# Patient Record
Sex: FEMALE | Race: WHITE | NOT HISPANIC OR LATINO | Employment: OTHER | URBAN - METROPOLITAN AREA
[De-identification: names, ages, dates, MRNs, and addresses within clinical notes are randomized per-mention and may not be internally consistent; named-entity substitution may affect disease eponyms.]

---

## 2017-01-16 ENCOUNTER — ALLSCRIPTS OFFICE VISIT (OUTPATIENT)
Dept: OTHER | Facility: OTHER | Age: 62
End: 2017-01-16

## 2017-01-17 ENCOUNTER — GENERIC CONVERSION - ENCOUNTER (OUTPATIENT)
Dept: OTHER | Facility: OTHER | Age: 62
End: 2017-01-17

## 2017-01-23 ENCOUNTER — TRANSCRIBE ORDERS (OUTPATIENT)
Dept: ADMINISTRATIVE | Facility: HOSPITAL | Age: 62
End: 2017-01-23

## 2017-01-23 ENCOUNTER — HOSPITAL ENCOUNTER (OUTPATIENT)
Dept: RADIOLOGY | Facility: HOSPITAL | Age: 62
Discharge: HOME | End: 2017-01-23
Payer: MEDICARE

## 2017-01-23 DIAGNOSIS — R07.81 PLEURODYNIA: ICD-10-CM

## 2017-01-23 PROCEDURE — 71101 X-RAY EXAM UNILAT RIBS/CHEST: CPT

## 2017-01-24 ENCOUNTER — GENERIC CONVERSION - ENCOUNTER (OUTPATIENT)
Dept: OTHER | Facility: OTHER | Age: 62
End: 2017-01-24

## 2017-04-25 ENCOUNTER — ALLSCRIPTS OFFICE VISIT (OUTPATIENT)
Dept: OTHER | Facility: OTHER | Age: 62
End: 2017-04-25

## 2017-04-25 DIAGNOSIS — M79.7 FIBROMYALGIA: ICD-10-CM

## 2017-04-25 DIAGNOSIS — F30.9 MANIC EPISODE (HCC): ICD-10-CM

## 2017-04-25 DIAGNOSIS — Z13.6 ENCOUNTER FOR SCREENING FOR CARDIOVASCULAR DISORDERS: ICD-10-CM

## 2017-04-25 DIAGNOSIS — R69 ILLNESS: ICD-10-CM

## 2017-07-04 ENCOUNTER — GENERIC CONVERSION - ENCOUNTER (OUTPATIENT)
Dept: OTHER | Facility: OTHER | Age: 62
End: 2017-07-04

## 2017-07-31 ENCOUNTER — GENERIC CONVERSION - ENCOUNTER (OUTPATIENT)
Dept: OTHER | Facility: OTHER | Age: 62
End: 2017-07-31

## 2017-08-28 ENCOUNTER — GENERIC CONVERSION - ENCOUNTER (OUTPATIENT)
Dept: OTHER | Facility: OTHER | Age: 62
End: 2017-08-28

## 2017-11-13 ENCOUNTER — TRANSCRIBE ORDERS (OUTPATIENT)
Dept: MAMMOGRAPHY | Facility: CLINIC | Age: 62
End: 2017-11-13

## 2017-11-13 ENCOUNTER — LAB REQUISITION (OUTPATIENT)
Dept: LAB | Facility: HOSPITAL | Age: 62
End: 2017-11-13
Payer: MEDICARE

## 2017-11-13 ENCOUNTER — ALLSCRIPTS OFFICE VISIT (OUTPATIENT)
Dept: OTHER | Facility: OTHER | Age: 62
End: 2017-11-13

## 2017-11-13 DIAGNOSIS — Z12.31 ENCOUNTER FOR SCREENING MAMMOGRAM FOR MALIGNANT NEOPLASM OF BREAST: ICD-10-CM

## 2017-11-13 DIAGNOSIS — Z01.419 ENCOUNTER FOR GYNECOLOGICAL EXAMINATION WITHOUT ABNORMAL FINDING: ICD-10-CM

## 2017-11-13 PROCEDURE — 87624 HPV HI-RISK TYP POOLED RSLT: CPT | Performed by: PHYSICIAN ASSISTANT

## 2017-11-13 PROCEDURE — G0145 SCR C/V CYTO,THINLAYER,RESCR: HCPCS | Performed by: PHYSICIAN ASSISTANT

## 2017-11-15 ENCOUNTER — HOSPITAL ENCOUNTER (OUTPATIENT)
Dept: MAMMOGRAPHY | Facility: CLINIC | Age: 62
Discharge: HOME | End: 2017-11-15
Payer: MEDICARE

## 2017-11-15 DIAGNOSIS — Z12.31 ENCOUNTER FOR SCREENING MAMMOGRAM FOR MALIGNANT NEOPLASM OF BREAST: ICD-10-CM

## 2017-11-15 PROCEDURE — G0202 SCR MAMMO BI INCL CAD: HCPCS

## 2017-11-16 LAB — HPV RRNA GENITAL QL NAA+PROBE: NORMAL

## 2017-11-17 LAB
LAB AP GYN PRIMARY INTERPRETATION: NORMAL
Lab: NORMAL

## 2017-12-11 ENCOUNTER — GENERIC CONVERSION - ENCOUNTER (OUTPATIENT)
Dept: FAMILY MEDICINE CLINIC | Facility: CLINIC | Age: 62
End: 2017-12-11

## 2018-01-09 NOTE — MISCELLANEOUS
Message  Return to work or school:   Lukasz Hinojosa is under my professional care  She was seen in my office on 1/16/2017   She is able to return to work on  1/30/2017       Ivania Cordova ,PAC        Signatures   Electronically signed by : Justo Huber, ; Jan 23 2017  4:24PM EST                       (Author)

## 2018-01-14 VITALS
WEIGHT: 195.25 LBS | RESPIRATION RATE: 16 BRPM | HEART RATE: 80 BPM | DIASTOLIC BLOOD PRESSURE: 82 MMHG | BODY MASS INDEX: 32.53 KG/M2 | SYSTOLIC BLOOD PRESSURE: 110 MMHG | TEMPERATURE: 98.4 F | HEIGHT: 65 IN

## 2018-01-14 NOTE — PROGRESS NOTES
Assessment   1  Never a smoker  2  Acute sinusitis (461 9) (J01 90)    Plan  Acute sinusitis    · Start: Azithromycin 250 MG Oral Tablet (Zithromax Z-Connor); TAKE 2 TABLETS ON DAY 1  THEN TAKE 1 TABLET A DAY FOR 4 DAYS  PMH: Cough, Shortness of breath    · PEAK FLOW- POC; Status:Complete;   Done: 84THQ7418 08:12AM    Discussion/Summary    Sinus infection - start Z-pack as directed with food, increase Symbicort 2 puffs twice daily for 2 weeks until breathing improves then resume 1 puff twice a day, use Proair as needed  f/u as needed  f/u as scheduled  Possible side effects of new medications were reviewed with the patient/guardian today  The treatment plan was reviewed with the patient/guardian  The patient/guardian understands and agrees with the treatment plan      Chief Complaint  Pt presents to the office with c/o a cough and difficulty breathing  colon due 10/23/23  mammo due 07/14/16  pap due 01/23/2017      History of Present Illness  HPI: [place] and was babysitting grandson from the 20-26  Grandson had a fever of 102 F  Patient started to feel sick around January 23, then started with cough around Jan 26  Nasal congestion, cough, trouble breathing  Ears congested and popping, laying flat coughing worse  Tried [facility] cough medicine like Delsym with no relief  Denies fever, chills, n/v/d  Active Problems   1  Acute URI (465 9) (J06 9)  2  Allergic rhinitis (477 9) (J30 9)  3  Asymptomatic menopausal state (V49 81) (Z78 0)  4  Basal cell carcinoma of skin (173 91) (C44 91)  5  Bipolar I disorder, single manic episode (296 00) (F30 9)  6  Cervical radiculopathy (723 4) (M54 12)  7  Cervical spinal stenosis (723 0) (M48 02)  8  Chronic pain syndrome (338 4) (G89 4)  9  Colonoscopy (Fiberoptic) Screening  10  Degeneration of cervical intervertebral disc (722 4) (M50 90)  11  Disc degeneration, lumbar (722 52) (M51 36)  12  Encounter for routine gynecological examination (V72 31) (Z01 419)  13  Encounter for screening for malignant neoplasm of colon (V76 51) (Z12 11)  14  Encounter for screening mammogram for malignant neoplasm of breast (V76 12)    (Z12 31)  15  Esophageal reflux (530 81) (K21 9)  16  Essential hypertriglyceridemia (272 1) (E78 1)  17  Fatigue (780 79) (R53 83)  18  Fibromyalgia (729 1) (M79 7)  19  Herpes simplex infection (054 9) (B00 9)  20  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 1) (G56 10)  21  Need for hepatitis C screening test (V73 89) (Z11 59)  22  Need for prophylactic vaccination and inoculation against influenza (V04 81) (Z23)  23  Need for Tdap vaccination (V06 1) (Z23)  24  Screening for osteoporosis (V82 81) (Z13 820)  25  Vaccine for VZV (varicella-zoster virus) (V05 4) (Z23)    Past Medical History   1  History of Abnormal blood chemistry (790 6) (R79 9)  2  Acute bronchitis (466 0) (J20 9)  3  Acute bronchitis (466 0) (J20 9)  4  History of Anxiety (300 00) (F41 9)  5  History of Cellulitis of extremity (L03 119)  6  History of Cellulitis of right lower leg (682 6) (L03 115)  7  History of Chest pain (786 50) (R07 9)  8  History of Chronic constipation (564 00) (K59 00)  9  History of Chronic hyperglycemia (790 29) (R73 9)  10  Cough (786 2) (R05)  11  History of Cough (786 2) (R05)  12  History of Depression (311) (F32 9)  13  History of Elevated serum creatinine (790 99) (R74 8)  14  History of Fibromyalgia (729 1) (M79 7)  15  History of Flushing (782 62) (R23 2)  16  History of Folliculitis (244 7) (Z80 8)  17  History of Headache (784 0) (R51)  18  History of acute sinusitis (V12 69) (Z87 09)  19  History of bipolar disorder (V11 1) (Z86 59)  20  History of chronic fatigue syndrome (V13 89) (Z87 898)  21  History of chronic fatigue syndrome (V13 89) (Z87 898)  22  History of edema (V13 89) (Z87 898)  23  History of gastroesophageal reflux (GERD) (V12 79) (Z87 19)  24  History of herpes simplex infection (V12 09) (Z86 19)  25  History of hyperglycemia (V12 29) (Z86 39)  26   History of hypokalemia (V12 29) (Z86 39)  27  History of insomnia (V13 89) (Z87 898)  28  History of low back pain (V13 59) (Z87 39)  29  History of upper respiratory infection (V12 09) (Z87 09)  30  History of upper respiratory infection (V12 09) (Z87 09)  31  History of Leg swelling (729 81) (M79 89)  32  History of Nasal congestion (478 19) (R09 81)  33  History of Neck pain (723 1) (M54 2)  34  Personal history of arthritis (V13 4) (Z87 39)  35  History of Pneumonia (V12 61)  36  History of Sacroiliitis (720 2) (M46 1)  37  Visit for pre-operative examination (V72 84) (K72 547)    Family History   1  Family history of Hypertension (V17 49)  2  Family history of Osteoporosis (V17 81)   3  Family history of Acute Myocardial Infarction (V17 3)  4  Family history of   5  Family history of Malignant Melanoma Of The Skin (V16 8)   6  Family history of Diabetes Mellitus (V18 0)   7  Family history of Coronary Artery Disease (V17 49)   8  Family history of Acute Myocardial Infarction (V17 3)  9  Family history of Arthritis (V17 7)  10  Family history of Diabetes Mellitus (V18 0)  11  Family history of hypertension (V17 49) (Z82 49)  12  Family history of Heart Disease (V17 49)  13  Family history of Osteoporosis (V17 81)  Family History Reviewed: The family history was reviewed and updated today  Social History    · Always uses seat belt   · Daily caffeine consumption, 1 serving a day   · Employed in sales position   · Lives independently   · Marital History -  (V61 03)   · Never a smoker   · No alcohol use   · No drug use   · Unemployed (V62 0)  The social history was reviewed and updated today  The social history was reviewed and is unchanged  Surgical History   1  History of Breast Surgery Reduction Procedure  2  History of Cervical Vertebral Fusion  3  History of Complete Colonoscopy  4  History of Dilation And Curettage  5  History of Hallux Valgus (Bunion) Correction  6   History of Hemorrhoidectomy  7  History of Neuroplasty Decompression Median Nerve At Carpal Tunnel  8  History of Oral Surgery Tooth Extraction  9  History of Tonsillectomy  10  History of Tubal Ligation  Surgical History Reviewed: The surgical history was reviewed and updated today  Current Meds  1  Abilify 20 MG Oral Tablet; 1 tab PO q day; Therapy: 84Aam1721 to Recorded  2  Bisacodyl EC 5 MG Oral Tablet Delayed Release; Therapy: (Recorded:07Oct2015) to Recorded  3  Centrum Silver TABS; TAKE 1 TABLET DAILY; Therapy: (Recorded:43Wda3145) to Recorded  4  Cymbalta 60 MG Oral Capsule Delayed Release Particles; TAKE 1 CAPSULE DAILY; Therapy: 39Wgs1913 to (Evaluate:27Oct2012) Recorded  5  Loratadine 10 MG Oral Tablet; Take 1 tablet daily; Therapy: (Recorded:87Gzi8904) to Recorded  6  Montelukast Sodium 10 MG Oral Tablet; TAKE 1 TABLET AT BEDTIME; Therapy: 20OIB7214 to (Evaluate:17Nov2016)  Requested for: 72ANK4805; Last   Rx:23Nov2015 Ordered  7  ProAir  (90 Base) MCG/ACT Inhalation Aerosol Solution; 2 PUFF(S) EVERY 4   HOURS AS NEEDED; Therapy: 02CLO8179 to (Ila Merrill)  Requested for: 42PWA7368; Last   Rx:16Oct2015 Ordered  8  Symbicort 160-4 5 MCG/ACT Inhalation Aerosol; INHALE 1 PUFFS Twice daily; Therapy: 46QAW5386 to (Last Rx:28Ght8551) Ordered    The medication list was reviewed and updated today  Allergies   1  Keflex TABS  2  Latex Exam Gloves Miscellaneous  3  RisperDAL TABS  4  Zoloft TABS   5  Soy  6  No Known Environmental Allergies    Vitals   Recorded: 44JLL4580 08:11AM   Temperature 98 F, Oral   Heart Rate 68, R Radial   Respiration 20   Respiration Quality Difficult   Systolic 613, RUE, Sitting   Diastolic 64, RUE, Sitting   Height 5 ft 6 in   Weight 227 lb    BMI Calculated 36 64   BSA Calculated 2 12     Physical Exam    Constitutional   General appearance: No acute distress, well appearing and well nourished      Eyes   Conjunctiva and lids: No swelling, erythema or discharge  Pupils and irises: Equal, round and reactive to light  Ears, Nose, Mouth, and Throat   External inspection of ears and nose: Normal     Otoscopic examination: Tympanic membranes translucent with normal light reflex  Canals patent without erythema  Nasal mucosa, septum, and turbinates: Abnormal   turbinates inflamed, congested  Oropharynx: Normal with no erythema, edema, exudate or lesions  Pulmonary   Respiratory effort: No increased work of breathing or signs of respiratory distress  Auscultation of lungs: Clear to auscultation  Cardiovascular   Palpation of heart: Normal PMI, no thrills  Auscultation of heart: Normal rate and rhythm, normal S1 and S2, without murmurs  Lymphatic   Palpation of lymph nodes in neck: No lymphadenopathy      Psychiatric   Mood and affect: Normal          Results/Data  PEAK FLOW- POC 19VMU1887 08:12AM Unk Sirisha     Test Name Result Flag Reference   Peak Flow 157,718,189         Signatures   Electronically signed by : Hamilton Preciado, [facility]; Feb 1 2016  2:04PM EST                       (Author)    Electronically signed by : JILL Francisco ; Feb 1 2016  3:01PM EST                       (Author)

## 2018-01-15 NOTE — MISCELLANEOUS
Message   Recorded as Task   Date: 01/24/2017 11:08 AM, Created By: Dajuan Martinez   Task Name: Call Back   Assigned To: Glenroy Chen   Regarding Patient: Bekah Watkins, Status: Active   CommentJuleen Scot - 24 Jan 2017 11:08 AM     TASK CREATED  Caller: Self; [phone] (Home); [phone] (Work)  pt is asking for her x ray results  pt # [phone]   Glenroy Chen - 24 Jan 2017 12:08 PM     TASK REPLIED TO: Previously Assigned To Edwina Holbrook  XR shows only an old healed fracture of rib 7 on L side  It is going to take time for the pain to resolve  I would continue to rest and try not to aggravate rib  Limit heavy  lifting, turning, ect     Dajuan Martinez - 24 Jan 2017 12:42 PM     TASK EDITED  pt informed        Signatures   Electronically signed by : Nel Flores, [facility]; Jan 24 2017  1:01PM EST                       (Author)

## 2018-01-17 NOTE — PROGRESS NOTES
Assessment    1  Encounter for routine gynecological examination (V72 31) (Z01 419)    Plan  Encounter for routine gynecological examination    · (1) THIN PREP PAP WITH IMAGING; Status: In Progress - Specimen/Data Collected;    Done: 00UZI3152  Maturation index required? : No  : Postmenopausal  HPV? : Regardless of Interpretation  Encounter for screening mammogram for malignant neoplasm of breast    · * MAMMO SCREENING BILATERAL W CAD; Status:Hold For - Scheduling; Requested  for:13Nov2017;   Need for prophylactic vaccination and inoculation against influenza    · Fluzone Quadrivalent Intramuscular Suspension    Discussion/Summary  health maintenance visit healthy adult female Currently, she eats an adequate diet and has an inadequate exercise regimen  the risks and benefits of cervical cancer screening were discussed Pap test with reflex HPV testing was done today Breast cancer screening: the risks and benefits of breast cancer screening were discussed, self breast exam technique was taught, monthly self breast exam was advised and mammogram has been ordered  Colorectal cancer screening: the risks and benefits of colorectal cancer screening were discussed and colorectal cancer screening is current  The risks and benefits of immunizations were discussed and immunizations are up to date  Advice and education were given regarding nutrition, aerobic exercise, weight bearing exercise, weight loss, calcium supplements, vitamin D supplements, reproductive health, self skin examination and seat belt use  Patient discussion: discussed with the patient  Hepatitis C Screening: the patient was counseled on Hepatitis C screening  The patient declines Hepatitis C screening  Gyn exam - conducted, mammo to be done today, labs reviewed from August normal repeat in 1 year    Colon due 10/23/23  PAP due 11/23/16  mammo DUE 10/10/2017  Possible side effects of new medications were reviewed with the patient/guardian today   The treatment plan was reviewed with the patient/guardian  The patient/guardian understands and agrees with the treatment plan      Chief Complaint  Pt presents to the office for her Annual GYN  Colon due 10/23/23  PAP due 11/23/16  mammo DUE 10/10/2017      History of Present Illness  HM, Adult Female: The patient is being seen for a gynecology evaluation  General Health: The patient's health since the last visit is described as good  She has regular dental visits  She denies vision problems  Lifestyle:  She consumes a diverse and healthy diet  She exercises regularly  She does not use tobacco  She denies alcohol use  She denies drug use  Reproductive health:  she reports normal menses  she uses contraception  For contraception, she has had a tubal occlusion  Screening:   HPI: Patient here for PAP  Working at [employer], 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"  Review of Systems    Constitutional: No fever, no chills, feels well, no tiredness, no recent weight gain or weight loss  Eyes: No complaints of eye pain, no red eyes, no eyesight problems, no discharge, no dry eyes, no itching of eyes  ENT: no complaints of earache, no loss of hearing, no nose bleeds, no nasal discharge, no sore throat, no hoarseness  Cardiovascular: No complaints of slow heart rate, no fast heart rate, no chest pain, no palpitations, no leg claudication, no lower extremity edema  Respiratory: No complaints of shortness of breath, no wheezing, no cough, no SOB on exertion, no orthopnea, no PND  Gastrointestinal: No complaints of abdominal pain, no constipation, no nausea or vomiting, no diarrhea, no bloody stools  Genitourinary: No complaints of dysuria, no incontinence, no pelvic pain, no dysmenorrhea, no vaginal discharge or bleeding  Musculoskeletal: No complaints of arthralgias, no myalgias, no joint swelling or stiffness, no limb pain or swelling     Integumentary: No complaints of skin rash or lesions, no itching, no skin wounds, no breast pain or lump  Neurological: No complaints of headache, no confusion, no convulsions, no numbness, no dizziness or fainting, no tingling, no limb weakness, no difficulty walking  Psychiatric: Not suicidal, no sleep disturbance, no anxiety or depression, no change in personality, no emotional problems  Endocrine: No complaints of proptosis, no hot flashes, no muscle weakness, no deepening of the voice, no feelings of weakness  Hematologic/Lymphatic: No complaints of swollen glands, no swollen glands in the neck, does not bleed easily, does not bruise easily  Active Problems    1  Allergic rhinitis (477 9) (J30 9)   2  Basal cell carcinoma of skin (173 91) (C44 91)   3  Bipolar I disorder, single manic episode (296 00) (F30 9)   4  Cervical radiculopathy (723 4) (M54 12)   5  Degeneration of cervical intervertebral disc (722 4) (M50 90)   6  Disc degeneration, lumbar (722 52) (M51 36)   7  Fibromyalgia (729 1) (M79 7)   8  Screening for cardiovascular condition (V81 2) (Z13 6)   9   Taking medication for chronic disease (799 9) (R69)    Past Medical History    · History of Abnormal blood chemistry (790 6) (R79 9)   · Acute bronchitis (466 0) (J20 9)   · Acute bronchitis (466 0) (J20 9)   · Acute sinusitis (461 9) (J01 90)   · History of Acute viral pharyngitis (462) (J02 8,B97 89)   · History of Anxiety (300 00) (F41 9)   · History of Asymptomatic menopausal state (V49 81) (Z78 0)   · History of Cellulitis of extremity (L03 119)   · History of Cellulitis of right lower leg (682 6) (L03 115)   · History of Cervical spinal stenosis (723 0) (M48 02)   · History of Chest pain (786 50) (R07 9)   · History of Chest wall muscle strain (848 8) (S29 011A)   · History of Chronic constipation (564 00) (K59 09)   · History of Chronic hyperglycemia (790 29) (R73 9)   · History of Chronic pain syndrome (338 4) (G89 4)   · Cough (786 2) (R05)   · History of Cough (786 2) (R05)   · History of Cough (786 2) (R05)   · History of Depression (311) (F32 9)   · History of Elevated serum creatinine (790 99) (R79 89)   · History of Essential hypertriglyceridemia (272 1) (E78 1)   · History of Fibromyalgia (729 1) (M79 7)   · History of Flushing (782 62) (R23 2)   · History of Folliculitis (527 9) (D70 7)   · History of Headache (784 0) (R51)   · History of acute sinusitis (V12 69) (Z87 09)   · History of bipolar disorder (V11 1) (Z86 59)   · History of chronic fatigue syndrome (V13 89) (E96 978)   · History of chronic fatigue syndrome (V13 89) (X86 443)   · History of constipation (V12 79) (Z87 19)   · History of edema (V13 89) (J09 357)   · History of esophageal reflux (V12 79) (Z87 19)   · History of fatigue (V13 89) (M65 543)   · History of gastroesophageal reflux (GERD) (V12 79) (Z87 19)   · History of herpes simplex infection (V12 09) (Z86 19)   · History of herpes simplex infection (V12 09) (Z86 19)   · History of hyperglycemia (V12 29) (Z86 39)   · History of hypokalemia (V12 29) (Z86 39)   · History of insomnia (V13 89) (Z87 898)   · History of low back pain (V13 59) (Z87 39)   · History of shortness of breath (V13 89) (W21 715)   · History of shortness of breath (V13 89) (I15 792)   · History of upper respiratory infection (V12 09) (Z87 09)   · History of upper respiratory infection (V12 09) (Z87 09)   · History of Leg swelling (729 81) (M79 89)   · History of 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 1) (G56 10)   · History of Nasal congestion (478 19) (R09 81)   · History of Neck pain (723 1) (M54 2)   · Personal history of arthritis (V13 4) (Z87 39)   · History of Pneumonia (V12 61)   · History of Sacroiliitis (720 2) (M46 1)   · Visit for pre-operative examination (V72 84) (K15 693)    Surgical History    · History of Breast Surgery Reduction Procedure   · History of Cervical Vertebral Fusion   · History of Complete Colonoscopy   · History of Dilation And Curettage   · History of Hallux Valgus (Bunion) Correction   · History of Hemorrhoidectomy   · History of Neuroplasty Decompression Median Nerve At Carpal Tunnel   · History of Oral Surgery Tooth Extraction   · History of Tonsillectomy   · History of Tubal Ligation    Family History  Mother    · Denied: Family history of substance abuse   · Family history of Hypertension (V17 49)   · Denied: Family history of Mental health problem   · Family history of Osteoporosis (V17 81)  Father    · Family history of Acute Myocardial Infarction (V17 3)   · Family history of    · Denied: Family history of substance abuse   · Family history of Malignant Melanoma Of The Skin (V16 8)   · Denied: Family history of Mental health problem  Paternal Grandmother    · Family history of Diabetes Mellitus (V18 0)  Paternal Grandfather    · Family history of Coronary Artery Disease (V17 49)  Family History    · Family history of Acute Myocardial Infarction (V17 3)   · Family history of Arthritis (V17 7)   · Family history of Diabetes Mellitus (V18 0)   · Family history of hypertension (V17 49) (Z82 49)   · Family history of Heart Disease (V17 49)   · Family history of Osteoporosis (V17 81)    Social History    · Always uses seat belt   · Daily caffeine consumption, 1 serving a day   · Employed in sales position   · Has smoke detectors   · Lives independently   · Marital History -  (V61 03)   · Never a smoker   · No alcohol use   · No drug use   · Unemployed (V62 0)    Current Meds   1  Abilify 10 MG Oral Tablet; TAKE 1 TABLET DAILY; Therapy: 2012 to Recorded   2  Bisacodyl EC 5 MG Oral Tablet Delayed Release; TAKE 1 TABLET DAILY AS NEEDED; Therapy: (Recorded:2017) to Recorded   3  [employer] Vitamin D CAPS; Therapy: (Recorded:2016) to Recorded   4  Cymbalta 60 MG Oral Capsule Delayed Release Particles; TAKE 1 CAPSULE DAILY; Therapy: 71Wmq0756 to (Evaluate:2012) Recorded   5  Turmeric 500 MG Oral Capsule;    Therapy: (Recorded:2016) to Recorded    Allergies    1  Keflex TABS   2  Latex Exam Gloves MISC   3  RisperDAL TABS   4  Zoloft TABS    5  Dust   6  Mold   7  Other   8  Soy   9  Trees    Vitals   Recorded: 95EEB1760 01:31PM   Heart Rate 76, L Radial   Pulse Quality Regular, L Radial   Respiration Quality Normal   Respiration 16   Systolic 114, LUE, Sitting   Diastolic 68, LUE, Sitting   Height 5 ft 6 in   Weight 207 lb 14 4 oz   BMI Calculated 33 56   BSA Calculated 2 03     Physical Exam    Constitutional   General appearance: No acute distress, well appearing and well nourished  Neck   Neck: Supple, symmetric, trachea midline, no masses  Thyroid: Normal, no thyromegaly  Pulmonary   Respiratory effort: No increased work of breathing or signs of respiratory distress  Palpation of chest: Normal     Auscultation of lungs: Clear to auscultation  Cardiovascular   Palpation of heart: Normal PMI, no thrills  Auscultation of heart: Normal rate and rhythm, normal S1 and S2, no murmurs  Pedal pulses: 2+ bilaterally  Examination of extremities for edema and/or varicosities: Normal     Chest   Breasts: Normal, no dimpling or skin changes appreciated  Palpation of breasts and axillae: Normal, no masses palpated  Chest: Normal     Abdomen   Abdomen: Non-tender, no masses  Liver and spleen: No hepatomegaly or splenomegaly  Genitourinary   External genitalia and vagina: Normal, no lesions appreciated  Cervix: Normal, no lesions  Uterus: Normal size, no tenderness, no masses  Adnexa/Parametria: Normal, no masses or tenderness  Lymphatic   Palpation of lymph nodes in neck: No lymphadenopathy  Palpation of lymph nodes in axillae: No lymphadenopathy  Musculoskeletal   Gait and station: Normal     Skin   Skin and subcutaneous tissue: Normal without rashes or lesions  Palpation of skin and subcutaneous tissue: Normal turgor  Neurologic   Cranial nerves: Cranial nerves II-XII intact      Reflexes: 2+ and symmetric  Psychiatric   Judgment and insight: Normal     Mood and affect: Normal        Health Management  History of Encounter for routine gynecological examination   (1) THIN PREP PAP WITH IMAGING; every 2 years; Last 01GBO5826; Next Due:  92YCJ1905; Near Due  History of Encounter for screening for malignant neoplasm of colon   COLONOSCOPY; every 10 years; Last 23Oct2013; Next Due: 44XXG9885; Active  History of Encounter for screening mammogram for malignant neoplasm of breast   Digital Bilateral Screening Mammogram With CAD; every 1 year; Last 26NYN0210; Next  Due: 99UVK8318;  Overdue    Signatures   Electronically signed by : Rob Hernandez, [facility]; Nov 13 2017  2:28PM EST                       (Author)    Electronically signed by : JILL Rodriguez ; Nov 13 2017  5:23PM EST                       (Author)

## 2018-01-17 NOTE — PROGRESS NOTES
Assessment    1  Taking medication for chronic disease (799 9) (R69)   2  Screening for cardiovascular condition (V81 2) (Z13 6)   3  Encounter for preventive health examination (V70 0) (Z00 00)    Plan  Bipolar I disorder, single manic episode, Fibromyalgia, Taking medication for chronic  disease    · (1) TSH WITH FT4 REFLEX; Status:Active; Requested for:[id number];   Fibromyalgia, Taking medication for chronic disease    · (1) CBC/PLT/DIFF; Status:Active; Requested for:[id number];   Screening for cardiovascular condition, Taking medication for chronic disease    · (1) LIPID PANEL FASTING W DIRECT LDL REFLEX; Status:Active; Requested  for:[id number];   Taking medication for chronic disease    · (1) COMPREHENSIVE METABOLIC PANEL; Status:Active; Requested for:[id number];    · (1) URINALYSIS (will reflex a microscopy if leukocytes, occult blood, protein or nitrites  are not within normal limits); Status:Active; Requested for:25Apr2017;     Discussion/Summary    Medicare wellness - conducted  Impression: Subsequent Annual Wellness Visit, with preventive exam as well as age and risk appropriate counseling completed  Cardiovascular screening and counseling: the risks and benefits of screening were discussed, counseling was given on maintaining a healthy diet, counseling was given on maintaining a healthy weight and due for a lipid panel  Diabetes screening and counseling: the risks and benefits of screening were discussed, counseling was given on maintaining a healthy diet, counseling was given on maintaining a healthy weight, counseling was given on ways to improve physical activity and due for blood glucose  Colorectal cancer screening and counseling: the risks and benefits of screening were discussed, screening is current and due 2023  Breast cancer screening and counseling: the risks and benefits of screening were discussed, screening is current and due 10/2017     Cervical cancer screening and counseling: the risks and benefits of screening were discussed, screening is current and due 11/2017  Osteoporosis screening and counseling: the risks and benefits of screening were discussed and screening is current  Abdominal aortic aneurysm screening and counseling: the risks and benefits of screening were discussed and screening not indicated  Glaucoma screening and counseling: the risks and benefits of screening were discussed, screening is current and glaucoma screening due every 1 year(s)  HIV screening and counseling: the risks and benefits of screening were discussed and screening not indicated  Immunizations: the risks and benefits of influenza vaccination were discussed with the patient, influenza vaccine is up to date this year, the risks and benefits of pneumococcal vaccination were discussed with the patient, no age appropriate, hepatitis B vaccination series is not indicated at this time due to the patient's low risk of daniel the disease, the risks and benefits of the Zostavax vaccine were discussed with the patient, Zostavax vaccination up to date, the risks and benefits of the Td vaccine were discussed with the patient, Td vaccination up to date, the risks and benefits of the Tdap vaccine were discussed with the patient and Tdap vaccination up to date  Advance Directive Planning: has at home will bring here  Advice and education were given regarding fall risk reduction, increasing physical activity, nutrition (non-diabetic), seat belt use, skin self-exam, sunscreen use and weight loss  Patient Discussion: plan discussed with the patient, follow-up visit needed in one year, follow-up as needed  The treatment plan was reviewed with the patient/guardian   The patient/guardian understands and agrees with the treatment plan      Chief Complaint  Pt presents to the office for her initial annual wellness  Colon due 10/23/23  mammo due 10/10/17  PAP due 11/23/17       History of Present Illness  HPI: Patient here for wellness  Has an appt with Dr Breanna Adame, dermatology, for July  Specialist for skin cancer  Not eating cured meat, gluten, dairy  Lost 40 lbs  Psych Dr Heena Martin, [facility]   Welcome to [employer] and Wellness Visits: The patient is being seen for the subsequent annual wellness visit  Medicare Screening and Risk Factors   Hospitalizations: no previous hospitalizations  Medicare Screening Tests Risk Questions   Abdominal aortic aneurysm risk assessment: none indicated  Osteoporosis risk assessment: none indicated  HIV risk assessment: none indicated  Drug and Alcohol Use: The patient has never smoked cigarettes  The patient reports never drinking alcohol  She has never used illicit drugs  Diet and Physical Activity: Current diet includes well balanced meals  She exercises daily  The patient does not exercise  Exercise: walking 3hours hours per week  Mood Disorder and Cognitive Impairment Screening: She denies feeling down, depressed, or hopeless over the past two weeks  She denies feeling little interest or pleasure in doing things over the past two weeks  Cognitive impairment screening: denies difficulty learning/retaining new information, denies difficulty handling complex tasks, denies difficulty with reasoning, denies difficulty with spatial ability and orientation, denies difficulty with language and denies difficulty with behavior  Functional Ability/Level of Safety: Hearing is normal bilaterally and a hearing aid is not used  The patient is currently able to do activities of daily living without limitations, able to do instrumental activities of daily living without limitations, able to participate in social activities without limitations and able to drive without limitations   Activities of daily living details: does not need help using the phone, no transportation help needed, does not need help shopping, no meal preparation help needed, does not need help doing housework, does not need help doing laundry, does not need help managing medications and does not need help managing money  Fall risk factors:  polypharmacy, but The patient fell 1- fell of latter 10/2016 times in the past 12 months , no alcohol use, no mobility impairment, no antidepressant use, no deconditioning, no postural hypotension, no sedative use, no visual impairment, no urinary incontinence, no antihypertensive use, no cognitive impairment, up and go test was normal and no previous fall  Home safety risk factors:  unfamiliar surroundings, loose rugs, household clutter, no grab bars in the bathroom and moved to a new place, working on getting settled, feels safe, but no poor household lighting, no uneven floors and handrails on the stairs  Advance Directives: Advance directives: living will, durable power of  for health care directives, advance directives and Pt will bring copy  end of life decisions were reviewed with the patient and I agree with the patient's decisions  Co-Managers and Medical Equipment/Suppliers: See Patient Care Team      Patient Care Team    Care Team Member Role Specialty Office Number   [facility] DO  Pain Management [phone]   [place] [address] [phone]   [facility]  Family Medicine [phone]     Review of Systems    Constitutional: negative  Eyes: negative  ENT: negative  Cardiovascular: negative  Respiratory: negative  Gastrointestinal: negative  Genitourinary: negative  Musculoskeletal: negative  Integumentary and Breasts: negative  Neurological: negative  Psychiatric: negative  Endocrine: negative  Hematologic and Lymphatic: negative  Active Problems    1  Allergic rhinitis (477 9) (J30 9)   2  Basal cell carcinoma of skin (173 91) (C44 91)   3  Bipolar I disorder, single manic episode (296 00) (F30 9)   4  Cervical radiculopathy (723 4) (M54 12)   5   Degeneration of cervical intervertebral disc (722 4) (M50 90)   6  Disc degeneration, lumbar (722 52) (M51 36)   7   Fibromyalgia (729 1) (M79 7)    Past Medical History    · History of Abnormal blood chemistry (790 6) (R79 9)   · Acute bronchitis (466 0) (J20 9)   · Acute bronchitis (466 0) (J20 9)   · Acute sinusitis (461 9) (J01 90)   · History of Acute viral pharyngitis (462) (J02 8,B97 89)   · History of Anxiety (300 00) (F41 9)   · History of Asymptomatic menopausal state (V49 81) (Z78 0)   · History of Cellulitis of extremity (L03 119)   · History of Cellulitis of right lower leg (682 6) (L03 115)   · History of Cervical spinal stenosis (723 0) (M48 02)   · History of Chest pain (786 50) (R07 9)   · History of Chest wall muscle strain (848 8) (S29 011A)   · History of Chronic constipation (564 00) (K59 09)   · History of Chronic hyperglycemia (790 29) (R73 9)   · History of Chronic pain syndrome (338 4) (G89 4)   · Cough (786 2) (R05)   · History of Cough (786 2) (R05)   · History of Cough (786 2) (R05)   · History of Depression (311) (F32 9)   · History of Elevated serum creatinine (790 99) (R79 89)   · History of Essential hypertriglyceridemia (272 1) (E78 1)   · History of Fibromyalgia (729 1) (M79 7)   · History of Flushing (782 62) (R23 2)   · History of Folliculitis (331 1) (A09 5)   · History of Headache (784 0) (R51)   · History of acute sinusitis (V12 69) (Z87 09)   · History of bipolar disorder (V11 1) (Z86 59)   · History of chronic fatigue syndrome (V13 89) (A41 249)   · History of chronic fatigue syndrome (V13 89) (P78 053)   · History of constipation (V12 79) (Z87 19)   · History of edema (V13 89) (C97 554)   · History of esophageal reflux (V12 79) (Z87 19)   · History of fatigue (V13 89) (X62 087)   · History of gastroesophageal reflux (GERD) (V12 79) (Z87 19)   · History of herpes simplex infection (V12 09) (Z86 19)   · History of herpes simplex infection (V12 09) (Z86 19)   · History of hyperglycemia (V12 29) (Z86 39)   · History of hypokalemia (V12 29) (Z86 39)   · History of insomnia (V13 89) (W56 918)   · History of low back pain (V13 59) (Z87 39)   · History of shortness of breath (V13 89) (Y44 320)   · History of shortness of breath (V13 89) (F71 114)   · History of upper respiratory infection (V12 09) (Z87 09)   · History of upper respiratory infection (V12 09) (Z87 09)   · History of Leg swelling (729 81) (M79 89)   · History of 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 1) (G56 10)   · History of Nasal congestion (478 19) (R09 81)   · History of Neck pain (723 1) (M54 2)   · Personal history of arthritis (V13 4) (Z87 39)   · History of Pneumonia (V12 61)   · History of Sacroiliitis (720 2) (M46 1)   · Visit for pre-operative examination (V72 84) (X30 160)    The active problems and past medical history were reviewed and updated today  Surgical History    · History of Breast Surgery Reduction Procedure   · History of Cervical Vertebral Fusion   · History of Complete Colonoscopy   · History of Dilation And Curettage   · History of Hallux Valgus (Bunion) Correction   · History of Hemorrhoidectomy   · History of Neuroplasty Decompression Median Nerve At Carpal Tunnel   · History of Oral Surgery Tooth Extraction   · History of Tonsillectomy   · History of Tubal Ligation    The surgical history was reviewed and updated today         Family History  Mother    · Family history of Hypertension (V17 49)   · Family history of Osteoporosis (V17 81)  Father    · Family history of Acute Myocardial Infarction (V17 3)   · Family history of    · Family history of Malignant Melanoma Of The Skin (V16 8)  Paternal Grandmother    · Family history of Diabetes Mellitus (V18 0)  Paternal Grandfather    · Family history of Coronary Artery Disease (V17 49)  Family History    · Family history of Acute Myocardial Infarction (V17 3)   · Family history of Arthritis (V17 7)   · Family history of Diabetes Mellitus (V18 0)   · Family history of hypertension (V17 49) (Z82 49) · Family history of Heart Disease (V17 49)   · Family history of Osteoporosis (V17 81)    The family history was reviewed and updated today  Social History    · Always uses seat belt   · Daily caffeine consumption, 1 serving a day   · Employed in sales position   · Has smoke detectors   · Lives independently   · Marital History -  (V61 03)   · Never a smoker   · No alcohol use   · No drug use   · Unemployed (V62 0)  The social history was reviewed and updated today  The social history was reviewed and is unchanged  Current Meds   1  Abilify 10 MG Oral Tablet; Therapy: 28Aug2012 to Recorded   2  Bisacodyl EC 5 MG Oral Tablet Delayed Release; Therapy: (Recorded:07Oct2015) to Recorded   3  [employer] Vitamin D CAPS; Therapy: (Recorded:10Oct2016) to Recorded   4  Cymbalta 60 MG Oral Capsule Delayed Release Particles; TAKE 1 CAPSULE DAILY; Therapy: 28Aug2012 to (Evaluate:27Oct2012) Recorded   5  Turmeric 500 MG Oral Capsule; Therapy: (Recorded:10Oct2016) to Recorded    The medication list was reviewed and updated today  Allergies    1  Keflex TABS   2  Latex Exam Gloves MISC   3  RisperDAL TABS   4  Zoloft TABS    5  Dust   6  Mold   7  Other   8  Soy   9  Trees    Immunizations   1 2 3 4    Influenza  12-Nov-2013 13-Oct-2014 14-Sep-2015 10-Oct-2016    Tdap  23-Nov-2015       Zoster  07-Oct-2015        Vitals  Signs    Heart Rate: 72, R Radial  Pulse Quality: Normal, R Radial  Respiration Quality: Normal  Respiration: 16  Systolic: 815, RUE, Sitting  Diastolic: 68, RUE, Sitting  Height: 5 ft 5 in  Weight: 195 lb 4 8 oz  BMI Calculated: 32 5  BSA Calculated: 1 96    Physical Exam    Constitutional   General appearance: No acute distress, well appearing and well nourished  Neck   Neck: Supple, symmetric, trachea midline, no masses  Thyroid: Normal, no thyromegaly  Pulmonary   Respiratory effort: No increased work of breathing or signs of respiratory distress      Palpation of chest: Normal     Auscultation of lungs: Clear to auscultation  Cardiovascular   Palpation of heart: Normal PMI, no thrills  Auscultation of heart: Normal rate and rhythm, normal S1 and S2, no murmurs  Pedal pulses: 2+ bilaterally  Examination of extremities for edema and/or varicosities: Normal     Abdomen   Abdomen: Non-tender, no masses  Lymphatic   Palpation of lymph nodes in neck: No lymphadenopathy  Musculoskeletal   Gait and station: Normal     Psychiatric   Judgment and insight: Normal     Mood and affect: Normal        Health Management  History of Encounter for routine gynecological examination   (1) THIN PREP PAP WITH IMAGING; every 2 years; Last 35ZLF5543; Next Due:  53YBN0810; Active  History of Encounter for screening for malignant neoplasm of colon   COLONOSCOPY; every 10 years; Last 23Oct2013; Next Due: 49BMD0401; Active  History of Encounter for screening mammogram for malignant neoplasm of breast   Digital Bilateral Screening Mammogram With CAD; every 1 year; Last 04YTX0044; Next  Due: 63PRO9406; Overdue    Future Appointments    Date/Time Provider Specialty Site   11/13/2017 01:30 PM Shalom Fuchs [facility] [address] [place] [address] Drive     Signatures   Electronically signed by : Hamilton Preciado, [facility];  Apr 25 2017  4:06PM EST                       (Author)    Electronically signed by : JILL Francisco ; Apr 25 2017  4:14PM EST                       (Author)

## 2018-01-22 VITALS
WEIGHT: 195.3 LBS | HEART RATE: 72 BPM | SYSTOLIC BLOOD PRESSURE: 124 MMHG | HEIGHT: 65 IN | RESPIRATION RATE: 16 BRPM | BODY MASS INDEX: 32.54 KG/M2 | DIASTOLIC BLOOD PRESSURE: 68 MMHG

## 2018-01-22 VITALS
SYSTOLIC BLOOD PRESSURE: 118 MMHG | BODY MASS INDEX: 33.41 KG/M2 | HEART RATE: 76 BPM | WEIGHT: 207.9 LBS | DIASTOLIC BLOOD PRESSURE: 68 MMHG | HEIGHT: 66 IN | RESPIRATION RATE: 16 BRPM

## 2018-03-15 ENCOUNTER — OFFICE VISIT (OUTPATIENT)
Dept: FAMILY MEDICINE CLINIC | Facility: CLINIC | Age: 63
End: 2018-03-15
Payer: MEDICARE

## 2018-03-15 VITALS
WEIGHT: 215 LBS | HEIGHT: 66 IN | DIASTOLIC BLOOD PRESSURE: 72 MMHG | RESPIRATION RATE: 16 BRPM | SYSTOLIC BLOOD PRESSURE: 126 MMHG | HEART RATE: 76 BPM | TEMPERATURE: 97.7 F | BODY MASS INDEX: 34.55 KG/M2

## 2018-03-15 DIAGNOSIS — J30.0 ACUTE VASOMOTOR RHINITIS: ICD-10-CM

## 2018-03-15 DIAGNOSIS — R05.9 COUGH: Primary | ICD-10-CM

## 2018-03-15 DIAGNOSIS — J30.9 CHRONIC ALLERGIC RHINITIS, UNSPECIFIED SEASONALITY, UNSPECIFIED TRIGGER: ICD-10-CM

## 2018-03-15 PROCEDURE — 99213 OFFICE O/P EST LOW 20 MIN: CPT | Performed by: PHYSICIAN ASSISTANT

## 2018-03-15 RX ORDER — DULOXETIN HYDROCHLORIDE 60 MG/1
1 CAPSULE, DELAYED RELEASE ORAL DAILY
COMMUNITY
Start: 2012-08-28 | End: 2021-09-16 | Stop reason: DRUGHIGH

## 2018-03-15 RX ORDER — TRIAMCINOLONE ACETONIDE 55 UG/1
SPRAY, METERED NASAL
Qty: 1 INHALER | Refills: 3 | Status: SHIPPED | OUTPATIENT
Start: 2018-03-15 | End: 2018-10-23 | Stop reason: ALTCHOICE

## 2018-03-15 RX ORDER — LEVALBUTEROL TARTRATE 45 UG/1
1-2 AEROSOL, METERED ORAL EVERY 4 HOURS PRN
Qty: 1 INHALER | Refills: 0 | Status: SHIPPED | OUTPATIENT
Start: 2018-03-15 | End: 2018-10-23 | Stop reason: SDUPTHER

## 2018-03-15 RX ORDER — LORATADINE 10 MG/1
10 TABLET ORAL
COMMUNITY

## 2018-03-15 RX ORDER — ARIPIPRAZOLE 10 MG/1
1 TABLET ORAL DAILY
COMMUNITY
Start: 2012-08-28 | End: 2020-12-30

## 2018-03-15 RX ORDER — OXYCODONE HYDROCHLORIDE AND ACETAMINOPHEN 5; 325 MG/1; MG/1
1 TABLET ORAL EVERY 6 HOURS
COMMUNITY
End: 2018-10-23 | Stop reason: ALTCHOICE

## 2018-03-15 NOTE — LETTER
March 15, 2018     Patient: Graciela Junior   YOB: 1955   Date of Visit: 3/15/2018       To Whom it May Concern:    Cookie Thurman is under my professional care  She was seen in my office on 3/15/2018  She was found to have non allergic rhinitis most likely triggered by second hand smoke and other airborne irritants  She would benefit from not being exposed to such things  If you have any questions or concerns, please don't hesitate to call           Sincerely,          Yulia Siu PA-C        CC: No Recipients

## 2018-03-15 NOTE — PROGRESS NOTES
Assessment/Plan:    1  Acute vasomotor rhinitis    - avoid  Smoking and other airborne irritants  - start Nasacort AQ 2 sprays each nostril once daily, Xopenex inhaler 2 puffs every 4-6 hours as needed    2  Cough    - Peak flow  - Triamcinolone Acetonide 55 MCG/ACT AERO; 2 sprayse ach nostril once a day  Dispense: 1 Inhaler; Refill: 3  - levalbuterol (XOPENEX HFA) 45 mcg/act inhaler; Inhale 1-2 puffs every 4 (four) hours as needed for wheezing  Dispense: 1 Inhaler; Refill: 0    F/u as needed  F/u as scheduled            Subjective:   Chief Complaint   Patient presents with    Cough     productive    Headache      Patient ID: Natali Walters is a 60 y o  female  Patient started last night with nasal congestion, blowing pinkish mucus from nose, coughing up mucus  Denies fever, chills  Gives patient a headache  New neighbor moved in and is a smoker, had a huge problem with neighbor like this before, caused a lot of coughing so moved into new place and now the same is happening  Saw an allergist and they wrote a note stating patient could not live around smokers but did not make a difference  The following portions of the patient's history were reviewed and updated as appropriate: allergies, current medications, past family history, past medical history, past social history, past surgical history and problem list     No past medical history on file  No past surgical history on file  No family history on file  Social History     Social History    Marital status:      Spouse name: N/A    Number of children: N/A    Years of education: N/A     Occupational History    Not on file       Social History Main Topics    Smoking status: Never Smoker    Smokeless tobacco: Never Used    Alcohol use No    Drug use: No    Sexual activity: Not on file     Other Topics Concern    Not on file     Social History Narrative    No narrative on file       Current Outpatient Prescriptions:     ARIPiprazole (ABILIFY) 10 mg tablet, Take 1 tablet by mouth daily, Disp: , Rfl:     DULoxetine (CYMBALTA) 60 mg delayed release capsule, Take 1 capsule by mouth daily, Disp: , Rfl:     Turmeric Curcumin 500 MG CAPS, Take by mouth, Disp: , Rfl:     bisacodyl (DULCOLAX) 5 mg EC tablet, Take 1 tablet by mouth daily as needed, Disp: , Rfl:     Cholecalciferol ([employer]S VITAMIN D) 2000 units CAPS, Take by mouth, Disp: , Rfl:     loratadine (CLARITIN) 10 mg tablet, Take 10 mg by mouth, Disp: , Rfl:     oxyCODONE-acetaminophen (PERCOCET) 5-325 mg per tablet, Take 1 tablet by mouth every 6 (six) hours, Disp: , Rfl:     Review of Systems          Objective:    Vitals:    03/15/18 1237   BP: 126/72   BP Location: Right arm   Patient Position: Sitting   Cuff Size: Adult   Pulse: 76   Resp: 16   Temp: 97 7 °F (36 5 °C)   TempSrc: Oral   Weight: 97 5 kg (215 lb)   Height: 5' 6" (1 676 m)        Physical Exam   Constitutional: She is oriented to person, place, and time  She appears well-developed and well-nourished  HENT:   Head: Normocephalic and atraumatic  Right Ear: External ear normal    Left Ear: External ear normal    Mouth/Throat: Oropharynx is clear and moist    Turbinates inflamed, congested, dry   Eyes: Conjunctivae are normal    Neck: Neck supple  Cardiovascular: Normal rate, regular rhythm and normal heart sounds  Pulmonary/Chest: Effort normal and breath sounds normal    Lymphadenopathy:     She has no cervical adenopathy  Neurological: She is alert and oriented to person, place, and time  Skin: Skin is warm  Psychiatric: She has a normal mood and affect

## 2018-10-23 ENCOUNTER — OFFICE VISIT (OUTPATIENT)
Dept: FAMILY MEDICINE CLINIC | Facility: CLINIC | Age: 63
End: 2018-10-23
Payer: MEDICARE

## 2018-10-23 VITALS
WEIGHT: 215.3 LBS | TEMPERATURE: 98 F | DIASTOLIC BLOOD PRESSURE: 74 MMHG | BODY MASS INDEX: 34.6 KG/M2 | RESPIRATION RATE: 16 BRPM | HEART RATE: 75 BPM | SYSTOLIC BLOOD PRESSURE: 120 MMHG | HEIGHT: 66 IN

## 2018-10-23 DIAGNOSIS — J06.9 VIRAL URI WITH COUGH: Primary | ICD-10-CM

## 2018-10-23 DIAGNOSIS — R05.9 COUGH: ICD-10-CM

## 2018-10-23 PROCEDURE — 99213 OFFICE O/P EST LOW 20 MIN: CPT | Performed by: PHYSICIAN ASSISTANT

## 2018-10-23 PROCEDURE — 94150 VITAL CAPACITY TEST: CPT | Performed by: PHYSICIAN ASSISTANT

## 2018-10-23 RX ORDER — PREDNISONE 10 MG/1
TABLET ORAL
Qty: 20 TABLET | Refills: 0 | Status: SHIPPED | OUTPATIENT
Start: 2018-10-23 | End: 2018-11-27 | Stop reason: ALTCHOICE

## 2018-10-23 RX ORDER — LEVALBUTEROL TARTRATE 45 UG/1
1-2 AEROSOL, METERED ORAL EVERY 4 HOURS PRN
Qty: 1 INHALER | Refills: 0 | Status: SHIPPED | OUTPATIENT
Start: 2018-10-23 | End: 2021-04-28

## 2018-10-23 NOTE — PROGRESS NOTES
Assessment/Plan:    1  Viral URI with cough    - start prednisone 10 mg 4-4-3-3-2-2-1-1 take each daily dose with food, Xoponex as needed, fluids rest  - POCT peak flow  - levalbuterol (XOPENEX HFA) 45 mcg/act inhaler; Inhale 1-2 puffs every 4 (four) hours as needed for wheezing  Dispense: 1 Inhaler; Refill: 0  - predniSONE 10 mg tablet; Take 4 tabs on day 1,2 with food, 3 tabs on day 3,4 with food, 2 tabs on day 5,6 with food, 1 tab on day 7,8 with food  Dispense: 20 tablet; Refill: 0    F/u as needed  F/u for physical in november      Subjective:   Chief Complaint   Patient presents with    Cough    Nasal Congestion      Patient ID: Johnny Sharpe is a 58 y o  female  Over the weekend woke up with a sore throat, coughing and sneezing, then went to training and had to leave because was coughing so hard  Felt feverish  Left work slept all day, when patient came home she felt a little better  Less of a sore throat  Tried no OTC  Historically has had trouble with smoking also and now a new neighbor moved in that smokes  Patient upset  The following portions of the patient's history were reviewed and updated as appropriate: allergies, current medications, past family history, past medical history, past social history, past surgical history and problem list     History reviewed  No pertinent past medical history  History reviewed  No pertinent surgical history  History reviewed  No pertinent family history  Social History     Social History    Marital status:      Spouse name: N/A    Number of children: N/A    Years of education: N/A     Occupational History    Not on file       Social History Main Topics    Smoking status: Never Smoker    Smokeless tobacco: Never Used    Alcohol use No    Drug use: No    Sexual activity: Not on file     Other Topics Concern    Not on file     Social History Narrative    No narrative on file       Current Outpatient Prescriptions:     ARIPiprazole (ABILIFY) 10 mg tablet, Take 1 tablet by mouth daily, Disp: , Rfl:     bisacodyl (DULCOLAX) 5 mg EC tablet, Take 1 tablet by mouth daily as needed, Disp: , Rfl:     Cholecalciferol ([employer]S VITAMIN D) 2000 units CAPS, Take by mouth, Disp: , Rfl:     DULoxetine (CYMBALTA) 60 mg delayed release capsule, Take 1 capsule by mouth daily, Disp: , Rfl:     levalbuterol (XOPENEX HFA) 45 mcg/act inhaler, Inhale 1-2 puffs every 4 (four) hours as needed for wheezing, Disp: 1 Inhaler, Rfl: 0    loratadine (CLARITIN) 10 mg tablet, Take 10 mg by mouth, Disp: , Rfl:     Turmeric Curcumin 500 MG CAPS, Take by mouth, Disp: , Rfl:     Review of Systems          Objective:    Vitals:    10/23/18 1046   BP: 120/74   BP Location: Right arm   Patient Position: Sitting   Cuff Size: Standard   Pulse: 75   Resp: 16   Temp: 98 °F (36 7 °C)   TempSrc: Oral   Weight: 97 7 kg (215 lb 4 8 oz)   Height: 5' 5 75" (1 67 m)        Physical Exam   Constitutional: She is oriented to person, place, and time  She appears well-developed and well-nourished  HENT:   Head: Normocephalic and atraumatic  Right Ear: Tympanic membrane, external ear and ear canal normal    Left Ear: Tympanic membrane, external ear and ear canal normal    Nose: Mucosal edema and rhinorrhea present  Mouth/Throat: Oropharynx is clear and moist  No oropharyngeal exudate or posterior oropharyngeal erythema  Cardiovascular: Normal rate, regular rhythm and normal heart sounds  Pulmonary/Chest: Effort normal and breath sounds normal    Lymphadenopathy:     She has no cervical adenopathy  Neurological: She is alert and oriented to person, place, and time  Skin: Skin is warm  Psychiatric: She has a normal mood and affect   Judgment normal

## 2018-10-24 ENCOUNTER — TELEPHONE (OUTPATIENT)
Dept: FAMILY MEDICINE CLINIC | Facility: CLINIC | Age: 63
End: 2018-10-24

## 2018-10-31 ENCOUNTER — TELEPHONE (OUTPATIENT)
Dept: FAMILY MEDICINE CLINIC | Facility: CLINIC | Age: 63
End: 2018-10-31

## 2018-10-31 NOTE — TELEPHONE ENCOUNTER
We have tried an inhaler in the past I believe with patient when she was around smoke  A twice daily inhaler may help with this  What does patient think?

## 2018-10-31 NOTE — TELEPHONE ENCOUNTER
Genia Phalen called  Said she is finished with the Prednisone and is still having trouble breathing  Hasn't used the Xopenex inhaler at all because she says it makes her anxious  Is coughing up clear mucous     Wants to know what she should do?

## 2018-11-27 ENCOUNTER — OFFICE VISIT (OUTPATIENT)
Dept: FAMILY MEDICINE CLINIC | Facility: CLINIC | Age: 63
End: 2018-11-27
Payer: MEDICARE

## 2018-11-27 VITALS
DIASTOLIC BLOOD PRESSURE: 76 MMHG | BODY MASS INDEX: 34.54 KG/M2 | SYSTOLIC BLOOD PRESSURE: 122 MMHG | HEIGHT: 66 IN | HEART RATE: 70 BPM | WEIGHT: 214.9 LBS | RESPIRATION RATE: 16 BRPM

## 2018-11-27 DIAGNOSIS — Z13.1 SCREENING FOR DIABETES MELLITUS: ICD-10-CM

## 2018-11-27 DIAGNOSIS — R69 TAKING MEDICATION FOR CHRONIC DISEASE: ICD-10-CM

## 2018-11-27 DIAGNOSIS — Z23 NEED FOR PNEUMOCOCCAL VACCINATION: ICD-10-CM

## 2018-11-27 DIAGNOSIS — Z00.00 MEDICARE ANNUAL WELLNESS VISIT, SUBSEQUENT: Primary | ICD-10-CM

## 2018-11-27 DIAGNOSIS — Z13.220 SCREENING, LIPID: ICD-10-CM

## 2018-11-27 DIAGNOSIS — Z12.31 SCREENING MAMMOGRAM, ENCOUNTER FOR: ICD-10-CM

## 2018-11-27 DIAGNOSIS — Z23 NEED FOR PROPHYLACTIC VACCINATION AND INOCULATION AGAINST INFLUENZA: ICD-10-CM

## 2018-11-27 PROCEDURE — 90682 RIV4 VACC RECOMBINANT DNA IM: CPT

## 2018-11-27 PROCEDURE — G0439 PPPS, SUBSEQ VISIT: HCPCS | Performed by: PHYSICIAN ASSISTANT

## 2018-11-27 PROCEDURE — G0009 ADMIN PNEUMOCOCCAL VACCINE: HCPCS

## 2018-11-27 PROCEDURE — 90670 PCV13 VACCINE IM: CPT

## 2018-11-27 PROCEDURE — G0008 ADMIN INFLUENZA VIRUS VAC: HCPCS

## 2018-11-27 NOTE — PROGRESS NOTES
Assessment and Plan:    1  Medicare annual wellness visit, subsequent    - conducted  - prevnar and flu given  - lab slip given  - mammo ordered  - colon due next year  - PAPs no longer indicated    2  Bipolar    - continue with psych, Abilify, cymbalta    F/u 1 year     Health Maintenance Due   Topic Date Due    Hepatitis C Screening  1955    Medicare Annual Wellness Visit (AWV)  1955    Pneumococcal PPSV23 Highest Risk Adult (2 of 3 - PCV13) 11/16/2016    INFLUENZA VACCINE  07/01/2018    MAMMOGRAM  11/15/2018         HPI:  Anuj Patton is a 58 y o  female here for her Subsequent Wellness Visit      Patient Active Problem List   Diagnosis    Allergic rhinitis    Basal cell carcinoma of skin    Bipolar I disorder, single manic episode ([address] )    Cervical radiculopathy    DDD (degenerative disc disease), cervical    Disc degeneration, lumbar    Fibromyalgia    Acute vasomotor rhinitis     Past Medical History:   Diagnosis Date    Anxiety     Arthritis     Bipolar disorder ([address] )     Cervical spinal stenosis     last assessed 5/13/14, resolved 10/10/16    Chronic constipation     last assessed 8/28/12, resovled 9/14/15    Chronic fatigue syndrome     last assessed 8/28/12, resolved 9/14/15    Chronic hyperglycemia     resolved 9/14/15    Chronic pain syndrome     last assessed 11/12/13, resolved 10/10/16    Depression     Essential hypertriglyceridemia     resolved 10/10/16    Fibromyalgia     GERD (gastroesophageal reflux disease)     Herpes simplex infection     last assessed 12/12/13, resolved 10/10/16    Hypokalemia     last assessed 9/14/15, resolved 11/23/15    Insomnia     last assessed 3/30/15, resolved 11/23/15    Median neuropathy     last assessed 7/14/15, resolved 10/10/16    Pneumonia     last assessed 7/16/13, resolved 5/10/13    Sacroiliitis ([address] )     last assessed 10/22/13, resolved 10/27/14     Past Surgical History:   Procedure Laterality Date    CERVICAL FUSION      COLONOSCOPY  10/23/2013    10yrs     DILATION AND CURETTAGE OF UTERUS      HALLUX VALGUS CORRECTION      HEMORROIDECTOMY      NEUROPLASTY / TRANSPOSITION MEDIAN NERVE AT CARPAL TUNNEL      REDUCTION MAMMAPLASTY      TONSILLECTOMY      TOOTH EXTRACTION      TUBAL LIGATION       Family History   Problem Relation Age of Onset    Hypertension Mother     Osteoporosis Mother     Heart attack Father     Melanoma Father     Diabetes Paternal Grandmother     Coronary artery disease Paternal Grandfather     Heart attack Family     Arthritis Family     Diabetes Family     Hypertension Family     Heart disease Family     Osteoporosis Family      History   Smoking Status    Never Smoker   Smokeless Tobacco    Never Used     History   Alcohol Use No      History   Drug Use No       Current Outpatient Prescriptions   Medication Sig Dispense Refill    ARIPiprazole (ABILIFY) 10 mg tablet Take 1 tablet by mouth daily      bisacodyl (DULCOLAX) 5 mg EC tablet Take 1 tablet by mouth daily as needed      Cholecalciferol ([employer] VITAMIN D) 2000 units CAPS Take by mouth      DULoxetine (CYMBALTA) 60 mg delayed release capsule Take 1 capsule by mouth daily      levalbuterol (XOPENEX HFA) 45 mcg/act inhaler Inhale 1-2 puffs every 4 (four) hours as needed for wheezing 1 Inhaler 0    loratadine (CLARITIN) 10 mg tablet Take 10 mg by mouth      predniSONE 10 mg tablet Take 4 tabs on day 1,2 with food, 3 tabs on day 3,4 with food, 2 tabs on day 5,6 with food, 1 tab on day 7,8 with food 20 tablet 0    Turmeric Curcumin 500 MG CAPS Take by mouth       No current facility-administered medications for this visit        Allergies   Allergen Reactions    Cephalexin Rash    Isoflavones     Latex Rash     Reaction Date: 21Mar2012;     Molds & Smuts Allergic Rhinitis    Other Allergic Rhinitis and Cough    Risperidone Palpitations     Reaction Date: 21Mar2012;     Sertraline Itching     Reaction Date: [id number];      Immunization History   Administered Date(s) Administered    Influenza Quadrivalent, 6-35 Months IM 10/13/2014, 09/14/2015, 10/10/2016, 11/13/2017    Influenza TIV (IM) 11/12/2013    Pneumococcal Polysaccharide PPV23 11/16/2015    Tdap 11/23/2015    Zoster 10/07/2015, 11/12/2015       Patient Care Team:  Toby Jarquin PA-C as PCP - General (Family Medicine)  VICKY Alvarado, DO    Medicare Screening Tests and Risk Assessments:  Last Medicare Wellness visit information reviewed, patient interviewed, no change since last AWV  Health Risk Assessment:  Patient rates overall health as good  Patient feels that their physical health rating is Slightly better  Eyesight was rated as Slightly worse  Hearing was rated as Same  Patient feels that their emotional and mental health rating is Slightly better  Pain experienced by patient in the last 7 days has been None  Patient states that she has experienced no weight loss or gain in last 6 months  Emotional/Mental Health:  Patient has been feeling nervous/anxious  PHQ-9 Depression Screening:    Frequency of the following problems over the past two weeks:      1  Little interest or pleasure in doing things: 0 - not at all      2  Feeling down, depressed, or hopeless: 0 - not at all  PHQ-2 Score: 0          Broken Bones/Falls: Fall Risk Assessment:    In the past year, patient has experienced: No history of falling in past year          Bladder/Bowel:  Patient has leaked urine accidently in the last six months  Patient reports no loss of bowel control  Immunizations:  Patient has had a flu vaccination within the last year  Patient has received a pneumonia shot  Patient has received a shingles shot  Patient has not received tetanus/diphtheria shot  Home Safety:  Patient does not have trouble with stairs inside or outside of their home     Patient currently reports that there are no safety hazards present in home, working smoke alarms, no working carbon monoxide detectors  Preventative Screenings:   Breast cancer screening performed, colon cancer screen completed, cholesterol screen completed, glaucoma eye exam completed,     Nutrition:  Current diet: Regular and Frequent junk food with servings of the following:    Medications:  Patient is not currently taking any over-the-counter supplements  Patient is able to manage medications  Lifestyle Choices:  Patient reports no tobacco use  Patient has smoked or used tobacco in the past   Patient has stopped her tobacco use  Tobacco use quit date: 40 yrs ago   Patient reports no alcohol use  Patient drives a vehicle  Patient wears seat belt  Current level of exercise of physical activity described by patient as: None   Activities of Daily Living:  Can get out of bed by his or her self, able to dress self, able to make own meals, able to do own shopping, able to bathe self, can do own laundry/housekeeping, can manage own money, pay bills and track expenses    Previous Hospitalizations:  No hospitalization or ED visit in past 12 months        Advanced Directives:  Patient has decided on a power of   Patient has spoken to designated power of   Patient has completed advanced directive          Preventative Screening/Counseling:      Cardiovascular:      General: Risks and Benefits Discussed and Screening Current      Counseling: Healthy Diet and Healthy Weight          Diabetes:      General: Risks and Benefits Discussed and Screening Current      Counseling: Healthy Diet, Healthy Weight and Improve Physical Activity          Colorectal Cancer:      General: Risks and Benefits Discussed and Screening Current      Counseling: high fiber diet      Comments: Due 2023        Breast Cancer:      General: Risks and Benefits Discussed      Comments: Due 11/2018        Cervical Cancer:      General: Risks and Benefits Discussed, Screening Current and Screening Not Indicated      Comments: Complete no longer needs pap        Osteoporosis:      General: Risks and Benefits Discussed and Screening Not Indicated      Counseling: Calcium and Vitamin D Intake and Regular Weightbearing Exercise          AAA:      General: Screening Not Indicated          Glaucoma:      General: Risks and Benefits Discussed and Screening Current      Comments: Goes yearly, decrease in vision, start of cataracts, 11/26/2018        HIV:      General: Screening Not Indicated          Hepatitis C:      General: Screening Not Indicated        Advanced Directives:   Patient has living will for healthcare, does not have durable POA for healthcare, patient has an advanced directive  Information on ACP and/or AD provided  End of life assessment reviewed with patient  Provider agrees with end of life decisions   Additional Comments: Daughter is his executor but does not think she is the POA will look into this    Immunizations:  Patient reviewed and up to date      Influenza: Risks & Benefits Discussed and Influenza Due Today      Pneumococcal: Risks & Benefits Discussed and Lifetime Vaccine Completed      Shingrix: Risks & Benefits Discussed      Hepatitis B (Low risk patients): Prevention Counseling and Series Not Indicated      Zostavax: Risks & Benefits Discussed and Zostavax Vaccine UTD      TD: Risks & Benefits Discussed and Td Vaccine UTD      TDAP: Risks & Benefits Discussed and Tdap Vaccine UTD      Other Preventative Counseling (Non-Medicare): Increase physical activity, Nutrition Counseling, Skin self-exam, Sunscreen use and Weight reduction discussed      Referrals:   Additional Comments: Not indicated at this time

## 2018-11-30 ENCOUNTER — APPOINTMENT (OUTPATIENT)
Dept: LAB | Facility: CLINIC | Age: 63
End: 2018-11-30
Payer: MEDICARE

## 2018-11-30 ENCOUNTER — HOSPITAL ENCOUNTER (OUTPATIENT)
Dept: MAMMOGRAPHY | Facility: CLINIC | Age: 63
Discharge: HOME | End: 2018-11-30
Payer: MEDICARE

## 2018-11-30 VITALS — WEIGHT: 214 LBS | HEIGHT: 66 IN | BODY MASS INDEX: 34.39 KG/M2

## 2018-11-30 DIAGNOSIS — Z12.31 SCREENING MAMMOGRAM, ENCOUNTER FOR: ICD-10-CM

## 2018-11-30 DIAGNOSIS — Z13.220 SCREENING, LIPID: ICD-10-CM

## 2018-11-30 DIAGNOSIS — R69 TAKING MEDICATION FOR CHRONIC DISEASE: ICD-10-CM

## 2018-11-30 DIAGNOSIS — Z13.1 SCREENING FOR DIABETES MELLITUS: ICD-10-CM

## 2018-11-30 LAB
BASOPHILS # BLD AUTO: 0.03 THOUSANDS/ΜL (ref 0–0.1)
BASOPHILS NFR BLD AUTO: 0 % (ref 0–1)
BILIRUB UR QL STRIP: NEGATIVE
CLARITY UR: CLEAR
COLOR UR: YELLOW
EOSINOPHIL # BLD AUTO: 0.15 THOUSAND/ΜL (ref 0–0.61)
EOSINOPHIL NFR BLD AUTO: 2 % (ref 0–6)
ERYTHROCYTE [DISTWIDTH] IN BLOOD BY AUTOMATED COUNT: 14.2 % (ref 11.6–15.1)
GLUCOSE UR STRIP-MCNC: NEGATIVE MG/DL
HCT VFR BLD AUTO: 41.1 % (ref 34.8–46.1)
HGB BLD-MCNC: 13.6 G/DL (ref 11.5–15.4)
HGB UR QL STRIP.AUTO: NEGATIVE
IMM GRANULOCYTES # BLD AUTO: 0.02 THOUSAND/UL (ref 0–0.2)
IMM GRANULOCYTES NFR BLD AUTO: 0 % (ref 0–2)
KETONES UR STRIP-MCNC: NEGATIVE MG/DL
LEUKOCYTE ESTERASE UR QL STRIP: NEGATIVE
LYMPHOCYTES # BLD AUTO: 2.07 THOUSANDS/ΜL (ref 0.6–4.47)
LYMPHOCYTES NFR BLD AUTO: 28 % (ref 14–44)
MCH RBC QN AUTO: 32.8 PG (ref 26.8–34.3)
MCHC RBC AUTO-ENTMCNC: 33.1 G/DL (ref 31.4–37.4)
MCV RBC AUTO: 99 FL (ref 82–98)
MONOCYTES # BLD AUTO: 0.83 THOUSAND/ΜL (ref 0.17–1.22)
MONOCYTES NFR BLD AUTO: 11 % (ref 4–12)
NEUTROPHILS # BLD AUTO: 4.2 THOUSANDS/ΜL (ref 1.85–7.62)
NEUTS SEG NFR BLD AUTO: 59 % (ref 43–75)
NITRITE UR QL STRIP: NEGATIVE
NRBC BLD AUTO-RTO: 0 /100 WBCS
PH UR STRIP.AUTO: 6 [PH] (ref 4.5–8)
PLATELET # BLD AUTO: 264 THOUSANDS/UL (ref 149–390)
PMV BLD AUTO: 11.5 FL (ref 8.9–12.7)
PROT UR STRIP-MCNC: NEGATIVE MG/DL
RBC # BLD AUTO: 4.15 MILLION/UL (ref 3.81–5.12)
SP GR UR STRIP.AUTO: 1.01 (ref 1–1.03)
TSH SERPL DL<=0.05 MIU/L-ACNC: 3.56 UIU/ML (ref 0.36–3.74)
UROBILINOGEN UR QL STRIP.AUTO: 0.2 E.U./DL
WBC # BLD AUTO: 7.3 THOUSAND/UL (ref 4.31–10.16)

## 2018-11-30 PROCEDURE — 85025 COMPLETE CBC W/AUTO DIFF WBC: CPT

## 2018-11-30 PROCEDURE — 84443 ASSAY THYROID STIM HORMONE: CPT

## 2018-11-30 PROCEDURE — 77067 SCR MAMMO BI INCL CAD: CPT

## 2018-11-30 PROCEDURE — 81003 URINALYSIS AUTO W/O SCOPE: CPT

## 2018-11-30 PROCEDURE — 36415 COLL VENOUS BLD VENIPUNCTURE: CPT

## 2018-12-04 ENCOUNTER — TRANSCRIBE ORDERS (OUTPATIENT)
Dept: ADMINISTRATIVE | Facility: HOSPITAL | Age: 63
End: 2018-12-04

## 2018-12-04 ENCOUNTER — APPOINTMENT (OUTPATIENT)
Dept: LAB | Facility: CLINIC | Age: 63
End: 2018-12-04
Payer: MEDICARE

## 2018-12-04 DIAGNOSIS — Z79.899 NEED FOR PROPHYLACTIC CHEMOTHERAPY: Primary | ICD-10-CM

## 2018-12-04 DIAGNOSIS — Z79.899 NEED FOR PROPHYLACTIC CHEMOTHERAPY: ICD-10-CM

## 2018-12-04 LAB
ALBUMIN SERPL BCP-MCNC: 3.8 G/DL (ref 3.5–5)
ALP SERPL-CCNC: 95 U/L (ref 46–116)
ALT SERPL W P-5'-P-CCNC: 35 U/L (ref 12–78)
ANION GAP SERPL CALCULATED.3IONS-SCNC: 8 MMOL/L (ref 4–13)
AST SERPL W P-5'-P-CCNC: 26 U/L (ref 5–45)
BILIRUB SERPL-MCNC: 0.57 MG/DL (ref 0.2–1)
BUN SERPL-MCNC: 6 MG/DL (ref 5–25)
CALCIUM SERPL-MCNC: 8.9 MG/DL (ref 8.3–10.1)
CHLORIDE SERPL-SCNC: 108 MMOL/L (ref 100–108)
CHOLEST SERPL-MCNC: 134 MG/DL (ref 50–200)
CO2 SERPL-SCNC: 27 MMOL/L (ref 21–32)
CREAT SERPL-MCNC: 0.88 MG/DL (ref 0.6–1.3)
GFR SERPL CREATININE-BSD FRML MDRD: 70 ML/MIN/1.73SQ M
GLUCOSE P FAST SERPL-MCNC: 87 MG/DL (ref 65–99)
HDLC SERPL-MCNC: 39 MG/DL (ref 40–60)
LDLC SERPL CALC-MCNC: 66 MG/DL (ref 0–100)
POTASSIUM SERPL-SCNC: 3.9 MMOL/L (ref 3.5–5.3)
PROT SERPL-MCNC: 7.2 G/DL (ref 6.4–8.2)
SODIUM SERPL-SCNC: 143 MMOL/L (ref 136–145)
TRIGL SERPL-MCNC: 144 MG/DL

## 2018-12-04 PROCEDURE — 36415 COLL VENOUS BLD VENIPUNCTURE: CPT

## 2018-12-04 PROCEDURE — 80061 LIPID PANEL: CPT

## 2018-12-04 PROCEDURE — 80053 COMPREHEN METABOLIC PANEL: CPT

## 2019-01-08 ENCOUNTER — OFFICE VISIT (OUTPATIENT)
Dept: FAMILY MEDICINE CLINIC | Facility: CLINIC | Age: 64
End: 2019-01-08
Payer: MEDICARE

## 2019-01-08 VITALS
OXYGEN SATURATION: 98 % | HEART RATE: 80 BPM | RESPIRATION RATE: 16 BRPM | WEIGHT: 214.8 LBS | SYSTOLIC BLOOD PRESSURE: 122 MMHG | BODY MASS INDEX: 34.52 KG/M2 | DIASTOLIC BLOOD PRESSURE: 76 MMHG | TEMPERATURE: 97.8 F | HEIGHT: 66 IN

## 2019-01-08 DIAGNOSIS — J02.9 SORE THROAT: ICD-10-CM

## 2019-01-08 DIAGNOSIS — J06.9 UPPER RESPIRATORY TRACT INFECTION, UNSPECIFIED TYPE: Primary | ICD-10-CM

## 2019-01-08 LAB — S PYO AG THROAT QL: NEGATIVE

## 2019-01-08 PROCEDURE — 99213 OFFICE O/P EST LOW 20 MIN: CPT | Performed by: FAMILY MEDICINE

## 2019-01-08 PROCEDURE — 87880 STREP A ASSAY W/OPTIC: CPT | Performed by: FAMILY MEDICINE

## 2019-01-08 PROCEDURE — 87070 CULTURE OTHR SPECIMN AEROBIC: CPT | Performed by: FAMILY MEDICINE

## 2019-01-08 RX ORDER — GUAIFENESIN 600 MG
600 TABLET, EXTENDED RELEASE 12 HR ORAL EVERY 12 HOURS SCHEDULED
Qty: 20 TABLET | Refills: 0 | COMMUNITY
Start: 2019-01-08 | End: 2019-01-28 | Stop reason: ALTCHOICE

## 2019-01-08 RX ORDER — PREDNISONE 20 MG/1
20 TABLET ORAL 2 TIMES DAILY WITH MEALS
Qty: 10 TABLET | Refills: 0 | Status: SHIPPED | OUTPATIENT
Start: 2019-01-08 | End: 2019-01-28 | Stop reason: ALTCHOICE

## 2019-01-08 NOTE — PROGRESS NOTES
Assessment/Plan:  1  Upper respiratory tract infection with cough  - discussed rest, plenty of fluids, gargle with salt water, start Prednisone 20 mg twice a day for 5 days and take Mucinex twice a day as needed for cough/ congestion  - predniSONE 20 mg tablet; Take 1 tablet (20 mg total) by mouth 2 (two) times a day with meals  Dispense: 10 tablet; Refill: 0  - guaiFENesin (MUCINEX) 600 mg 12 hr tablet; Take 1 tablet (600 mg total) by mouth every 12 (twelve) hours  Dispense: 20 tablet; Refill: 0  - POCT rapid strep A negative  - Throat culture obtained and we will call with results  - follow up if symptoms persist or worsen  Possible side effects of new medications were reviewed with the patient today  The treatment plan was reviewed with the patient  The patient understands and agrees with the treatment plan      Subjective:   Chief Complaint   Patient presents with    URI      Patient ID: Sal Robertson is a 58 y o  female who presents with c/o nasal congestion, sore throat and runny nose for 1 1/2 weeks, she was visiting her daughter in [place] who was sick with a cold  Patient denies fever/chills, purulent mucus production, chest pain or SOB  She has been taking OTC cough medications         The following portions of the patient's history were reviewed and updated as appropriate: allergies, current medications, past family history, past medical history, past social history, past surgical history and problem list     Past Medical History:   Diagnosis Date    Anxiety     Arthritis     B[facility] disorder ([facility] [address] )     Cervical spinal stenosis     last assessed 5/13/14, resolved 10/10/16    Chronic constipation     last assessed 8/28/12, resovled 9/14/15    Chronic fatigue syndrome     last assessed 8/28/12, resolved 9/14/15    Chronic hyperglycemia     resolved 9/14/15    Chronic pain syndrome     last assessed 11/12/13, resolved 10/10/16    Depression     Essential hypertriglyceridemia     resolved 10/10/16    Fibromyalgia     GERD (gastroesophageal reflux disease)     Herpes simplex infection     last assessed 12/12/13, resolved 10/10/16    Hypokalemia     last assessed 9/14/15, resolved 11/23/15    Insomnia     last assessed 3/30/15, resolved 11/23/15    Median neuropathy     last assessed 7/14/15, resolved 10/10/16    Pneumonia     last assessed 7/16/13, resolved 5/10/13    Sacroiliitis ([address] )     last assessed 10/22/13, resolved 10/27/14     Past Surgical History:   Procedure Laterality Date    CERVICAL FUSION      COLONOSCOPY  10/23/2013    10yrs     DILATION AND CURETTAGE OF UTERUS      HALLUX VALGUS CORRECTION      HEMORROIDECTOMY      NEUROPLASTY / [address] AT CARPAL TUNNEL      REDUCTION MAMMAPLASTY      TONSILLECTOMY      TOOTH EXTRACTION      TUBAL LIGATION       Family History   Problem Relation Age of Onset    Hypertension Mother     Osteoporosis Mother     Heart attack Father     Melanoma Father     Diabetes Paternal Grandmother     Coronary artery disease Paternal Grandfather     Heart attack Family     Arthritis Family     Diabetes Family     Hypertension Family     Heart disease Family     Osteoporosis Family      Social History     Social History    Marital status:      Spouse name: N/A    Number of children: N/A    Years of education: N/A     Occupational History    Not on file       Social History Main Topics    Smoking status: Never Smoker    Smokeless tobacco: Never Used    Alcohol use No    Drug use: No    Sexual activity: Not on file     Other Topics Concern    Not on file     Social History Narrative    Always uses seat belt    Daily caffeine consumption, 1 serv/day    Has smoke detectors    Lives independently        Current Outpatient Prescriptions:     ARIPiprazole (ABILIFY) 10 mg tablet, Take 1 tablet by mouth daily, Disp: , Rfl:     bisacodyl (DULCOLAX) 5 mg EC tablet, Take 1 tablet by mouth daily as needed, Disp: , Rfl:     Cholecalciferol (CVS VITAMIN D) 2000 units CAPS, Take by mouth, Disp: , Rfl:     DULoxetine (CYMBALTA) 60 mg delayed release capsule, Take 1 capsule by mouth daily, Disp: , Rfl:     levalbuterol (XOPENEX HFA) 45 mcg/act inhaler, Inhale 1-2 puffs every 4 (four) hours as needed for wheezing, Disp: 1 Inhaler, Rfl: 0    loratadine (CLARITIN) 10 mg tablet, Take 10 mg by mouth, Disp: , Rfl:     Turmeric Curcumin 500 MG CAPS, Take by mouth, Disp: , Rfl:     guaiFENesin (MUCINEX) 600 mg 12 hr tablet, Take 1 tablet (600 mg total) by mouth every 12 (twelve) hours, Disp: 20 tablet, Rfl: 0    predniSONE 20 mg tablet, Take 1 tablet (20 mg total) by mouth 2 (two) times a day with meals, Disp: 10 tablet, Rfl: 0    Review of Systems   Constitutional: Positive for fatigue  Negative for chills and fever  HENT: Positive for congestion, postnasal drip, rhinorrhea and sore throat  Negative for ear pain, trouble swallowing and voice change  Respiratory: Positive for cough  Negative for shortness of breath and wheezing  Cardiovascular: Negative for chest pain and palpitations  Gastrointestinal: Negative for abdominal pain, diarrhea, nausea and vomiting  Neurological: Negative for dizziness and headaches  Hematological: Negative for adenopathy  Objective:    Vitals:    01/08/19 1821   BP: 122/76   BP Location: Left arm   Patient Position: Sitting   Cuff Size: Standard   Pulse: 80   Resp: 16   Temp: 97 8 °F (36 6 °C)   SpO2: 98%   Weight: 97 4 kg (214 lb 12 8 oz)   Height: 5' 5 75" (1 67 m)        Physical Exam   Constitutional: She is oriented to person, place, and time  She appears well-developed and well-nourished  No distress  HENT:   Head: Normocephalic and atraumatic  Right Ear: Tympanic membrane and ear canal normal    Left Ear: Tympanic membrane and ear canal normal    Nose: Mucosal edema present  Mouth/Throat: Posterior oropharyngeal erythema present   No oropharyngeal exudate  Neck: Neck supple  Cardiovascular: Normal rate, regular rhythm and normal heart sounds  No murmur heard  Pulmonary/Chest: Effort normal and breath sounds normal  No respiratory distress  She has no wheezes  She has no rhonchi  She has no rales  Lymphadenopathy:     She has no cervical adenopathy  Neurological: She is alert and oriented to person, place, and time         Office Visit on 01/08/2019   Component Date Value Ref Range Status     RAPID STREP A 01/08/2019 Negative  Negative Final

## 2019-01-10 LAB — BACTERIA THROAT CULT: NORMAL

## 2019-01-28 ENCOUNTER — APPOINTMENT (OUTPATIENT)
Dept: RADIOLOGY | Facility: CLINIC | Age: 64
End: 2019-01-28
Payer: MEDICARE

## 2019-01-28 ENCOUNTER — OFFICE VISIT (OUTPATIENT)
Dept: FAMILY MEDICINE CLINIC | Facility: CLINIC | Age: 64
End: 2019-01-28
Payer: MEDICARE

## 2019-01-28 VITALS
WEIGHT: 209.8 LBS | BODY MASS INDEX: 31.8 KG/M2 | DIASTOLIC BLOOD PRESSURE: 68 MMHG | RESPIRATION RATE: 18 BRPM | HEART RATE: 78 BPM | HEIGHT: 68 IN | SYSTOLIC BLOOD PRESSURE: 118 MMHG

## 2019-01-28 DIAGNOSIS — S80.219A ABRASION OF KNEE, UNSPECIFIED LATERALITY, INITIAL ENCOUNTER: ICD-10-CM

## 2019-01-28 DIAGNOSIS — M79.604 PAIN OF RIGHT LOWER EXTREMITY: ICD-10-CM

## 2019-01-28 DIAGNOSIS — M79.604 PAIN OF RIGHT LOWER EXTREMITY: Primary | ICD-10-CM

## 2019-01-28 PROCEDURE — 73590 X-RAY EXAM OF LOWER LEG: CPT

## 2019-01-28 PROCEDURE — 99214 OFFICE O/P EST MOD 30 MIN: CPT | Performed by: PHYSICIAN ASSISTANT

## 2019-01-28 NOTE — PROGRESS NOTES
Assessment/Plan:    1  Pain of right lower extremity    - XR right lower leg r/o fracture, most likely hematoma, apply heat, can take advil as needed will most likely take 6-8 weeks to resolved  - XR tibia fibula 2 vw right; Future    2  Abrasions b/l knees    - wound care as discussed and demonstrated on patients wounds today, keep covered until healed  - apply vaseline for moisture  - Td 2015    F/u as needed  F/u as scheduled    Subjective:   Chief Complaint   Patient presents with   Nestor Jacobsen outside house      Patient ID: Anuj Patton is a 58 y o  female  Patient was going outside at 7 pm, didn't step high caught foot and landed on both knees and hit both shins  Right lower leg had a bruise the size of grapefruit  Put ice on legs, then this morning took a hot shower  Throbbing at rest  Move to slow due to pain  Took no OTC           The following portions of the patient's history were reviewed and updated as appropriate: allergies, current medications, past family history, past medical history, past social history, past surgical history and problem list     Past Medical History:   Diagnosis Date    Anxiety     Arthritis     B[facility] disorder ([facility] [address] )     Cervical spinal stenosis     last assessed 5/13/14, resolved 10/10/16    Chronic constipation     last assessed 8/28/12, resovled 9/14/15    Chronic fatigue syndrome     last assessed 8/28/12, resolved 9/14/15    Chronic hyperglycemia     resolved 9/14/15    Chronic pain syndrome     last assessed 11/12/13, resolved 10/10/16    Depression     Essential hypertriglyceridemia     resolved 10/10/16    Fibromyalgia     GERD (gastroesophageal reflux disease)     Herpes simplex infection     last assessed 12/12/13, resolved 10/10/16    Hypokalemia     last assessed 9/14/15, resolved 11/23/15    Insomnia     last assessed 3/30/15, resolved 11/23/15    Median neuropathy     last assessed 7/14/15, resolved 10/10/16    Pneumonia     last assessed 7/16/13, resolved 5/10/13    [address]roili[address] ([facility] [address] )     last assessed 10/22/13, resolved 10/27/14     Past Surgical History:   Procedure Laterality Date    CERVICAL FUSION      COLONOSCOPY  10/23/2013    10yrs     DILATION AND CURETTAGE OF UTERUS      HALLUX VALGUS CORRECTION      HEMORROIDECTOMY      NEUROPLASTY / TRANSPOSITION MEDIAN NERVE AT CARPAL TUNNEL      REDUCTION MAMMAPLASTY      TONSILLECTOMY      TOOTH EXTRACTION      TUBAL LIGATION       Family History   Problem Relation Age of Onset    Hypertension Mother     Osteoporosis Mother     Heart attack Father     Melanoma Father     Diabetes Paternal Grandmother     Coronary artery disease Paternal Grandfather     Heart attack Family     Arthritis Family     Diabetes Family     Hypertension Family     Heart disease Family     Osteoporosis Family      Social History     Social History    Marital status:      Spouse name: N/A    Number of children: N/A    Years of education: N/A     Occupational History    Not on file       Social History Main Topics    Smoking status: Never Smoker    Smokeless tobacco: Never Used    Alcohol use No    Drug use: No    Sexual activity: Not on file     Other Topics Concern    Not on file     Social History Narrative    Always uses seat belt    Daily caffeine consumption, 1 serv/day    Has smoke detectors    Lives independently        Current Outpatient Prescriptions:     ARIPiprazole (ABILIFY) 10 mg tablet, Take 1 tablet by mouth daily, Disp: , Rfl:     bisacodyl (DULCOLAX) 5 mg EC tablet, Take 1 tablet by mouth daily as needed, Disp: , Rfl:     Cholecalciferol (CVS VITAMIN D) 2000 units CAPS, Take by mouth, Disp: , Rfl:     DULoxetine (CYMBALTA) 60 mg delayed release capsule, Take 1 capsule by mouth daily, Disp: , Rfl:     levalbuterol (XOPENEX HFA) 45 mcg/act inhaler, Inhale 1-2 puffs every 4 (four) hours as needed for wheezing, Disp: 1 Inhaler, Rfl: 0    loratadine (CLARITIN) 10 mg tablet, Take 10 mg by mouth, Disp: , Rfl:     Turmeric Curcumin 500 MG CAPS, Take by mouth, Disp: , Rfl:     Review of Systems          Objective:    Vitals:    01/28/19 0939   BP: 118/68   BP Location: Left arm   Patient Position: Sitting   Cuff Size: Standard   Pulse: 78   Resp: 18   Weight: 95 2 kg (209 lb 12 8 oz)   Height: 5' 7 75" (1 721 m)        Physical Exam   Constitutional: She is oriented to person, place, and time  She appears well-developed and well-nourished  Cardiovascular: Normal rate, regular rhythm and normal heart sounds  Pulmonary/Chest: Effort normal and breath sounds normal    Musculoskeletal:   B/l knees with superficial small abrasions clean, no signs infection    Right lower tibial plateau with area about 8 cm x 4 cm with swelling and tenderness   Neurological: She is alert and oriented to person, place, and time  Skin: Skin is warm  Psychiatric: She has a normal mood and affect

## 2019-01-29 ENCOUNTER — TELEPHONE (OUTPATIENT)
Dept: FAMILY MEDICINE CLINIC | Facility: CLINIC | Age: 64
End: 2019-01-29

## 2019-01-29 NOTE — TELEPHONE ENCOUNTER
Patient called for results of Xray on her tibia  Results are not in chart  I called over to the Reading [facility] to have them put a rush on it      Best number for Genia Phalen:  [phone]

## 2019-01-30 NOTE — TELEPHONE ENCOUNTER
Pt called again asking for results  I informed her that when we get the results we will give her a call back

## 2019-01-31 ENCOUNTER — TELEPHONE (OUTPATIENT)
Dept: FAMILY MEDICINE CLINIC | Facility: CLINIC | Age: 64
End: 2019-01-31

## 2019-01-31 NOTE — TELEPHONE ENCOUNTER
Called [facility] again  They don't understand why no one read it when I put a rush on it the other day  He said they will have someone read it right away

## 2019-01-31 NOTE — TELEPHONE ENCOUNTER
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     Best number for Shruthi Palacio:  [phone]

## 2019-01-31 NOTE — TELEPHONE ENCOUNTER
When do you think she can drive?  This is the fax # to send the note to [phone] please send it to the attn of : Nader Jacobson

## 2019-02-01 NOTE — TELEPHONE ENCOUNTER
[place] Hun, as long as she is feels ok to drive she may resume driving  Does she feel she can? If so we can write a note to clear her  With nothing being fractured there is no reason she cannot drive

## 2019-02-01 NOTE — TELEPHONE ENCOUNTER
David Vu states that when she spoke to someone about the XR results here, she asked if she can drive and she was advised that she probably shouldn't until we get the results

## 2019-02-01 NOTE — TELEPHONE ENCOUNTER
I never told her she couldn't drive  She has a large bruise on her leg nothing that would stop her from driving  Sorry but I am confused

## 2019-02-01 NOTE — TELEPHONE ENCOUNTER
Nguyen Diaz called again this AM   She needs to know when she can drive because she needs "things at the store"  Also, she needs a note to her employer stating that she is cleared to drive

## 2019-03-15 ENCOUNTER — OFFICE VISIT (OUTPATIENT)
Dept: FAMILY MEDICINE CLINIC | Facility: CLINIC | Age: 64
End: 2019-03-15
Payer: MEDICARE

## 2019-03-15 VITALS
DIASTOLIC BLOOD PRESSURE: 70 MMHG | RESPIRATION RATE: 17 BRPM | BODY MASS INDEX: 33.86 KG/M2 | WEIGHT: 210.7 LBS | HEIGHT: 66 IN | HEART RATE: 69 BPM | SYSTOLIC BLOOD PRESSURE: 120 MMHG

## 2019-03-15 DIAGNOSIS — F30.9 BIPOLAR I DISORDER, SINGLE MANIC EPISODE (HCC): ICD-10-CM

## 2019-03-15 DIAGNOSIS — Z13.820 SCREENING FOR OSTEOPOROSIS: ICD-10-CM

## 2019-03-15 DIAGNOSIS — M25.561 ACUTE PAIN OF RIGHT KNEE: ICD-10-CM

## 2019-03-15 DIAGNOSIS — R29.6 FALLS FREQUENTLY: Primary | ICD-10-CM

## 2019-03-15 PROCEDURE — 99213 OFFICE O/P EST LOW 20 MIN: CPT | Performed by: PHYSICIAN ASSISTANT

## 2019-03-15 NOTE — PROGRESS NOTES
Assessment/Plan:    1  Falls frequently    - encouraged to see eye doctor regarding new glasses   - see PT for strengthening    2  Acute pain of right knee    - c/w RICE, see PT  - Ambulatory referral to Physical Therapy; Future    3  Bipolar I disorder, single manic episode ([facility] [address] )    - c/w psych    4  Screening for osteoporosis    - DXA bone density spine hip and pelvis; Future    F/u as needed  F/u as scheduled    Subjective:   Chief Complaint   Patient presents with    Fall      Patient ID: Elise Smith is a 58 y o  female  Patient fell again  First seen 1/28/2019 falling going up the steps with taking dog out  Had xrays was normal  Friday a week ago fell going up steps  Doesn't recall fall and went "boom" on both knees  Was feeling normal, was not dizzy, did not have a headache  Did not hit head  Both knees are skinned  Not exercising  Walks dog to go to the bathroom and works at [employer]  Going to see eye doctor tomorrow because notes she has had more headaches recently  Notes ever since she got new glasses she has had more headaches and dizzy spells that come and go  Could not wear her contacts anymore because her eyes were too dry  [facility]        The following portions of the patient's history were reviewed and updated as appropriate: allergies, current medications, past family history, past medical history, past social history, past surgical history and problem list     Past Medical History:   Diagnosis Date    Anxiety     Arthritis     Bipolar disorder ([facility] [address] )     Cervical spinal stenosis     last assessed 5/13/14, resolved 10/10/16    Chronic constipation     last assessed 8/28/12, resovled 9/14/15    Chronic fatigue syndrome     last assessed 8/28/12, resolved 9/14/15    Chronic hyperglycemia     resolved 9/14/15    Chronic pain syndrome     last assessed 11/12/13, resolved 10/10/16    Depression     Essential hypertriglyceridemia     resolved 10/10/16    Fibromyalgia     GERD (gastroesophageal reflux disease)     Herpes simplex infection     last assessed 12/12/13, resolved 10/10/16    Hypokalemia     last assessed 9/14/15, resolved 11/23/15    Insomnia     last assessed 3/30/15, resolved 11/23/15    Median neuropathy     last assessed 7/14/15, resolved 10/10/16    Pneumonia     last assessed 7/16/13, resolved 5/10/13    Sacroiliitis ([address] )     last assessed 10/22/13, resolved 10/27/14     Past Surgical History:   Procedure Laterality Date    CERVICAL FUSION      COLONOSCOPY  10/23/2013    10yrs     DILATION AND CURETTAGE OF UTERUS      HALLUX VALGUS CORRECTION      HEMORROIDECTOMY      NEUROPLASTY / [address] AT CARPAL TUNNEL      REDUCTION MAMMAPLASTY      TONSILLECTOMY      TOOTH EXTRACTION      TUBAL LIGATION       Family History   Problem Relation Age of Onset    Hypertension Mother     Osteoporosis Mother     Heart attack Father     Melanoma Father     Diabetes Paternal Grandmother     Coronary artery disease Paternal Grandfather     Heart attack Family     Arthritis Family     Diabetes Family     Hypertension Family     Heart disease Family     Osteoporosis Family      Social History     Socioeconomic History    Marital status:      Spouse name: Not on file    Number of children: Not on file    Years of education: Not on file    Highest education level: Not on file   Occupational History    Not on file   Social Needs    Financial resource strain: Not on file    Food insecurity:     Worry: Not on file     Inability: Not on file    Transportation needs:     Medical: Not on file     Non-medical: Not on file   Tobacco Use    Smoking status: Never Smoker    Smokeless tobacco: Never Used   Substance and Sexual Activity    Alcohol use: No    Drug use: No    Sexual activity: Not on file   Lifestyle    Physical activity:     Days per week: Not on file     Minutes per session: Not on file    Stress: Not on file Relationships    Social connections:     Talks on phone: Not on file     Gets together: Not on file     Attends Yazdanism service: Not on file     Active member of club or organization: Not on file     Attends meetings of clubs or organizations: Not on file     Relationship status: Not on file    Intimate partner violence:     Fear of current or ex partner: Not on file     Emotionally abused: Not on file     Physically abused: Not on file     Forced sexual activity: Not on file   Other Topics Concern    Not on file   Social History Narrative    Always uses seat belt    Daily caffeine consumption, 1 serv/day    Has smoke detectors    Lives independently        Current Outpatient Medications:     ARIPiprazole (ABILIFY) 10 mg tablet, Take 1 tablet by mouth daily, Disp: , Rfl:     bisacodyl (DULCOLAX) 5 mg EC tablet, Take 1 tablet by mouth daily as needed, Disp: , Rfl:     Cholecalciferol (CVS VITAMIN D) 2000 units CAPS, Take by mouth, Disp: , Rfl:     DULoxetine (CYMBALTA) 60 mg delayed release capsule, Take 1 capsule by mouth daily, Disp: , Rfl:     levalbuterol (XOPENEX HFA) 45 mcg/act inhaler, Inhale 1-2 puffs every 4 (four) hours as needed for wheezing, Disp: 1 Inhaler, Rfl: 0    loratadine (CLARITIN) 10 mg tablet, Take 10 mg by mouth, Disp: , Rfl:     Turmeric Curcumin 500 MG CAPS, Take by mouth, Disp: , Rfl:     Review of Systems          Objective:    Vitals:    03/15/19 1029   BP: 120/70   BP Location: Right arm   Patient Position: Sitting   Cuff Size: Standard   Pulse: 69   Resp: 17   Weight: 95 6 kg (210 lb 11 2 oz)   Height: 5' 5 75" (1 67 m)        Physical Exam   Constitutional: She is oriented to person, place, and time  She appears well-developed and well-nourished  Cardiovascular: Normal rate, regular rhythm and normal heart sounds  Pulmonary/Chest: Effort normal and breath sounds normal    Musculoskeletal: Normal range of motion  She exhibits no edema, tenderness or deformity     Right knee with tenderness to palpation, full rom, ligaments intact, negative mcmurrays     Neurological: She is alert and oriented to person, place, and time  Skin: Skin is warm  Psychiatric: She has a normal mood and affect

## 2019-03-20 ENCOUNTER — TELEPHONE (OUTPATIENT)
Dept: FAMILY MEDICINE CLINIC | Facility: CLINIC | Age: 64
End: 2019-03-20

## 2019-03-20 NOTE — TELEPHONE ENCOUNTER
We have never ordered an A1C because her FBS have always been normal  We only order A1C for prediaebtics, patients with elevated fasting blood sugars and diabetics  She can still see a nutritionist if she would like though   We can put a referal in

## 2019-03-20 NOTE — TELEPHONE ENCOUNTER
Pt  Wants to know what her recent HA1C results are; I did not see anything in her chart unless I missed it  If she does not have on one may you please order it for her  She wants to see a dietician and wanted to know if her insurance would cover it  I told her you may not know the answer to that and she would have to call her insurance to find out

## 2019-08-05 ENCOUNTER — OFFICE VISIT (OUTPATIENT)
Dept: FAMILY MEDICINE CLINIC | Facility: CLINIC | Age: 64
End: 2019-08-05
Payer: MEDICARE

## 2019-08-05 ENCOUNTER — APPOINTMENT (OUTPATIENT)
Dept: RADIOLOGY | Facility: CLINIC | Age: 64
End: 2019-08-05
Payer: MEDICARE

## 2019-08-05 VITALS
DIASTOLIC BLOOD PRESSURE: 82 MMHG | HEART RATE: 72 BPM | RESPIRATION RATE: 18 BRPM | HEIGHT: 66 IN | SYSTOLIC BLOOD PRESSURE: 124 MMHG | BODY MASS INDEX: 33.73 KG/M2 | WEIGHT: 209.9 LBS

## 2019-08-05 DIAGNOSIS — R07.89 RIGHT-SIDED CHEST WALL PAIN: Primary | ICD-10-CM

## 2019-08-05 DIAGNOSIS — E66.9 OBESITY (BMI 30.0-34.9): ICD-10-CM

## 2019-08-05 DIAGNOSIS — R07.89 RIGHT-SIDED CHEST WALL PAIN: ICD-10-CM

## 2019-08-05 PROCEDURE — 99213 OFFICE O/P EST LOW 20 MIN: CPT | Performed by: PHYSICIAN ASSISTANT

## 2019-08-05 PROCEDURE — 71100 X-RAY EXAM RIBS UNI 2 VIEWS: CPT

## 2019-08-05 RX ORDER — NAPROXEN 500 MG/1
500 TABLET ORAL 2 TIMES DAILY WITH MEALS
Qty: 40 TABLET | Refills: 0 | Status: SHIPPED | OUTPATIENT
Start: 2019-08-05 | End: 2021-03-24 | Stop reason: ALTCHOICE

## 2019-08-05 NOTE — PROGRESS NOTES
Assessment/Plan:    1  Right-sided chest wall pain    - bruise vs fracture, rest, try icy hot to area, can take naprosyn twice daily with food for as short a period of time as possible  Will call with XR results  - XR ribs 2 vw right; Future  - naproxen (NAPROSYN) 500 mg tablet; Take 1 tablet (500 mg total) by mouth 2 (two) times a day with meals for 20 days  Dispense: 40 tablet; Refill: 0    2  Obesity (BMI 30 0-34 9)    - encouraged patient to work on [employer], exercise  and adequate sleep for weight loss  Follow up if more help is needed    F/u as needed  F/u as scheduled    Subjective:   Chief Complaint   Patient presents with    Poss Broken Rib     right side      Patient ID: Robbi Gibson is a 71 y o  female  Patient was cleaning her apartment on Saturday  Was doing toilettes and hit right ribs on the seat and felt a pop in right ribs  Kept cleaning and then noted with waking Sunday morning right ribs hurt  Only thing patient did all day was sit around due to pain  Pain at rest, worse with moving, pain described as sharp 8:10 pain  Pain with breathing        The following portions of the patient's history were reviewed and updated as appropriate: allergies, current medications, past family history, past medical history, past social history, past surgical history and problem list     Past Medical History:   Diagnosis Date    Anxiety     Arthritis     Bipolar disorder ([facility] [address] )     Cervical spinal stenosis     last assessed 5/13/14, resolved 10/10/16    Chronic constipation     last assessed 8/28/12, resovled 9/14/15    Chronic fatigue syndrome     last assessed 8/28/12, resolved 9/14/15    Chronic hyperglycemia     resolved 9/14/15    Chronic pain syndrome     last assessed 11/12/13, resolved 10/10/16    Depression     Essential hypertriglyceridemia     resolved 10/10/16    Fibromyalgia     GERD (gastroesophageal reflux disease)     Herpes simplex infection     last assessed 12/12/13, resolved 10/10/16    Hypokalemia     last assessed 9/14/15, resolved 11/23/15    Insomnia     last assessed 3/30/15, resolved 11/23/15    Median neuropathy     last assessed 7/14/15, resolved 10/10/16    Pneumonia     last assessed 7/16/13, resolved 5/10/13    Sacroiliitis ([facility] [address] )     last assessed 10/22/13, resolved 10/27/14     Past Surgical History:   Procedure Laterality Date    CERVICAL FUSION      COLONOSCOPY  10/23/2013    10yrs     DILATION AND CURETTAGE OF UTERUS      HALLUX VALGUS CORRECTION      HEMORROIDECTOMY      NEUROPLASTY / [address] CARPAL TUNNEL      REDUCTION MAMMAPLASTY      TONSILLECTOMY      TOOTH EXTRACTION      TUBAL LIGATION       Family History   Problem Relation Age of Onset    Hypertension Mother     Osteoporosis Mother     Heart attack Father     Melanoma Father     Diabetes Paternal Grandmother     Coronary artery disease Paternal Grandfather     Heart attack Family     Arthritis Family     Diabetes Family     Hypertension Family     Heart disease Family     Osteoporosis Family      Social History     Socioeconomic History    Marital status:      Spouse name: Not on file    Number of children: Not on file    Years of education: Not on file    Highest education level: Not on file   Occupational History    Not on file   Social Needs    Financial resource strain: Not on file    Food insecurity:     Worry: Not on file     Inability: Not on file    Transportation needs:     Medical: Not on file     Non-medical: Not on file   Tobacco Use    Smoking status: Never Smoker    Smokeless tobacco: Never Used   Substance and Sexual Activity    Alcohol use: No    Drug use: No    Sexual activity: Not on file   Lifestyle    Physical activity:     Days per week: Not on file     Minutes per session: Not on file    Stress: Not on file   Relationships    Social connections:     Talks on phone: Not on file     Gets together: Not on file     Attends Adventism service: Not on file     Active member of club or organization: Not on file     Attends meetings of clubs or organizations: Not on file     Relationship status: Not on file    Intimate partner violence:     Fear of current or ex partner: Not on file     Emotionally abused: Not on file     Physically abused: Not on file     Forced sexual activity: Not on file   Other Topics Concern    Not on file   Social History Narrative    Always uses seat belt    Daily caffeine consumption, 1 serv/day    Has smoke detectors    Lives independently        Current Outpatient Medications:     ARIPiprazole (ABILIFY) 10 mg tablet, Take 1 tablet by mouth daily, Disp: , Rfl:     bisacodyl (DULCOLAX) 5 mg EC tablet, Take 1 tablet by mouth daily as needed, Disp: , Rfl:     Cholecalciferol (CVS VITAMIN D) 2000 units CAPS, Take by mouth, Disp: , Rfl:     DULoxetine (CYMBALTA) 60 mg delayed release capsule, Take 1 capsule by mouth daily, Disp: , Rfl:     levalbuterol (XOPENEX HFA) 45 mcg/act inhaler, Inhale 1-2 puffs every 4 (four) hours as needed for wheezing, Disp: 1 Inhaler, Rfl: 0    loratadine (CLARITIN) 10 mg tablet, Take 10 mg by mouth, Disp: , Rfl:     Turmeric Curcumin 500 MG CAPS, Take by mouth, Disp: , Rfl:     Review of Systems          Objective:    Vitals:    08/05/19 1448   BP: 124/82   BP Location: Left arm   Patient Position: Sitting   Cuff Size: Standard   Pulse: 72   Resp: 18   Weight: 95 2 kg (209 lb 14 4 oz)   Height: 5' 5 5" (1 664 m)        Physical Exam   Constitutional: She appears well-developed and well-nourished  Cardiovascular: Normal rate, regular rhythm and normal heart sounds  Pulmonary/Chest: Effort normal and breath sounds normal    Right chest wall tender over anterior rib 4-5-6, no swelling, no bruising         BMI Counseling: Body mass index is 34 4 kg/m²  Discussed the patient's BMI with her  The BMI is above average  BMI counseling and education was provided to the patient  Nutrition recommendations include reducing portion sizes, decreasing overall calorie intake, 3-5 servings of fruits/vegetables daily and reducing fast food intake  Exercise recommendations include moderate aerobic physical activity for 150 minutes/week

## 2019-08-07 ENCOUNTER — TELEPHONE (OUTPATIENT)
Dept: FAMILY MEDICINE CLINIC | Facility: CLINIC | Age: 64
End: 2019-08-07

## 2019-08-07 NOTE — TELEPHONE ENCOUNTER
----- Message from Silvia Guillory PA-C sent at 8/6/2019  5:37 PM EDT -----  Please let patient know no fracture of ribs, most likely just a bruise  Rest, ice and anaprox as needed as discussed at visit    Left message to call back

## 2019-08-30 ENCOUNTER — TELEPHONE (OUTPATIENT)
Dept: FAMILY MEDICINE CLINIC | Facility: CLINIC | Age: 64
End: 2019-08-30

## 2019-08-30 NOTE — TELEPHONE ENCOUNTER
Patient stopped in today asking for a letter from you to her boss stating that she can not work more then 4 days per week, and no more then 6 hours per day  She said she will be making more then she gets in [employer], and she can not do that    She said she has chronic fatigue and fibromyalgia      Call when ready for       [phone]

## 2019-09-03 NOTE — TELEPHONE ENCOUNTER
Patient called back  I gave her the info about the psychiatrist writing the letter  She says that his is not a mental health issue, it's a physical issue  Also, she works with the psychiatrist and so it would put the psychiatrist in a "double bind" situation  She has a meeting with her supervisor tomorrow and she is hoping to  the letter larry Cortés number for Yvonne Lagunas:  [phone]

## 2019-09-03 NOTE — TELEPHONE ENCOUNTER
LMOM informing patient that she must have the provider that treats her for fibro and chronic fatigue write the letter

## 2019-09-03 NOTE — TELEPHONE ENCOUNTER
I am sorry, I do not treat her for her fibro or her chronic fatigue  Who ever treats her for this must write the note

## 2020-01-14 ENCOUNTER — OFFICE VISIT (OUTPATIENT)
Dept: FAMILY MEDICINE CLINIC | Facility: CLINIC | Age: 65
End: 2020-01-14
Payer: MEDICARE

## 2020-01-14 VITALS
BODY MASS INDEX: 34.55 KG/M2 | WEIGHT: 215 LBS | RESPIRATION RATE: 18 BRPM | SYSTOLIC BLOOD PRESSURE: 100 MMHG | HEART RATE: 86 BPM | HEIGHT: 66 IN | DIASTOLIC BLOOD PRESSURE: 68 MMHG | TEMPERATURE: 98.2 F

## 2020-01-14 DIAGNOSIS — F30.9 BIPOLAR I DISORDER, SINGLE MANIC EPISODE (HCC): ICD-10-CM

## 2020-01-14 DIAGNOSIS — Z00.00 MEDICARE ANNUAL WELLNESS VISIT, SUBSEQUENT: Primary | ICD-10-CM

## 2020-01-14 DIAGNOSIS — B34.9 ACUTE VIRAL SYNDROME: ICD-10-CM

## 2020-01-14 DIAGNOSIS — Z12.39 SCREENING FOR MALIGNANT NEOPLASM OF BREAST: ICD-10-CM

## 2020-01-14 PROCEDURE — 1123F ACP DISCUSS/DSCN MKR DOCD: CPT | Performed by: PHYSICIAN ASSISTANT

## 2020-01-14 PROCEDURE — 99213 OFFICE O/P EST LOW 20 MIN: CPT | Performed by: PHYSICIAN ASSISTANT

## 2020-01-14 PROCEDURE — G0439 PPPS, SUBSEQ VISIT: HCPCS | Performed by: PHYSICIAN ASSISTANT

## 2020-01-14 NOTE — PROGRESS NOTES
Assessment and Plan:     Problem List Items Addressed This Visit     None      Visit Diagnoses     Screening for malignant neoplasm of breast    -  Primary    Relevant Orders    Mammo screening bilateral w cad           Preventive health issues were discussed with patient, and age appropriate screening tests were ordered as noted in patient's After Visit Summary  Personalized health advice and appropriate referrals for health education or preventive services given if needed, as noted in patient's After Visit Summary       History of Present Illness:     Patient presents for Medicare Annual Wellness visit    Patient Care Team:  Kenya Arnold PA-C as PCP - General (Family Medicine)  VICKY Hoskins DO     Problem List:     Patient Active Problem List   Diagnosis    Allergic rhinitis    Basal cell carcinoma of skin    Bipolar I disorder, single manic episode ([facility] [address] )    Cervical radiculopathy    DDD (degenerative disc disease), cervical    Disc degeneration, lumbar    Fibromyalgia    Acute vasomotor rhinitis      Past Medical and Surgical History:     Past Medical History:   Diagnosis Date    Anxiety     Arthritis     Bipolar disorder ([facility] [address] )     Cervical spinal stenosis     last assessed 5/13/14, resolved 10/10/16    Chronic constipation     last assessed 8/28/12, resovled 9/14/15    Chronic fatigue syndrome     last assessed 8/28/12, resolved 9/14/15    Chronic hyperglycemia     resolved 9/14/15    Chronic pain syndrome     last assessed 11/12/13, resolved 10/10/16    Depression     Essential hypertriglyceridemia     resolved 10/10/16    Fibromyalgia     GERD (gastroesophageal reflux disease)     Herpes simplex infection     last assessed 12/12/13, resolved 10/10/16    Hypokalemia     last assessed 9/14/15, resolved 11/23/15    Insomnia     last assessed 3/30/15, resolved 11/23/15    Median neuropathy     last assessed 7/14/15, resolved 10/10/16    Pneumonia     last assessed 7/16/13, resolved 5/10/13    Sacroiliitis ([address] )     last assessed 10/22/13, resolved 10/27/14     Past Surgical History:   Procedure Laterality Date    CERVICAL FUSION      COLONOSCOPY  10/23/2013    10yrs     DILATION AND CURETTAGE OF UTERUS      HALLUX VALGUS CORRECTION      HEMORROIDECTOMY      NEUROPLASTY / TRANSPOSITION MEDIAN NERVE AT CARPAL TUNNEL      REDUCTION MAMMAPLASTY      TONSILLECTOMY      TOOTH EXTRACTION      TUBAL LIGATION        Family History:     Family History   Problem Relation Age of Onset    Hypertension Mother     Osteoporosis Mother     Heart attack Father     Melanoma Father     Diabetes Paternal Grandmother     Coronary artery disease Paternal Grandfather     Heart attack Family     Arthritis Family     Diabetes Family     Hypertension Family     Heart disease Family     Osteoporosis Family       Social History:     Social History     Socioeconomic History    Marital status:      Spouse name: None    Number of children: None    Years of education: None    Highest education level: None   Occupational History    None   Social Needs    Financial resource strain: None    Food insecurity:     Worry: None     Inability: None    Transportation needs:     Medical: None     Non-medical: None   Tobacco Use    Smoking status: Never Smoker    Smokeless tobacco: Never Used   Substance and Sexual Activity    Alcohol use: No    Drug use: No    Sexual activity: None   Lifestyle    Physical activity:     Days per week: None     Minutes per session: None    Stress: None   Relationships    Social connections:     Talks on phone: None     Gets together: None     Attends [employer] service: None     Active member of club or organization: None     Attends meetings of clubs or organizations: None     Relationship status: None    Intimate partner violence:     Fear of current or ex partner: None     Emotionally abused: None     Physically abused: None     Forced sexual activity: None   Other Topics Concern    None   Social History Narrative    Always uses seat belt    Daily caffeine consumption, 1 serv/day    Has smoke detectors    Lives independently        Medications and Allergies:     Current Outpatient Medications   Medication Sig Dispense Refill    ARIPiprazole (ABILIFY) 10 mg tablet Take 1 tablet by mouth daily      bisacodyl (DULCOLAX) 5 mg EC tablet Take 1 tablet by mouth daily as needed      Cholecalciferol (CVS VITAMIN D) 2000 units CAPS Take by mouth      DULoxetine (CYMBALTA) 60 mg delayed release capsule Take 1 capsule by mouth daily      loratadine (CLARITIN) 10 mg tablet Take 10 mg by mouth      Turmeric Curcumin 500 MG CAPS Take by mouth      levalbuterol (XOPENEX HFA) 45 mcg/act inhaler Inhale 1-2 puffs every 4 (four) hours as needed for wheezing (Patient not taking: Reported on 1/14/2020) 1 Inhaler 0    naproxen (NAPROSYN) 500 mg tablet Take 1 tablet (500 mg total) by mouth 2 (two) times a day with meals for 20 days 40 tablet 0     No current facility-administered medications for this visit        Allergies   Allergen Reactions    Cephalexin Rash    Isoflavones     Latex Rash     Reaction Date: 21Mar2012;     Molds & Smuts Allergic Rhinitis    Other Allergic Rhinitis and Cough    Risperidone Palpitations     Reaction Date: 21Mar2012;     Sertraline Itching     Reaction Date: 21Mar2012;       Immunizations:     Immunization History   Administered Date(s) Administered    INFLUENZA 10/12/2015, 10/31/2016    Influenza Quadrivalent, 6-35 Months IM 10/13/2014, 09/14/2015, 10/10/2016, 11/13/2017    Influenza TIV (IM) 11/12/2013    Influenza, recombinant, quadrivalent,injectable, preservative free 11/27/2018    Pneumococcal Conjugate 13-Valent 11/27/2018    Pneumococcal Polysaccharide PPV23 11/16/2015    Tdap 11/23/2015    Zoster 10/07/2015, 11/12/2015      Health Maintenance:         Topic Date Due    Hepatitis C Screening  1955    DXA SCAN  08/12/2016    MAMMOGRAM  11/30/2019    CRC Screening: Colonoscopy  10/23/2023         Topic Date Due    Influenza Vaccine  07/01/2019      Medicare Health Risk Assessment:     Resp 18   Ht 5' 5 5" (1 664 m)   Wt 97 5 kg (215 lb)   BMI 35 23 kg/m²      Letitia Bay is here for her Subsequent Wellness visit  Health Risk Assessment:   Patient rates overall health as fair  Patient feels that their physical health rating is slightly worse  Eyesight was rated as much worse  Hearing was rated as same  Patient feels that their emotional and mental health rating is same  Pain experienced in the last 7 days has been none  Patient states that she has experienced no weight loss or gain in last 6 months  Patient considering her health slightly worse because today she is here for feeling "like she has the flu"    Depression Screening:   PHQ-2 Score: 0      Fall Risk Screening: In the past year, patient has experienced: history of falling in past year    Number of falls: 2 or more  Injured during fall?: Yes    Feels unsteady when standing or walking?: No    Worried about falling?: No      Urinary Incontinence Screening:   Patient has leaked urine accidently in the last six months  Wears a pad as needed    Home Safety:  Patient does not have trouble with stairs inside or outside of their home  Patient has working smoke alarms and has working carbon monoxide detector  Home safety hazards include: none  Nutrition:   Current diet is Regular  Medications:   Patient is currently taking over-the-counter supplements  OTC medications include: see medication list  Patient is able to manage medications  Activities of Daily Living (ADLs)/Instrumental Activities of Daily Living (IADLs):   Walk and transfer into and out of bed and chair?: Yes  Dress and groom yourself?: Yes    Bathe or shower yourself?: Yes    Feed yourself?  Yes  Do your laundry/housekeeping?: Yes  Manage your money, pay your bills and track your expenses?: Yes  Make your own meals?: Yes    Do your own shopping?: Yes    Previous Hospitalizations:   Any hospitalizations or ED visits within the last 12 months?: No      Advance Care Planning:   Living will: Yes    Durable POA for healthcare:  Yes    Advanced directive: Yes    End of Life Decisions reviewed with patient: Yes    Provider agrees with end of life decisions: Yes      Cognitive Screening:   Provider or family/friend/caregiver concerned regarding cognition?: No    PREVENTIVE SCREENINGS      Cardiovascular Screening:    General: Screening Current      Diabetes Screening:     General: Screening Current      Colorectal Cancer Screening:     General: Screening Current      Breast Cancer Screening:     General: Screening Current      Cervical Cancer Screening:    General: Screening Not Indicated      Osteoporosis Screening:    General: Risks and Benefits Discussed    Due for: DXA Axial      Abdominal Aortic Aneurysm (AAA) Screening:        General: Screening Not Indicated      Lung Cancer Screening:     General: Screening Not Indicated      Hepatitis C Screening:    General: Patient Declines      Linda Mcintosh PA-C

## 2020-01-14 NOTE — PATIENT INSTRUCTIONS
Medicare Preventive Visit Patient Instructions  Thank you for completing your Welcome to Medicare Visit or Medicare Annual Wellness Visit today  Your next wellness visit will be due in one year (1/14/2021)  The screening/preventive services that you may require over the next 5-10 years are detailed below  Some tests may not apply to you based off risk factors and/or age  Screening tests ordered at today's visit but not completed yet may show as past due  Also, please note that scanned in results may not display below  Preventive Screenings:  Service Recommendations Previous Testing/Comments   Colorectal Cancer Screening  * Colonoscopy    * Fecal Occult Blood Test (FOBT)/Fecal Immunochemical Test (FIT)  * Fecal DNA/Cologuard Test  * Flexible Sigmoidoscopy Age: 54-65 years old   Colonoscopy: every 10 years (may be performed more frequently if at higher risk)  OR  FOBT/FIT: every 1 year  OR  Cologuard: every 3 years  OR  Sigmoidoscopy: every 5 years  Screening may be recommended earlier than age 48 if at higher risk for colorectal cancer  Also, an individualized decision between you and your healthcare provider will decide whether screening between the ages of 74-80 would be appropriate  Colonoscopy: 10/23/2013  FOBT/FIT: Not on file  Cologuard: Not on file  Sigmoidoscopy: Not on file    Screening Current     Breast Cancer Screening Age: 36 years old  Frequency: every 1-2 years  Not required if history of left and right mastectomy Mammogram: 11/30/2018    Screening Current   Cervical Cancer Screening Between the ages of 21-29, pap smear recommended once every 3 years  Between the ages of 33-67, can perform pap smear with HPV co-testing every 5 years     Recommendations may differ for women with a history of total hysterectomy, cervical cancer, or abnormal pap smears in past  Pap Smear: 11/13/2017    Screening Not Indicated   Hepatitis C Screening Once for adults born between 1945 and 1965  More frequently in patients at high risk for Hepatitis C Hep C Antibody: Not on file       Diabetes Screening 1-2 times per year if you're at risk for diabetes or have pre-diabetes Fasting glucose: 87 mg/dL   A1C: No results in last 5 years       Cholesterol Screening Once every 5 years if you don't have a lipid disorder  May order more often based on risk factors  Lipid panel: 12/04/2018    Screening Current     Other Preventive Screenings Covered by Medicare:  1  Abdominal Aortic Aneurysm (AAA) Screening: covered once if your at risk  You're considered to be at risk if you have a family history of AAA  2  Lung Cancer Screening: covers low dose CT scan once per year if you meet all of the following conditions: (1) Age 50-69; (2) No signs or symptoms of lung cancer; (3) Current smoker or have quit smoking within the last 15 years; (4) You have a tobacco smoking history of at least 30 pack years (packs per day multiplied by number of years you smoked); (5) You get a written order from a healthcare provider  3  Glaucoma Screening: covered annually if you're considered high risk: (1) You have diabetes OR (2) Family history of glaucoma OR (3)  aged 48 and older OR (3)  American aged 72 and older  3  Osteoporosis Screening: covered every 2 years if you meet one of the following conditions: (1) You're estrogen deficient and at risk for osteoporosis based off medical history and other findings; (2) Have a vertebral abnormality; (3) On glucocorticoid therapy for more than 3 months; (4) Have primary hyperparathyroidism; (5) On osteoporosis medications and need to assess response to drug therapy  · Last bone density test (DXA Scan): 08/12/2013  5  HIV Screening: covered annually if you're between the age of 12-76  Also covered annually if you are younger than 13 and older than 72 with risk factors for HIV infection  For pregnant patients, it is covered up to 3 times per pregnancy      Immunizations:  Immunization Recommendations   Influenza Vaccine Annual influenza vaccination during flu season is recommended for all persons aged >= 6 months who do not have contraindications   Pneumococcal Vaccine (Prevnar and Pneumovax)  * Prevnar = PCV13  * Pneumovax = PPSV23   Adults 25-60 years old: 1-3 doses may be recommended based on certain risk factors  Adults 72 years old: Prevnar (PCV13) vaccine recommended followed by Pneumovax (PPSV23) vaccine  If already received PPSV23 since turning 65, then PCV13 recommended at least one year after PPSV23 dose  Hepatitis B Vaccine 3 dose series if at intermediate or high risk (ex: diabetes, end stage renal disease, liver disease)   Tetanus (Td) Vaccine - COST NOT COVERED BY MEDICARE PART B Following completion of primary series, a booster dose should be given every 10 years to maintain immunity against tetanus  Td may also be given as tetanus wound prophylaxis  Tdap Vaccine - COST NOT COVERED BY MEDICARE PART B Recommended at least once for all adults  For pregnant patients, recommended with each pregnancy  Shingles Vaccine (Shingrix) - COST NOT COVERED BY MEDICARE PART B  2 shot series recommended in those aged 48 and above     Health Maintenance Due:      Topic Date Due    Hepatitis C Screening  1955    DXA SCAN  08/12/2016    MAMMOGRAM  11/30/2019    CRC Screening: Colonoscopy  10/23/2023     Immunizations Due:      Topic Date Due    Influenza Vaccine  07/01/2019     Advance Directives   What are advance directives? Advance directives are legal documents that state your wishes and plans for medical care  These plans are made ahead of time in case you lose your ability to make decisions for yourself  Advance directives can apply to any medical decision, such as the treatments you want, and if you want to donate organs  What are the types of advance directives? There are many types of advance directives, and each state has rules about how to use them   You may choose a combination of any of the following:  · Living will: This is a written record of the treatment you want  You can also choose which treatments you do not want, which to limit, and which to stop at a certain time  This includes surgery, medicine, IV fluid, and tube feedings  · Durable power of  for healthcare [facility]): This is a written record that states who you want to make healthcare choices for you when you are unable to make them for yourself  This person, called a proxy, is usually a family member or a friend  You may choose more than 1 proxy  · Do not resuscitate (DNR) order:  A DNR order is used in case your heart stops beating or you stop breathing  It is a request not to have certain forms of treatment, such as CPR  A DNR order may be included in other types of advance directives  · Medical directive: This covers the care that you want if you are in a coma, near death, or unable to make decisions for yourself  You can list the treatments you want for each condition  Treatment may include pain medicine, surgery, blood transfusions, dialysis, IV or tube feedings, and a ventilator (breathing machine)  · Values history: This document has questions about your views, beliefs, and how you feel and think about life  This information can help others choose the care that you would choose  Why are advance directives important? An advance directive helps you control your care  Although spoken wishes may be used, it is better to have your wishes written down  Spoken wishes can be misunderstood, or not followed  Treatments may be given even if you do not want them  An advance directive may make it easier for your family to make difficult choices about your care  Fall Prevention    Fall prevention  includes ways to make your home and other areas safer  It also includes ways you can move more carefully to prevent a fall   Health conditions that cause changes in your blood pressure, vision, or muscle strength and coordination may increase your risk for falls  Medicines may also increase your risk for falls if they make you dizzy, weak, or sleepy  Fall prevention tips:   · Stand or sit up slowly  · Use assistive devices as directed  · Wear shoes that fit well and have soles that   · Wear a personal alarm  · Stay active  · Manage your medical conditions  Home Safety Tips:  · Add items to prevent falls in the bathroom  · Keep paths clear  · Install bright lights in your home  · Keep items you use often on shelves within reach  · Paint or place reflective tape on the edges of your stairs  Urinary Incontinence   Urinary incontinence (UI)  is when you lose control of your bladder  UI develops because your bladder cannot store or empty urine properly  The 3 most common types of UI are stress incontinence, urge incontinence, or both  Medicines:   · May be given to help strengthen your bladder control  Report any side effects of medication to your healthcare provider  Do pelvic muscle exercises often:  Your pelvic muscles help you stop urinating  Squeeze these muscles tight for 5 seconds, then relax for 5 seconds  Gradually work up to squeezing for 10 seconds  Do 3 sets of 15 repetitions a day, or as directed  This will help strengthen your pelvic muscles and improve bladder control  Train your bladder:  Go to the bathroom at set times, such as every 2 hours, even if you do not feel the urge to go  You can also try to hold your urine when you feel the urge to go  For example, hold your urine for 5 minutes when you feel the urge to go  As that becomes easier, hold your urine for 10 minutes  Self-care:   · Keep a UI record  Write down how often you leak urine and how much you leak  Make a note of what you were doing when you leaked urine  · Drink liquids as directed  You may need to limit the amount of liquid you drink to help control your urine leakage   Do not drink any liquid right before you go to bed  Limit or do not have drinks that contain caffeine or alcohol  · Prevent constipation  Eat a variety of high-fiber foods  Good examples are high-fiber cereals, beans, vegetables, and whole-grain breads  Walking is the best way to trigger your intestines to have a bowel movement  · Exercise regularly and maintain a healthy weight  Weight loss and exercise will decrease pressure on your bladder and help you control your leakage  · Use a catheter as directed  to help empty your bladder  A catheter is a tiny, plastic tube that is put into your bladder to drain your urine  · Go to behavior therapy as directed  Behavior therapy may be used to help you learn to control your urge to urinate  Weight Management   Why it is important to manage your weight:  Being overweight increases your risk of health conditions such as heart disease, high blood pressure, type 2 diabetes, and certain types of cancer  It can also increase your risk for osteoarthritis, sleep apnea, and other respiratory problems  Aim for a slow, steady weight loss  Even a small amount of weight loss can lower your risk of health problems  How to lose weight safely:  A safe and healthy way to lose weight is to eat fewer calories and get regular exercise  You can lose up about 1 pound a week by decreasing the number of calories you eat by 500 calories each day  Healthy meal plan for weight management:  A healthy meal plan includes a variety of foods, contains fewer calories, and helps you stay healthy  A healthy meal plan includes the following:  · Eat whole-grain foods more often  A healthy meal plan should contain fiber  Fiber is the part of grains, fruits, and vegetables that is not broken down by your body  Whole-grain foods are healthy and provide extra fiber in your diet  Some examples of whole-grain foods are whole-wheat breads and pastas, oatmeal, brown rice, and bulgur    · Eat a variety of vegetables every day   Include dark, leafy greens such as spinach, kale, amanda greens, and mustard greens  Eat yellow and orange vegetables such as carrots, sweet potatoes, and winter squash  · Eat a variety of fruits every day  Choose fresh or canned fruit (canned in its own juice or light syrup) instead of juice  Fruit juice has very little or no fiber  · Eat low-fat dairy foods  Drink fat-free (skim) milk or 1% milk  Eat fat-free yogurt and low-fat cottage cheese  Try low-fat cheeses such as mozzarella and other reduced-fat cheeses  · Choose meat and other protein foods that are low in fat  Choose beans or other legumes such as split peas or lentils  Choose fish, skinless poultry (chicken or turkey), or lean cuts of red meat (beef or pork)  Before you cook meat or poultry, cut off any visible fat  · Use less fat and oil  Try baking foods instead of frying them  Add less fat, such as margarine, sour cream, regular salad dressing and mayonnaise to foods  Eat fewer high-fat foods  Some examples of high-fat foods include french fries, doughnuts, ice cream, and cakes  · Eat fewer sweets  Limit foods and drinks that are high in sugar  This includes candy, cookies, regular soda, and sweetened drinks  Exercise:  Exercise at least 30 minutes per day on most days of the week  Some examples of exercise include walking, biking, dancing, and swimming  You can also fit in more physical activity by taking the stairs instead of the elevator or parking farther away from stores  Ask your healthcare provider about the best exercise plan for you  © Copyright [employer] 2018 Information is for End User's use only and may not be sold, redistributed or otherwise used for commercial purposes   All illustrations and images included in CareNotes® are the copyrighted property of [employer]  or [address] [place] [address]

## 2020-01-14 NOTE — PROGRESS NOTES
Assessment/Plan:    1  Acute viral syndrome    - fluids, rest, reassurance appears to be improving  2  Bipolar I disorder, single manic episode ([facility] [address] )    - continue with psych and medications as prescribed    3  Screening for malignant neoplasm of breast    - Mammo screening bilateral w cad; Future    F/u as needed  F/u for yearly physical    Subjective:   Chief Complaint   Patient presents with    Headache    Sinusitis      Patient ID: Lester Avalos is a 71 y o  female  Wednesday was around a lot of sick people, Thursday started "off", Friday really started to feel "sick"  Head congestion  Sunday skipped Sikh so she started Mucinex  Helping to improve congestion, clear mucus  Coughing initially but that has gotten better  Feverish feeling  Chills         The following portions of the patient's history were reviewed and updated as appropriate: allergies, current medications, past family history, past medical history, past social history, past surgical history and problem list     Past Medical History:   Diagnosis Date    Anxiety     Arthritis     Bipolar disorder ([facility] [address] )     Cervical spinal stenosis     last assessed 5/13/14, resolved 10/10/16    Chronic constipation     last assessed 8/28/12, resovled 9/14/15    Chronic fatigue syndrome     last assessed 8/28/12, resolved 9/14/15    Chronic hyperglycemia     resolved 9/14/15    Chronic pain syndrome     last assessed 11/12/13, resolved 10/10/16    Depression     Essential hypertriglyceridemia     resolved 10/10/16    Fibromyalgia     GERD (gastroesophageal reflux disease)     Herpes simplex infection     last assessed 12/12/13, resolved 10/10/16    Hypokalemia     last assessed 9/14/15, resolved 11/23/15    Insomnia     last assessed 3/30/15, resolved 11/23/15    Median neuropathy     last assessed 7/14/15, resolved 10/10/16    Pneumonia     last assessed 7/16/13, resolved 5/10/13    Sacroiliitis ([facility] [address] )     last assessed 10/22/13, resolved 10/27/14     Past Surgical History:   Procedure Laterality Date    CERVICAL FUSION      COLONOSCOPY  10/23/2013    10yrs     DILATION AND CURETTAGE OF UTERUS      HALLUX VALGUS CORRECTION      HEMORROIDECTOMY      NEUROPLASTY / TRANSPOSITION MEDIAN NERVE AT CARPAL TUNNEL      REDUCTION MAMMAPLASTY      TONSILLECTOMY      TOOTH EXTRACTION      TUBAL LIGATION       Family History   Problem Relation Age of Onset    Hypertension Mother     Osteoporosis Mother     Heart attack Father     Melanoma Father     Diabetes Paternal Grandmother     Coronary artery disease Paternal Grandfather     Heart attack Family     Arthritis Family     Diabetes Family     Hypertension Family     Heart disease Family     Osteoporosis Family      Social History     Socioeconomic History    Marital status:      Spouse name: Not on file    Number of children: Not on file    Years of education: Not on file    Highest education level: Not on file   Occupational History    Not on file   Social Needs    Financial resource strain: Not on file    Food insecurity:     Worry: Not on file     Inability: Not on file    Transportation needs:     Medical: Not on file     Non-medical: Not on file   Tobacco Use    Smoking status: Never Smoker    Smokeless tobacco: Never Used   Substance and Sexual Activity    Alcohol use: No    Drug use: No    Sexual activity: Not on file   Lifestyle    Physical activity:     Days per week: Not on file     Minutes per session: Not on file    Stress: Not on file   Relationships    Social connections:     Talks on phone: Not on file     Gets together: Not on file     Attends Christianity service: Not on file     Active member of club or organization: Not on file     Attends meetings of clubs or organizations: Not on file     Relationship status: Not on file    Intimate partner violence:     Fear of current or ex partner: Not on file     Emotionally abused: Not on file     Physically abused: Not on file     Forced sexual activity: Not on file   Other Topics Concern    Not on file   Social History Narrative    Always uses seat belt    Daily caffeine consumption, 1 serv/day    Has smoke detectors    Lives independently        Current Outpatient Medications:     ARIPiprazole (ABILIFY) 10 mg tablet, Take 1 tablet by mouth daily, Disp: , Rfl:     bisacodyl (DULCOLAX) 5 mg EC tablet, Take 1 tablet by mouth daily as needed, Disp: , Rfl:     Cholecalciferol ([employer] VITAMIN D) 2000 units CAPS, Take by mouth, Disp: , Rfl:     DULoxetine (CYMBALTA) 60 mg delayed release capsule, Take 1 capsule by mouth daily, Disp: , Rfl:     loratadine (CLARITIN) 10 mg tablet, Take 10 mg by mouth, Disp: , Rfl:     Turmeric Curcumin 500 MG CAPS, Take by mouth, Disp: , Rfl:     levalbuterol (XOPENEX HFA) 45 mcg/act inhaler, Inhale 1-2 puffs every 4 (four) hours as needed for wheezing (Patient not taking: Reported on 1/14/2020), Disp: 1 Inhaler, Rfl: 0    naproxen (NAPROSYN) 500 mg tablet, Take 1 tablet (500 mg total) by mouth 2 (two) times a day with meals for 20 days, Disp: 40 tablet, Rfl: 0    Review of Systems          Objective:    Vitals:    01/14/20 1056   BP: 100/68   Pulse: 86   Resp: 18   Temp: 98 2 °F (36 8 °C)   TempSrc: Oral   Weight: 97 5 kg (215 lb)   Height: 5' 5 5" (1 664 m)        Physical Exam   Constitutional: She is oriented to person, place, and time  She appears well-developed and well-nourished  HENT:   Head: Normocephalic and atraumatic  Right Ear: Tympanic membrane, external ear and ear canal normal    Left Ear: Tympanic membrane, external ear and ear canal normal    Nose: Mucosal edema and rhinorrhea present  Mouth/Throat: Oropharynx is clear and moist  No oropharyngeal exudate or posterior oropharyngeal erythema  Cardiovascular: Normal rate, regular rhythm and normal heart sounds     Pulmonary/Chest: Effort normal and breath sounds normal    Lymphadenopathy:     She has no cervical adenopathy  Neurological: She is alert and oriented to person, place, and time  Skin: Skin is warm  Psychiatric: She has a normal mood and affect  Judgment normal        BMI Counseling: Body mass index is 35 23 kg/m²  The BMI is above normal  Nutrition recommendations include reducing portion sizes and decreasing overall calorie intake  Exercise recommendations include moderate aerobic physical activity for 150 minutes/week

## 2020-02-14 ENCOUNTER — HOSPITAL ENCOUNTER (OUTPATIENT)
Dept: BONE DENSITY | Facility: IMAGING CENTER | Age: 65
Discharge: HOME | End: 2020-02-14
Payer: MEDICARE

## 2020-02-14 VITALS — WEIGHT: 214 LBS | BODY MASS INDEX: 34.39 KG/M2 | HEIGHT: 66 IN

## 2020-02-14 DIAGNOSIS — Z12.39 SCREENING FOR MALIGNANT NEOPLASM OF BREAST: ICD-10-CM

## 2020-02-14 PROCEDURE — 77067 SCR MAMMO BI INCL CAD: CPT

## 2020-02-14 PROCEDURE — 77063 BREAST TOMOSYNTHESIS BI: CPT

## 2020-02-26 ENCOUNTER — HOSPITAL ENCOUNTER (OUTPATIENT)
Dept: BONE DENSITY | Facility: CLINIC | Age: 65
Discharge: HOME | End: 2020-02-26
Payer: MEDICARE

## 2020-02-26 DIAGNOSIS — Z13.820 SCREENING FOR OSTEOPOROSIS: ICD-10-CM

## 2020-02-26 PROCEDURE — 77080 DXA BONE DENSITY AXIAL: CPT

## 2020-03-21 ENCOUNTER — TELEMEDICINE (OUTPATIENT)
Dept: FAMILY MEDICINE CLINIC | Facility: CLINIC | Age: 65
End: 2020-03-21
Payer: MEDICARE

## 2020-03-21 VITALS — TEMPERATURE: 97.8 F

## 2020-03-21 DIAGNOSIS — Z20.828 EXPOSURE TO SARS-ASSOCIATED CORONAVIRUS: ICD-10-CM

## 2020-03-21 DIAGNOSIS — Z20.828 EXPOSURE TO SARS-ASSOCIATED CORONAVIRUS: Primary | ICD-10-CM

## 2020-03-21 PROCEDURE — 99442 PR PHYS/QHP TELEPHONE EVALUATION 11-20 MIN: CPT | Performed by: FAMILY MEDICINE

## 2020-03-21 PROCEDURE — 87635 SARS-COV-2 COVID-19 AMP PRB: CPT

## 2020-03-21 RX ORDER — ASCORBIC ACID 500 MG
500 TABLET ORAL DAILY
COMMUNITY

## 2020-03-21 NOTE — PROGRESS NOTES
COVID-19 Virtual Visit     This virtual check-in was done via telephone  Encounter provider Valeria De La Garza MD    Provider located at [address] [place] [address]  [phone]    Recent Visits  No visits were found meeting these conditions  Showing recent visits within past 7 days and meeting all other requirements     Future Appointments  No visits were found meeting these conditions  Showing future appointments within next 150 days and meeting all other requirements        Patient agrees to participate in a virtual check in via telephone or video visit instead of presenting to the office to address urgent/immediate medical needs  Patient is aware this is a billable service  After connecting through telephone, the patient was identified by name and date of birth  Jose Alvarado was informed that this was a telemedicine visit and that the exam was being conducted confidentially over secure lines  My office door was closed  No one else was in the room  Jose Alvarado acknowledged consent and understanding of privacy and security of the telemedicine visit  I informed the patient that I have reviewed her record in Epic and presented the opportunity for her to ask any questions regarding the visit today  The patient agreed to participate  Jose Alvarado is a 71 y o  female who is concerned about COVID-19  She reports fever, cough and shortness of breath  She has not traveled outside the [place]  within the last 14 days    She has not had contact with a person who is under investigation for or who is positive for COVID-19 within the last 14 days  She has not been hospitalized recently for fever and/or lower respiratory symptoms      Past Medical History:   Diagnosis Date    Anxiety     Arthritis     Bipolar disorder ([facility] [address] )     Cervical spinal stenosis     last assessed 5/13/14, resolved 10/10/16    Chronic constipation     last assessed 8/28/12, resovled 9/14/15    Chronic fatigue syndrome     last assessed 8/28/12, resolved 9/14/15    Chronic hyperglycemia     resolved 9/14/15    Chronic pain syndrome     last assessed 11/12/13, resolved 10/10/16    Depression     Essential hypertriglyceridemia     resolved 10/10/16    Fibromyalgia     GERD (gastroesophageal reflux disease)     Herpes simplex infection     last assessed 12/12/13, resolved 10/10/16    Hypokalemia     last assessed 9/14/15, resolved 11/23/15    Insomnia     last assessed 3/30/15, resolved 11/23/15    Median neuropathy     last assessed 7/14/15, resolved 10/10/16    Pneumonia     last assessed 7/16/13, resolved 5/10/13    Sacroiliitis ([address] )     last assessed 10/22/13, resolved 10/27/14       Past Surgical History:   Procedure Laterality Date    CERVICAL FUSION      COLONOSCOPY  10/23/2013    10yrs     DILATION AND CURETTAGE OF UTERUS      HALLUX VALGUS CORRECTION      HEMORROIDECTOMY      NEUROPLASTY / TRANSPOSITION MEDIAN NERVE AT CARPAL TUNNEL      REDUCTION MAMMAPLASTY      TONSILLECTOMY      TOOTH EXTRACTION      TUBAL LIGATION         Current Outpatient Medications   Medication Sig Dispense Refill    ARIPiprazole (ABILIFY) 10 mg tablet Take 1 tablet by mouth daily      ascorbic acid (VITAMIN C) 500 mg tablet Take 500 mg by mouth daily      bisacodyl (DULCOLAX) 5 mg EC tablet Take 1 tablet by mouth daily as needed      Calcium 250 MG CAPS Take 500 mg by mouth      Cholecalciferol ([employer] VITAMIN D) 2000 units CAPS Take by mouth      DULoxetine (CYMBALTA) 60 mg delayed release capsule Take 1 capsule by mouth daily      loratadine (CLARITIN) 10 mg tablet Take 10 mg by mouth      Turmeric Curcumin 500 MG CAPS Take by mouth      levalbuterol (XOPENEX HFA) 45 mcg/act inhaler Inhale 1-2 puffs every 4 (four) hours as needed for wheezing (Patient not taking: Reported on 1/14/2020) 1 Inhaler 0    naproxen (NAPROSYN) 500 mg tablet Take 1 tablet (500 mg total) by mouth 2 (two) times a day with meals for 20 days 40 tablet 0     No current facility-administered medications for this visit  Allergies   Allergen Reactions    Cephalexin Rash    Latex Rash     Reaction Date: 21Mar2012;     Molds & Smuts Allergic Rhinitis    Other Allergic Rhinitis and Cough     Cigarette     Risperidone Palpitations     Reaction Date: 21Mar2012;     Sertraline Itching     Reaction Date: 21Mar2012;     Soy Isoflavones        Video Exam    Francisco Botello appears no distress  Disposition:      I referred Francisco Botello to one of our centralized sites for a COVID-19 swab  I spent 15 minutes with the patient during this virtual check-in visit  Virtual Brief Visit    Reason for visit is fever, body aches, cough, sore throat  Encounter provider Nadeem Ray MD    Provider located at [address] [place] [address]  [phone]      Recent Visits  No visits were found meeting these conditions  Showing recent visits within past 7 days and meeting all other requirements     Future Appointments  No visits were found meeting these conditions  Showing future appointments within next 150 days and meeting all other requirements        Patient agrees to participate in a virtual check in via telephone or video visit instead of presenting to the office to address urgent/immediate medical needs  Patient is aware this is a billable service  After connecting through telephone, the patient was identified by name and date of birth  Jo Carrington was informed that this was a telemedicine visit and that the visit is being conducted through telephone which may not be secure and therefore might not be HIPAA-compliant  My office door was closed  No one else was in the room    She acknowledged consent and understanding of privacy and security of the virtual check-in visit  I informed the patient that I have reviewed her record in Epic and presented the opportunity for her to ask any questions regarding the visit today  The patient initiated communication and agreed to participate  Subjective  Levy Dougherty is a 71 y o  female who is c/o fever up to 99 7, body aches, hot and cold chills, headache, sore throat onset on Tuesday night 03/17/2020  She works at [employer] and has been exposed to a lot of people  She is now having occasional wheezing when lying down at night, no purulent mucus production, no difficulty breathing or pleuritic chest pain         Past Medical History:   Diagnosis Date    Anxiety     Arthritis     Bipolar disorder ([facility] [address] )     Cervical spinal stenosis     last assessed 5/13/14, resolved 10/10/16    Chronic constipation     last assessed 8/28/12, resovled 9/14/15    Chronic fatigue syndrome     last assessed 8/28/12, resolved 9/14/15    Chronic hyperglycemia     resolved 9/14/15    Chronic pain syndrome     last assessed 11/12/13, resolved 10/10/16    Depression     Essential hypertriglyceridemia     resolved 10/10/16    Fibromyalgia     GERD (gastroesophageal reflux disease)     Herpes simplex infection     last assessed 12/12/13, resolved 10/10/16    Hypokalemia     last assessed 9/14/15, resolved 11/23/15    Insomnia     last assessed 3/30/15, resolved 11/23/15    Median neuropathy     last assessed 7/14/15, resolved 10/10/16    Pneumonia     last assessed 7/16/13, resolved 5/10/13    Sacroiliitis ([facility] [address] )     last assessed 10/22/13, resolved 10/27/14       Past Surgical History:   Procedure Laterality Date    CERVICAL FUSION      COLONOSCOPY  10/23/2013    10yrs     DILATION AND CURETTAGE OF UTERUS      HALLUX VALGUS CORRECTION      HEMORROIDECTOMY      NEUROPLASTY / TRANSPOSITION MEDIAN NERVE AT CARPAL TUNNEL      REDUCTION MAMMAPLASTY      TONSILLECTOMY      TOOTH EXTRACTION      TUBAL LIGATION         Current Outpatient Medications   Medication Sig Dispense Refill    ARIPiprazole (ABILIFY) 10 mg tablet Take 1 tablet by mouth daily      ascorbic acid (VITAMIN C) 500 mg tablet Take 500 mg by mouth daily      bisacodyl (DULCOLAX) 5 mg EC tablet Take 1 tablet by mouth daily as needed      Calcium 250 MG CAPS Take 500 mg by mouth      Cholecalciferol ([employer]S VITAMIN D) 2000 units CAPS Take by mouth      DULoxetine (CYMBALTA) 60 mg delayed release capsule Take 1 capsule by mouth daily      loratadine (CLARITIN) 10 mg tablet Take 10 mg by mouth      Turmeric Curcumin 500 MG CAPS Take by mouth      levalbuterol (XOPENEX HFA) 45 mcg/act inhaler Inhale 1-2 puffs every 4 (four) hours as needed for wheezing (Patient not taking: Reported on 1/14/2020) 1 Inhaler 0    naproxen (NAPROSYN) 500 mg tablet Take 1 tablet (500 mg total) by mouth 2 (two) times a day with meals for 20 days 40 tablet 0     No current facility-administered medications for this visit  Allergies   Allergen Reactions    Cephalexin Rash    Latex Rash     Reaction Date: 21Mar2012;     Molds & Smuts Allergic Rhinitis    Other Allergic Rhinitis and Cough     Cigarette     Risperidone Palpitations     Reaction Date: 21Mar2012;     Sertraline Itching     Reaction Date: 21Mar2012;     Soy Isoflavones        [place] telephone assessment is patient appeares in no acute distress       Assessment/plan:    1  Viral illness  - will refer patient for COVID-19 testing   - advised patient to rest, have plenty of fluids, take Tylenol 500 mg 2 tablets every 4-6 hours as needed for fever/ sore throat/ body aches, Mucinex as needed for cough/ congestion and use Xopenex inhaler 2 puffs every 6-8 hours as needed for wheezing  - advised patient to self quarantine, no work until we get results back, and call the office if symptoms not better or go to ER if she develops any difficulty breathing        I spent 15 minutes with the patient during this virtual check-in visit

## 2020-03-26 ENCOUNTER — TELEPHONE (OUTPATIENT)
Dept: FAMILY MEDICINE CLINIC | Facility: CLINIC | Age: 65
End: 2020-03-26

## 2020-03-26 NOTE — TELEPHONE ENCOUNTER
Spencer Vizcarra called to see if you received her testing results  I advised her they are still processing  She wanted to let you know that she is having some belly pain but she has had no fever and is not feeling worse except for her stomach

## 2020-03-26 NOTE — TELEPHONE ENCOUNTER
Patient called looking for the result of her COVID-19 test that was done on 03/21/20  She also is c/o shortness of breath  Spoke to Dr Sophy Mejia and she requested verification that the patient was using her xopenex inhaler  Per patient, she had only used it twice since Saturday  Per Dr Sophy Mejia she is to use the inhaler and if that does not help or hold her for four hours she is to go to the ED  Patient was advised of this information and understood  Also advised the patient that we will not have her test results for at least a week

## 2020-03-26 NOTE — TELEPHONE ENCOUNTER
No results yet, but we will call patient as soon as we get them  If she has Pepcid or Tums at home, she may take it 1-2 times a day as needed, she should stay on bland diet and have small frequent meals, she should avoid spicy, fried or acidic food and reduce caffeine and alcohol intake  If her stomach pain persist, she should call the office

## 2020-03-27 LAB — SARS-COV-2 RNA SPEC QL NAA+PROBE: NOT DETECTED

## 2020-03-28 ENCOUNTER — OFFICE VISIT (OUTPATIENT)
Dept: URGENT CARE | Facility: CLINIC | Age: 65
End: 2020-03-28
Payer: MEDICARE

## 2020-03-28 ENCOUNTER — APPOINTMENT (OUTPATIENT)
Dept: RADIOLOGY | Facility: CLINIC | Age: 65
End: 2020-03-28
Payer: MEDICARE

## 2020-03-28 VITALS
RESPIRATION RATE: 16 BRPM | HEIGHT: 66 IN | TEMPERATURE: 98 F | BODY MASS INDEX: 34.39 KG/M2 | HEART RATE: 88 BPM | SYSTOLIC BLOOD PRESSURE: 153 MMHG | DIASTOLIC BLOOD PRESSURE: 84 MMHG | OXYGEN SATURATION: 99 % | WEIGHT: 214 LBS

## 2020-03-28 DIAGNOSIS — R05.9 COUGH: Primary | ICD-10-CM

## 2020-03-28 DIAGNOSIS — J06.9 BACTERIAL URI: ICD-10-CM

## 2020-03-28 DIAGNOSIS — R05.9 COUGH: ICD-10-CM

## 2020-03-28 DIAGNOSIS — B96.89 BACTERIAL URI: ICD-10-CM

## 2020-03-28 DIAGNOSIS — R07.89 TIGHTNESS IN CHEST: ICD-10-CM

## 2020-03-28 PROCEDURE — G0463 HOSPITAL OUTPT CLINIC VISIT: HCPCS | Performed by: NURSE PRACTITIONER

## 2020-03-28 PROCEDURE — 71046 X-RAY EXAM CHEST 2 VIEWS: CPT

## 2020-03-28 PROCEDURE — 99213 OFFICE O/P EST LOW 20 MIN: CPT | Performed by: NURSE PRACTITIONER

## 2020-03-28 RX ORDER — BENZONATATE 100 MG/1
100 CAPSULE ORAL 3 TIMES DAILY PRN
Qty: 20 CAPSULE | Refills: 0 | Status: SHIPPED | OUTPATIENT
Start: 2020-03-28 | End: 2020-04-09 | Stop reason: ALTCHOICE

## 2020-03-28 RX ORDER — AZITHROMYCIN 250 MG/1
TABLET, FILM COATED ORAL
Qty: 6 TABLET | Refills: 0 | Status: SHIPPED | OUTPATIENT
Start: 2020-03-28 | End: 2020-04-01

## 2020-03-28 NOTE — PATIENT INSTRUCTIONS
Follow up with pcp   Take meds as directed   Increase fluids     Take zyrtec or allegra daily, claritin not as potent    Use flonase as directed for nasal symptoms  If worse go to the ER   Rest         Acute Bronchitis   WHAT YOU NEED TO KNOW:   Acute bronchitis is swelling and irritation in the air passages of your lungs  This irritation may cause you to cough or have other breathing problems  Acute bronchitis often starts because of another illness, such as a cold or the flu  The illness spreads from your nose and throat to your windpipe and airways  Bronchitis is often called a chest cold  Acute bronchitis lasts about 3 to 6 weeks and is usually not a serious illness  Your cough can last for several weeks  DISCHARGE INSTRUCTIONS:   Return to the emergency department if:   · You cough up blood  · Your lips or fingernails turn blue  · You feel like you are not getting enough air when you breathe  Contact your healthcare provider if:   · You have a fever  · Your breathing problems do not go away or get worse  · Your cough does not get better within 4 weeks  · You have questions or concerns about your condition or care  Self-care:   · Get more rest   Rest helps your body to heal  Slowly start to do more each day  Rest when you feel it is needed  · Avoid irritants in the air  Avoid chemicals, fumes, and dust  Wear a face mask if you must work around dust or fumes  Stay inside on days when air pollution levels are high  If you have allergies, stay inside when pollen counts are high  Do not use aerosol products, such as spray-on deodorant, bug spray, and hair spray  · Do not smoke or be around others who smoke  Nicotine and other chemicals in cigarettes and cigars damages the cilia that move mucus out of your lungs  Ask your healthcare provider for information if you currently smoke and need help to quit  E-cigarettes or smokeless tobacco still contain nicotine   Talk to your healthcare provider before you use these products  · Drink liquids as directed  Liquids help keep your air passages moist and help you cough up mucus  You may need to drink more liquids when you have acute bronchitis  Ask how much liquid to drink each day and which liquids are best for you  · Use a humidifier or vaporizer  Use a cool mist humidifier or a vaporizer to increase air moisture in your home  This may make it easier for you to breathe and help decrease your cough  Decrease risk for acute bronchitis:   · Get the vaccinations you need  Ask your healthcare provider if you should get vaccinated against the flu or pneumonia  · Prevent the spread of germs  You can decrease your risk of acute bronchitis and other illnesses by doing the following:     [facility] your hands often with soap and water  Carry germ-killing hand lotion or gel with you  You can use the lotion or gel to clean your hands when soap and water are not available  ¨ Do not touch your eyes, nose, or mouth unless you have washed your hands first     ¨ Always cover your mouth when you cough to prevent the spread of germs  It is best to cough into a tissue or your shirt sleeve instead of into your hand  Ask those around you cover their mouths when they cough  ¨ Try to avoid people who have a cold or the flu  If you are sick, stay away from others as much as possible  Medicines: Your healthcare provider may  give you any of the following:  · Ibuprofen or acetaminophen  are medicines that help lower your fever  They are available without a doctor's order  Ask your healthcare provider which medicine is right for you  Ask how much to take and how often to take it  Follow directions  These medicines can cause stomach bleeding if not taken correctly  Ibuprofen can cause kidney damage  Do not take ibuprofen if you have kidney disease, an ulcer, or allergies to aspirin  Acetaminophen can cause liver damage   Do not take more than 4,000 milligrams in 24 hours  · Decongestants  help loosen mucus in your lungs and make it easier to cough up  This can help you breathe easier  · Cough suppressants  decrease your urge to cough  If your cough produces mucus, do not take a cough suppressant unless your healthcare provider tells you to  Your healthcare provider may suggest that you take a cough suppressant at night so you can rest     · Inhalers  may be given  Your healthcare provider may give you one or more inhalers to help you breathe easier and cough less  An inhaler gives your medicine to open your airways  Ask your healthcare provider to show you how to use your inhaler correctly  · Take your medicine as directed  Contact your healthcare provider if you think your medicine is not helping or if you have side effects  Tell him of her if you are allergic to any medicine  Keep a list of the medicines, vitamins, and herbs you take  Include the amounts, and when and why you take them  Bring the list or the pill bottles to follow-up visits  Carry your medicine list with you in case of an emergency  Follow up with your healthcare provider as directed:  Write down questions you have so you will remember to ask them during your follow-up visits  © 2017 [facility] Information is for End User's use only and may not be sold, redistributed or otherwise used for commercial purposes  All illustrations and images included in CareNotes® are the copyrighted property of A D A M [employer]  or [employer]  The above information is an  only  It is not intended as medical advice for individual conditions or treatments  Talk to your doctor, nurse or pharmacist before following any medical regimen to see if it is safe and effective for you

## 2020-03-28 NOTE — PROGRESS NOTES
NAME: Sheila Robins is a 71 y o  female  : 1955    MRN: [id number]    /84   Pulse 88   Temp 98 °F (36 7 °C)   Resp 16   Ht 5' 6" (1 676 m)   Wt 97 1 kg (214 lb)   SpO2 99%   BMI 34 54 kg/m²     Assessment and Plan   Cough [R05]  1  Cough  XR chest pa & lateral    azithromycin (ZITHROMAX) 250 mg tablet    benzonatate (TESSALON PERLES) 100 mg capsule   2  Tightness in chest  azithromycin (ZITHROMAX) 250 mg tablet    benzonatate (TESSALON PERLES) 100 mg capsule   3  Bacterial URI  azithromycin (ZITHROMAX) 250 mg tablet       Carole Arias was seen today for cough  Diagnoses and all orders for this visit:    Cough  -     XR chest pa & lateral; Future  -     azithromycin (ZITHROMAX) 250 mg tablet; Take 2 tablets today and only 1 pill daily until finished  -     benzonatate (TESSALON PERLES) 100 mg capsule; Take 1 capsule (100 mg total) by mouth 3 (three) times a day as needed for cough    Tightness in chest  -     azithromycin (ZITHROMAX) 250 mg tablet; Take 2 tablets today and only 1 pill daily until finished  -     benzonatate (TESSALON PERLES) 100 mg capsule; Take 1 capsule (100 mg total) by mouth 3 (three) times a day as needed for cough    Bacterial URI  -     azithromycin (ZITHROMAX) 250 mg tablet; Take 2 tablets today and only 1 pill daily until finished  Patient Instructions   Patient Instructions     Follow up with pcp   Take meds as directed   Increase fluids     Take zyrtec or allegra daily, claritin not as potent    Use flonase as directed for nasal symptoms  If worse go to the ER   Rest         Acute Bronchitis   WHAT YOU NEED TO KNOW:   Acute bronchitis is swelling and irritation in the air passages of your lungs  This irritation may cause you to cough or have other breathing problems  Acute bronchitis often starts because of another illness, such as a cold or the flu  The illness spreads from your nose and throat to your windpipe and airways   Bronchitis is often called a chest cold  Acute bronchitis lasts about 3 to 6 weeks and is usually not a serious illness  Your cough can last for several weeks  DISCHARGE INSTRUCTIONS:   Return to the emergency department if:   · You cough up blood  · Your lips or fingernails turn blue  · You feel like you are not getting enough air when you breathe  Contact your healthcare provider if:   · You have a fever  · Your breathing problems do not go away or get worse  · Your cough does not get better within 4 weeks  · You have questions or concerns about your condition or care  Self-care:   · Get more rest   Rest helps your body to heal  Slowly start to do more each day  Rest when you feel it is needed  · Avoid irritants in the air  Avoid chemicals, fumes, and dust  Wear a face mask if you must work around dust or fumes  Stay inside on days when air pollution levels are high  If you have allergies, stay inside when pollen counts are high  Do not use aerosol products, such as spray-on deodorant, bug spray, and hair spray  · Do not smoke or be around others who smoke  Nicotine and other chemicals in cigarettes and cigars damages the cilia that move mucus out of your lungs  Ask your healthcare provider for information if you currently smoke and need help to quit  E-cigarettes or smokeless tobacco still contain nicotine  Talk to your healthcare provider before you use these products  · Drink liquids as directed  Liquids help keep your air passages moist and help you cough up mucus  You may need to drink more liquids when you have acute bronchitis  Ask how much liquid to drink each day and which liquids are best for you  · Use a humidifier or vaporizer  Use a cool mist humidifier or a vaporizer to increase air moisture in your home  This may make it easier for you to breathe and help decrease your cough  Decrease risk for acute bronchitis:   · Get the vaccinations you need    Ask your healthcare provider if you should get vaccinated against the flu or pneumonia  · Prevent the spread of germs  You can decrease your risk of acute bronchitis and other illnesses by doing the following:     [facility] your hands often with soap and water  Carry germ-killing hand lotion or gel with you  You can use the lotion or gel to clean your hands when soap and water are not available  ¨ Do not touch your eyes, nose, or mouth unless you have washed your hands first     ¨ Always cover your mouth when you cough to prevent the spread of germs  It is best to cough into a tissue or your shirt sleeve instead of into your hand  Ask those around you cover their mouths when they cough  ¨ Try to avoid people who have a cold or the flu  If you are sick, stay away from others as much as possible  Medicines: Your healthcare provider may  give you any of the following:  · Ibuprofen or acetaminophen  are medicines that help lower your fever  They are available without a doctor's order  Ask your healthcare provider which medicine is right for you  Ask how much to take and how often to take it  Follow directions  These medicines can cause stomach bleeding if not taken correctly  Ibuprofen can cause kidney damage  Do not take ibuprofen if you have kidney disease, an ulcer, or allergies to aspirin  Acetaminophen can cause liver damage  Do not take more than 4,000 milligrams in 24 hours  · Decongestants  help loosen mucus in your lungs and make it easier to cough up  This can help you breathe easier  · Cough suppressants  decrease your urge to cough  If your cough produces mucus, do not take a cough suppressant unless your healthcare provider tells you to  Your healthcare provider may suggest that you take a cough suppressant at night so you can rest     · Inhalers  may be given  Your healthcare provider may give you one or more inhalers to help you breathe easier and cough less  An inhaler gives your medicine to open your airways   Ask your healthcare provider to show you how to use your inhaler correctly  · Take your medicine as directed  Contact your healthcare provider if you think your medicine is not helping or if you have side effects  Tell him of her if you are allergic to any medicine  Keep a list of the medicines, vitamins, and herbs you take  Include the amounts, and when and why you take them  Bring the list or the pill bottles to follow-up visits  Carry your medicine list with you in case of an emergency  Follow up with your healthcare provider as directed:  Write down questions you have so you will remember to ask them during your follow-up visits  © 2017 [address] [employer] Information is for End User's use only and may not be sold, redistributed or otherwise used for commercial purposes  All illustrations and images included in CareNotes® are the copyrighted property of A D A M [employer] Inc  or [employer]  The above information is an  only  It is not intended as medical advice for individual conditions or treatments  Talk to your doctor, nurse or pharmacist before following any medical regimen to see if it is safe and effective for you  Proceed to ER if symptoms worsen  Chief Complaint     Chief Complaint   Patient presents with    Cough     fever present for the past 10 days, cough that comes and goes  Pt has productive cough and worried she may have pneumonia, COVID -19 testing was negative this past week  History of Present Illness     Pt was also notified that her COVID -19 test was negative  It is documented in the result part of her labs  Pt comes in with c/o of fatigue, cough, fevers and present for the past 10 days  She was tested cfor COVID-19 and was negative  She was taking OTC meds, mucinex, with little relief, has taken no tylenol or motrin for the symptoms  No one else sick at home  She has no n/v/d   She has been resting at home, worried about having COVID and happy that it was negative  She still has complaints of coughing and having a productive cough and worried that she may have pneumonia or something worse  She is able to speak in full sentences without being SOB and has no active cough when I saw pt today  Review of Systems   Review of Systems   Constitutional: Positive for fatigue and fever  Negative for chills  HENT: Positive for congestion  Negative for postnasal drip, sinus pressure, sinus pain, sneezing and sore throat  Eyes: Negative  Respiratory: Positive for cough, chest tightness (when coughing) and shortness of breath  Negative for wheezing  Cardiovascular: Negative  Gastrointestinal: Positive for nausea (intermittent)  Genitourinary: Negative  Musculoskeletal: Negative  Neurological: Negative            Current Medications       Current Outpatient Medications:     ARIPiprazole (ABILIFY) 10 mg tablet, Take 1 tablet by mouth daily, Disp: , Rfl:     ascorbic acid (VITAMIN C) 500 mg tablet, Take 500 mg by mouth daily, Disp: , Rfl:     bisacodyl (DULCOLAX) 5 mg EC tablet, Take 1 tablet by mouth daily as needed, Disp: , Rfl:     Calcium 250 MG CAPS, Take 500 mg by mouth, Disp: , Rfl:     Cholecalciferol ([employer] VITAMIN D) 2000 units CAPS, Take by mouth, Disp: , Rfl:     DULoxetine (CYMBALTA) 60 mg delayed release capsule, Take 1 capsule by mouth daily, Disp: , Rfl:     levalbuterol (XOPENEX HFA) 45 mcg/act inhaler, Inhale 1-2 puffs every 4 (four) hours as needed for wheezing, Disp: 1 Inhaler, Rfl: 0    loratadine (CLARITIN) 10 mg tablet, Take 10 mg by mouth, Disp: , Rfl:     Turmeric Curcumin 500 MG CAPS, Take by mouth, Disp: , Rfl:     azithromycin (ZITHROMAX) 250 mg tablet, Take 2 tablets today and only 1 pill daily until finished , Disp: 6 tablet, Rfl: 0    benzonatate (TESSALON PERLES) 100 mg capsule, Take 1 capsule (100 mg total) by mouth 3 (three) times a day as needed for cough, Disp: 20 capsule, Rfl: 0    naproxen (NAPROSYN) 500 mg tablet, Take 1 tablet (500 mg total) by mouth 2 (two) times a day with meals for 20 days, Disp: 40 tablet, Rfl: 0    Current Allergies     Allergies as of 03/28/2020 - Reviewed 03/28/2020   Allergen Reaction Noted    Cephalexin Rash 09/14/2015    Latex Rash 04/21/2012    Molds & smuts Allergic Rhinitis 03/11/2016    Other Allergic Rhinitis and Cough 04/30/2015    Risperidone Palpitations 04/21/2012    Sertraline Itching 04/21/2012    Soy isoflavones  07/30/2015              Past Medical History:   Diagnosis Date    Anxiety     Arthritis     [facility]     Cervical spinal stenosis     last assessed 5/13/14, resolved 10/10/16    Chronic constipation     last assessed 8/28/12, resovled 9/14/15    Chronic fatigue syndrome     last assessed 8/28/12, resolved 9/14/15    Chronic hyperglycemia     resolved 9/14/15    Chronic pain syndrome     last assessed 11/12/13, resolved 10/10/16    Depression     Essential hypertriglyceridemia     resolved 10/10/16    Fibromyalgia     GERD (gastroesophageal reflux disease)     Herpes simplex infection     last assessed 12/12/13, resolved 10/10/16    Hypokalemia     last assessed 9/14/15, resolved 11/23/15    Insomnia     last assessed 3/30/15, resolved 11/23/15    Median neuropathy     last assessed 7/14/15, resolved 10/10/16    Pneumonia     last assessed 7/16/13, resolved 5/10/13    Sacroiliitis ([facility] )     last assessed 10/22/13, resolved 10/27/14       Past Surgical History:   Procedure Laterality Date    CERVICAL FUSION      COLONOSCOPY  10/23/2013    10yrs     DILATION AND CURETTAGE OF UTERUS      HALLUX VALGUS CORRECTION      HEMORROIDECTOMY      NEUROPLASTY / TRANSPOSITION MEDIAN NERVE AT CARPAL TUNNEL      REDUCTION MAMMAPLASTY      TONSILLECTOMY      TOOTH EXTRACTION      TUBAL LIGATION         Family History   Problem Relation Age of Onset    Hypertension Mother     Osteoporosis Mother     Heart attack Father  Melanoma Father     Diabetes Paternal Grandmother     Coronary artery disease Paternal Grandfather     Heart attack Family     Arthritis Family     Diabetes Family     Hypertension Family     Heart disease Family     Osteoporosis Family     No Known Problems Daughter     No Known Problems Maternal Grandmother     No Known Problems Maternal Grandfather          Medications have been verified  The following portions of the patient's history were reviewed and updated as appropriate: allergies, current medications, past family history, past medical history, past social history, past surgical history and problem list     Objective   /84   Pulse 88   Temp 98 °F (36 7 °C)   Resp 16   Ht 5' 6" (1 676 m)   Wt 97 1 kg (214 lb)   SpO2 99%   BMI 34 54 kg/m²      Physical Exam     Physical Exam   Constitutional: She appears well-developed and well-nourished  No distress  HENT:   Head: Normocephalic  Nose: Nose normal  Right sinus exhibits no maxillary sinus tenderness  Left sinus exhibits no maxillary sinus tenderness  Cardiovascular: Normal rate, regular rhythm, normal heart sounds and normal pulses  Pulmonary/Chest: Effort normal and breath sounds normal  No stridor  She has no decreased breath sounds  She has no wheezes  She has no rhonchi  Abdominal: Soft  Normal appearance  There is no tenderness  Musculoskeletal: Normal range of motion  Skin: Skin is warm  No rash noted  She is not diaphoretic  Psychiatric: She has a normal mood and affect   Her speech is normal and behavior is normal  Thought content normal        Ramesh KARIN Renee

## 2020-03-30 ENCOUNTER — TELEPHONE (OUTPATIENT)
Dept: FAMILY MEDICINE CLINIC | Facility: CLINIC | Age: 65
End: 2020-03-30

## 2020-03-30 NOTE — TELEPHONE ENCOUNTER
Patient called she got a call her covid test came back negative she went to urgent care Saturday has a upper respiratory infection  She is asking for a note to return to work on 4-2-20  Is it ok to send the note?     David Berry  Fax number [phone]

## 2020-04-09 ENCOUNTER — TELEMEDICINE (OUTPATIENT)
Dept: FAMILY MEDICINE CLINIC | Facility: CLINIC | Age: 65
End: 2020-04-09
Payer: MEDICARE

## 2020-04-09 VITALS — BODY MASS INDEX: 34.22 KG/M2 | WEIGHT: 212 LBS | TEMPERATURE: 96.8 F

## 2020-04-09 DIAGNOSIS — H10.32 ACUTE BACTERIAL CONJUNCTIVITIS OF LEFT EYE: Primary | ICD-10-CM

## 2020-04-09 PROCEDURE — 99213 OFFICE O/P EST LOW 20 MIN: CPT | Performed by: NURSE PRACTITIONER

## 2020-04-09 RX ORDER — POLYMYXIN B SULFATE AND TRIMETHOPRIM 1; 10000 MG/ML; [USP'U]/ML
1 SOLUTION OPHTHALMIC EVERY 6 HOURS
Qty: 10 ML | Refills: 0 | Status: SHIPPED | OUTPATIENT
Start: 2020-04-09 | End: 2021-01-18 | Stop reason: ALTCHOICE

## 2020-12-01 ENCOUNTER — HOSPITAL ENCOUNTER (EMERGENCY)
Facility: HOSPITAL | Age: 65
Discharge: HOME | End: 2020-12-01
Attending: EMERGENCY MEDICINE
Payer: MEDICARE

## 2020-12-01 ENCOUNTER — APPOINTMENT (EMERGENCY)
Dept: RADIOLOGY | Facility: HOSPITAL | Age: 65
End: 2020-12-01
Payer: MEDICARE

## 2020-12-01 VITALS
HEART RATE: 71 BPM | HEIGHT: 66 IN | BODY MASS INDEX: 33.75 KG/M2 | TEMPERATURE: 97.3 F | WEIGHT: 210 LBS | OXYGEN SATURATION: 99 % | DIASTOLIC BLOOD PRESSURE: 75 MMHG | SYSTOLIC BLOOD PRESSURE: 142 MMHG | RESPIRATION RATE: 20 BRPM

## 2020-12-01 DIAGNOSIS — R07.89 ATYPICAL CHEST PAIN: ICD-10-CM

## 2020-12-01 DIAGNOSIS — R07.9 CHEST PAIN: Primary | ICD-10-CM

## 2020-12-01 LAB
ALBUMIN SERPL BCP-MCNC: 3.9 G/DL (ref 3.5–5)
ALP SERPL-CCNC: 83 U/L (ref 46–116)
ALT SERPL W P-5'-P-CCNC: 32 U/L (ref 12–78)
ANION GAP SERPL CALCULATED.3IONS-SCNC: 10 MMOL/L (ref 4–13)
AST SERPL W P-5'-P-CCNC: 26 U/L (ref 5–45)
BASOPHILS # BLD AUTO: 0.03 THOUSANDS/ΜL (ref 0–0.1)
BASOPHILS NFR BLD AUTO: 0 % (ref 0–1)
BILIRUB SERPL-MCNC: 0.5 MG/DL (ref 0.2–1)
BUN SERPL-MCNC: 17 MG/DL (ref 5–25)
CALCIUM SERPL-MCNC: 9.2 MG/DL (ref 8.3–10.1)
CHLORIDE SERPL-SCNC: 103 MMOL/L (ref 100–108)
CO2 SERPL-SCNC: 27 MMOL/L (ref 21–32)
CREAT SERPL-MCNC: 0.96 MG/DL (ref 0.6–1.3)
EOSINOPHIL # BLD AUTO: 0.15 THOUSAND/ΜL (ref 0–0.61)
EOSINOPHIL NFR BLD AUTO: 2 % (ref 0–6)
ERYTHROCYTE [DISTWIDTH] IN BLOOD BY AUTOMATED COUNT: 14 % (ref 11.6–15.1)
GFR SERPL CREATININE-BSD FRML MDRD: 62 ML/MIN/1.73SQ M
GLUCOSE SERPL-MCNC: 86 MG/DL (ref 65–140)
HCT VFR BLD AUTO: 40.9 % (ref 34.8–46.1)
HGB BLD-MCNC: 13.8 G/DL (ref 11.5–15.4)
IMM GRANULOCYTES # BLD AUTO: 0.02 THOUSAND/UL (ref 0–0.2)
IMM GRANULOCYTES NFR BLD AUTO: 0 % (ref 0–2)
LYMPHOCYTES # BLD AUTO: 1.99 THOUSANDS/ΜL (ref 0.6–4.47)
LYMPHOCYTES NFR BLD AUTO: 30 % (ref 14–44)
MCH RBC QN AUTO: 33.7 PG (ref 26.8–34.3)
MCHC RBC AUTO-ENTMCNC: 33.7 G/DL (ref 31.4–37.4)
MCV RBC AUTO: 100 FL (ref 82–98)
MONOCYTES # BLD AUTO: 0.52 THOUSAND/ΜL (ref 0.17–1.22)
MONOCYTES NFR BLD AUTO: 8 % (ref 4–12)
NEUTROPHILS # BLD AUTO: 3.97 THOUSANDS/ΜL (ref 1.85–7.62)
NEUTS SEG NFR BLD AUTO: 60 % (ref 43–75)
NRBC BLD AUTO-RTO: 0 /100 WBCS
PLATELET # BLD AUTO: 266 THOUSANDS/UL (ref 149–390)
PMV BLD AUTO: 10.4 FL (ref 8.9–12.7)
POTASSIUM SERPL-SCNC: 3.3 MMOL/L (ref 3.5–5.3)
PROT SERPL-MCNC: 7.6 G/DL (ref 6.4–8.2)
RBC # BLD AUTO: 4.09 MILLION/UL (ref 3.81–5.12)
SODIUM SERPL-SCNC: 140 MMOL/L (ref 136–145)
TROPONIN I SERPL-MCNC: <0.02 NG/ML
TROPONIN I SERPL-MCNC: <0.02 NG/ML
WBC # BLD AUTO: 6.68 THOUSAND/UL (ref 4.31–10.16)

## 2020-12-01 PROCEDURE — 84484 ASSAY OF TROPONIN QUANT: CPT | Performed by: EMERGENCY MEDICINE

## 2020-12-01 PROCEDURE — 93005 ELECTROCARDIOGRAM TRACING: CPT

## 2020-12-01 PROCEDURE — 85025 COMPLETE CBC W/AUTO DIFF WBC: CPT | Performed by: EMERGENCY MEDICINE

## 2020-12-01 PROCEDURE — 99285 EMERGENCY DEPT VISIT HI MDM: CPT

## 2020-12-01 PROCEDURE — 71046 X-RAY EXAM CHEST 2 VIEWS: CPT

## 2020-12-01 PROCEDURE — 80053 COMPREHEN METABOLIC PANEL: CPT | Performed by: EMERGENCY MEDICINE

## 2020-12-01 PROCEDURE — 36415 COLL VENOUS BLD VENIPUNCTURE: CPT | Performed by: EMERGENCY MEDICINE

## 2020-12-01 PROCEDURE — 99284 EMERGENCY DEPT VISIT MOD MDM: CPT | Performed by: EMERGENCY MEDICINE

## 2020-12-01 RX ORDER — ASPIRIN 81 MG/1
324 TABLET, CHEWABLE ORAL ONCE
Status: COMPLETED | OUTPATIENT
Start: 2020-12-01 | End: 2020-12-01

## 2020-12-01 RX ORDER — POTASSIUM CHLORIDE 20 MEQ/1
40 TABLET, EXTENDED RELEASE ORAL ONCE
Status: COMPLETED | OUTPATIENT
Start: 2020-12-01 | End: 2020-12-01

## 2020-12-01 RX ADMIN — ASPIRIN 81 MG CHEWABLE TABLET 324 MG: 81 TABLET CHEWABLE at 14:28

## 2020-12-01 RX ADMIN — POTASSIUM CHLORIDE 40 MEQ: 1500 TABLET, EXTENDED RELEASE ORAL at 15:05

## 2020-12-03 LAB
ATRIAL RATE: 89 BPM
P AXIS: 68 DEGREES
PR INTERVAL: 150 MS
QRS AXIS: 33 DEGREES
QRSD INTERVAL: 72 MS
QT INTERVAL: 382 MS
QTC INTERVAL: 464 MS
T WAVE AXIS: 54 DEGREES
VENTRICULAR RATE: 89 BPM

## 2020-12-03 PROCEDURE — 93010 ELECTROCARDIOGRAM REPORT: CPT | Performed by: INTERNAL MEDICINE

## 2020-12-15 ENCOUNTER — OFFICE VISIT (OUTPATIENT)
Dept: FAMILY MEDICINE CLINIC | Facility: CLINIC | Age: 65
End: 2020-12-15
Payer: MEDICARE

## 2020-12-15 VITALS
RESPIRATION RATE: 18 BRPM | TEMPERATURE: 98.3 F | HEART RATE: 84 BPM | WEIGHT: 215.4 LBS | SYSTOLIC BLOOD PRESSURE: 134 MMHG | BODY MASS INDEX: 34.62 KG/M2 | HEIGHT: 66 IN | DIASTOLIC BLOOD PRESSURE: 74 MMHG

## 2020-12-15 DIAGNOSIS — M25.562 ACUTE PAIN OF LEFT KNEE: ICD-10-CM

## 2020-12-15 PROCEDURE — 99213 OFFICE O/P EST LOW 20 MIN: CPT | Performed by: PHYSICIAN ASSISTANT

## 2020-12-18 ENCOUNTER — TELEPHONE (OUTPATIENT)
Dept: FAMILY MEDICINE CLINIC | Facility: CLINIC | Age: 65
End: 2020-12-18

## 2020-12-21 ENCOUNTER — TELEPHONE (OUTPATIENT)
Dept: FAMILY MEDICINE CLINIC | Facility: CLINIC | Age: 65
End: 2020-12-21

## 2020-12-30 ENCOUNTER — OFFICE VISIT (OUTPATIENT)
Dept: CARDIOLOGY CLINIC | Facility: CLINIC | Age: 65
End: 2020-12-30
Payer: MEDICARE

## 2020-12-30 VITALS
WEIGHT: 222 LBS | DIASTOLIC BLOOD PRESSURE: 70 MMHG | HEART RATE: 88 BPM | BODY MASS INDEX: 34.84 KG/M2 | HEIGHT: 67 IN | SYSTOLIC BLOOD PRESSURE: 130 MMHG

## 2020-12-30 DIAGNOSIS — R07.9 CHEST PAIN SYNDROME: Primary | ICD-10-CM

## 2020-12-30 PROCEDURE — 99203 OFFICE O/P NEW LOW 30 MIN: CPT | Performed by: INTERNAL MEDICINE

## 2020-12-30 RX ORDER — ARIPIPRAZOLE 15 MG/1
15 TABLET ORAL
COMMUNITY
Start: 2020-12-12 | End: 2021-09-16 | Stop reason: SDUPTHER

## 2021-01-18 ENCOUNTER — OFFICE VISIT (OUTPATIENT)
Dept: FAMILY MEDICINE CLINIC | Facility: CLINIC | Age: 66
End: 2021-01-18
Payer: MEDICARE

## 2021-01-18 VITALS
TEMPERATURE: 98.3 F | BODY MASS INDEX: 34.86 KG/M2 | DIASTOLIC BLOOD PRESSURE: 70 MMHG | WEIGHT: 216.9 LBS | HEART RATE: 84 BPM | HEIGHT: 66 IN | SYSTOLIC BLOOD PRESSURE: 120 MMHG | RESPIRATION RATE: 16 BRPM

## 2021-01-18 DIAGNOSIS — E87.6 HYPOKALEMIA: ICD-10-CM

## 2021-01-18 DIAGNOSIS — M25.511 ACUTE PAIN OF RIGHT SHOULDER: Primary | ICD-10-CM

## 2021-01-18 DIAGNOSIS — Z79.899 ENCOUNTER FOR LONG-TERM CURRENT USE OF MEDICATION: ICD-10-CM

## 2021-01-18 DIAGNOSIS — Z12.31 ENCOUNTER FOR SCREENING MAMMOGRAM FOR MALIGNANT NEOPLASM OF BREAST: ICD-10-CM

## 2021-01-18 DIAGNOSIS — Z23 FLU VACCINE NEED: ICD-10-CM

## 2021-01-18 PROCEDURE — 99213 OFFICE O/P EST LOW 20 MIN: CPT | Performed by: NURSE PRACTITIONER

## 2021-01-18 PROCEDURE — G0008 ADMIN INFLUENZA VIRUS VAC: HCPCS

## 2021-01-18 PROCEDURE — 90662 IIV NO PRSV INCREASED AG IM: CPT

## 2021-01-18 NOTE — LETTER
January 18, 2021     Patient: Marixa Cole   YOB: 1955   Date of Visit: 1/18/2021       To Whom it May Concern:    Jarred Romero is under my professional care  She was seen in my office on 1/18/2021  Please excuse her on 01/17 and 01/18  She may return to work on 1/19/2021  If you have any questions or concerns, please don't hesitate to call           Sincerely,          KARIN Gutierrez        CC: No Recipients

## 2021-01-18 NOTE — PROGRESS NOTES
Assessment/Plan:       Diagnoses and all orders for this visit:    Acute pain of right shoulder    Limited ROM noted  Limited right shoulder extension to 45 degrees with pain & right shoulder flexion 110 degrees with pain  Patient able to abduct without difficulty  R shoulder adduction to 20 degrees limited with pain  Patient encouraged to continue to rest right shoulder and limit overuse  PT consult placed  Patient instructed to utilize ice PRN for shoulder pain  Patient is not interested in pharmacological mgmt at this time  Pt to f/u in 4 weeks for recheck and AWV  Encounter for screening mammogram    Order placed  Last mammo noted on 02/14  Flu Vaccine Need    Flu shot given    Hypokalemia    Noted at 3 3 during ER visit on 12/01  We will recheck this along with other baseline labs prior to next AWV in 4 weeks  Subjective:      Patient ID: Jayda Talley is a 76 y o  female  Patient presents for right shoulder pain and limited ROM patient states since her mechanical fall back in December 2020  She was seen by Maxwell Pascual PA-C on 12/15/2020 for left knee pain  Left knee xrays noted no fracture to left knee  No right shoulder pain reported during initial visit with Maxwell Pascual on 12/15/2020  Patient states that her right shoulder pain has worsened over the last 3 weeks or so  Patient right shoulder pain relieved with rest & aggravated while working at [employer] as a   She is changing jobs from [employer] to [employer] and is hoping that her new position will be less physically demanding  The following portions of the patient's history were reviewed and updated as appropriate: allergies, current medications, past family history, past medical history, past social history, past surgical history and problem list     Review of Systems   Constitutional: Negative  Negative for chills and fever  HENT: Negative  Negative for ear pain and sore throat  Eyes: Negative    Negative for pain and visual disturbance  Respiratory: Negative  Negative for cough and shortness of breath  Cardiovascular: Negative  Negative for chest pain and palpitations  Gastrointestinal: Negative  Negative for abdominal pain and vomiting  Musculoskeletal: Positive for arthralgias (right shoulder, aggravated w/ ROM)  Negative for back pain, joint swelling, myalgias, neck pain and neck stiffness  Skin: Negative  Negative for color change and rash  Neurological: Negative  Negative for numbness  Psychiatric/Behavioral: Negative  All other systems reviewed and are negative  Objective:      /70   Pulse 84   Temp 98 3 °F (36 8 °C)   Resp 16   Ht 5' 6" (1 676 m)   Wt 98 4 kg (216 lb 14 4 oz)   BMI 35 01 kg/m²          Physical Exam  Vitals signs reviewed  Constitutional:       General: She is not in acute distress  Appearance: Normal appearance  She is obese  HENT:      Head: Normocephalic  Neck:      Musculoskeletal: Normal range of motion and neck supple  No muscular tenderness  Cardiovascular:      Rate and Rhythm: Normal rate and regular rhythm  Pulses: Normal pulses  Heart sounds: Normal heart sounds  Pulmonary:      Effort: Pulmonary effort is normal       Breath sounds: Normal breath sounds  Abdominal:      General: Bowel sounds are normal       Palpations: Abdomen is soft  Musculoskeletal:         General: Tenderness (right shoulder) present  No swelling  Right shoulder: She exhibits decreased range of motion, tenderness and decreased strength  She exhibits no swelling, no effusion, no crepitus and normal pulse  Right upper arm: She exhibits tenderness  She exhibits no bony tenderness, no swelling, no edema and no deformity  Right lower leg: No edema  Left lower leg: No edema  Skin:     General: Skin is warm and dry  Coloration: Skin is not pale  Findings: No erythema     Neurological:      Mental Status: She is alert and oriented to person, place, and time  Mental status is at baseline     Psychiatric:         Mood and Affect: Mood normal          Behavior: Behavior normal

## 2021-01-20 ENCOUNTER — HOSPITAL ENCOUNTER (OUTPATIENT)
Dept: NON INVASIVE DIAGNOSTICS | Age: 66
Discharge: HOME | End: 2021-01-20
Payer: MEDICARE

## 2021-01-20 DIAGNOSIS — R07.9 CHEST PAIN SYNDROME: ICD-10-CM

## 2021-01-20 PROCEDURE — 93016 CV STRESS TEST SUPVJ ONLY: CPT | Performed by: INTERNAL MEDICINE

## 2021-01-20 PROCEDURE — 93017 CV STRESS TEST TRACING ONLY: CPT

## 2021-01-20 PROCEDURE — 93306 TTE W/DOPPLER COMPLETE: CPT

## 2021-01-20 PROCEDURE — 93018 CV STRESS TEST I&R ONLY: CPT | Performed by: INTERNAL MEDICINE

## 2021-01-20 PROCEDURE — 93306 TTE W/DOPPLER COMPLETE: CPT | Performed by: INTERNAL MEDICINE

## 2021-01-21 LAB
CHEST PAIN STATEMENT: NORMAL
MAX DIASTOLIC BP: 86 MMHG
MAX HEART RATE: 133 BPM
MAX PREDICTED HEART RATE: 154 BPM
MAX. SYSTOLIC BP: 160 MMHG
PROTOCOL NAME: NORMAL
REASON FOR TERMINATION: NORMAL
TARGET HR FORMULA: NORMAL
TEST INDICATION: NORMAL
TIME IN EXERCISE PHASE: NORMAL

## 2021-02-03 ENCOUNTER — EVALUATION (OUTPATIENT)
Dept: PHYSICAL THERAPY | Facility: CLINIC | Age: 66
End: 2021-02-03
Payer: MEDICARE

## 2021-02-03 DIAGNOSIS — M25.511 ACUTE PAIN OF RIGHT SHOULDER: Primary | ICD-10-CM

## 2021-02-03 PROCEDURE — 97162 PT EVAL MOD COMPLEX 30 MIN: CPT | Performed by: PHYSICAL THERAPIST

## 2021-02-03 NOTE — PROGRESS NOTES
PT Evaluation     Today's date: 2/3/2021  Patient name: Latonya Bashir  : 1955  MRN: [id number]  Referring provider: KARIN Reyez  Dx:   Encounter Diagnosis     ICD-10-CM    1  Acute pain of right shoulder  M25 511 Ambulatory referral to Physical Therapy                  Assessment  Assessment details: Pt is a 76y o  year old female coming to outpatient PT with a diagnosis of R shoulder pain s/p fall   Pt presents with increased pain and TTP, decreased R shoulder ROM and strength, and overall decreased functional mobility  Pt would benefit from skilled PT services in order to address these deficits and reach maximum level of function  Thank you kindly for the referral!  Impairments: abnormal or restricted ROM, activity intolerance, impaired physical strength, lacks appropriate home exercise program and pain with function  Understanding of Dx/Px/POC: good   Prognosis: fair    Goals  STG's ( 3-4 weeks)  1  Pt will be independent in HEP  2  Decrease pain to 5/10 at best  LTG's ( 6- 8 weeks)  1  Improve FOTO score by 8-10 points  2  Improve R shoulder ROM to WFL's  3  Improve R shoulder strength by 1/2 grade  4  Pt will be able to complete overhead activities with greater ease  5   Pt will have improved tolerance for reaching behind her back    Plan  Patient would benefit from: PT eval and skilled physical therapy  Planned modality interventions: cryotherapy, thermotherapy: hydrocollator packs and TENS  Planned therapy interventions: joint mobilization, manual therapy, neuromuscular re-education, strengthening, stretching, therapeutic activities, therapeutic exercise, flexibility, functional ROM exercises and home exercise program  Frequency: 2x week  Plan of Care beginning date: 2/3/2021  Plan of Care expiration date: 3/17/2021  Treatment plan discussed with: patient        Subjective Evaluation    History of Present Illness  Mechanism of injury: Pt reports she fell on 20, which caused R shoulder pain  Pt notes the floor was uneven and she can't see well and needs cataract surgery  Pt has a history of cervical spine fusion  Pt reports her pain is gradually worsening in her shoulder and has worsened with shoveling snow  Pt has increased pain and difficulty putting her R arm behind her back, lifting and reaching overhead  Pt is able to drive  Pt is not able to hook her bra  Pt has not noticed pain with self care activities  Pt was a  at [employer] and felt increased soreness with opening bags and scanning items      Work: works at [employer]; calligraphy  Gait: slow speed  Pain  At best pain ratin  At worst pain rating: 10  Location: R shoulder  Quality: sharp  Aggravating factors: lifting and overhead activity    Social Support    Employment status: working    Diagnostic Tests  No diagnostic tests performed  Treatments  No previous or current treatments  Patient Goals  Patient goal: to have better ROM in my arm        Objective     Neurological Testing     Sensation     Shoulder   Left Shoulder   Intact: light touch    Right Shoulder   Intact: light touch    Reflexes   Left   Biceps (C5/C6): normal (2+)  Brachioradialis (C6): normal (2+)  Triceps (C7): normal (2+)    Right   Biceps (C5/C6): normal (2+)  Brachioradialis (C6): normal (2+)  Triceps (C7): normal (2+)    Active Range of Motion   Cervical/Thoracic Spine       Cervical    Flexion: 30 degrees   Extension: 50 degrees      Left lateral flexion: 20 degrees      Right lateral flexion: 15 degrees      Left rotation:  Restriction level: moderate  Right rotation:  Restriction level: moderate  Left Shoulder   Flexion: 120 degrees   Abduction: 110 degrees     Right Shoulder   Flexion: 75 degrees   Abduction: 75 degrees   External rotation 45°: 20 degrees   Internal rotation 45°: 60 degrees     Additional Active Range of Motion Details  Posture: pt sits with rounded shoulders/ forward head position  Hypersensitivity to light touch in R upper trap, rhomboid, scapular region   Pt is very tender to light touch  (+) TTP R posterior shoulder, lateral shoulder  Pt moves with guarded positioning    Passive Range of Motion     Right Shoulder   Flexion: 85 degrees   Abduction: 87 degrees   External rotation 45°: 21 degrees   Internal rotation 45°: 60 degrees     Strength/Myotome Testing     Right Shoulder     Planes of Motion   Right shoulder forward flexion strength: NT    Right shoulder abduction strength: NT    External rotation at 0°: 4+ (pain)   Internal rotation at 0°: 4+ (pain)             Dx: R shoulder pain s/p fall  EPOC:3/17/21  CO-MORBIDITIES: depression, fibromyalgia, chronic fatigue syndrome; s/p cervical fusion, LBP  PERSONAL FACTORS:  Precautions: high pain levels; very tender to light touch      Manuals             R shoulder PROM             R shld gentle MFR                                       Neuro Re-Ed                                                                                                        Ther Ex             pendulums             Table slides: flex, scap, ER             Seated cane IR/ER             Supine cane flexion             Pulleys: flex             Pulleys: scap                                       Ther Activity                                       Gait Training                                       Modalities             TENS w/ MH post tx

## 2021-02-09 ENCOUNTER — OFFICE VISIT (OUTPATIENT)
Dept: PHYSICAL THERAPY | Facility: CLINIC | Age: 66
End: 2021-02-09
Payer: MEDICARE

## 2021-02-09 DIAGNOSIS — M25.511 ACUTE PAIN OF RIGHT SHOULDER: Primary | ICD-10-CM

## 2021-02-09 PROCEDURE — 97112 NEUROMUSCULAR REEDUCATION: CPT

## 2021-02-09 PROCEDURE — 97140 MANUAL THERAPY 1/> REGIONS: CPT

## 2021-02-09 PROCEDURE — 97110 THERAPEUTIC EXERCISES: CPT

## 2021-02-09 NOTE — PROGRESS NOTES
Daily Note     Today's date: 2021  Patient name: Apolinar Brooke  : 1955  MRN: [id number]  Referring provider: KARIN Saucedo  Dx:   Encounter Diagnosis     ICD-10-CM    1  Acute pain of right shoulder  M25 [id number]                   Subjective: Pt reports practicing HEP and having more mobility but still experiencing pain  Pt states having difficulty lifting heavy paper at work  Objective: See treatment diary below  Pt treated by RICKI Cummings w/ supervision of FABRICIO SINCLAIR  Assessment: Tolerated treatment well  Limitations felt at end range of R shldr PROM in flex and scaption due to pain and tightness  Patient demonstrated fatigue post treatment and would benefit from continued PT to increase ROM, overall function, and decrease pain and difficulty lifting at work  Pt reported having TENS in the past and did not like the tingling  Plan: Continue per plan of care        Dx: R shoulder pain s/p fall  EPOC:3/17/21  CO-MORBIDITIES: depression, fibromyalgia, chronic fatigue syndrome; s/p cervical fusion, LBP  PERSONAL FACTORS:  Precautions: high pain levels; very tender to light touch      Manuals 2/8            R shoulder PROM NK/JK            R shld gentle MFR NK/JK                          15'            Neuro Re-Ed                                                                                                        Ther Ex             pendulums 1' CW/  CCW            Table slides: flex, scap, ER 10 ea            Seated cane IR/ER 10ea            Supine cane flexion 10x            Pulleys: flex 3'            Pulleys: scap 1 5'                                      Ther Activity                                       Gait Training                                       Modalities             TENS w/ MH post tx MH 10'

## 2021-02-11 ENCOUNTER — OFFICE VISIT (OUTPATIENT)
Dept: FAMILY MEDICINE CLINIC | Facility: CLINIC | Age: 66
End: 2021-02-11
Payer: MEDICARE

## 2021-02-11 VITALS
HEART RATE: 97 BPM | BODY MASS INDEX: 35.17 KG/M2 | SYSTOLIC BLOOD PRESSURE: 120 MMHG | HEIGHT: 66 IN | WEIGHT: 218.8 LBS | DIASTOLIC BLOOD PRESSURE: 80 MMHG | RESPIRATION RATE: 16 BRPM

## 2021-02-11 DIAGNOSIS — M54.50 ACUTE BILATERAL LOW BACK PAIN WITHOUT SCIATICA: ICD-10-CM

## 2021-02-11 DIAGNOSIS — M25.511 ACUTE PAIN OF RIGHT SHOULDER: ICD-10-CM

## 2021-02-11 DIAGNOSIS — R26.89 IMBALANCE: ICD-10-CM

## 2021-02-11 DIAGNOSIS — Z00.00 ENCOUNTER FOR SUBSEQUENT ANNUAL WELLNESS VISIT (AWV) IN MEDICARE PATIENT: Primary | ICD-10-CM

## 2021-02-11 PROCEDURE — G0439 PPPS, SUBSEQ VISIT: HCPCS | Performed by: NURSE PRACTITIONER

## 2021-02-11 PROCEDURE — 99212 OFFICE O/P EST SF 10 MIN: CPT | Performed by: NURSE PRACTITIONER

## 2021-02-11 PROCEDURE — 1123F ACP DISCUSS/DSCN MKR DOCD: CPT | Performed by: NURSE PRACTITIONER

## 2021-02-11 NOTE — PROGRESS NOTES
Assessment and Plan: 1  Obtain Mammogram  2  F/u in 1 year for AWV or sooner PRN       Problem List Items Addressed This Visit     None      Visit Diagnoses     Acute pain of right shoulder    -  Primary    Relevant Orders    Ambulatory referral to Orthopedic Surgery    Encounter for subsequent annual wellness visit (AWV) in Medicare patient        Acute bilateral low back pain without sciatica        Imbalance        Relevant Orders    Ambulatory referral to Physical Therapy    Medicare annual wellness visit, subsequent            BMI Counseling: Body mass index is 35 32 kg/m²  The BMI is above normal  Nutrition recommendations include decreasing portion sizes, encouraging healthy choices of fruits and vegetables, decreasing fast food intake, consuming healthier snacks, moderation in carbohydrate intake, increasing intake of lean protein, reducing intake of saturated and trans fat and reducing intake of cholesterol  Exercise recommendations include moderate physical activity 150 minutes/week  No pharmacotherapy was ordered  Preventive health issues were discussed with patient, and age appropriate screening tests were ordered as noted in patient's After Visit Summary  Personalized health advice and appropriate referrals for health education or preventive services given if needed, as noted in patient's After Visit Summary       History of Present Illness:     Patient presents for Medicare Annual Wellness visit    Patient Care Team:  Gabriel Cordero PA-C as PCP - General (Family Medicine)  VICKY Bacon DO     Problem List:     Patient Active Problem List   Diagnosis    Allergic rhinitis    Basal cell carcinoma of skin    Bipolar I disorder, single manic episode ([facility] [address] )    Cervical radiculopathy    DDD (degenerative disc disease), cervical    Disc degeneration, lumbar    Fibromyalgia    Acute vasomotor rhinitis    Chest pain syndrome      Past Medical and Surgical History:     Past Medical History:   Diagnosis Date    Anxiety     Arthritis     Bipolar disorder ([facility] [address] )     Cervical spinal stenosis     last assessed 5/13/14, resolved 10/10/16    Chronic constipation     last assessed 8/28/12, resovled 9/14/15    Chronic fatigue syndrome     last assessed 8/28/12, resolved 9/14/15    Chronic hyperglycemia     resolved 9/14/15    Chronic pain syndrome     last assessed 11/12/13, resolved 10/10/16    Depression     Essential hypertriglyceridemia     resolved 10/10/16    Fibromyalgia     GERD (gastroesophageal reflux disease)     Herpes simplex infection     last assessed 12/12/13, resolved 10/10/16    Hypokalemia     last assessed 9/14/15, resolved 11/23/15    Insomnia     last assessed 3/30/15, resolved 11/23/15    Median neuropathy     last assessed 7/14/15, resolved 10/10/16    Pneumonia     last assessed 7/16/13, resolved 5/10/13    Sacroiliitis ([facility] [address] )     last assessed 10/22/13, resolved 10/27/14     Past Surgical History:   Procedure Laterality Date    CERVICAL FUSION      COLONOSCOPY  10/23/2013    10yrs     DILATION AND CURETTAGE OF UTERUS      HALLUX VALGUS CORRECTION      HEMORROIDECTOMY      NEUROPLASTY / TRANSPOSITION MEDIAN NERVE AT CARPAL TUNNEL      REDUCTION MAMMAPLASTY      TONSILLECTOMY      TOOTH EXTRACTION      TUBAL LIGATION        Family History:     Family History   Problem Relation Age of Onset    Hypertension Mother     Osteoporosis Mother     Arthritis Mother     Sudden death Mother         brain aneurysm    Heart attack Father     Melanoma Father     Coronary artery disease Father     Hyperlipidemia Father     Diabetes Paternal Grandmother     Coronary artery disease Paternal Grandfather     Heart attack Family     Arthritis Family     Diabetes Family     Hypertension Family     Heart disease Family     Osteoporosis Family     No Known Problems Daughter     No Known Problems Maternal Grandmother     No Known Problems Maternal Grandfather       Social History:     E-Cigarette/Vaping    E-Cigarette Use Never User      E-Cigarette/Vaping Substances    Nicotine No     THC No     CBD No     Flavoring No     Other No     Unknown No      Social History     Socioeconomic History    Marital status:      Spouse name: None    Number of children: None    Years of education: None    Highest education level: None   Occupational History    None   Social Needs    Financial resource strain: None    Food insecurity     Worry: None     Inability: None    Transportation needs     Medical: None     Non-medical: None   Tobacco Use    Smoking status: Never Smoker    Smokeless tobacco: Never Used   Substance and Sexual Activity    Alcohol use: No    Drug use: No    Sexual activity: None   Lifestyle    Physical activity     Days per week: None     Minutes per session: None    Stress: None   Relationships    Social connections     Talks on phone: None     Gets together: None     Attends [employer] service: None     Active member of club or organization: None     Attends meetings of clubs or organizations: None     Relationship status: None    Intimate partner violence     Fear of current or ex partner: None     Emotionally abused: None     Physically abused: None     Forced sexual activity: None   Other Topics Concern    None   Social History Narrative    Always uses seat belt    Daily caffeine consumption, 1 serv/day    Has smoke detectors    Lives independently       Medications and Allergies:     Current Outpatient Medications   Medication Sig Dispense Refill    ARIPiprazole (ABILIFY) 15 mg tablet Take 15 mg by mouth daily at bedtime      ascorbic acid (VITAMIN C) 500 mg tablet Take 500 mg by mouth daily      bisacodyl (DULCOLAX) 5 mg EC tablet Take 1 tablet by mouth daily as needed      Calcium 250 MG CAPS Take 500 mg by mouth      Cholecalciferol ([employer] VITAMIN D) 2000 units CAPS Take by mouth      DULoxetine (CYMBALTA) 60 mg delayed release capsule Take 1 capsule by mouth daily      levalbuterol (XOPENEX HFA) 45 mcg/act inhaler Inhale 1-2 puffs every 4 (four) hours as needed for wheezing 1 Inhaler 0    loratadine (CLARITIN) 10 mg tablet Take 10 mg by mouth      Turmeric Curcumin 500 MG CAPS Take by mouth      naproxen (NAPROSYN) 500 mg tablet Take 1 tablet (500 mg total) by mouth 2 (two) times a day with meals for 20 days 40 tablet 0     No current facility-administered medications for this visit  Allergies   Allergen Reactions    Cephalexin Rash    Latex Rash     Reaction Date: 21Mar2012;     Molds & Smuts Allergic Rhinitis    Other Allergic Rhinitis and Cough     Cigarette     Risperidone Palpitations     Reaction Date: 21Mar2012;     Sertraline Itching     Reaction Date: 21Mar2012;     Soy Isoflavones       Immunizations:     Immunization History   Administered Date(s) Administered    INFLUENZA 10/12/2015, 10/31/2016    Influenza Quadrivalent, 6-35 Months IM 10/13/2014, 09/14/2015, 10/10/2016, 11/13/2017    Influenza, high dose seasonal 0 7 mL 01/18/2021    Influenza, recombinant, quadrivalent,injectable, preservative free 11/27/2018    Influenza, seasonal, injectable 11/12/2013    Pneumococcal Conjugate 13-Valent 11/27/2018    Pneumococcal Polysaccharide PPV23 11/16/2015    Tdap 11/23/2015    Zoster 10/07/2015, 11/12/2015      Health Maintenance:         Topic Date Due    MAMMOGRAM  02/14/2021    Hepatitis C Screening  02/18/2021 (Originally 1955)    DXA SCAN  02/26/2023    Colorectal Cancer Screening  10/23/2023         Topic Date Due    Pneumococcal Vaccine: 65+ Years (2 of 2 - PPSV23) 11/16/2020      Medicare Health Risk Assessment:     /80   Pulse 97   Resp 16   Ht 5' 6" (1 676 m)   Wt 99 2 kg (218 lb 12 8 oz)   BMI 35 32 kg/m²      Deandra Dahl is here for her Subsequent Wellness visit  Last Medicare Wellness visit information reviewed, patient interviewed, no change since last AWV  Health Risk Assessment:   Patient rates overall health as fair  Patient feels that their physical health rating is slightly worse  Eyesight was rated as same  Hearing was rated as same  Patient feels that their emotional and mental health rating is slightly worse  Pain experienced in the last 7 days has been a lot  Patient's pain rating has been 6/10  Patient states that she has experienced no weight loss or gain in last 6 months  Pt feeling slightly worse since fall in December  She is currently working with PT to help w/ balance and strengthening  She is also being seen by psychiatry for her mental health  Fall Risk Screening: In the past year, patient has experienced: history of falling in past year    Number of falls: 2 or more  Injured during fall?: Yes    Feels unsteady when standing or walking?: No    Worried about falling?: No      Urinary Incontinence Screening:   Patient has leaked urine accidently in the last six months  Wears pads as needed    Home Safety:  Patient has trouble with stairs inside or outside of their home  Patient has working smoke alarms and has no working carbon monoxide detector  Home safety hazards include: none  Pt is working with PT to help w/ balance and strengthening with her stairs at home  Nutrition:   Current diet is Unhealthy and Frequent junk food  We reviewed healthy diet and exercise lifestyle modifications  Medications:   Patient is currently taking over-the-counter supplements  OTC medications include: see medication list  Patient is able to manage medications  Activities of Daily Living (ADLs)/Instrumental Activities of Daily Living (IADLs):   Walk and transfer into and out of bed and chair?: Yes  Dress and groom yourself?: Yes    Bathe or shower yourself?: Yes    Feed yourself?  Yes  Do your laundry/housekeeping?: Yes  Manage your money, pay your bills and track your expenses?: Yes  Make your own meals?: Yes    Do your own shopping?: Yes    Previous Hospitalizations:   Any hospitalizations or ED visits within the last 12 months?: No      Advance Care Planning:   Living will: Yes    Durable POA for healthcare: No    Advanced directive: Yes    Advanced directive counseling given: Yes    Patient declined ACP directive: No    End of Life Decisions reviewed with patient: Yes    Provider agrees with end of life decisions: Yes      Cognitive Screening:   Provider or family/friend/caregiver concerned regarding cognition?: No    PREVENTIVE SCREENINGS      Cardiovascular Screening:    General: Screening Current    Due for: Lipid Panel      Diabetes Screening:     General: Screening Current      Colorectal Cancer Screening:     General: Screening Current      Breast Cancer Screening:     General: Screening Current      Cervical Cancer Screening:    General: Screening Not Indicated      Osteoporosis Screening:    General: Screening Current      Abdominal Aortic Aneurysm (AAA) Screening:        General: Screening Not Indicated      Lung Cancer Screening:     General: Screening Not Indicated      Hepatitis C Screening:    General: Screening Current    Other Counseling Topics:   Alcohol use counseling, car/seat belt/driving safety, skin self-exam, sunscreen and calcium and vitamin D intake and regular weightbearing exercise         Herb Monk

## 2021-02-11 NOTE — PATIENT INSTRUCTIONS
Medicare Preventive Visit Patient Instructions  Thank you for completing your Welcome to Medicare Visit or Medicare Annual Wellness Visit today  Your next wellness visit will be due in one year (2/11/2022)  The screening/preventive services that you may require over the next 5-10 years are detailed below  Some tests may not apply to you based off risk factors and/or age  Screening tests ordered at today's visit but not completed yet may show as past due  Also, please note that scanned in results may not display below  Preventive Screenings:  Service Recommendations Previous Testing/Comments   Colorectal Cancer Screening  * Colonoscopy    * Fecal Occult Blood Test (FOBT)/Fecal Immunochemical Test (FIT)  * Fecal DNA/Cologuard Test  * Flexible Sigmoidoscopy Age: 54-65 years old   Colonoscopy: every 10 years (may be performed more frequently if at higher risk)  OR  FOBT/FIT: every 1 year  OR  Cologuard: every 3 years  OR  Sigmoidoscopy: every 5 years  Screening may be recommended earlier than age 48 if at higher risk for colorectal cancer  Also, an individualized decision between you and your healthcare provider will decide whether screening between the ages of 74-80 would be appropriate  Colonoscopy: 10/23/2013  FOBT/FIT: Not on file  Cologuard: Not on file  Sigmoidoscopy: Not on file         Breast Cancer Screening Age: 36 years old  Frequency: every 1-2 years  Not required if history of left and right mastectomy Mammogram: 02/14/2020       Cervical Cancer Screening Between the ages of 21-29, pap smear recommended once every 3 years  Between the ages of 33-67, can perform pap smear with HPV co-testing every 5 years     Recommendations may differ for women with a history of total hysterectomy, cervical cancer, or abnormal pap smears in past  Pap Smear: 11/13/2017       Hepatitis C Screening Once for adults born between 1945 and 1965  More frequently in patients at high risk for Hepatitis C Hep C Antibody: Not on file       Diabetes Screening 1-2 times per year if you're at risk for diabetes or have pre-diabetes Fasting glucose: 87 mg/dL   A1C: No results in last 5 years       Cholesterol Screening Once every 5 years if you don't have a lipid disorder  May order more often based on risk factors  Lipid panel: 12/04/2018         Other Preventive Screenings Covered by Medicare:  1  Abdominal Aortic Aneurysm (AAA) Screening: covered once if your at risk  You're considered to be at risk if you have a family history of AAA  2  Lung Cancer Screening: covers low dose CT scan once per year if you meet all of the following conditions: (1) Age 50-69; (2) No signs or symptoms of lung cancer; (3) Current smoker or have quit smoking within the last 15 years; (4) You have a tobacco smoking history of at least 30 pack years (packs per day multiplied by number of years you smoked); (5) You get a written order from a healthcare provider  3  Glaucoma Screening: covered annually if you're considered high risk: (1) You have diabetes OR (2) Family history of glaucoma OR (3)  aged 48 and older OR (3)  American aged 72 and older  3  Osteoporosis Screening: covered every 2 years if you meet one of the following conditions: (1) You're estrogen deficient and at risk for osteoporosis based off medical history and other findings; (2) Have a vertebral abnormality; (3) On glucocorticoid therapy for more than 3 months; (4) Have primary hyperparathyroidism; (5) On osteoporosis medications and need to assess response to drug therapy  · Last bone density test (DXA Scan): 02/26/2020   5  HIV Screening: covered annually if you're between the age of 15-65  Also covered annually if you are younger than 13 and older than 72 with risk factors for HIV infection  For pregnant patients, it is covered up to 3 times per pregnancy      Immunizations:  Immunization Recommendations   Influenza Vaccine Annual influenza vaccination during flu season is recommended for all persons aged >= 6 months who do not have contraindications   Pneumococcal Vaccine (Prevnar and Pneumovax)  * Prevnar = PCV13  * Pneumovax = PPSV23   Adults 25-60 years old: 1-3 doses may be recommended based on certain risk factors  Adults 72 years old: Prevnar (PCV13) vaccine recommended followed by Pneumovax (PPSV23) vaccine  If already received PPSV23 since turning 65, then PCV13 recommended at least one year after PPSV23 dose  Hepatitis B Vaccine 3 dose series if at intermediate or high risk (ex: diabetes, end stage renal disease, liver disease)   Tetanus (Td) Vaccine - COST NOT COVERED BY MEDICARE PART B Following completion of primary series, a booster dose should be given every 10 years to maintain immunity against tetanus  Td may also be given as tetanus wound prophylaxis  Tdap Vaccine - COST NOT COVERED BY MEDICARE PART B Recommended at least once for all adults  For pregnant patients, recommended with each pregnancy  Shingles Vaccine (Shingrix) - COST NOT COVERED BY MEDICARE PART B  2 shot series recommended in those aged 48 and above     Health Maintenance Due:      Topic Date Due    MAMMOGRAM  02/14/2021    Hepatitis C Screening  02/18/2021 (Originally 1955)    DXA SCAN  02/26/2023    Colorectal Cancer Screening  10/23/2023     Immunizations Due:      Topic Date Due    Pneumococcal Vaccine: 65+ Years (2 of 2 - PPSV23) 11/16/2020     Advance Directives   What are advance directives? Advance directives are legal documents that state your wishes and plans for medical care  These plans are made ahead of time in case you lose your ability to make decisions for yourself  Advance directives can apply to any medical decision, such as the treatments you want, and if you want to donate organs  What are the types of advance directives? There are many types of advance directives, and each state has rules about how to use them   You may choose a combination of any of the following:  · Living will: This is a written record of the treatment you want  You can also choose which treatments you do not want, which to limit, and which to stop at a certain time  This includes surgery, medicine, IV fluid, and tube feedings  · Durable power of  for healthcare [facility]): This is a written record that states who you want to make healthcare choices for you when you are unable to make them for yourself  This person, called a proxy, is usually a family member or a friend  You may choose more than 1 proxy  · Do not resuscitate (DNR) order:  A DNR order is used in case your heart stops beating or you stop breathing  It is a request not to have certain forms of treatment, such as CPR  A DNR order may be included in other types of advance directives  · Medical directive: This covers the care that you want if you are in a coma, near death, or unable to make decisions for yourself  You can list the treatments you want for each condition  Treatment may include pain medicine, surgery, blood transfusions, dialysis, IV or tube feedings, and a ventilator (breathing machine)  · Values history: This document has questions about your views, beliefs, and how you feel and think about life  This information can help others choose the care that you would choose  Why are advance directives important? An advance directive helps you control your care  Although spoken wishes may be used, it is better to have your wishes written down  Spoken wishes can be misunderstood, or not followed  Treatments may be given even if you do not want them  An advance directive may make it easier for your family to make difficult choices about your care  Weight Management   Why it is important to manage your weight:  Being overweight increases your risk of health conditions such as heart disease, high blood pressure, type 2 diabetes, and certain types of cancer   It can also increase your risk for osteoarthritis, sleep apnea, and other respiratory problems  Aim for a slow, steady weight loss  Even a small amount of weight loss can lower your risk of health problems  How to lose weight safely:  A safe and healthy way to lose weight is to eat fewer calories and get regular exercise  You can lose up about 1 pound a week by decreasing the number of calories you eat by 500 calories each day  Healthy meal plan for weight management:  A healthy meal plan includes a variety of foods, contains fewer calories, and helps you stay healthy  A healthy meal plan includes the following:  · Eat whole-grain foods more often  A healthy meal plan should contain fiber  Fiber is the part of grains, fruits, and vegetables that is not broken down by your body  Whole-grain foods are healthy and provide extra fiber in your diet  Some examples of whole-grain foods are whole-wheat breads and pastas, oatmeal, brown rice, and bulgur  · Eat a variety of vegetables every day  Include dark, leafy greens such as spinach, kale, amanda greens, and mustard greens  Eat yellow and orange vegetables such as carrots, sweet potatoes, and winter squash  · Eat a variety of fruits every day  Choose fresh or canned fruit (canned in its own juice or light syrup) instead of juice  Fruit juice has very little or no fiber  · Eat low-fat dairy foods  Drink fat-free (skim) milk or 1% milk  Eat fat-free yogurt and low-fat cottage cheese  Try low-fat cheeses such as mozzarella and other reduced-fat cheeses  · Choose meat and other protein foods that are low in fat  Choose beans or other legumes such as split peas or lentils  Choose fish, skinless poultry (chicken or turkey), or lean cuts of red meat (beef or pork)  Before you cook meat or poultry, cut off any visible fat  · Use less fat and oil  Try baking foods instead of frying them  Add less fat, such as margarine, sour cream, regular salad dressing and mayonnaise to foods  Eat fewer high-fat foods   Some examples of high-fat foods include french fries, doughnuts, ice cream, and cakes  · Eat fewer sweets  Limit foods and drinks that are high in sugar  This includes candy, cookies, regular soda, and sweetened drinks  Exercise:  Exercise at least 30 minutes per day on most days of the week  Some examples of exercise include walking, biking, dancing, and swimming  You can also fit in more physical activity by taking the stairs instead of the elevator or parking farther away from stores  Ask your healthcare provider about the best exercise plan for you  © Copyright [employer] 2018 Information is for End User's use only and may not be sold, redistributed or otherwise used for commercial purposes  All illustrations and images included in CareNotes® are the copyrighted property of [employer]  or [facility] Preventive Visit Patient Instructions  Thank you for completing your Welcome to Medicare Visit or Medicare Annual Wellness Visit today  Your next wellness visit will be due in one year (2/11/2022)  The screening/preventive services that you may require over the next 5-10 years are detailed below  Some tests may not apply to you based off risk factors and/or age  Screening tests ordered at today's visit but not completed yet may show as past due  Also, please note that scanned in results may not display below  Preventive Screenings:  Service Recommendations Previous Testing/Comments   Colorectal Cancer Screening  * Colonoscopy    * Fecal Occult Blood Test (FOBT)/Fecal Immunochemical Test (FIT)  * Fecal DNA/Cologuard Test  * Flexible Sigmoidoscopy Age: 54-65 years old   Colonoscopy: every 10 years (may be performed more frequently if at higher risk)  OR  FOBT/FIT: every 1 year  OR  Cologuard: every 3 years  OR  Sigmoidoscopy: every 5 years  Screening may be recommended earlier than age 48 if at higher risk for colorectal cancer   Also, an individualized decision between you and your healthcare provider will decide whether screening between the ages of 74-80 would be appropriate  Colonoscopy: 10/23/2013  FOBT/FIT: Not on file  Cologuard: Not on file  Sigmoidoscopy: Not on file         Breast Cancer Screening Age: 36 years old  Frequency: every 1-2 years  Not required if history of left and right mastectomy Mammogram: 02/14/2020       Cervical Cancer Screening Between the ages of 21-29, pap smear recommended once every 3 years  Between the ages of 33-67, can perform pap smear with HPV co-testing every 5 years  Recommendations may differ for women with a history of total hysterectomy, cervical cancer, or abnormal pap smears in past  Pap Smear: 11/13/2017       Hepatitis C Screening Once for adults born between 1945 and 1965  More frequently in patients at high risk for Hepatitis C Hep C Antibody: Not on file       Diabetes Screening 1-2 times per year if you're at risk for diabetes or have pre-diabetes Fasting glucose: 87 mg/dL   A1C: No results in last 5 years       Cholesterol Screening Once every 5 years if you don't have a lipid disorder  May order more often based on risk factors  Lipid panel: 12/04/2018         Other Preventive Screenings Covered by Medicare:  6  Abdominal Aortic Aneurysm (AAA) Screening: covered once if your at risk  You're considered to be at risk if you have a family history of AAA  7  Lung Cancer Screening: covers low dose CT scan once per year if you meet all of the following conditions: (1) Age 50-69; (2) No signs or symptoms of lung cancer; (3) Current smoker or have quit smoking within the last 15 years; (4) You have a tobacco smoking history of at least 30 pack years (packs per day multiplied by number of years you smoked); (5) You get a written order from a healthcare provider    8  Glaucoma Screening: covered annually if you're considered high risk: (1) You have diabetes OR (2) Family history of glaucoma OR (3)  aged 48 and older OR (4)  American aged 72 and older  5  Osteoporosis Screening: covered every 2 years if you meet one of the following conditions: (1) You're estrogen deficient and at risk for osteoporosis based off medical history and other findings; (2) Have a vertebral abnormality; (3) On glucocorticoid therapy for more than 3 months; (4) Have primary hyperparathyroidism; (5) On osteoporosis medications and need to assess response to drug therapy  · Last bone density test (DXA Scan): 02/26/2020  10  HIV Screening: covered annually if you're between the age of 12-76  Also covered annually if you are younger than 13 and older than 72 with risk factors for HIV infection  For pregnant patients, it is covered up to 3 times per pregnancy  Immunizations:  Immunization Recommendations   Influenza Vaccine Annual influenza vaccination during flu season is recommended for all persons aged >= 6 months who do not have contraindications   Pneumococcal Vaccine (Prevnar and Pneumovax)  * Prevnar = PCV13  * Pneumovax = PPSV23   Adults 25-60 years old: 1-3 doses may be recommended based on certain risk factors  Adults 72 years old: Prevnar (PCV13) vaccine recommended followed by Pneumovax (PPSV23) vaccine  If already received PPSV23 since turning 65, then PCV13 recommended at least one year after PPSV23 dose  Hepatitis B Vaccine 3 dose series if at intermediate or high risk (ex: diabetes, end stage renal disease, liver disease)   Tetanus (Td) Vaccine - COST NOT COVERED BY MEDICARE PART B Following completion of primary series, a booster dose should be given every 10 years to maintain immunity against tetanus  Td may also be given as tetanus wound prophylaxis  Tdap Vaccine - COST NOT COVERED BY MEDICARE PART B Recommended at least once for all adults  For pregnant patients, recommended with each pregnancy     Shingles Vaccine (Shingrix) - COST NOT COVERED BY MEDICARE PART B  2 shot series recommended in those aged 48 and above     Health Maintenance Due:      Topic Date Due    MAMMOGRAM  02/14/2021    Hepatitis C Screening  02/18/2021 (Originally 1955)    DXA SCAN  02/26/2023    Colorectal Cancer Screening  10/23/2023     Immunizations Due:      Topic Date Due    Pneumococcal Vaccine: 65+ Years (2 of 2 - PPSV23) 11/16/2020     Advance Directives   What are advance directives? Advance directives are legal documents that state your wishes and plans for medical care  These plans are made ahead of time in case you lose your ability to make decisions for yourself  Advance directives can apply to any medical decision, such as the treatments you want, and if you want to donate organs  What are the types of advance directives? There are many types of advance directives, and each state has rules about how to use them  You may choose a combination of any of the following:  · Living will: This is a written record of the treatment you want  You can also choose which treatments you do not want, which to limit, and which to stop at a certain time  This includes surgery, medicine, IV fluid, and tube feedings  · Durable power of  for healthcare [facility]): This is a written record that states who you want to make healthcare choices for you when you are unable to make them for yourself  This person, called a proxy, is usually a family member or a friend  You may choose more than 1 proxy  · Do not resuscitate (DNR) order:  A DNR order is used in case your heart stops beating or you stop breathing  It is a request not to have certain forms of treatment, such as CPR  A DNR order may be included in other types of advance directives  · Medical directive: This covers the care that you want if you are in a coma, near death, or unable to make decisions for yourself  You can list the treatments you want for each condition   Treatment may include pain medicine, surgery, blood transfusions, dialysis, IV or tube feedings, and a ventilator (breathing machine)  · Values history: This document has questions about your views, beliefs, and how you feel and think about life  This information can help others choose the care that you would choose  Why are advance directives important? An advance directive helps you control your care  Although spoken wishes may be used, it is better to have your wishes written down  Spoken wishes can be misunderstood, or not followed  Treatments may be given even if you do not want them  An advance directive may make it easier for your family to make difficult choices about your care  Weight Management   Why it is important to manage your weight:  Being overweight increases your risk of health conditions such as heart disease, high blood pressure, type 2 diabetes, and certain types of cancer  It can also increase your risk for osteoarthritis, sleep apnea, and other respiratory problems  Aim for a slow, steady weight loss  Even a small amount of weight loss can lower your risk of health problems  How to lose weight safely:  A safe and healthy way to lose weight is to eat fewer calories and get regular exercise  You can lose up about 1 pound a week by decreasing the number of calories you eat by 500 calories each day  Healthy meal plan for weight management:  A healthy meal plan includes a variety of foods, contains fewer calories, and helps you stay healthy  A healthy meal plan includes the following:  · Eat whole-grain foods more often  A healthy meal plan should contain fiber  Fiber is the part of grains, fruits, and vegetables that is not broken down by your body  Whole-grain foods are healthy and provide extra fiber in your diet  Some examples of whole-grain foods are whole-wheat breads and pastas, oatmeal, brown rice, and bulgur  · Eat a variety of vegetables every day  Include dark, leafy greens such as spinach, kale, amanda greens, and mustard greens   Eat yellow and orange vegetables such as carrots, sweet potatoes, and winter squash  · Eat a variety of fruits every day  Choose fresh or canned fruit (canned in its own juice or light syrup) instead of juice  Fruit juice has very little or no fiber  · Eat low-fat dairy foods  Drink fat-free (skim) milk or 1% milk  Eat fat-free yogurt and low-fat cottage cheese  Try low-fat cheeses such as mozzarella and other reduced-fat cheeses  · Choose meat and other protein foods that are low in fat  Choose beans or other legumes such as split peas or lentils  Choose fish, skinless poultry (chicken or turkey), or lean cuts of red meat (beef or pork)  Before you cook meat or poultry, cut off any visible fat  · Use less fat and oil  Try baking foods instead of frying them  Add less fat, such as margarine, sour cream, regular salad dressing and mayonnaise to foods  Eat fewer high-fat foods  Some examples of high-fat foods include french fries, doughnuts, ice cream, and cakes  · Eat fewer sweets  Limit foods and drinks that are high in sugar  This includes candy, cookies, regular soda, and sweetened drinks  Exercise:  Exercise at least 30 minutes per day on most days of the week  Some examples of exercise include walking, biking, dancing, and swimming  You can also fit in more physical activity by taking the stairs instead of the elevator or parking farther away from stores  Ask your healthcare provider about the best exercise plan for you  © Copyright [employer] 2018 Information is for End User's use only and may not be sold, redistributed or otherwise used for commercial purposes  All illustrations and images included in CareNotes® are the copyrighted property of [employer]  or [facility] Preventive Visit Patient Instructions  Thank you for completing your Welcome to Medicare Visit or Medicare Annual Wellness Visit today  Your next wellness visit will be due in one year (2/11/2022)    The screening/preventive services that you may require over the next 5-10 years are detailed below  Some tests may not apply to you based off risk factors and/or age  Screening tests ordered at today's visit but not completed yet may show as past due  Also, please note that scanned in results may not display below  Preventive Screenings:  Service Recommendations Previous Testing/Comments   Colorectal Cancer Screening  * Colonoscopy    * Fecal Occult Blood Test (FOBT)/Fecal Immunochemical Test (FIT)  * Fecal DNA/Cologuard Test  * Flexible Sigmoidoscopy Age: 54-65 years old   Colonoscopy: every 10 years (may be performed more frequently if at higher risk)  OR  FOBT/FIT: every 1 year  OR  Cologuard: every 3 years  OR  Sigmoidoscopy: every 5 years  Screening may be recommended earlier than age 48 if at higher risk for colorectal cancer  Also, an individualized decision between you and your healthcare provider will decide whether screening between the ages of 74-80 would be appropriate  Colonoscopy: 10/23/2013  FOBT/FIT: Not on file  Cologuard: Not on file  Sigmoidoscopy: Not on file         Breast Cancer Screening Age: 36 years old  Frequency: every 1-2 years  Not required if history of left and right mastectomy Mammogram: 02/14/2020       Cervical Cancer Screening Between the ages of 21-29, pap smear recommended once every 3 years  Between the ages of 33-67, can perform pap smear with HPV co-testing every 5 years  Recommendations may differ for women with a history of total hysterectomy, cervical cancer, or abnormal pap smears in past  Pap Smear: 11/13/2017       Hepatitis C Screening Once for adults born between 1945 and 1965  More frequently in patients at high risk for Hepatitis C Hep C Antibody: Not on file       Diabetes Screening 1-2 times per year if you're at risk for diabetes or have pre-diabetes Fasting glucose: 87 mg/dL   A1C: No results in last 5 years       Cholesterol Screening Once every 5 years if you don't have a lipid disorder   May order more often based on risk factors  Lipid panel: 12/04/2018         Other Preventive Screenings Covered by Medicare:  11  Abdominal Aortic Aneurysm (AAA) Screening: covered once if your at risk  You're considered to be at risk if you have a family history of AAA  12  Lung Cancer Screening: covers low dose CT scan once per year if you meet all of the following conditions: (1) Age 50-69; (2) No signs or symptoms of lung cancer; (3) Current smoker or have quit smoking within the last 15 years; (4) You have a tobacco smoking history of at least 30 pack years (packs per day multiplied by number of years you smoked); (5) You get a written order from a healthcare provider  15  Glaucoma Screening: covered annually if you're considered high risk: (1) You have diabetes OR (2) Family history of glaucoma OR (3)  aged 48 and older OR (3)  American aged 72 and older  15  Osteoporosis Screening: covered every 2 years if you meet one of the following conditions: (1) You're estrogen deficient and at risk for osteoporosis based off medical history and other findings; (2) Have a vertebral abnormality; (3) On glucocorticoid therapy for more than 3 months; (4) Have primary hyperparathyroidism; (5) On osteoporosis medications and need to assess response to drug therapy  · Last bone density test (DXA Scan): 02/26/2020  14  HIV Screening: covered annually if you're between the age of 12-76  Also covered annually if you are younger than 13 and older than 72 with risk factors for HIV infection  For pregnant patients, it is covered up to 3 times per pregnancy      Immunizations:  Immunization Recommendations   Influenza Vaccine Annual influenza vaccination during flu season is recommended for all persons aged >= 6 months who do not have contraindications   Pneumococcal Vaccine (Prevnar and Pneumovax)  * Prevnar = PCV13  * Pneumovax = PPSV23   Adults 25-60 years old: 1-3 doses may be recommended based on certain risk factors  Adults 65+ years old: Prevnar (PCV13) vaccine recommended followed by Pneumovax (PPSV23) vaccine  If already received PPSV23 since turning 65, then PCV13 recommended at least one year after PPSV23 dose  Hepatitis B Vaccine 3 dose series if at intermediate or high risk (ex: diabetes, end stage renal disease, liver disease)   Tetanus (Td) Vaccine - COST NOT COVERED BY MEDICARE PART B Following completion of primary series, a booster dose should be given every 10 years to maintain immunity against tetanus  Td may also be given as tetanus wound prophylaxis  Tdap Vaccine - COST NOT COVERED BY MEDICARE PART B Recommended at least once for all adults  For pregnant patients, recommended with each pregnancy  Shingles Vaccine (Shingrix) - COST NOT COVERED BY MEDICARE PART B  2 shot series recommended in those aged 48 and above     Health Maintenance Due:      Topic Date Due    MAMMOGRAM  02/14/2021    Hepatitis C Screening  02/18/2021 (Originally 1955)    DXA SCAN  02/26/2023    Colorectal Cancer Screening  10/23/2023     Immunizations Due:      Topic Date Due    Pneumococcal Vaccine: 65+ Years (2 of 2 - PPSV23) 11/16/2020     Advance Directives   What are advance directives? Advance directives are legal documents that state your wishes and plans for medical care  These plans are made ahead of time in case you lose your ability to make decisions for yourself  Advance directives can apply to any medical decision, such as the treatments you want, and if you want to donate organs  What are the types of advance directives? There are many types of advance directives, and each state has rules about how to use them  You may choose a combination of any of the following:  · Living will: This is a written record of the treatment you want  You can also choose which treatments you do not want, which to limit, and which to stop at a certain time  This includes surgery, medicine, IV fluid, and tube feedings     · Durable power of  for [facility]): This is a written record that states who you want to make healthcare choices for you when you are unable to make them for yourself  This person, called a proxy, is usually a family member or a friend  You may choose more than 1 proxy  · Do not resuscitate (DNR) order:  A DNR order is used in case your heart stops beating or you stop breathing  It is a request not to have certain forms of treatment, such as CPR  A DNR order may be included in other types of advance directives  · Medical directive: This covers the care that you want if you are in a coma, near death, or unable to make decisions for yourself  You can list the treatments you want for each condition  Treatment may include pain medicine, surgery, blood transfusions, dialysis, IV or tube feedings, and a ventilator (breathing machine)  · Values history: This document has questions about your views, beliefs, and how you feel and think about life  This information can help others choose the care that you would choose  Why are advance directives important? An advance directive helps you control your care  Although spoken wishes may be used, it is better to have your wishes written down  Spoken wishes can be misunderstood, or not followed  Treatments may be given even if you do not want them  An advance directive may make it easier for your family to make difficult choices about your care  Weight Management   Why it is important to manage your weight:  Being overweight increases your risk of health conditions such as heart disease, high blood pressure, type 2 diabetes, and certain types of cancer  It can also increase your risk for osteoarthritis, sleep apnea, and other respiratory problems  Aim for a slow, steady weight loss  Even a small amount of weight loss can lower your risk of health problems  How to lose weight safely:  A safe and healthy way to lose weight is to eat fewer calories and get regular exercise   You can lose up about 1 pound a week by decreasing the number of calories you eat by 500 calories each day  Healthy meal plan for weight management:  A healthy meal plan includes a variety of foods, contains fewer calories, and helps you stay healthy  A healthy meal plan includes the following:  · Eat whole-grain foods more often  A healthy meal plan should contain fiber  Fiber is the part of grains, fruits, and vegetables that is not broken down by your body  Whole-grain foods are healthy and provide extra fiber in your diet  Some examples of whole-grain foods are whole-wheat breads and pastas, oatmeal, brown rice, and bulgur  · Eat a variety of vegetables every day  Include dark, leafy greens such as spinach, kale, amanda greens, and mustard greens  Eat yellow and orange vegetables such as carrots, sweet potatoes, and winter squash  · Eat a variety of fruits every day  Choose fresh or canned fruit (canned in its own juice or light syrup) instead of juice  Fruit juice has very little or no fiber  · Eat low-fat dairy foods  Drink fat-free (skim) milk or 1% milk  Eat fat-free yogurt and low-fat cottage cheese  Try low-fat cheeses such as mozzarella and other reduced-fat cheeses  · Choose meat and other protein foods that are low in fat  Choose beans or other legumes such as split peas or lentils  Choose fish, skinless poultry (chicken or turkey), or lean cuts of red meat (beef or pork)  Before you cook meat or poultry, cut off any visible fat  · Use less fat and oil  Try baking foods instead of frying them  Add less fat, such as margarine, sour cream, regular salad dressing and mayonnaise to foods  Eat fewer high-fat foods  Some examples of high-fat foods include french fries, doughnuts, ice cream, and cakes  · Eat fewer sweets  Limit foods and drinks that are high in sugar  This includes candy, cookies, regular soda, and sweetened drinks    Exercise:  Exercise at least 30 minutes per day on most days of the week  Some examples of exercise include walking, biking, dancing, and swimming  You can also fit in more physical activity by taking the stairs instead of the elevator or parking farther away from stores  Ask your healthcare provider about the best exercise plan for you  © Copyright [employer] 2018 Information is for End User's use only and may not be sold, redistributed or otherwise used for commercial purposes   All illustrations and images included in CareNotes® are the copyrighted property of A D A M , Inc  or [address] [place] [address] [email or web address]

## 2021-02-11 NOTE — PROGRESS NOTES
Assessment/Plan:       Diagnoses and all orders for this visit:      Acute pain of right shoulder  -     Ambulatory referral to Orthopedic Surgery; Future    This is f/u from previous visit with myself on 01/18/21  Pt has had 1 session with PT, noting improvement in R shoulder pain & ROM  Continue PT  Pt not wanting to take any medications for pain & has been trying to rest when not at work  Pt requesting to have ortho consult as she has chronic low back pain, liz knee pain & left wrist pain  Ortho consult placed  Acute bilateral low back pain without sciatica    Pt states this is chronic & is requesting ortho consult  Ortho consult placed  Imbalance  -     Ambulatory referral to Physical Therapy; Future      Pt states she is working with PT to help improve this  No vertigo or dizziness per pt  Subjective:      Patient ID: Desmond Pacheco is a 76 y o  female  Pt presents today for her AWV as well as a f/u for her right shoulder pain  Pt has had 1 visit with PT and has already noticed some improvement in ROM & pain  She has other orthopedic concerns including bilateral low back pain, liz knee pain & left side wrist pain  Pt requesting to get established with an orthopedic group  The following portions of the patient's history were reviewed and updated as appropriate: allergies, current medications, past family history, past medical history, past social history, past surgical history and problem list     Review of Systems   Constitutional: Negative  Negative for activity change, appetite change, chills and fever  HENT: Negative  Negative for ear pain and sore throat  Eyes: Negative  Negative for pain and visual disturbance  Respiratory: Negative  Negative for cough and shortness of breath  Cardiovascular: Negative  Negative for chest pain and palpitations  Gastrointestinal: Negative  Negative for abdominal pain, constipation, diarrhea, nausea and vomiting     Genitourinary: Negative for dysuria, frequency, hematuria and urgency  Musculoskeletal: Positive for arthralgias  Negative for back pain, gait problem, myalgias, neck pain and neck stiffness  Skin: Negative for color change and rash  Neurological: Negative  Negative for dizziness, seizures, syncope and headaches  Psychiatric/Behavioral: Negative  All other systems reviewed and are negative  Objective:      /80   Pulse 97   Resp 16   Ht 5' 6" (1 676 m)   Wt 99 2 kg (218 lb 12 8 oz)   BMI 35 32 kg/m²          Physical Exam  Vitals signs reviewed  Constitutional:       General: She is not in acute distress  Appearance: Normal appearance  She is obese  She is not ill-appearing  HENT:      Head: Normocephalic  Neck:      Musculoskeletal: Normal range of motion  Vascular: No carotid bruit  Cardiovascular:      Rate and Rhythm: Normal rate and regular rhythm  Pulses: Normal pulses  Heart sounds: Normal heart sounds  No murmur  Pulmonary:      Effort: Pulmonary effort is normal       Breath sounds: Normal breath sounds  No wheezing  Abdominal:      General: Bowel sounds are normal  There is no distension  Palpations: Abdomen is soft  There is no mass  Tenderness: There is no abdominal tenderness  There is no guarding or rebound  Hernia: No hernia is present  Musculoskeletal: Normal range of motion  General: Tenderness (right shoulder pain, chronic liz low back pain) present  No swelling, deformity or signs of injury  Right lower leg: No edema  Left lower leg: No edema  Lymphadenopathy:      Cervical: No cervical adenopathy  Skin:     General: Skin is warm and dry  Coloration: Skin is not pale  Neurological:      Mental Status: She is alert and oriented to person, place, and time  Mental status is at baseline  Cranial Nerves: No cranial nerve deficit  Sensory: No sensory deficit  Motor: No weakness        Coordination: Coordination normal       Gait: Gait normal    Psychiatric:         Mood and Affect: Mood normal          Behavior: Behavior normal          Thought Content:  Thought content normal          Judgment: Judgment normal

## 2021-02-12 ENCOUNTER — OFFICE VISIT (OUTPATIENT)
Dept: PHYSICAL THERAPY | Facility: CLINIC | Age: 66
End: 2021-02-12
Payer: MEDICARE

## 2021-02-12 DIAGNOSIS — M25.511 ACUTE PAIN OF RIGHT SHOULDER: Primary | ICD-10-CM

## 2021-02-12 PROCEDURE — 97140 MANUAL THERAPY 1/> REGIONS: CPT | Performed by: PHYSICAL THERAPIST

## 2021-02-12 PROCEDURE — 97110 THERAPEUTIC EXERCISES: CPT | Performed by: PHYSICAL THERAPIST

## 2021-02-12 NOTE — PROGRESS NOTES
Daily Note     Today's date: 2021  Patient name: Jackson Mehta  : 1955  MRN: [id number]  Referring provider: KARIN Erazo  Dx:   Encounter Diagnosis     ICD-10-CM    1  Acute pain of right shoulder  M25 511                   Subjective: "My shoulder feels great", but everything else hurts  Objective: See treatment diary below      Assessment: Tolerated treatment well  Patient had improved PROM today  Improved ROM with GHJ mobs      Plan: Continue per plan of care        Dx: R shoulder pain s/p fall  EPOC:3/17/21  CO-MORBIDITIES: depression, fibromyalgia, chronic fatigue syndrome; s/p cervical fusion, LBP  PERSONAL FACTORS:  Precautions: high pain levels; very tender to light touch      Manuals            R shoulder PROM NK/JK th           R shld gentle MFR NK/JK th           Cervical distraton/ MFR  th            13' 15'           Neuro Re-Ed                                                                                                        Ther Ex             Pendulums: CW, CCW 1' CW/  CCW 15 ea           Table slides: flex, scap, ER 10 ea 10 ea           Seated cane IR/ER 10ea 10 ea           Supine cane flexion 10x 10           Pulleys: flex 3' 2'           Pulleys: scap 1 5' 2'           Doorway stetch  10"x3           Standing row  10           Standing shld ext  10                                     Gait Training                                       Modalities             TENS w/ MH post tx MH 10'

## 2021-02-15 ENCOUNTER — OFFICE VISIT (OUTPATIENT)
Dept: PHYSICAL THERAPY | Facility: CLINIC | Age: 66
End: 2021-02-15
Payer: MEDICARE

## 2021-02-15 DIAGNOSIS — M25.511 ACUTE PAIN OF RIGHT SHOULDER: Primary | ICD-10-CM

## 2021-02-15 PROCEDURE — 97140 MANUAL THERAPY 1/> REGIONS: CPT

## 2021-02-15 PROCEDURE — 97110 THERAPEUTIC EXERCISES: CPT

## 2021-02-15 NOTE — PROGRESS NOTES
Daily Note     Today's date: 2/15/2021  Patient name: Lester Avalos   : 1955  MRN: [id number]  Referring provider: KARIN Guido  Dx:   Encounter Diagnosis     ICD-10-CM    1  Acute pain of right shoulder  M25 511                   Subjective: Pt reports feeling good after therapy sessions and feeling the shldr getting better  Pt notes not stretching or practicing exercises at home and just focusing on resting  Pt states reaching for something high up on a shelf last week and feeling pain in R shldr during the reach  Objective: See treatment diary below      Assessment: Tolerated treatment well  Pt demonstrates PROM restrictions in R shldr in all directions except ER due to pain  Severe limitations felt in IR  Pt Patient would benefit from continued PT to increase strength and ROM to return to ADLs  Plan: Continue per plan of care        Dx: R shoulder pain s/p fall  EPOC:3/17/21  CO-MORBIDITIES: depression, fibromyalgia, chronic fatigue syndrome; s/p cervical fusion, LBP  PERSONAL FACTORS:  Precautions: high pain levels; very tender to light touch      Manuals 2/8 2/12 2/15          R shoulder PROM NK/JK th nk          R shld gentle MFR NK/JK th nk          Cervical distraction/ MFR  th            13' 15' 15'          Neuro Re-Ed                                                                                                        Ther Ex             Pendulums: CW, CCW 1' CW/  CCW 15 ea 15 ea          Table slides: flex, scap, ER 10 ea 10 ea 10 ea          Seated cane IR/ER 10ea 10 ea 10 ea          Supine cane flexion 10x 10 10x          Pulleys: flex 3' 2' 3          Pulleys: scap 1 5' 2' 2'          Doorway stretch  10"x3 10"x3          Standing row  10 10          Standing shld ext  10 10                                    Gait Training                                       Modalities             TENS w/ MH post tx MH 10'  defer                          3

## 2021-02-16 ENCOUNTER — TELEPHONE (OUTPATIENT)
Dept: FAMILY MEDICINE CLINIC | Facility: CLINIC | Age: 66
End: 2021-02-16

## 2021-02-16 ENCOUNTER — HOSPITAL ENCOUNTER (OUTPATIENT)
Dept: MAMMOGRAPHY | Facility: CLINIC | Age: 66
Discharge: HOME | End: 2021-02-16
Payer: MEDICARE

## 2021-02-16 VITALS — WEIGHT: 218 LBS | BODY MASS INDEX: 35.03 KG/M2 | HEIGHT: 66 IN

## 2021-02-16 DIAGNOSIS — Z12.31 ENCOUNTER FOR SCREENING MAMMOGRAM FOR MALIGNANT NEOPLASM OF BREAST: Primary | ICD-10-CM

## 2021-02-16 DIAGNOSIS — Z12.31 ENCOUNTER FOR SCREENING MAMMOGRAM FOR MALIGNANT NEOPLASM OF BREAST: ICD-10-CM

## 2021-02-16 PROCEDURE — 77067 SCR MAMMO BI INCL CAD: CPT

## 2021-02-16 PROCEDURE — 77063 BREAST TOMOSYNTHESIS BI: CPT

## 2021-02-16 NOTE — TELEPHONE ENCOUNTER
----- Message from Khadar Mills, [address] sent at 2/16/2021 12:04 PM EST -----  Can you please let pt know her mammogram is normal & it is recommended she have routine screening in 1 year, thanks!

## 2021-02-19 ENCOUNTER — APPOINTMENT (OUTPATIENT)
Dept: PHYSICAL THERAPY | Facility: CLINIC | Age: 66
End: 2021-02-19
Payer: MEDICARE

## 2021-02-23 ENCOUNTER — OFFICE VISIT (OUTPATIENT)
Dept: PHYSICAL THERAPY | Facility: CLINIC | Age: 66
End: 2021-02-23
Payer: MEDICARE

## 2021-02-23 DIAGNOSIS — M25.511 ACUTE PAIN OF RIGHT SHOULDER: Primary | ICD-10-CM

## 2021-02-23 PROCEDURE — 97140 MANUAL THERAPY 1/> REGIONS: CPT

## 2021-02-23 PROCEDURE — 97110 THERAPEUTIC EXERCISES: CPT

## 2021-02-26 ENCOUNTER — APPOINTMENT (OUTPATIENT)
Dept: RADIOLOGY | Facility: CLINIC | Age: 66
End: 2021-02-26
Payer: MEDICARE

## 2021-02-26 ENCOUNTER — APPOINTMENT (OUTPATIENT)
Dept: PHYSICAL THERAPY | Facility: CLINIC | Age: 66
End: 2021-02-26
Payer: MEDICARE

## 2021-02-26 ENCOUNTER — OFFICE VISIT (OUTPATIENT)
Dept: PHYSICAL THERAPY | Facility: CLINIC | Age: 66
End: 2021-02-26
Payer: MEDICARE

## 2021-02-26 ENCOUNTER — OFFICE VISIT (OUTPATIENT)
Dept: OBGYN CLINIC | Facility: CLINIC | Age: 66
End: 2021-02-26
Payer: MEDICARE

## 2021-02-26 VITALS
HEIGHT: 66 IN | WEIGHT: 218 LBS | TEMPERATURE: 97.3 F | BODY MASS INDEX: 35.03 KG/M2 | SYSTOLIC BLOOD PRESSURE: 120 MMHG | DIASTOLIC BLOOD PRESSURE: 78 MMHG

## 2021-02-26 DIAGNOSIS — M25.511 RIGHT SHOULDER PAIN, UNSPECIFIED CHRONICITY: ICD-10-CM

## 2021-02-26 DIAGNOSIS — M25.511 ACUTE PAIN OF RIGHT SHOULDER: Primary | ICD-10-CM

## 2021-02-26 DIAGNOSIS — M25.562 LEFT KNEE PAIN, UNSPECIFIED CHRONICITY: ICD-10-CM

## 2021-02-26 DIAGNOSIS — S82.002A CLOSED NONDISPLACED FRACTURE OF LEFT PATELLA, UNSPECIFIED FRACTURE MORPHOLOGY, INITIAL ENCOUNTER: Primary | ICD-10-CM

## 2021-02-26 DIAGNOSIS — M77.8 RIGHT SHOULDER TENDONITIS: ICD-10-CM

## 2021-02-26 PROCEDURE — 73564 X-RAY EXAM KNEE 4 OR MORE: CPT

## 2021-02-26 PROCEDURE — 73562 X-RAY EXAM OF KNEE 3: CPT

## 2021-02-26 PROCEDURE — 73030 X-RAY EXAM OF SHOULDER: CPT

## 2021-02-26 PROCEDURE — 97140 MANUAL THERAPY 1/> REGIONS: CPT

## 2021-02-26 PROCEDURE — 97110 THERAPEUTIC EXERCISES: CPT

## 2021-02-26 PROCEDURE — 99204 OFFICE O/P NEW MOD 45 MIN: CPT | Performed by: ORTHOPAEDIC SURGERY

## 2021-02-26 RX ORDER — CHLORAL HYDRATE 500 MG
1000 CAPSULE ORAL DAILY
COMMUNITY
End: 2021-08-30 | Stop reason: HOSPADM

## 2021-02-26 NOTE — LETTER
February 26, 2021     Gabriel Cordero PA-C  [facility] [address]    Patient: Greg Orantes   YOB: 1955   Date of Visit: 2/26/2021       Dear Dr Violetta Woody: Thank you for referring Ryne Estrada to me for evaluation  Below are my notes for this consultation  If you have questions, please do not hesitate to call me  I look forward to following your patient along with you           Sincerely,        Reina Neves DO        CC: No Recipients

## 2021-02-26 NOTE — PROGRESS NOTES
Ortho Sports Medicine Shoulder Visit     Assesment:   right shoulder tendonitis, trigger points mid trapezius, left knee distal pole of patella fracture nondisplaced 12/9/21    Plan:    Conservative treatment:    Ice to shoulder 1-2 times daily, for 20 minutes at a time  PT for ROM and strengthening to shoulder, rotator cuff, scapular stabilizers  Home exercise program for shoulder, including ROM and strenthening  Instructions given to patient of what exercises to perform  Let pain guide return to activities  Patient will continue physical therapy for her right shoulder her exam was unremarkable, good cuff strength and motion, pain posterior scapular region, left knee will have start physical therapy and give hinged knee brace for support  Fracture is 2 months old  She does have medial joint line tenderness but no locking currently  If symptoms persist may get MRI to evaluate for meniscal tear  Therapy will also address left wrist as well, she has history carpal tunnel release, she may need to see hand specialist in future  She states she also has fibromyalgia may need rheumatology consult in future  See back in 6 weeks  Imaging: All imaging from today was reviewed by myself and explained to the patient  Injection:    No Injection planned at this time  Surgery:     No surgery is recommended at this point, continue with conservative treatment plan as noted  History of Present Illness: The patient is a 76 y o , right hand dominant female whose occupation is  at [employer] referred to me by their primary care physician, seen in clinic for consultation of right shoulder pain and left knee pain  The patient denies a history of diabetes  The patient denies a history of thyroid disorder  Pain is located posterior shoulder and anteromedial left knee  The patient rates the pain as a 5/10  The pain has been present for 8 weeks        The patient sustained an injury on 12/9/21 The mechanism of injury was fall injuring left knee, month later due to repetitive use of right shoulder while at work  The pain is characterized as sharp  The pain is present at all times  Pain is improved by rest, ice, NSAIDS and physical therapy  Pain is aggravated by left knee standing, walking, right shoulder with overhead activities but this is improving with physical therapy  Symptoms include clicking and swelling  The patient denies weakness  The patient denies numbness and tingling  The patient has tried rest, ice, NSAIDS and physical therapy            Shoulder Surgical History:  None    Past Medical, Social and Family History:  Past Medical History:   Diagnosis Date    Anxiety     Arthritis     Bipolar disorder ([facility] [address] )     Cervical spinal stenosis     last assessed 5/13/14, resolved 10/10/16    Chronic constipation     last assessed 8/28/12, resovled 9/14/15    Chronic fatigue syndrome     last assessed 8/28/12, resolved 9/14/15    Chronic hyperglycemia     resolved 9/14/15    Chronic pain syndrome     last assessed 11/12/13, resolved 10/10/16    Depression     Essential hypertriglyceridemia     resolved 10/10/16    Fibromyalgia     GERD (gastroesophageal reflux disease)     Herpes simplex infection     last assessed 12/12/13, resolved 10/10/16    Hypokalemia     last assessed 9/14/15, resolved 11/23/15    Insomnia     last assessed 3/30/15, resolved 11/23/15    Median neuropathy     last assessed 7/14/15, resolved 10/10/16    Pneumonia     last assessed 7/16/13, resolved 5/10/13    Sacroiliitis ([facility] [address] )     last assessed 10/22/13, resolved 10/27/14     Past Surgical History:   Procedure Laterality Date    CERVICAL FUSION      COLONOSCOPY  10/23/2013    10yrs     DILATION AND CURETTAGE OF UTERUS      HALLUX VALGUS CORRECTION      HEMORROIDECTOMY      NEUROPLASTY / TRANSPOSITION MEDIAN NERVE AT CARPAL TUNNEL      REDUCTION MAMMAPLASTY      TONSILLECTOMY  TOOTH EXTRACTION      TUBAL LIGATION       Allergies   Allergen Reactions    Cephalexin Rash    Latex Rash     Reaction Date: 21Mar2012;     Molds & Smuts Allergic Rhinitis    Other Allergic Rhinitis and Cough     Cigarette     Risperidone Palpitations     Reaction Date: 21Mar2012;     Sertraline Itching     Reaction Date: 21Mar2012;     Soy Isoflavones      Current Outpatient Medications on File Prior to Visit   Medication Sig Dispense Refill    ARIPiprazole (ABILIFY) 15 mg tablet Take 15 mg by mouth daily at bedtime      ascorbic acid (VITAMIN C) 500 mg tablet Take 500 mg by mouth daily      bisacodyl (DULCOLAX) 5 mg EC tablet Take 1 tablet by mouth daily as needed      Calcium 250 MG CAPS Take 500 mg by mouth      Cholecalciferol ([employer] VITAMIN D) 2000 units CAPS Take by mouth      DULoxetine (CYMBALTA) 60 mg delayed release capsule Take 1 capsule by mouth daily      levalbuterol (XOPENEX HFA) 45 mcg/act inhaler Inhale 1-2 puffs every 4 (four) hours as needed for wheezing 1 Inhaler 0    loratadine (CLARITIN) 10 mg tablet Take 10 mg by mouth      Omega-3 Fatty Acids (fish oil) 1,000 mg Take 1,000 mg by mouth daily      Turmeric Curcumin 500 MG CAPS Take by mouth      naproxen (NAPROSYN) 500 mg tablet Take 1 tablet (500 mg total) by mouth 2 (two) times a day with meals for 20 days 40 tablet 0     No current facility-administered medications on file prior to visit        Social History     Socioeconomic History    Marital status:      Spouse name: Not on file    Number of children: Not on file    Years of education: Not on file    Highest education level: Not on file   Occupational History    Not on file   Social Needs    Financial resource strain: Not on file    Food insecurity     Worry: Not on file     Inability: Not on file    Transportation needs     Medical: Not on file     Non-medical: Not on file   Tobacco Use    Smoking status: Never Smoker    Smokeless tobacco: Never Used   Substance and Sexual Activity    Alcohol use: No    Drug use: No    Sexual activity: Not on file   Lifestyle    Physical activity     Days per week: Not on file     Minutes per session: Not on file    Stress: Not on file   Relationships    Social connections     Talks on phone: Not on file     Gets together: Not on file     Attends Scientologist service: Not on file     Active member of club or organization: Not on file     Attends meetings of clubs or organizations: Not on file     Relationship status: Not on file    Intimate partner violence     Fear of current or ex partner: Not on file     Emotionally abused: Not on file     Physically abused: Not on file     Forced sexual activity: Not on file   Other Topics Concern    Not on file   Social History Narrative    Always uses seat belt    Daily caffeine consumption, 1 serv/day    Has smoke detectors    Lives independently          I have reviewed the past medical, surgical, social and family history, medications and allergies as documented in the EMR  Review of systems: ROS is negative other than that noted in the HPI  Constitutional: Negative for fatigue and fever  HENT: Negative for sore throat  Respiratory: Negative for shortness of breath  Cardiovascular: Negative for chest pain  Gastrointestinal: Negative for abdominal pain  Endocrine: Negative for cold intolerance and heat intolerance  Genitourinary: Negative for flank pain  Musculoskeletal: Negative for back pain  Skin: Negative for rash  Allergic/Immunologic: Negative for immunocompromised state  Neurological: Negative for dizziness  Psychiatric/Behavioral: Negative for agitation  Physical Exam:    Blood pressure 120/78, temperature (!) 97 3 °F (36 3 °C), height 5' 6" (1 676 m), weight 98 9 kg (218 lb), not currently breastfeeding      General/Constitutional: NAD, well developed, well nourished  HENT: Normocephalic, atraumatic  CV: Intact distal pulses, regular rate  Resp: No respiratory distress or labored breathing  Lymphatic: No lymphadenopathy palpated  Neuro: Alert and Oriented x 3, no focal deficits  Psych: Normal mood, normal affect, normal judgement, normal behavior  Skin: Warm, dry, no rashes, no erythema     Shoulder Exam (focused): Shoulder focused exam:       RIGHT LEFT    Scapula Atrophy Negative Negative     Winging Negative Negative     Protraction Negative Negative    Rotator cuff SS 5/5 5/5     IS 5/5 5/5     SubS 5/5 5/5    ROM  170     ER0 60 60     ER90 90 90     IR90 40 40     IRb T6 T6    TTP: AC Negative Negative     Biceps Negative Negative     Coracoid Negative Negative    Special Tests: O'Briens Negative Negative     Mathews-shear Negative Negative     Cross body Adduction Negative Negative     Speeds  Negative Negative     Soledad's Negative Negative     Whipple Negative Negative       Neer Negative Negative     Granados Negative Negative    Instability: Apprehension & relocation not tested not tested     Load & shift not tested not tested    Other: Crank Negative Negative             Left knee:  No erythema, mild swelling distal pole of patella with bony deformity evident distal pole of patella  TTP distal pole of patella and medial joint line  Extensor mechanism intact of left knee with good strength against resistance  0 extension, 130 flexion  Positive medial Severino  Negative anterior drawer, lachman  Calf is supple           UE NV Exam: +2 Radial pulses bilaterally  Sensation intact to light touch C5-T1 bilaterally, Radial/median/ulnar nerve motor intact      Bilateral elbow, wrist, and and forearm ROM full, painless with passive ROM, no ttp or crepitance throughout extremities below shoulder joint    Cervical ROM is full without pain, numbness or tingling      Shoulder Imaging    X-rays of the right shoulder were reviewed, which demonstrate mild degenerative changes of right shoulder, no fracture, no osseus abnormality   XR left knee demonstrates distal pole of patella fracture nondisplaced  I have reviewed the radiology report and do not currently have a radiology reading from [address] [place] [address], but will check the result once the reading is performed        Scribe Attestation    I,:  Elena Aldridge am acting as a scribe while in the presence of the attending physician :       I,:  Corry Hinkle, DO personally performed the services described in this documentation    as scribed in my presence :

## 2021-02-26 NOTE — PROGRESS NOTES
Daily Note     Today's date: 2021  Patient name: Shahla Gottlieb  : 1955  MRN: [id number]  Referring provider: KARIN Love  Dx:   Encounter Diagnosis     ICD-10-CM    1  Acute pain of right shoulder  M25 511                   Subjective: Pt arrives 15 min late from ortho visit  Pt reports R shldr feeling much stronger  Pt states results from ortho return for a fx of the L patella  L wrist and L knee is painful  Pt notes feeling relief in shld post Tx  Objective: See treatment diary below  Updated exercises to HEP  Assessment: Tolerated treatment well  R shldr PROM has improved in all directions  Reviewed HEP and pt demonstrated understanding  Patient exhibited good technique with therapeutic exercises and would benefit from continued PT to increase ROM and overall strength  Plan: RA NV for possible discharge of shldr dx and work towards new script for L knee        Dx: R shoulder pain s/p fall  EPOC:3/17/21  CO-MORBIDITIES: depression, fibromyalgia, chronic fatigue syndrome; s/p cervical fusion, LBP  PERSONAL FACTORS:  Precautions: high pain levels; very tender to light touch      Manuals 2/8 2/12 2/15 2/23 2/26        R shoulder PROM NK/JK th nk JK nk        R shld gentle MFR NK/JK th nk JK nk        Cervical distraction/ MFR  th  scap PROM  JK scap PROM JK         15' 15' 15' 15' 20'        Neuro Re-Ed                                                                                                        Ther Ex             Pendulums: CW, CCW 1' CW/  CCW 15 ea 15 ea np 15 ea        Table slides: flex, scap, ER 10 ea 10 ea 10 ea 10 10 ea        Seated cane IR/ER 10ea 10 ea 10 ea 10 10 ea        Supine cane flexion 10x 10 10x 103' 10'        Pulleys: flex 3' 2' 3 3' 3'        Pulleys: scap 1 5' 2' 2' 3' 3'        Doorway stretch  10"x3 10"x3 15"x3 15"x3        Standing row  10 10 15 10 #1        Standing shld ext  10 10 15 10 #1                                  Gait Training Modalities             TENS w/ MH post tx  10'  defer

## 2021-03-02 ENCOUNTER — EVALUATION (OUTPATIENT)
Dept: PHYSICAL THERAPY | Facility: CLINIC | Age: 66
End: 2021-03-02
Payer: MEDICARE

## 2021-03-02 DIAGNOSIS — M25.562 ACUTE PAIN OF LEFT KNEE: ICD-10-CM

## 2021-03-02 DIAGNOSIS — S82.002D CLOSED NONDISPLACED FRACTURE OF LEFT PATELLA WITH ROUTINE HEALING, UNSPECIFIED FRACTURE MORPHOLOGY, SUBSEQUENT ENCOUNTER: ICD-10-CM

## 2021-03-02 DIAGNOSIS — M25.511 ACUTE PAIN OF RIGHT SHOULDER: Primary | ICD-10-CM

## 2021-03-02 DIAGNOSIS — R26.89 IMBALANCE: ICD-10-CM

## 2021-03-02 DIAGNOSIS — M25.561 ACUTE PAIN OF RIGHT KNEE: ICD-10-CM

## 2021-03-02 PROCEDURE — 97164 PT RE-EVAL EST PLAN CARE: CPT | Performed by: PHYSICAL THERAPIST

## 2021-03-02 NOTE — PROGRESS NOTES
PT Re-Evaluation     Today's date: 3/2/2021  Patient name: Dsemond Pacheco  : 1955  MRN: [id number]  Referring provider: KARIN Johnston  Dx:   Encounter Diagnosis     ICD-10-CM    1  Acute pain of right shoulder  M25 511                   Assessment  Assessment details: Since starting skilled PT, R shoulder pain is decreasing, R shoulder ROM and strength has improved nicely with good functional progress  Recommend pt discontinue skilled PT for her R shoulder and initiate skilled PT for B knee pain and balance deficits  Pt should follow up with physician regarding B wrist pain s/p fall  A referral to occupational therapy and certified hand therapist would be appropriate  Impairments: abnormal or restricted ROM, activity intolerance, impaired physical strength and pain with function  Understanding of Dx/Px/POC: good   Prognosis: fair    Goals  STG's ( 3-4 weeks)  1  Pt will be independent in HEP--met  2  Decrease pain to 5/10 at best-met  LTG's ( 6- 8 weeks)  1  Improve FOTO score by 8-10 points-met  2  Improve R shoulder ROM to WFL's-met  3  Improve R shoulder strength by 1/2 grade-met  4  Pt will be able to complete overhead activities with greater ease-met  5  Pt will have improved tolerance for reaching behind her back-partial met  6  Pt will be able to go up and down the steps with less pain to get into her home  7  Improve L knee flexion ROM to WFL's  8   Improve L LE strength by 1/2 grade    Plan  Patient would benefit from: skilled physical therapy and PT eval  Planned modality interventions: cryotherapy, thermotherapy: hydrocollator packs and TENS  Planned therapy interventions: joint mobilization, manual therapy, neuromuscular re-education, strengthening, stretching, therapeutic activities, therapeutic exercise, flexibility, functional ROM exercises and home exercise program  Frequency: 2x week  Plan of Care beginning date: 3/2/2021  Plan of Care expiration date: 2021  Treatment plan discussed with: patient        Subjective Evaluation    History of Present Illness  Mechanism of injury: I E: Pt reports she fell on 20, which caused R shoulder pain  Pt notes the floor was uneven and she can't see well and needs cataract surgery  Pt has a history of cervical spine fusion  Pt reports her pain is gradually worsening in her shoulder and has worsened with shoveling snow  Pt has increased pain and difficulty putting her R arm behind her back, lifting and reaching overhead  Pt is able to drive  Pt is not able to hook her bra  Pt has not noticed pain with self care activities  Pt was a  at [employer] and felt increased soreness with opening bags and scanning items  3/2/21: Pt reports she can do activities easier, but it still hurts  Pt is able to reach overhead with greater ease  Pt continues to have difficulty hooking her bra behind her back  Pt is able to lift a grocery bag and laundry basket  Pt is now working at [employer], and it is difficult because it is a fast pace job  Pt reports L knee pain s/p fall  X-testing did not show a fracture at first  MD took more X-rays and it showed L knee nondisplaced patellar fx,distal pole  Pt was give a brace for her L knee  When she wears the brace on her L knee, it causes her R knee to hurt  Pt has increased pain and difficulty putting on shoes, when crossing her ankle over her opposite knee  Pt has increased knee pain when standing at work  Pt has increased pain when going up and down the steps  Pt needs to carry laundry and grocery bags up an down the steps and pt says " it's killing me"  Pt has increased pain when walking for long time periods      Work: works at [employer]; calligraphy  Gait: slow speed  Pain  At best pain rating: 3  At worst pain ratin  Location: R shoulder; L knee best: 5/10  worst: 10/10; R knee: best: 3/10  worst: 10  Quality: sharp  Aggravating factors: lifting and overhead activity    Social Support    Employment status: working    Diagnostic Tests  No diagnostic tests performed  Treatments  No previous or current treatments  Patient Goals  Patient goals for therapy: decreased pain  Patient goal: to have better ROM in my arm; less pain when when going up and down the steps  Objective     Neurological Testing     Sensation     Shoulder   Left Shoulder   Intact: light touch    Right Shoulder   Intact: light touch    Knee   Left Knee   Intact: light touch    Right Knee   Intact: light touch     Reflexes   Left   Biceps (C5/C6): normal (2+)  Brachioradialis (C6): normal (2+)  Triceps (C7): normal (2+)  Achilles (S1): normal (2+)    Right   Biceps (C5/C6): normal (2+)  Brachioradialis (C6): normal (2+)  Triceps (C7): normal (2+)  Achilles (S1): normal (2+)    Active Range of Motion   Cervical/Thoracic Spine       Cervical    Flexion: 30 degrees   Extension: 50 degrees      Left lateral flexion: 20 degrees      Right lateral flexion: 15 degrees      Left rotation:  Restriction level: moderate  Right rotation:  Restriction level: moderate  Left Shoulder   Flexion: 120 degrees   Abduction: 110 degrees     Right Shoulder   Flexion: 140 degrees   Abduction: 130 degrees   External rotation 45°: 85 degrees   Internal rotation 45°: 90 degrees   Left Knee   Flexion: 90 degrees with pain  Extension: 0 degrees     Right Knee   Flexion: 120 degrees   Extension: 0 degrees     Additional Active Range of Motion Details  Posture: pt sits with rounded shoulders/ forward head position  Hypersensitivity to light touch in R upper trap, rhomboid, scapular region   Pt is very tender to light touch  (+) TTP R posterior shoulder, lateral shoulder  Pt moves with guarded positioning  Pt is R hand dominant    3/2/21:   (+) TTP R medial knee/ patellar tendon  (+) TTP L medial and lateral joint line, patellar tendon    Passive Range of Motion     Right Shoulder   Flexion: 140 degrees with pain  Abduction: 141 degrees with pain  External rotation 45°: 86 degrees   Internal rotation 45°: 90 degrees     Strength/Myotome Testing     Right Shoulder     Planes of Motion   Flexion: 4+ (NT)   Abduction: 4+ (NT)   External rotation at 0°: 5   Internal rotation at 0°: 5     Left Hip   Planes of Motion   Flexion: 4 (pain)  Abduction: 4+  External rotation: 4+ (pain)  Internal rotation: 4+ (pain)    Right Hip   Planes of Motion   Flexion: 4 (pain)  Abduction: 4+  External rotation: 4+  Internal rotation: 4+ (pain)    Left Knee   Flexion: 4 (pain)  Extension: 4+ (pain)    Right Knee   Flexion: 5  Extension: 5          Dx: R shoulder pain s/p fall; L knee non displaced patellar fx, distal pole; imbalance  EPOC:3/17/21  CO-MORBIDITIES: depression, fibromyalgia, chronic fatigue syndrome; s/p cervical fusion, LBP  PERSONAL FACTORS:  Precautions: high pain levels; very tender to light touch      Manuals 2/8 2/12 2/15 2/23 2/26 3/2       RE- Rico Coto NK/JK th nk JK nk th       R shld gentle MFR NK/JK th nk JK nk        Cervical distraction/ MFR  th  scap PROM  JK scap PROM JK         15' 15' 15' 15' 20' 45'       Neuro Re-Ed             sidestepping             Rhomberg FT EO on foam             Rhomberg FT EC on level surf             Fw walking over hurdles                                                    Ther Ex             LAQ 1' CW/  CCW 15 ea 15 ea np 15 ea        Quad sets 10 ea 10 ea 10 ea 10 10 ea        Heel slides 10ea 10 ea 10 ea 10 10 ea        Supine SLR 10x 10 10x 103' 10'        S/l hip abduction 3' 2' 3 3' 3'        Seated HS stretch 1 5' 2' 2' 3' 3'        Seated ball squeeze  10"x3 10"x3 15"x3 15"x3        Standing row  10 10 15 10 #1        Standing shld ext  10 10 15 10 #1                                  Gait Training                                       Modalities             TENS w/ MH post tx MH 10'  defer

## 2021-03-03 ENCOUNTER — TELEPHONE (OUTPATIENT)
Dept: FAMILY MEDICINE CLINIC | Facility: CLINIC | Age: 66
End: 2021-03-03

## 2021-03-03 DIAGNOSIS — M19.042 PRIMARY OSTEOARTHRITIS OF BOTH HANDS: Primary | ICD-10-CM

## 2021-03-03 DIAGNOSIS — M19.072 OSTEOARTHRITIS OF BOTH ANKLES, UNSPECIFIED OSTEOARTHRITIS TYPE: ICD-10-CM

## 2021-03-03 DIAGNOSIS — M19.041 PRIMARY OSTEOARTHRITIS OF BOTH HANDS: Primary | ICD-10-CM

## 2021-03-03 DIAGNOSIS — M19.071 OSTEOARTHRITIS OF BOTH ANKLES, UNSPECIFIED OSTEOARTHRITIS TYPE: ICD-10-CM

## 2021-03-03 NOTE — TELEPHONE ENCOUNTER
Boo Anderson said her physical therapist said that she would benefit from consulting with occupation therapy for her hands  She is also asking to be referred to an orthopedic for her ankles  I said that she's already seeing the orthopedic for her knee and she said he wouldn't help her because he only does knees and shoulders  Can you enter the orders?

## 2021-03-04 ENCOUNTER — TELEPHONE (OUTPATIENT)
Dept: OBGYN CLINIC | Facility: HOSPITAL | Age: 66
End: 2021-03-04

## 2021-03-04 NOTE — TELEPHONE ENCOUNTER
Spoke to patient she already is in contact to occupational therapy  She is going to call her ortho doctor

## 2021-03-05 ENCOUNTER — OFFICE VISIT (OUTPATIENT)
Dept: PHYSICAL THERAPY | Facility: CLINIC | Age: 66
End: 2021-03-05
Payer: MEDICARE

## 2021-03-05 DIAGNOSIS — M25.561 ACUTE PAIN OF RIGHT KNEE: Primary | ICD-10-CM

## 2021-03-05 PROCEDURE — 97112 NEUROMUSCULAR REEDUCATION: CPT

## 2021-03-05 PROCEDURE — 97140 MANUAL THERAPY 1/> REGIONS: CPT

## 2021-03-05 PROCEDURE — 97110 THERAPEUTIC EXERCISES: CPT

## 2021-03-05 NOTE — PROGRESS NOTES
Daily Note     Today's date: 3/5/2021  Patient name: Jimy Brewster  : 1955  MRN: [id number]  Referring provider: KARIN Morales  Dx:   Encounter Diagnosis     ICD-10-CM    1  Acute pain of right knee  M25 561                   Subjective: Pt arrives 5 min late to visit  Reports L knee is incredibly sensitive to touch  Pt notes having most pain after prolonged periods of standing especially after work  Pt notes only wearing flat shoes  States not exercising enough as well  Objective: See treatment diary below      Assessment: Tolerated treatment well  Instructed pt to talk to dr for recommendations for proper foot wear and possible shoe inserts for pain relief  Pt is fatigued w/ exercises and feels some exercises cause slight discomfort through motion  Pt finds new exercises challenging, but progresses well  Pt finds relief w/ L knee MFR  Instructed pt how to properly wear knee brace until correct size gets delivered  Patient would benefit from continued PT to increase ROM and overall function and decrease pain  Plan: Continue per plan of care          Dx: R shoulder pain s/p fall; L knee non displaced patellar fx, distal pole; imbalance  EPOC:3/17/21  CO-MORBIDITIES: depression, fibromyalgia, chronic fatigue syndrome; s/p cervical fusion, LBP  PERSONAL FACTORS:  Precautions: high pain levels; very tender to light touch      Manuals 2/8 2/12 2/15 2/23 2/26 3/2 3/5      RE- Eval NK/JK th nk JK nk th       L knee PROM NK/JK th nk JK nk  nk      L knee MFR  th  scap PROM  JK scap PROM JK  nk       15' 15' 15' 15' 20' 45' 10'      Neuro Re-Ed             sidestepping             Rhomberg FT EO on foam             Rhomberg FT EC on level surf             Fw walking over hurdles                                                    Ther Ex             LAQ 1' CW/  CCW 15 ea 15 ea np 15 ea  10      Quad sets 10 ea 10 ea 10 ea 10 10 ea  10      Heel slides 10ea 10 ea 10 ea 10 10 ea  10      Supine SLR 10x 10 10x 103' 10'  10      S/l hip abduction 3' 2' 3 3' 3'  10      Seated HS stretch 1 5' 2' 2' 3' 3' 3' 3x20"      Seated ball squeeze  10"x3 10"x3 15"x3 15"x3  10x10"                                                          Gait Training                                       Modalities             TENS w/ MH post tx  10'  defer

## 2021-03-09 ENCOUNTER — OFFICE VISIT (OUTPATIENT)
Dept: PHYSICAL THERAPY | Facility: CLINIC | Age: 66
End: 2021-03-09
Payer: MEDICARE

## 2021-03-09 ENCOUNTER — APPOINTMENT (OUTPATIENT)
Dept: PHYSICAL THERAPY | Facility: CLINIC | Age: 66
End: 2021-03-09
Payer: MEDICARE

## 2021-03-09 DIAGNOSIS — M25.561 ACUTE PAIN OF RIGHT KNEE: Primary | ICD-10-CM

## 2021-03-09 DIAGNOSIS — M25.511 ACUTE PAIN OF RIGHT SHOULDER: ICD-10-CM

## 2021-03-09 PROCEDURE — 97140 MANUAL THERAPY 1/> REGIONS: CPT

## 2021-03-09 PROCEDURE — 97110 THERAPEUTIC EXERCISES: CPT

## 2021-03-09 NOTE — PROGRESS NOTES
Daily Note     Today's date: 3/9/2021  Patient name: Vickie Nielsen  : 1955  MRN: [id number]  Referring provider: KARIN Purcell  Dx:   Encounter Diagnosis     ICD-10-CM    1  Acute pain of right knee  M25 561    2  Acute pain of right shoulder  M25 511                   Subjective: Pt reports she is getting deep knee pain under the patella  Pt reports she is calling DR for an injection  Objective: See treatment diary below  Pt 10 mins late for appt  Assessment: Tolerated treatment fair  Patient w/ palpable TrP's in the adductors and HS  Pt responded well to manuals  Pt limiting ex's due to c/o's knee pain  Plan: Continue per plan of care  Progress treatment as tolerated         Dx: R shoulder pain s/p fall; L knee non displaced patellar fx, distal pole; imbalance  EPOC:3/17/21  CO-MORBIDITIES: depression, fibromyalgia, chronic fatigue syndrome; s/p cervical fusion, LBP  PERSONAL FACTORS:  Precautions: high pain levels; very tender to light touch      Manuals 2/8 2/12 2/15 2/23 2/26 3/2 3/5 3/9     RE- Eval NK/JK th nk JK nk th       L knee PROM NK/JK th nk JK nk  nk JK     L knee MFR  th  scap PROM  JK scap PROM JK  nk JK      15' 15' 15' 15' 20' 39' 10' 15'     Neuro Re-Ed             sidestepping             Rhomberg FT EO on foam             Rhomberg FT EC on level surf             Fw walking over hurdles                                                    Ther Ex             LAQ 1' CW/  CCW 15 ea 15 ea np 15 ea  10 10x5"     Quad sets 10 ea 10 ea 10 ea 10 10 ea  10 10"x10     Heel slides 10ea 10 ea 10 ea 10 10 ea  10 10x5"     Supine SLR 10x 10 10x 103' 10'  10 10     S/l hip abduction 3' 2' 3 3' 3'  10 10     Seated HS stretch 1 5' 2' 2' 3' 3' 3' 3x20" 3x20"     Seated ball squeeze  10"x3 10"x3 15"x3 15"x3  10x10" 10"x10                                                         Gait Training                                       Modalities             TENS w/ MH post tx MH 10' defer     CP 10'

## 2021-03-11 ENCOUNTER — OFFICE VISIT (OUTPATIENT)
Dept: OBGYN CLINIC | Facility: CLINIC | Age: 66
End: 2021-03-11
Payer: MEDICARE

## 2021-03-11 ENCOUNTER — TELEPHONE (OUTPATIENT)
Dept: OBGYN CLINIC | Facility: CLINIC | Age: 66
End: 2021-03-11

## 2021-03-11 ENCOUNTER — APPOINTMENT (OUTPATIENT)
Dept: RADIOLOGY | Facility: CLINIC | Age: 66
End: 2021-03-11
Payer: MEDICARE

## 2021-03-11 VITALS
WEIGHT: 218 LBS | SYSTOLIC BLOOD PRESSURE: 123 MMHG | BODY MASS INDEX: 35.03 KG/M2 | HEIGHT: 66 IN | DIASTOLIC BLOOD PRESSURE: 70 MMHG

## 2021-03-11 DIAGNOSIS — M25.572 PAIN, JOINT, ANKLE AND FOOT, LEFT: ICD-10-CM

## 2021-03-11 DIAGNOSIS — M19.071 OSTEOARTHRITIS OF BOTH ANKLES, UNSPECIFIED OSTEOARTHRITIS TYPE: ICD-10-CM

## 2021-03-11 DIAGNOSIS — S93.492A SPRAIN OF ANTERIOR TALOFIBULAR LIGAMENT OF LEFT ANKLE, INITIAL ENCOUNTER: Primary | ICD-10-CM

## 2021-03-11 DIAGNOSIS — M19.072 OSTEOARTHRITIS OF BOTH ANKLES, UNSPECIFIED OSTEOARTHRITIS TYPE: ICD-10-CM

## 2021-03-11 PROCEDURE — 73610 X-RAY EXAM OF ANKLE: CPT

## 2021-03-11 PROCEDURE — 99213 OFFICE O/P EST LOW 20 MIN: CPT | Performed by: ORTHOPAEDIC SURGERY

## 2021-03-11 NOTE — PROGRESS NOTES
JILL Cerda  Attending, Orthopaedic Surgery  [facility]      Assessment:     Encounter Diagnoses   Name Primary?  Osteoarthritis of both ankles, unspecified osteoarthritis type     Pain, joint, ankle and foot, left     Sprain of anterior talofibular ligament of left ankle, initial encounter Yes              Plan:     The patient has sustained a sprain of the left ATFL and possibly CFL in December 2020 and she has significant weakness in the left ankle on exam today  We will begin physical therapy as soon as the patient tolerates- Order placed  Instructions for Home stretching program provided in AV  Instructions given for rest, ice 20mins/hr, elevation, and Ace wrap/compression stocking for compression        Follow up will be in 7 weeks  Collette Deal MD          Subjective:    Chief Complaint:  Left ankle pain     Tabithahaydee Holder is a 76 y o  female referred by KARIN Siddiqui for evaluation and treatment of an injury to the left ankle  This is evaluated as a personal injury  The injury occurred 3 months ago, and occurred while she missed a step and fell into a sunken living room  The patient states the ankle rolled inward at the time of injury  She did not hear or sense a pop or snap at the time of the injury  The patient notes pain and moderate swelling of the ankle since the injury  She has treated the ankle with Rest  Pain is localized to the anterolateral  malleolar area  She has not sprained this ankle in the past     Pain/symptom location: the left ankle and foot  Pain/symptom quality: aching and dull  Pain/symptom severity: moderate  Pain/symptom timing:  Worse during the day when active  Pain/symptom conext:  Worse with activites and work  Pain/symptom modifying factors:  Rest makes better, activities make worse  Pain/symptom associated signs/symptoms: none    Outside reports reviewed: office notes      Foot and Ankle Surgical History:   see below     Past Medical, Surgical and Social History:  Past Medical History:  has a past medical history of Anxiety, Arthritis, Bipolar disorder ([facility] [address] ), Cervical spinal stenosis, Chronic constipation, Chronic fatigue syndrome, Chronic hyperglycemia, Chronic pain syndrome, Depression, Essential hypertriglyceridemia, Fibromyalgia, GERD (gastroesophageal reflux disease), Herpes simplex infection, Hypokalemia, Insomnia, Median neuropathy, Pneumonia, and Sacroiliitis ([facility] [address] )  Problem List: does not have any pertinent problems on file  Past Surgical History:  has a past surgical history that includes Reduction mammaplasty; Cervical fusion; Colonoscopy (10/23/2013); Dilation and curettage of uterus; Hallux valgus correction; Hemorroidectomy; Neuroplasty / transposition median nerve at carpal tunnel; Tooth extraction; Tonsillectomy; and Tubal ligation  Family History: family history includes Arthritis in her family and mother; Coronary artery disease in her father and paternal grandfather; Diabetes in her family and paternal grandmother; Heart attack in her family and father; Heart disease in her family; Hyperlipidemia in her father; Hypertension in her family and mother; Melanoma in her father; No Known Problems in her daughter, maternal grandfather, and maternal grandmother; Osteoporosis in her family and mother; Sudden death in her mother  Social History:  reports that she has never smoked  She has never used smokeless tobacco  She reports that she does not drink alcohol or use drugs  Current Medications: has a current medication list which includes the following prescription(s): aripiprazole, ascorbic acid, bisacodyl, calcium, cholecalciferol, duloxetine, levalbuterol, loratadine, fish oil, turmeric curcumin, and naproxen  Allergies: is allergic to cephalexin; latex; molds & smuts; other; risperidone; sertraline; and soy isoflavones       Review of Systems:  General- denies fever/chills  HEENT- denies hearing loss or sore throat  Eyes- denies eye pain or visual disturbances, denies red eyes  Respiratory- denies cough or SOB  Cardio- denies chest pain or palpitations  GI- denies abdominal pain  Endocrine- denies urinary frequency  Urinary- denies pain with urination  Musculoskeletal- Negative except noted above  Skin- denies rashes or wounds  Neurological- denies dizziness or headache  Psychiatric- denies anxiety or difficulty concentrating    Objective:        /70   Ht 5' 6" (1 676 m)   Wt 98 9 kg (218 lb)   BMI 35 19 kg/m²   General/Constitutional: No apparent distress: well-nourished and well developed  Eyes: normal ocular motion  Lymphatic: No appreciable lymphadenopathy  Respiratory: Non-labored breathing  Vascular: No edema, swelling or tenderness, except as noted in detailed exam   Integumentary: No impressive skin lesions present, except as noted in detailed exam   Neuro: No ataxia or abnormal movements  Psych: Normal mood and affect, oriented to person, place and time  MSK: normal other than stated in HPI and exam      Gait:  Antalgic  The patient can bear weight on the injured extremity  Left Ankle  Proximal Fibula:   no tenderness noted   Edema: Moderate swelling circumferentially in the ankle   Ecchymosis:   None   Crepitus  None   Active ROM:  50% of normal    Passive ROM:   75% of normal    Palpation:  Significant tenderness of the anterolateral ligaments   Stability :   No joint laxity  Drawer sign equal to unaffected ankle  Syndosmosis:   syndesmotic ligament IS NOT tender   Sensation:     Intact in all distributions   Pulses:  normal DP and PT pulses     Strength: 4/5 strength in all directions of ankle motion    Imaging  X-ray of the ankle/foot: 3 views of the ankle reveal a stable mortise joint, moderate soft tissue swelling, and no evidence of acute fracture  There is an old healed avulsion fracture noted of the medial malleolus Reviewed by me personally      Scribe Attestation I,:  Aman Ryan PA-C am acting as a scribe while in the presence of the attending physician :       I,:  Demond Alvarado MD personally performed the services described in this documentation    as scribed in my presence :

## 2021-03-11 NOTE — TELEPHONE ENCOUNTER
Stephanie Calabrese would like a note to relieve her from work because she is unable to perform     Please advise [phone] (best number to reach PT)

## 2021-03-11 NOTE — PATIENT INSTRUCTIONS
You have sprained your ankle  Over the next 4 weeks it is important you follow these instructions; Lateral Ankle Sprain Protocol - [facility]  Acute Phase: Days 1-3  Goals: Decrease pain and swelling, protect from further injury  · Pain and swelling management (RICE)  · Protection of injured ligaments (activity modification, supportive sneakers, occasionally a boot is necessary for a week or two at most)  · Gait-WBAT  Sub-Acute Phase: 2-4 days to 2 weeks  Goals: Decrease/eliminate pain, increase ROM, decrease swelling, increase strength  · Continue pain and swelling management  · Subtalar and talocrurcal joint mobilizations  · ROM with pain-free range: DF/PF/EV/IV AROM, calf stretching  · Isometric strengthening  Rehabilitative Phase: 2-6 weeks  Goals: Regain ROM and strength, increase endurance and proprioception  · Continue joint mobilizations and stretching  · Progress to pain-free concentric and eccentric strengthening exercises (both open chain and closed chain)  · Proprioception exercises (balance board, BAPS board, single leg stance etc )  · Gait training-promote equal weight beraing and weaning of assistive devices  · Endurance activities (stationary biking, swimming, walking, etc )  Functional Phase: 6 weeks  Goals: Return to full activity and function  · Continue strengthening exercises  · Coordination and agility training-depends on patient's prior level of function, recreational activities, and goals         Treating your Sprained Ankle  Treating your sprained ankle properly may prevent chronic pain and instability  For a Grade I sprain, follow the R I C E  guidelines:    · Rest your ankle by not walking on it  Limit weight bearing  Use crutches if necessary; if there is no fracture you are safe to put some weight on the leg  An ankle brace often helps control swelling and adds stability while the ligaments are healing  · Ice it to keep down the swelling   Don't put ice directly on the skin (use a thin piece of cloth such as a pillow case between the ice bag and the skin) and don't ice more than 20 minutes at a time to avoid frost bite  · Compression can help control swelling as well as immobilize and support your injury  · Elevate the foot by reclining and propping it up above the waist or heart as needed  Swelling usually goes down with a few days  For a Grade II sprain, follow the R I C E  guidelines and allow more time for healing  A doctor may immobilize or splint your sprained ankle  A Grade III sprain puts you at risk for permanent ankle instability  Rarely, surgery may be needed to repair the damage, especially in competitive athletes  For severe ankle sprains, your doctor may also consider treating you with a short leg cast for two to three weeks or a walking boot  People who sprain their ankle repeatedly may also need surgical repair to tighten their ligaments  Rehabilitating your Sprained Ankle  Every ligament injury needs rehabilitation  Otherwise, your sprained ankle might not heal completely and you might re-injure it  All ankle sprains, from mild to severe, require three phases of recovery:    · Phase I includes resting, protecting and reducing swelling of your injured ankle  · Phase II includes restoring your ankle's flexibility, range of motion and strength  · Phase III includes gradually returning to straight-ahead activity and doing maintenance exercises, followed later by more cutting sports such as tennis, basketball or football  Once you can stand on your ankle again, your doctor will prescribe exercise routines to strengthen your muscles and ligaments and increase your flexibility, balance and coordination  Later, you may walk, jog and run figure eights with your ankle taped or in a supportive ankle brace  It's important to complete the rehabilitation program because it makes it less likely that you'll hurt the same ankle again   If you don't complete rehabilitation, you could suffer chronic pain, instability and arthritis in your ankle  If your ankle still hurts, it could mean that the sprained ligament has not healed right, or that some other injury also happened  To prevent future sprained ankles, pay attention to your body's warning signs to slow down when you feel pain or fatigue, and stay in shape with good muscle balance, flexibility and strength in your soft tissues  Ankle Sprain     What is an ankle sprain? An ankle sprain refers to tearing of the ligaments of the ankle  The most common ankle sprain occurs on the lateral or outside part of the ankle  This is an extremely common injury which affects many people during a wide variety of activities  It can happen in the setting of an ankle fracture (i e  when the bones of the ankle also break)  Most commonly, however, it occurs in isolation  What are the symptoms an ankle sprain? Patients report pain after having twisted an ankle  This usually occurs due to an inversion injury, which means the foot rolls underneath the ankle or leg  It commonly occurs during sports  Patients will complain of pain on the outside of their ankle and various degrees of swelling and bleeding under the skin (i e  bruising)  Technically, this bruising is referred to as ecchymosis  Depending on the severity of the sprain, a person may or may not be able to put weight on the foot  What are the risk factors for an ankle sprain? As noted above, these injuries occur when the ankle is twisted underneath the leg, called inversion  Risk factors are those activities, such as basketball and jumping sports, in which an athlete can come down on and turn the ankle or step on an opponent's foot  Some people are predisposed to ankle sprains  In people with a hindfoot varus, which means that the general nature or posture of the heels is slightly turned toward the inside, these injuries are more common   This is because it is easier to turn on the ankle  In those who have had a severe sprain in the past, it is also easier to turn the ankle and cause a new sprain  Therefore, one of the risk factors of spraining the ankle is having instability  Those who have weak muscles, especially those called the peroneals which run along the outside of the ankle, may be more predisposed  Anatomy    There are multiple ligaments in the ankle  Ligaments in general are those structures that connect bone-to-bone  Tendons, on the other hand, connect muscle-to-bone and allow those muscles to exert their force  In the case of an ankle sprain, there are several commonly sprained ligaments  The two most important are the followin The ATFL or anterior talofibular ligament, which connects the talus to the fibula on the outside of the ankle  2 The CFL or calcaneal fibular ligament, which connects the fibula to the calcaneus below  3  Finally, there is a third ligament which is not as commonly torn  It runs more in the back of the ankle and is called the PTFL or posterior talofibular ligament  These must be differentiated from the so-called high ankle sprain ligaments, which are completely different and located higher up the leg  How is an ankle sprain diagnosed? Ankle sprains can be diagnosed fairly easily given that they are common injuries  The location of pain on the outside of the ankle with tenderness and swelling in a patient who has an ankle with inversion is very suggestive  In these patients, normal X-rays also suggest that the bone has not been broken and instead the ankle ligaments have been torn or sprained  It is very important, however, not to simply regard any injury as an ankle sprain because other injuries can occur as well  For example, the peroneal tendons mentioned above can be torn   There can also be fractures in other bones around the ankle including the fifth metatarsal and the anterior process of the calcaneus  In very severe cases, an MRI may be warranted to rule out other problems in the ankle such as damage to the cartilage  An MRI typically is not necessary to diagnose a sprain  What are treatment options? Surgery is not required in the vast majority of ankle sprains  Even in severe sprains, these ligaments will heal without surgery  The grade of the sprain will dictate treatment  Sprains are traditionally classified into several grades  Perhaps more important, however, is the patients ability to bear weight  Those that can bear weight even after the injury are likely to return very quickly to play  Those who cannot walk may need to be immobilized  In general, treatment in the first 48 to 72 hours consists of resting the ankle, icing 20 minutes every two to three hours, compressing with an ACE wrap, and elevating, which means positioning the leg and ankle so that the toes are above the level of patients nose  Those patients who cannot bear weight are better treated in a removable walking boot until they can comfortably bear weight  Physical therapy is a mainstay  Patients should learn to strengthen the muscles around the ankle, particularly the peroneals  An ankle brace can be used in an athlete until a therapist believes that the ankle is strong enough to return to play without it  Surgery is rarely indicated but may be needed in a patient who has cartilage damage or other related injuries  Ligaments are only repaired or strengthened in cases of chronic instability in which the ligaments have healed but not in a strong fashion  How long is recovery? Recovery depends on the severity of the injury  As noted above, for those minor injuries, people can return to their activities in sports within several days  For very severe sprains, it may take longer and up to several weeks   It should be noted that high ankle sprains take considerably longer to heal      Outcomes for ankle sprains are generally quite good  Most patients heal from an ankle sprain and are able to get back to their normal lives, sports and activities  Some people, however, who do not properly rehab their ankle and have a rather severe sprain may go on to have ankle instability  Chronic instability occurs in patients repeatedly spraining the ankle  Such repeated episodes can be dangerous because they can lead to damage within the ankle  These patients should be identified and considered for repair  Potential Complications    Surgery is rarely needed  As noted above, however, an improperly rehabbed ankle may end up having chronic instability  It is important to address this with either therapy or surgery before further damage occurs to the ankle  Frequently Asked Questions    What is a high ankle sprain and is that different from a regular ankle sprain? A high ankle sprain refers to tearing of the ligaments that connect the tibia to the fibula (this connection is also called the syndesmosis)  These are different and much less common than the standard lateral ankle sprains, meaning those that occur on the side of the ankle  Do ankle sprains ever need to be repaired acutely? Ankle sprains rarely, if ever, needed to be treated with surgery  The vast majority simply need to be treated with rest, ice, compression and elevation followed by physical therapy and temporary bracing  I have sprained my ankle many times  Should I be concerned? Yes  The more you sprain an ankle, the greater the chance that problems will develop  For example, turning the ankle can lead to damage to the cartilage inside the ankle joint  You should see your doctor if this is occurring

## 2021-03-12 ENCOUNTER — OFFICE VISIT (OUTPATIENT)
Dept: FAMILY MEDICINE CLINIC | Facility: CLINIC | Age: 66
End: 2021-03-12
Payer: MEDICARE

## 2021-03-12 ENCOUNTER — EVALUATION (OUTPATIENT)
Dept: PHYSICAL THERAPY | Facility: CLINIC | Age: 66
End: 2021-03-12
Payer: MEDICARE

## 2021-03-12 ENCOUNTER — APPOINTMENT (OUTPATIENT)
Dept: RADIOLOGY | Facility: CLINIC | Age: 66
End: 2021-03-12
Payer: MEDICARE

## 2021-03-12 VITALS
HEART RATE: 92 BPM | WEIGHT: 223.8 LBS | TEMPERATURE: 97.9 F | OXYGEN SATURATION: 97 % | SYSTOLIC BLOOD PRESSURE: 128 MMHG | RESPIRATION RATE: 16 BRPM | DIASTOLIC BLOOD PRESSURE: 76 MMHG | HEIGHT: 66 IN | BODY MASS INDEX: 35.97 KG/M2

## 2021-03-12 DIAGNOSIS — S93.492D SPRAIN OF ANTERIOR TALOFIBULAR LIGAMENT OF LEFT ANKLE, SUBSEQUENT ENCOUNTER: ICD-10-CM

## 2021-03-12 DIAGNOSIS — M25.562 ACUTE PAIN OF LEFT KNEE: ICD-10-CM

## 2021-03-12 DIAGNOSIS — M25.511 ACUTE PAIN OF RIGHT SHOULDER: Primary | ICD-10-CM

## 2021-03-12 DIAGNOSIS — M79.642 BILATERAL HAND PAIN: ICD-10-CM

## 2021-03-12 DIAGNOSIS — M25.561 ACUTE PAIN OF RIGHT KNEE: Primary | ICD-10-CM

## 2021-03-12 DIAGNOSIS — M79.641 BILATERAL HAND PAIN: ICD-10-CM

## 2021-03-12 DIAGNOSIS — R26.89 IMBALANCE: ICD-10-CM

## 2021-03-12 DIAGNOSIS — S82.002D CLOSED NONDISPLACED FRACTURE OF LEFT PATELLA WITH ROUTINE HEALING, UNSPECIFIED FRACTURE MORPHOLOGY, SUBSEQUENT ENCOUNTER: ICD-10-CM

## 2021-03-12 PROCEDURE — 99213 OFFICE O/P EST LOW 20 MIN: CPT | Performed by: NURSE PRACTITIONER

## 2021-03-12 PROCEDURE — 97140 MANUAL THERAPY 1/> REGIONS: CPT | Performed by: PHYSICAL THERAPIST

## 2021-03-12 PROCEDURE — 73130 X-RAY EXAM OF HAND: CPT

## 2021-03-12 NOTE — LETTER
March 12, 2021     Patient: Camille Jaquez   YOB: 1955   Date of Visit: 3/12/2021       To Whom it May Concern:    Dionne Arreaga is under my professional care  She was seen in my office on 3/12/2021  Please excuse patient from work for the next 2 weeks  If you have any questions or concerns, please don't hesitate to call           Sincerely,          KARIN Gutierrez        CC: No Recipients

## 2021-03-12 NOTE — PROGRESS NOTES
PT Re-Evaluation     Today's date: 3/12/2021  Patient name: Roland Larson  : 1955  MRN: [id number]  Referring provider: KARIN Aguirre  Dx:   Encounter Diagnosis     ICD-10-CM    1  Acute pain of right knee  M25 561    2  Closed nondisplaced fracture of left patella with routine healing, unspecified fracture morphology, subsequent encounter  S82 002D    3  Imbalance  R26 89    4  Acute pain of left knee  M25 562    5  Sprain of anterior talofibular ligament of left ankle, subsequent encounter  S93 492D                   Assessment  Assessment details: Pt is arriving to skilled PT with a new script to add in L ankle ATFL sprain onset   Pt presents with increased pain and TTP, decreased B ankle ROM, decreased B ankle strength, increased edema and decreased functional activities  Recommend pt initiate skilled PT for L ankle and continue for B knee pain and balance  Issued tubigrip size E for edema control for L ankle  Pt was instructed to purchase a compression stocking, however insurance does not pay for it and and pt is not able to afford $50 for a knee high compression stocking  Impairments: abnormal or restricted ROM, activity intolerance, impaired physical strength and pain with function  Understanding of Dx/Px/POC: good   Prognosis: fair    Goals  STG's ( 3-4 weeks)  1  Pt will be independent in HEP--met  2  Decrease pain to 5/10 at best-met  LTG's ( 6- 8 weeks)  1  Improve FOTO score by 8-10 points-met  2  Improve R shoulder ROM to WFL's-met  3  Improve R shoulder strength by 1/2 grade-met  4  Pt will be able to complete overhead activities with greater ease-met  5  Pt will have improved tolerance for reaching behind her back-partial met  6  Pt will be able to go up and down the steps with less pain to get into her home  7  Improve L knee flexion ROM to WFL's  8  Improve L LE strength by 1/2 grade  9  Improve L ankle ROM to WFL's  10   Improve L ankle strength by 1/2 grade    Plan  Patient would benefit from: skilled physical therapy  Planned modality interventions: cryotherapy, thermotherapy: hydrocollator packs and TENS  Planned therapy interventions: joint mobilization, manual therapy, neuromuscular re-education, strengthening, stretching, therapeutic activities, therapeutic exercise, flexibility, functional ROM exercises and home exercise program  Frequency: 2x week  Duration in weeks: 6  Plan of Care beginning date: 3/12/2021  Plan of Care expiration date: 4/23/2021  Treatment plan discussed with: patient        Subjective Evaluation    History of Present Illness  Mechanism of injury: I E: Pt reports she fell on 12/9/20, which caused R shoulder pain  Pt notes the floor was uneven and she can't see well and needs cataract surgery  Pt has a history of cervical spine fusion  Pt reports her pain is gradually worsening in her shoulder and has worsened with shoveling snow  Pt has increased pain and difficulty putting her R arm behind her back, lifting and reaching overhead  Pt is able to drive  Pt is not able to hook her bra  Pt has not noticed pain with self care activities  Pt was a  at [employer] and felt increased soreness with opening bags and scanning items  3/2/21: Pt reports she can do activities easier, but it still hurts  Pt is able to reach overhead with greater ease  Pt continues to have difficulty hooking her bra behind her back  Pt is able to lift a grocery bag and laundry basket  Pt is now working at [employer], and it is difficult because it is a fast pace job  Pt reports L knee pain s/p fall  X-testing did not show a fracture at first  MD took more X-rays and it showed L knee nondisplaced patellar fx,distal pole  Pt was give a brace for her L knee  When she wears the brace on her L knee, it causes her R knee to hurt  Pt has increased pain and difficulty putting on shoes, when crossing her ankle over her opposite knee   Pt has increased knee pain when standing at work  Pt has increased pain when going up and down the steps  Pt needs to carry laundry and grocery bags up an down the steps and pt says " it's killing me"  Pt has increased pain when walking for long time periods  3/12/21: Pt is arriving to skilled PT with a new diagnosis of L ATFL sprain  Pt has increased pain when standing 5 hours at work  Pt has increased pain walking in the grocery store  Pt has increased pain when putting on her shoes or when she reaches overhead and goes up on her tip toes  Work: works at [employer]; calligraphy  Gait: slow speed  Pain  At best pain rating: 3  At worst pain ratin  Location: R shoulder; L knee best: 10  worst: 10/10; R knee: best: 3/10  worst: 10; L ankle  /10 best   8/10 worst  Quality: sharp  Aggravating factors: lifting and overhead activity    Social Support    Employment status: working    Diagnostic Tests  No diagnostic tests performed  Treatments  No previous or current treatments  Patient Goals  Patient goals for therapy: decreased pain  Patient goal: to have better ROM in my arm; less pain when when going up and down the steps          Objective     Neurological Testing     Sensation     Shoulder   Left Shoulder   Intact: light touch    Right Shoulder   Intact: light touch    Knee   Left Knee   Intact: light touch    Right Knee   Intact: light touch     Reflexes   Left   Biceps (C5/C6): normal (2+)  Brachioradialis (C6): normal (2+)  Triceps (C7): normal (2+)  Achilles (S1): normal (2+)    Right   Biceps (C5/C6): normal (2+)  Brachioradialis (C6): normal (2+)  Triceps (C7): normal (2+)  Achilles (S1): normal (2+)    Active Range of Motion   Cervical/Thoracic Spine       Cervical    Flexion: 30 degrees   Extension: 50 degrees      Left lateral flexion: 20 degrees      Right lateral flexion: 15 degrees      Left rotation:  Restriction level: moderate  Right rotation:  Restriction level: moderate  Left Shoulder   Flexion: 120 degrees Abduction: 110 degrees     Right Shoulder   Flexion: 140 degrees   Abduction: 130 degrees   External rotation 45°: 85 degrees   Internal rotation 45°: 90 degrees   Left Knee   Flexion: 90 degrees with pain  Extension: 0 degrees     Right Knee   Flexion: 120 degrees   Extension: 0 degrees   Left Ankle/Foot   Dorsiflexion (ke): -5 degrees with pain  Plantar flexion: 15 degrees with pain  Inversion: 20 degrees with pain  Eversion: 0 degrees with pain    Right Ankle/Foot   Dorsiflexion (ke): 0 degrees   Plantar flexion: 30 degrees   Inversion: 45 degrees   Eversion: 20 degrees     Additional Active Range of Motion Details  Posture: pt sits with rounded shoulders/ forward head position  Hypersensitivity to light touch in R upper trap, rhomboid, scapular region   Pt is very tender to light touch  (+) TTP R posterior shoulder, lateral shoulder  Pt moves with guarded positioning  Pt is R hand dominant    3/2/21:   (+) TTP R medial knee/ patellar tendon  (+) TTP L medial and lateral joint line, patellar tendon  Mild edema L ankle upon visual inspection  (+) TTP L ATFL/ lateral malleolar region  (+) TTP R deltoid ligament/ medial malleolar region    Passive Range of Motion     Right Shoulder   Flexion: 140 degrees with pain  Abduction: 141 degrees with pain  External rotation 45°: 86 degrees   Internal rotation 45°: 90 degrees     Strength/Myotome Testing     Right Shoulder     Planes of Motion   Flexion: 4+ (NT)   Abduction: 4+ (NT)   External rotation at 0°: 5   Internal rotation at 0°: 5     Left Hip   Planes of Motion   Flexion: 4 (pain)  Abduction: 4+  External rotation: 4+ (pain)  Internal rotation: 4+ (pain)    Right Hip   Planes of Motion   Flexion: 4 (pain)  Abduction: 4+  External rotation: 4+  Internal rotation: 4+ (pain)    Left Knee   Flexion: 4 (pain)  Extension: 4+ (pain)    Right Knee   Flexion: 5  Extension: 5    Left Ankle/Foot   Dorsiflexion: 4-  Plantar flexion: 3-  Inversion: 4-  Eversion: 3+    Right Ankle/Foot   Dorsiflexion: 4+  Plantar flexion: 3+  Inversion: 4+  Eversion: 4+          Dx: R shoulder pain s/p fall 12/2020; L knee non displaced patellar fx, distal pole; imbalance; L ankle ATFL prain  EPOC:3/17/21  CO-MORBIDITIES: depression, fibromyalgia, chronic fatigue syndrome; s/p cervical fusion, LBP  PERSONAL FACTORS:  Precautions: high pain levels; very tender to light touch      Manuals 2/8 2/12 2/15 2/23 2/26 3/2 3/12      RE- Eval NK/JK th nk JK nk th Th ankle      L ankle MFR/ PROM NK/JK th nk JK nk        K tape stirrup       Th B      B knee PROM/ MFR  th  scap PROM  JK scap PROM JK         15' 15' 15' 15' 20' 45' 25'      Neuro Re-Ed             sidestepping             Rhomberg FT EO on foam             Rhomberg FT EC on level surf             Fw walking over hurdles                                                    Ther Ex             LAQ 1' CW/  CCW 15 ea 15 ea np 15 ea        Quad sets 10 ea 10 ea 10 ea 10 10 ea        Heel slides 10ea 10 ea 10 ea 10 10 ea        Supine SLR 10x 10 10x 103' 10'        S/l hip abduction 3' 2' 3 3' 3'        Seated HS stretch 1 5' 2' 2' 3' 3'        Seated ball squeeze  10"x3 10"x3 15"x3 15"x3        Standing row  10 10 15 10 #1        Standing shld ext  10 10 15 10 #1        B ankle AROM       10 ea      B ankle circles,; CW, CCW       10 ea      Gait Training                                       Modalities             TENS w/ MH post tx  10'  defer

## 2021-03-12 NOTE — PROGRESS NOTES
Assessment/Plan:    Acute pain of right shoulder     Xray from 02/21 showing no acute osseous abnormality  Continue physical therapy sessions and icing PRN  Pt has appt w/ ortho next week  Work excuse note written  Acute pain of left knee    Xray from 02/21 showing no acute osseous abnormalities in rafi knees  Continue physical therapy and icing PRN  Pt has appt with ortho next week  She can utilize Tylenol PRN for pain  Pt not interested in any additional pain medication  Bilateral hand pain    Rafi hand xrays ordered  Normal rafi hand physical exam  Pt states she has had chronic hand pain since her fall in December 2020  Pt notified that we will contact her once results are final       Pt to f/u PRN  We will call her once results of hand xrays  Subjective:      Patient ID: Sheila Robins is a 76 y o  female  Pt presents for follow up of right shoulder pain, rafi knee pain, foot pain & hand pain s/p fall on 12/9  I evaluated pt in office previously on 01/18 & 2/11  She has been doing physical therapy sessions and has been seeing Dr Jose F Jarquin with Ortho  She currently has been wearing left knee immobilizer and notes some improvement in pain  Pt states she has had chronic rafi hand pain and would like x-rays to further evaluate  Overall pain is worse with overuse and ROM exercises & improved with rest       The following portions of the patient's history were reviewed and updated as appropriate: allergies, current medications, past family history, past medical history, past social history, past surgical history and problem list     Review of Systems   Constitutional: Negative  Negative for chills and fever  HENT: Negative  Negative for ear pain and sore throat  Eyes: Negative  Negative for pain and visual disturbance  Respiratory: Negative  Negative for cough and shortness of breath  Cardiovascular: Positive for leg swelling (rafi trace edema)   Negative for chest pain and palpitations  Gastrointestinal: Negative  Negative for abdominal distention, abdominal pain and vomiting  Genitourinary: Negative  Negative for dysuria and hematuria  Musculoskeletal: Positive for arthralgias (right shoulder, liz knee, liz hands & feet)  Negative for back pain, joint swelling and neck stiffness  Skin: Negative for color change and rash  Neurological: Negative  Negative for seizures and syncope  All other systems reviewed and are negative  Objective:      /76 (BP Location: Left arm, Patient Position: Sitting, Cuff Size: Large)   Pulse 92   Temp 97 9 °F (36 6 °C) (Tympanic)   Resp 16   Ht 5' 5 55" (1 665 m)   Wt 102 kg (223 lb 12 8 oz)   SpO2 97%   BMI 36 62 kg/m²          Physical Exam  Vitals signs reviewed  Constitutional:       General: She is not in acute distress  Appearance: Normal appearance  She is obese  She is not ill-appearing  HENT:      Head: Normocephalic  Cardiovascular:      Rate and Rhythm: Normal rate and regular rhythm  Pulses: Normal pulses  Heart sounds: Normal heart sounds  No murmur  Pulmonary:      Effort: Pulmonary effort is normal       Breath sounds: Normal breath sounds  No wheezing  Abdominal:      General: Bowel sounds are normal       Palpations: Abdomen is soft  Tenderness: There is no abdominal tenderness  There is no guarding  Musculoskeletal:         General: Tenderness (right shoulder, liz knees) present  Right shoulder: She exhibits decreased range of motion and tenderness  Right knee: Tenderness found  Left knee: Tenderness found  Right hand: She exhibits tenderness  Left hand: She exhibits tenderness  Right lower leg: Edema (trace edema around ankle) present  Left lower leg: Edema (trace edema around ankle) present  Skin:     General: Skin is warm and dry  Neurological:      Mental Status: She is alert and oriented to person, place, and time   Mental status is at baseline     Psychiatric:         Mood and Affect: Mood normal          Behavior: Behavior normal

## 2021-03-16 ENCOUNTER — OFFICE VISIT (OUTPATIENT)
Dept: PHYSICAL THERAPY | Facility: CLINIC | Age: 66
End: 2021-03-16
Payer: MEDICARE

## 2021-03-16 DIAGNOSIS — M25.561 ACUTE PAIN OF RIGHT KNEE: Primary | ICD-10-CM

## 2021-03-16 DIAGNOSIS — S82.002D CLOSED NONDISPLACED FRACTURE OF LEFT PATELLA WITH ROUTINE HEALING, UNSPECIFIED FRACTURE MORPHOLOGY, SUBSEQUENT ENCOUNTER: ICD-10-CM

## 2021-03-16 PROCEDURE — 97140 MANUAL THERAPY 1/> REGIONS: CPT

## 2021-03-16 PROCEDURE — 97110 THERAPEUTIC EXERCISES: CPT

## 2021-03-16 NOTE — PROGRESS NOTES
Daily Note     Today's date: 3/16/2021  Patient name: Indy Brown  : 1955  MRN: [id number]  Referring provider: KARIN De Souza  Dx:   Encounter Diagnosis     ICD-10-CM    1  Acute pain of right knee  M25 561    2  Closed nondisplaced fracture of left patella with routine healing, unspecified fracture morphology, subsequent encounter  S82 002D                   Subjective: Pt reports she is sore all over and can't tell which jt the pain is coming from  Presents w/ compression ankle socks BL  Objective: See treatment diary below      Assessment: Tolerated treatment fair  Patient anxious this AM   Pt responds well to manuals  Needs cont'd strengthening t/o  Plan: Continue per plan of care  Progress treatment as tolerated         Dx: R shoulder pain s/p fall 2020; L knee non displaced patellar fx, distal pole; imbalance; L ankle ATFL prain  EPOC:3/17/21  CO-MORBIDITIES: depression, fibromyalgia, chronic fatigue syndrome; s/p cervical fusion, LBP  PERSONAL FACTORS:  Precautions: high pain levels; very tender to light touch      Manuals 2/8 2/12 2/15 2/23 2/26 3/2 3/12 3/16     RE- Eval NK/JK th nk JK nk th Th ankle      L ankle MFR/ PROM NK/JK th nk JK nk   JK     K tape stirrup       Th B np     B knee PROM/ MFR  th  scap PROM  JK scap PROM JK   JK      15' 15' 15' 15' 20' 45' 25' 20'     Neuro Re-Ed             sidestepping             Rhomberg FT EO on foam             Rhomberg FT EC on level surf             Fw walking over hurdles                                                    Ther Ex             LAQ 1' CW/  CCW 15 ea 15 ea np 15 ea   15 ea     Quad sets 10 ea 10 ea 10 ea 10 10 ea   10"x10 ea     Heel slides 10ea 10 ea 10 ea 10 10 ea   10 ea     Supine SLR 10x 10 10x 103' 10'   10     S/l hip abduction 3' 2' 3 3' 3'   10     Seated HS stretch 1 5' 2' 2' 3' 3'   20"x3     Seated ball squeeze  10"x3 10"x3 15"x3 15"x3   5"x15     Standing row  10 10 15 10 #1   10 #1     Standing shld ext  10 10 15 10 #1   10 #1     B ankle AROM       10 ea 10 ea     B ankle circles,; CW, CCW       10 ea 10 ea     Gait Training                                       Modalities             TENS w/ MH post tx  10'  defer

## 2021-03-19 ENCOUNTER — OFFICE VISIT (OUTPATIENT)
Dept: OBGYN CLINIC | Facility: CLINIC | Age: 66
End: 2021-03-19
Payer: MEDICARE

## 2021-03-19 ENCOUNTER — OFFICE VISIT (OUTPATIENT)
Dept: PHYSICAL THERAPY | Facility: CLINIC | Age: 66
End: 2021-03-19
Payer: MEDICARE

## 2021-03-19 ENCOUNTER — TELEPHONE (OUTPATIENT)
Dept: FAMILY MEDICINE CLINIC | Facility: CLINIC | Age: 66
End: 2021-03-19

## 2021-03-19 ENCOUNTER — EVALUATION (OUTPATIENT)
Dept: OCCUPATIONAL THERAPY | Facility: CLINIC | Age: 66
End: 2021-03-19
Payer: MEDICARE

## 2021-03-19 VITALS
DIASTOLIC BLOOD PRESSURE: 76 MMHG | TEMPERATURE: 97.7 F | WEIGHT: 223 LBS | SYSTOLIC BLOOD PRESSURE: 134 MMHG | BODY MASS INDEX: 35.84 KG/M2 | HEIGHT: 66 IN | HEART RATE: 94 BPM

## 2021-03-19 DIAGNOSIS — M19.041 PRIMARY OSTEOARTHRITIS OF BOTH HANDS: ICD-10-CM

## 2021-03-19 DIAGNOSIS — M19.042 PRIMARY OSTEOARTHRITIS OF BOTH HANDS: ICD-10-CM

## 2021-03-19 DIAGNOSIS — S82.002D CLOSED NONDISPLACED FRACTURE OF LEFT PATELLA WITH ROUTINE HEALING, UNSPECIFIED FRACTURE MORPHOLOGY, SUBSEQUENT ENCOUNTER: ICD-10-CM

## 2021-03-19 DIAGNOSIS — M77.8 RIGHT SHOULDER TENDONITIS: ICD-10-CM

## 2021-03-19 DIAGNOSIS — S82.002A CLOSED NONDISPLACED FRACTURE OF LEFT PATELLA, UNSPECIFIED FRACTURE MORPHOLOGY, INITIAL ENCOUNTER: Primary | ICD-10-CM

## 2021-03-19 DIAGNOSIS — M25.561 ACUTE PAIN OF RIGHT KNEE: Primary | ICD-10-CM

## 2021-03-19 DIAGNOSIS — R26.89 IMBALANCE: ICD-10-CM

## 2021-03-19 PROCEDURE — 97110 THERAPEUTIC EXERCISES: CPT

## 2021-03-19 PROCEDURE — 97110 THERAPEUTIC EXERCISES: CPT | Performed by: OCCUPATIONAL THERAPIST

## 2021-03-19 PROCEDURE — 99214 OFFICE O/P EST MOD 30 MIN: CPT | Performed by: ORTHOPAEDIC SURGERY

## 2021-03-19 PROCEDURE — 97112 NEUROMUSCULAR REEDUCATION: CPT

## 2021-03-19 PROCEDURE — 97165 OT EVAL LOW COMPLEX 30 MIN: CPT | Performed by: OCCUPATIONAL THERAPIST

## 2021-03-19 PROCEDURE — 97140 MANUAL THERAPY 1/> REGIONS: CPT | Performed by: OCCUPATIONAL THERAPIST

## 2021-03-19 PROCEDURE — 97140 MANUAL THERAPY 1/> REGIONS: CPT

## 2021-03-19 PROCEDURE — 20610 DRAIN/INJ JOINT/BURSA W/O US: CPT | Performed by: ORTHOPAEDIC SURGERY

## 2021-03-19 RX ORDER — METHYLPREDNISOLONE ACETATE 40 MG/ML
1 INJECTION, SUSPENSION INTRA-ARTICULAR; INTRALESIONAL; INTRAMUSCULAR; SOFT TISSUE
Status: COMPLETED | OUTPATIENT
Start: 2021-03-19 | End: 2021-03-19

## 2021-03-19 RX ORDER — LIDOCAINE HYDROCHLORIDE 10 MG/ML
3 INJECTION, SOLUTION INFILTRATION; PERINEURAL
Status: COMPLETED | OUTPATIENT
Start: 2021-03-19 | End: 2021-03-19

## 2021-03-19 RX ADMIN — LIDOCAINE HYDROCHLORIDE 3 ML: 10 INJECTION, SOLUTION INFILTRATION; PERINEURAL at 13:17

## 2021-03-19 RX ADMIN — METHYLPREDNISOLONE ACETATE 1 ML: 40 INJECTION, SUSPENSION INTRA-ARTICULAR; INTRALESIONAL; INTRAMUSCULAR; SOFT TISSUE at 13:17

## 2021-03-19 NOTE — PROGRESS NOTES
Ortho Sports Medicine Shoulder Follow Up Visit     Assesment:   76 y o  female right shoulder tendonitis, trigger points mid trapezius, left knee distal pole of patella fracture nondisplaced 12/9/21, with little improvement    Plan:    Conservative treatment:    Ice to shoulder 1-2 times daily, for 20 minutes at a time  PT for ROM and strengthening to shoulder, rotator cuff, scapular stabilizers  Let pain guide return to activities  Referral to Occupational Medicine    Imaging:    No imaging was available for review today  Injection:    The risks and benefits of the injection (which include but are not limited to: infection, bleeding,damage to nerve/artery, need for further intervention), as well as the risks and benefits of all alternative treatments were explained and understood  The patient elected to proceed with injection  The procedure was done with aseptic technique, and the patient tolerated the procedure well with no complications  A corticosteroid injection of the subacromial space and the left knee was performed  The patient should take 1-2 days off of activity, with gradual return to activity as tolerated  Ice to the shoulder 1-2 times daily for 20 minutes, for next 24-48 hrs  Surgery:     No surgery is recommended at this point, continue with conservative treatment plan as noted  Follow up:    No follow-ups on file  Chief Complaint   Patient presents with    Left Knee - Follow-up    Right Knee - Follow-up     History of Present Illness: The patient is returns for follow up of right shoulder tendonitis, trigger points mid trapezius, left knee distal pole of patella fracture nondisplaced 12/9/21  Since the prior visit, She reports no improvement  Pain is improved by rest, ice, NSAIDS and physical therapy  Pain is aggravated by overhead activity, reaching back, rotation and lifting   Symptoms include clicking, catching, popping and cracking      She states her left knee is bothering her as well with no improvement at this time  The patient has weakness  The patient has tried rest, ice, NSAIDS and physical therapy          Shoulder Surgical History:  None    Past Medical, Social and Family History:  Past Medical History:   Diagnosis Date    Anxiety     Arthritis     Bipolar disorder ([facility] [address] )     Cervical spinal stenosis     last assessed 5/13/14, resolved 10/10/16    Chronic constipation     last assessed 8/28/12, resovled 9/14/15    Chronic fatigue syndrome     last assessed 8/28/12, resolved 9/14/15    Chronic hyperglycemia     resolved 9/14/15    Chronic pain syndrome     last assessed 11/12/13, resolved 10/10/16    Depression     Essential hypertriglyceridemia     resolved 10/10/16    Fibromyalgia     GERD (gastroesophageal reflux disease)     Herpes simplex infection     last assessed 12/12/13, resolved 10/10/16    Hypokalemia     last assessed 9/14/15, resolved 11/23/15    Insomnia     last assessed 3/30/15, resolved 11/23/15    Median neuropathy     last assessed 7/14/15, resolved 10/10/16    Pneumonia     last assessed 7/16/13, resolved 5/10/13    Sacroiliitis ([facility] [address] )     last assessed 10/22/13, resolved 10/27/14     Past Surgical History:   Procedure Laterality Date    CERVICAL FUSION      COLONOSCOPY  10/23/2013    10yrs     DILATION AND CURETTAGE OF UTERUS      HALLUX VALGUS CORRECTION      HEMORROIDECTOMY      NEUROPLASTY / TRANSPOSITION MEDIAN NERVE AT CARPAL TUNNEL      REDUCTION MAMMAPLASTY      TONSILLECTOMY      TOOTH EXTRACTION      TUBAL LIGATION       Allergies   Allergen Reactions    Cephalexin Rash    Latex Rash     Reaction Date: 21Mar2012;     Molds & Smuts Allergic Rhinitis    Other Allergic Rhinitis and Cough     Cigarette     Risperidone Palpitations     Reaction Date: 21Mar2012;     Sertraline Itching     Reaction Date: 21Mar2012;     Soy Isoflavones      Current Outpatient Medications on File Prior to Visit Medication Sig Dispense Refill    ARIPiprazole (ABILIFY) 15 mg tablet Take 15 mg by mouth daily at bedtime      ascorbic acid (VITAMIN C) 500 mg tablet Take 500 mg by mouth daily      bisacodyl (DULCOLAX) 5 mg EC tablet Take 1 tablet by mouth daily as needed      Calcium 250 MG CAPS Take 500 mg by mouth      Cholecalciferol ([employer] VITAMIN D) 2000 units CAPS Take by mouth      DULoxetine (CYMBALTA) 60 mg delayed release capsule Take 1 capsule by mouth daily      levalbuterol (XOPENEX HFA) 45 mcg/act inhaler Inhale 1-2 puffs every 4 (four) hours as needed for wheezing 1 Inhaler 0    loratadine (CLARITIN) 10 mg tablet Take 10 mg by mouth      Omega-3 Fatty Acids (fish oil) 1,000 mg Take 1,000 mg by mouth daily      Turmeric Curcumin 500 MG CAPS Take by mouth      naproxen (NAPROSYN) 500 mg tablet Take 1 tablet (500 mg total) by mouth 2 (two) times a day with meals for 20 days 40 tablet 0     No current facility-administered medications on file prior to visit        Social History     Socioeconomic History    Marital status:      Spouse name: Not on file    Number of children: Not on file    Years of education: Not on file    Highest education level: Not on file   Occupational History    Not on file   Social Needs    Financial resource strain: Not on file    Food insecurity     Worry: Not on file     Inability: Not on file    Transportation needs     Medical: Not on file     Non-medical: Not on file   Tobacco Use    Smoking status: Never Smoker    Smokeless tobacco: Never Used   Substance and Sexual Activity    Alcohol use: No    Drug use: No    Sexual activity: Not on file   Lifestyle    Physical activity     Days per week: Not on file     Minutes per session: Not on file    Stress: Not on file   Relationships    Social connections     Talks on phone: Not on file     Gets together: Not on file     Attends Buddhism service: Not on file     Active member of club or organization: Not on file     Attends meetings of clubs or organizations: Not on file     Relationship status: Not on file    Intimate partner violence     Fear of current or ex partner: No     Emotionally abused: No     Physically abused: No     Forced sexual activity: No   Other Topics Concern    Not on file   Social History Narrative    Always uses seat belt    Daily caffeine consumption, 1 serv/day    Has smoke detectors    Lives independently        I have reviewed the past medical, surgical, social and family history, medications and allergies as documented in the EMR  Review of systems: ROS is negative other than that noted in the HPI  Constitutional: Negative for fatigue and fever  Physical Exam:    Blood pressure 134/76, pulse 94, temperature 97 7 °F (36 5 °C), height 5' 6" (1 676 m), weight 101 kg (223 lb), not currently breastfeeding  General/Constitutional: NAD, well developed, well nourished  HENT: Normocephalic, atraumatic  CV: Intact distal pulses, regular rate  Resp: No respiratory distress or labored breathing  Lymphatic: No lymphadenopathy palpated  Neuro: Alert and Oriented x 3, no focal deficits  Psych: Normal mood, normal affect, normal judgement, normal behavior  Skin: Warm, dry, no rashes, no erythema    Knee Exam (focused): RIGHT LEFT   ROM:   0-130 0-130   Palpation: Effusion negative negative     MJL tenderness Negative Negative     LJL tenderness Negative Negative   Meniscus:  Severino Negative Negative    Apley's Compression Negative Negative   Instability: Varus stable stable     Valgus stable stable   Special Tests: Lachman Negative Negative     Posterior drawer Negative Negative     Anterior drawer Negative Negative     Pivot shift not tested not tested     Dial not tested not tested   Patella: Palpation no tenderness medial facet ttp, lateral facet ttp and inferior pole ttp     Mobility 1/4 1/4     Apprehension Negative Negative   Other: Single leg 1/4 squat not tested not tested      LE NV Exam: +2 DP/PT pulses bilaterally  Sensation intact to light touch L2-S1 bilaterally     Bilateral hip ROM demonstrates no pain actively or passively    No calf tenderness to palpation bilaterally    Shoulder focused exam:    RIGHT LEFT    Scapula Atrophy Negative Negative     Winging Negative Negative     Protraction Negative Negative    Rotator cuff SS 5/5 5/5     IS 5/5 5/5     SubS 5/5 5/5    ROM     170     ER0 60 60     ER90 90    90     IR90 T6    T6     IRb T6    T6    TTP: AC Negative Negative     Biceps Negative Negative     Coracoid Negative Negative    Special Tests: O'Briens Negative Negative     Mathews-shear Negative Negative     Cross body Adduction Negative Negative     Speeds  Negative Negative     Soledad's Negative Negative     Whipple Negative Negative       Neer Negative Negative     Granados Negative Negative    Instability: Apprehension & relocation not tested not tested     Load & shift not tested not tested    Other: Crank Negative Negative           Large joint arthrocentesis: L knee  Universal Protocol:  Consent: Verbal consent obtained  Risks and benefits: risks, benefits and alternatives were discussed  Consent given by: patient and parent  Time out: Immediately prior to procedure a "time out" was called to verify the correct patient, procedure, equipment, support staff and site/side marked as required    Patient understanding: patient states understanding of the procedure being performed  Patient consent: the patient's understanding of the procedure matches consent given  Procedure consent: procedure consent matches procedure scheduled  Relevant documents: relevant documents present and verified  Test results: test results available and properly labeled  Site marked: the operative site was marked  Patient identity confirmed: verbally with patient    Supporting Documentation  Indications: pain and joint swelling   Procedure Details  Location: knee - L knee  Needle size: 25 G  Ultrasound guidance: no  Approach: anterolateral  Medications administered: 3 mL lidocaine 1 %; 1 mL methylPREDNISolone acetate 40 mg/mL    Patient tolerance: patient tolerated the procedure well with no immediate complications  Dressing:  Sterile dressing applied    Large joint arthrocentesis: R subacromial bursa  Universal Protocol:  Consent: Verbal consent obtained  Risks and benefits: risks, benefits and alternatives were discussed  Consent given by: patient and parent  Time out: Immediately prior to procedure a "time out" was called to verify the correct patient, procedure, equipment, support staff and site/side marked as required  Patient understanding: patient states understanding of the procedure being performed  Patient consent: the patient's understanding of the procedure matches consent given  Procedure consent: procedure consent matches procedure scheduled  Relevant documents: relevant documents present and verified  Test results: test results available and properly labeled  Site marked: the operative site was marked  Radiology Images displayed and confirmed   If images not available, report reviewed: imaging studies available  Patient identity confirmed: verbally with patient    Supporting Documentation  Indications: pain and joint swelling   Procedure Details  Location: shoulder - R subacromial bursa  Needle size: 22 G  Ultrasound guidance: no  Approach: posterolateral  Medications administered: 3 mL lidocaine 1 %; 1 mL methylPREDNISolone acetate 40 mg/mL    Patient tolerance: patient tolerated the procedure well with no immediate complications  Dressing:  Sterile dressing applied            UE NV Exam: +2 Radial pulses bilaterally  Sensation intact to light touch C5-T1 bilaterally, Radial/median/ulnar nerve motor intact    Cervical ROM is full without pain, numbness or tingling      Shoulder Imaging    No imaging was performed today      Scribe Attestation    I,:   am acting as a scribe while in the presence of the attending physician :       I,:   personally performed the services described in this documentation    as scribed in my presence :

## 2021-03-19 NOTE — TELEPHONE ENCOUNTER
----- Message from [place], [place] sent at 3/19/2021  1:11 PM EDT -----  Can you let patient know her left & right hand xrays do not show any dislocation or fracture, just normal degenerative changes  I would recommend she keeps doing physical & occupational therapy  Thanks!

## 2021-03-19 NOTE — PROGRESS NOTES
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   Impairments: abnormal coordination, impaired physical strength, lacks appropriate home exercise program, pain with function and weight-bearing intolerance  Functional limitations: limited with bottles , jars , lift , carry , wt bearing , dressing   Symptom irritability: moderateUnderstanding of Dx/Px/POC: good   Prognosis: good    Goals  STG- 2wks  1- I with HEP  2- improve worst pain 8/10    LTG- 4 wks  1- no intrinsic tightness/ext tightness  2- improve pinch strength 3 #  3- worst pain 3/10  4- I ADL - self care, meal prep     Plan  Patient would benefit from: OT eval, custom splinting and skilled occupational therapy  Planned modality interventions: cryotherapy and thermotherapy: hydrocollator packs  Planned therapy interventions: activity modification, joint mobilization, manual therapy, massage, strengthening, stretching, home exercise program, functional ROM exercises and graded activity  Frequency: 2x week  Duration in weeks: 4  Plan of Care beginning date: 3/19/2021        Subjective Evaluation    History of Present Illness  Date of onset: 2021  Mechanism of injury: Geraldine Calhoun fell onto B hands in January and has had hand pain since   She also reports shoulder pain    She is receiving PT for her shoulders  Quality of life: fair    Pain  Current pain rating: 3  At best pain ratin  At worst pain rating: 10  Location: B hands   Quality: dull ache, cramping and needle-like  Relieving factors: rest  Progression: no change    Social Support  Steps to enter house: yes  Stairs in house: yes   Lives in: multiple-level home  Lives with: alone    Employment status: working  Hand dominance: right    Treatments  No previous or current treatments  Patient Goals  Patient goals for therapy: decreased edema, increased strength, independence with ADLs/IADLs and return to work  Patient goal: Improve use of hands without pain; do caligraphy         Objective     Observations     Additional Observation Details  Early  DJD in IPs    Palpation     Additional Palpation Details  C/o tenderness Ips , CMC , wrists B     Neurological Testing     Additional Neurological Details  C/o parasthesias intermittently     2 point 5mm all      Active Range of Motion     Left Wrist   Wrist flexion: 40 degrees   Wrist extension: 72 degrees     Right Wrist   Wrist flexion: 40 degrees   Wrist extension: 75 degrees     Additional Active Range of Motion Details  Thumb and digit ROM WNL B     Strength/Myotome Testing     Left Wrist/Hand   Normal wrist strength     (2nd hand position)     Trial 1: 20    Thumb Strength  Key/Lateral Pinch     Trial 1: 2  Palmar/Three-Point Pinch     Trial 1: 2    Right Wrist/Hand   Normal wrist strength     (2nd hand position)     Trial 1: 38    Thumb Strength   Key/Lateral Pinch     Trial 1: 2  Palmar/Three-Point Pinch     Trial 1: 5    Tests     Left Wrist/Hand   Positive CMC grind, extrinsic flexor tightness and intrinsic muscle tightness  Right Wrist/Hand   Positive extrinsic flexor tightness and intrinsic muscle tightness  Negative CMC grind       Swelling     Left Wrist/Hand   Circumference wrist: 17 cm    Right Wrist/Hand   Circumference wrist: 17 cm      Flowsheet Rows      Most Recent Value   PT/OT G-Codes   Current Score  42   Projected Score  59 Precautions: hx carcinoma      Manuals 3/19            antwan WHITE hands 8m            MOB Ips/MCPs/wrist 6m            Intrinsic/flexor stretches 4m                         HEP 3x day             Behavior mod discussed 4m                                                                                          Ther Ex             TGE  2x10            A/PROM wrist 2x10                                                                                          Ther Activity                                       Gait Training                                       Modalities             MH  10m            Cp 5m

## 2021-03-19 NOTE — PROGRESS NOTES
Daily Note     Today's date: 3/19/2021  Patient name: Malka Haas  : 1955  MRN: [id number]  Referring provider: KARIN Suh  Dx:   Encounter Diagnosis     ICD-10-CM    1  Acute pain of right knee  M25 561    2  Closed nondisplaced fracture of left patella with routine healing, unspecified fracture morphology, subsequent encounter  S82 002D    3  Imbalance  R26 89                   Subjective: Pt reports compliance w/ HEP  Pt reports her jts are stiff this AM   Reports good relief w/ PROM  Objective: See treatment diary below      Assessment: Tolerated treatment well  Patient demonstrated fatigue post treatment and would benefit from continued PT for cont'd work on ROM and strength  Pt demonstrates good compliance w/ HEP  Plan: Continue per plan of care  Progress treatment as tolerated         Dx: R shoulder pain s/p fall 2020; L knee non displaced patellar fx, distal pole; imbalance; L ankle ATFL prain  EPOC:3/17/21  CO-MORBIDITIES: depression, fibromyalgia, chronic fatigue syndrome; s/p cervical fusion, LBP  PERSONAL FACTORS:  Precautions: high pain levels; very tender to light touch      Manuals 2/8 2/12 2/15 2/23 2/26 3/2 3/12 3/16 3/19    RE- Eval NK/JK th nk JK nk th Th ankle      L ankle MFR/ PROM NK/JK th nk JK nk   JK JK    K tape stirrup       Th B np     B knee PROM/ MFR  th  scap PROM  JK scap PROM JK   JK JK     15' 15' 15' 15' 20' 45' 25' 20' 15'    Neuro Re-Ed             sidestepping             Rhomberg FT EO on foam             Rhomberg FT EC on level surf             Fw walking over hurdles                                                    Ther Ex             LAQ 1' CW/  CCW 15 ea 15 ea np 15 ea   15 ea 10 ea    Quad sets 10 ea 10 ea 10 ea 10 10 ea   10"x10 ea 5"x10 ea    Heel slides 10ea 10 ea 10 ea 10 10 ea   10 ea 10ea    Supine SLR 10x 10 10x 103' 10'   10 10    S/l hip abduction 3' 2' 3 3' 3'   10 10    Seated HS stretch 1 5' 2' 2' 3' 3'   20"x3 20"x3 Seated ball squeeze  10"x3 10"x3 15"x3 15"x3   5"x15 5" 15    Standing row  10 10 15 10 #1   10 #1 -    Standing shld ext  10 10 15 10 #1   10 #1 -    B ankle AROM       10 ea 10 ea 10 ea    B ankle circles,; CW, CCW       10 ea 10 ea 10 ea    Gait Training                                       Modalities             TENS w/ MH post tx  10'  defer

## 2021-03-23 ENCOUNTER — OFFICE VISIT (OUTPATIENT)
Dept: PHYSICAL THERAPY | Facility: CLINIC | Age: 66
End: 2021-03-23
Payer: MEDICARE

## 2021-03-23 ENCOUNTER — OFFICE VISIT (OUTPATIENT)
Dept: OCCUPATIONAL THERAPY | Facility: CLINIC | Age: 66
End: 2021-03-23
Payer: MEDICARE

## 2021-03-23 DIAGNOSIS — M25.561 ACUTE PAIN OF RIGHT KNEE: Primary | ICD-10-CM

## 2021-03-23 DIAGNOSIS — S82.002D CLOSED NONDISPLACED FRACTURE OF LEFT PATELLA WITH ROUTINE HEALING, UNSPECIFIED FRACTURE MORPHOLOGY, SUBSEQUENT ENCOUNTER: ICD-10-CM

## 2021-03-23 DIAGNOSIS — M19.041 PRIMARY OSTEOARTHRITIS OF BOTH HANDS: Primary | ICD-10-CM

## 2021-03-23 DIAGNOSIS — M19.042 PRIMARY OSTEOARTHRITIS OF BOTH HANDS: Primary | ICD-10-CM

## 2021-03-23 PROCEDURE — 97110 THERAPEUTIC EXERCISES: CPT

## 2021-03-23 PROCEDURE — 97110 THERAPEUTIC EXERCISES: CPT | Performed by: OCCUPATIONAL THERAPIST

## 2021-03-23 PROCEDURE — 97140 MANUAL THERAPY 1/> REGIONS: CPT | Performed by: OCCUPATIONAL THERAPIST

## 2021-03-23 PROCEDURE — 97140 MANUAL THERAPY 1/> REGIONS: CPT

## 2021-03-23 NOTE — PROGRESS NOTES
Daily Note     Today's date: 3/23/2021  Patient name: Patrick Donnelly  : 1955  MRN: [id number]  Referring provider: KARIN Barnes  Dx:   Encounter Diagnosis     ICD-10-CM    1  Acute pain of right knee  M25 561    2  Closed nondisplaced fracture of left patella with routine healing, unspecified fracture morphology, subsequent encounter  S82 002D                   Subjective: Pt reports she hurts all over  States her Dr talked about taking some time off of work to rest her jts  Pt 15 mins late for her appt  Objective: See treatment diary below      Assessment: Tolerated treatment fair  Patient demonstrated fatigue post treatment and would benefit from continued PT for cont'd work on ROM ans strengthening  Plan: Continue per plan of care  Progress treatment as tolerated         Dx: R shoulder pain s/p fall 2020; L knee non displaced patellar fx, distal pole; imbalance; L ankle ATFL prain  EPOC:3/17/21  CO-MORBIDITIES: depression, fibromyalgia, chronic fatigue syndrome; s/p cervical fusion, LBP  PERSONAL FACTORS:  Precautions: high pain levels; very tender to light touch      Manuals 2/8 2/12 2/15 2/23 2/26 3/2 3/12 3/16 3/19 3/23   RE- Eval NK/JK th nk JK nk th Th ankle      L ankle MFR/ PROM NK/JK th nk JK nk   JK JK JK   K tape stirrup       Th B np     B knee PROM/ MFR  th  scap PROM  JK scap PROM JK   JK JK JK    15' 15' 15' 15' 20' 45' 25' 20' 15' 15'   Neuro Re-Ed             sidestepping             Rhomberg FT EO on foam             Rhomberg FT EC on level surf             Fw walking over hurdles                                                    Ther Ex             LAQ 1' CW/  CCW 15 ea 15 ea np 15 ea   15 ea 10 ea 10x5"   Quad sets 10 ea 10 ea 10 ea 10 10 ea   10"x10 ea 5"x10 ea 5"x10 ea   Heel slides 10ea 10 ea 10 ea 10 10 ea   10 ea 10ea 15 ea   Supine SLR 10x 10 10x 103' 10'   10 10 15   S/l hip abduction 3' 2' 3 3' 3'   10 10 15   Seated HS stretch 1 5' 2' 2' 3' 3'   20"x3 20"x3    Seated ball squeeze  10"x3 10"x3 15"x3 15"x3   5"x15 5" 15 10x10"   Standing row  10 10 15 10 #1   10 #1 -    Standing shld ext  10 10 15 10 #1   10 #1 -    B ankle AROM       10 ea 10 ea 10 ea 10   B ankle circles,; CW, CCW       10 ea 10 ea 10 ea 10   Gait Training                                       Modalities             TENS w/ MH post tx  10'  defer

## 2021-03-23 NOTE — PROGRESS NOTES
Daily Note     Today's date: 3/23/2021  Patient name: Marixa Cole  : 1955  MRN: [id number]  Referring provider: KARIN Lange  Dx:   Encounter Diagnosis     ICD-10-CM    1  Primary osteoarthritis of both hands  M19 041     M19 042                   Subjective: the compression gloves helped       Objective: See treatment diary below      Assessment: Tolerated treatment fair  Patient has continued pain   We have discussed housekeeping management  Plan: Continue per plan of care        Dx: R shoulder pain s/p fall 2020; L knee non displaced patellar fx, distal pole; imbalance; L ankle ATFL prain  EPOC:3/17/21  CO-MORBIDITIES: depression, fibromyalgia, chronic fatigue syndrome; s/p cervical fusion, LBP  PERSONAL FACTORS:  Precautions: high pain levels; very tender to light touch        Manuals 3/19 3/23           graston B hands 8m 6m           MOB Ips/MCPs/wrist 6m 4m           Intrinsic/flexor stretches 4m 4m                        HEP 3x day             Behavior mod discussed 4m                                                                                          Ther Ex             TGE  2x10 2x10           A/PROM wrist 2x10 2x10           Wrist ext B  1#  3x10           Flex  ext  Yellow  3x10                                                               Ther Activity                                       Gait Training                                       Modalities             MH  10m 10m           Cp 5m 5m

## 2021-03-24 ENCOUNTER — OFFICE VISIT (OUTPATIENT)
Dept: FAMILY MEDICINE CLINIC | Facility: CLINIC | Age: 66
End: 2021-03-24
Payer: MEDICARE

## 2021-03-24 VITALS
HEIGHT: 66 IN | SYSTOLIC BLOOD PRESSURE: 130 MMHG | WEIGHT: 219.3 LBS | TEMPERATURE: 98.2 F | DIASTOLIC BLOOD PRESSURE: 84 MMHG | HEART RATE: 74 BPM | RESPIRATION RATE: 15 BRPM | BODY MASS INDEX: 35.24 KG/M2

## 2021-03-24 DIAGNOSIS — M79.7 FIBROMYALGIA: ICD-10-CM

## 2021-03-24 DIAGNOSIS — G89.4 CHRONIC PAIN SYNDROME: Primary | ICD-10-CM

## 2021-03-24 DIAGNOSIS — M19.041 PRIMARY OSTEOARTHRITIS OF BOTH HANDS: ICD-10-CM

## 2021-03-24 DIAGNOSIS — J30.1 SEASONAL ALLERGIC RHINITIS DUE TO POLLEN: ICD-10-CM

## 2021-03-24 DIAGNOSIS — M19.042 PRIMARY OSTEOARTHRITIS OF BOTH HANDS: ICD-10-CM

## 2021-03-24 DIAGNOSIS — F30.9 BIPOLAR I DISORDER, SINGLE MANIC EPISODE (HCC): ICD-10-CM

## 2021-03-24 PROCEDURE — 99213 OFFICE O/P EST LOW 20 MIN: CPT | Performed by: NURSE PRACTITIONER

## 2021-03-24 NOTE — LETTER
March 24, 2021     Patient: Levy Dougherty   YOB: 1955   Date of Visit: 3/24/2021       To Whom it May Concern:    Patience Pascual is under my professional care  She was seen in my office on 3/24/2021  She may not return to work until further notice  If you have any questions or concerns, please don't hesitate to call           Sincerely,          KARIN Gutierrez        CC: No Recipients

## 2021-03-24 NOTE — PROGRESS NOTES
Assessment/Plan:     Diagnoses and all orders for this visit:    Chronic pain syndrome    Currently seeing Dr Luz Maria Miller for joint injections  She still is to receive right knee injection with Dr Luz Maria Miller  She feels better after left knee & right shoulder injection  She is undergoing physical therapy and occupational therapy & feels ROM is improved but still has chronic pain  Voltaren gel ordered for liz hands and knees to help with local relief  Pt previously seen by Dr Acacia Pham in past  Referral placed with Dr Acacia Pham  Work excuse note given to patient  Fibromyalgia    Currently on Cymbalta prescribed by her psychiatrist  Not currently seeing Rheumatologist     Bipolar I disorder, single manic episode [facility])    Managed by Dr Blaze Burns at [facility]  Currently managed on Cymbalta & Abilify  She also sees therapist virtually every 2 weeks  Seasonal allergic rhinitis due to pollen      Currently on Claritin  I encouraged use of Saline nasal spray as well to help with seasonal allergies  Pt to f/u PRN  She is to make appointment with Dr Acacia Pham & continue with her PT & OT  Subjective:      Patient ID: Jimy Brewster is a 76 y o  female  Pt presents for f/u of her chronic pain today  Previously seeing Dr Luz Maria Miller for joint injections to left knee & right shoulder  She is also working with PT & OT  She has had chronic pain making it hard to perform tasks required for work at [employer]  She is here to have extension of her work excuse note  The following portions of the patient's history were reviewed and updated as appropriate: allergies, current medications, past family history, past medical history, past social history, past surgical history and problem list     Review of Systems   Constitutional: Negative  Negative for chills and fever  HENT: Negative  Negative for ear pain and sore throat  Eyes: Negative  Negative for pain and visual disturbance  Respiratory: Negative  Negative for cough and shortness of breath  Cardiovascular: Negative  Negative for chest pain, palpitations and leg swelling  Gastrointestinal: Negative  Negative for abdominal distention, abdominal pain, constipation, diarrhea, nausea and vomiting  Genitourinary: Negative  Negative for dysuria and hematuria  Musculoskeletal: Positive for arthralgias (generalized joint pain, right shoulder, liz knee pain, liz hand pain) and back pain  Skin: Negative for color change and rash  Neurological: Negative  Negative for seizures and syncope  All other systems reviewed and are negative  Objective:      /84 (BP Location: Left arm, Patient Position: Sitting, Cuff Size: Large)   Pulse 74   Temp 98 2 °F (36 8 °C) (Oral)   Resp 15   Ht 5' 5 5" (1 664 m)   Wt 99 5 kg (219 lb 4 8 oz)   BMI 35 94 kg/m²          Physical Exam  Vitals signs reviewed  Constitutional:       General: She is not in acute distress  Appearance: Normal appearance  She is obese  She is not ill-appearing  HENT:      Head: Normocephalic  Neck:      Musculoskeletal: Normal range of motion  Vascular: No carotid bruit  Cardiovascular:      Rate and Rhythm: Normal rate and regular rhythm  Pulses: Normal pulses  Heart sounds: Normal heart sounds  No murmur  Pulmonary:      Effort: Pulmonary effort is normal       Breath sounds: Normal breath sounds  No wheezing  Abdominal:      General: Bowel sounds are normal  There is no distension  Palpations: Abdomen is soft  There is no mass  Tenderness: There is no abdominal tenderness  There is no guarding or rebound  Hernia: No hernia is present  Musculoskeletal: Normal range of motion  General: Tenderness (tenderness on palpation in r shoulder, liz knees, liz hands) present  Skin:     General: Skin is warm and dry  Neurological:      Mental Status: She is alert and oriented to person, place, and time   Mental status is at baseline  Gait: Gait normal    Psychiatric:         Mood and Affect: Mood normal          Behavior: Behavior normal          Thought Content:  Thought content normal          Judgment: Judgment normal

## 2021-03-26 ENCOUNTER — OFFICE VISIT (OUTPATIENT)
Dept: OCCUPATIONAL THERAPY | Facility: CLINIC | Age: 66
End: 2021-03-26
Payer: MEDICARE

## 2021-03-26 ENCOUNTER — OFFICE VISIT (OUTPATIENT)
Dept: PHYSICAL THERAPY | Facility: CLINIC | Age: 66
End: 2021-03-26
Payer: MEDICARE

## 2021-03-26 DIAGNOSIS — M25.561 ACUTE PAIN OF RIGHT KNEE: Primary | ICD-10-CM

## 2021-03-26 DIAGNOSIS — M19.042 PRIMARY OSTEOARTHRITIS OF BOTH HANDS: Primary | ICD-10-CM

## 2021-03-26 DIAGNOSIS — M19.041 PRIMARY OSTEOARTHRITIS OF BOTH HANDS: Primary | ICD-10-CM

## 2021-03-26 DIAGNOSIS — S82.002D CLOSED NONDISPLACED FRACTURE OF LEFT PATELLA WITH ROUTINE HEALING, UNSPECIFIED FRACTURE MORPHOLOGY, SUBSEQUENT ENCOUNTER: ICD-10-CM

## 2021-03-26 PROCEDURE — 97110 THERAPEUTIC EXERCISES: CPT

## 2021-03-26 PROCEDURE — 97140 MANUAL THERAPY 1/> REGIONS: CPT

## 2021-03-26 PROCEDURE — 97140 MANUAL THERAPY 1/> REGIONS: CPT | Performed by: OCCUPATIONAL THERAPIST

## 2021-03-26 PROCEDURE — 97110 THERAPEUTIC EXERCISES: CPT | Performed by: OCCUPATIONAL THERAPIST

## 2021-03-26 PROCEDURE — 97112 NEUROMUSCULAR REEDUCATION: CPT

## 2021-03-26 NOTE — PROGRESS NOTES
Daily Note     Today's date: 3/26/2021  Patient name: Tabitha Holder  : 1955  MRN: [id number]  Referring provider: KARIN Heart  Dx:   Encounter Diagnosis     ICD-10-CM    1  Primary osteoarthritis of both hands  M19 041     M19 042                   Subjective: I feel better      Objective: See treatment diary below      Assessment: Tolerated treatment well  Patient has releif with manual tx and compression gloves  Plan: Continue per plan of care        Dx: R shoulder pain s/p fall 2020; L knee non displaced patellar fx, distal pole; imbalance; L ankle ATFL prain  EPOC:3/17/21  CO-MORBIDITIES: depression, fibromyalgia, chronic fatigue syndrome; s/p cervical fusion, LBP  PERSONAL FACTORS:  Precautions: high pain levels; very tender to light touch        Manuals 3/19 3/23 3/26          graston B hands 8m 6m 6m          MOB Ips/MCPs/wrist 6m 4m 4m          Intrinsic/flexor stretches 4m 4m 4m                       HEP 3x day             Behavior mod discussed 4m                                                                                          Ther Ex             TGE  2x10 2x10 2x10          A/PROM wrist 2x10 2x10 2x10          Wrist ext B  1#  3x10 1#  3x10          Flex  ext  Yellow  3x10 Yellow  3x10                                                              Ther Activity                                       Gait Training                                       Modalities             MH  10m 10m 10m          Cp 5m 5m 5m

## 2021-03-26 NOTE — PROGRESS NOTES
Daily Note     Today's date: 3/26/2021  Patient name: Daphnie Cabrales  : 1955  MRN: [id number]  Referring provider: KARIN Maxwell  Dx:   Encounter Diagnosis     ICD-10-CM    1  Acute pain of right knee  M25 561    2  Closed nondisplaced fracture of left patella with routine healing, unspecified fracture morphology, subsequent encounter  S82 002D                   Subjective: Pt arrived 10 mins late for her appt and was agitated due to hurrying  Pt c/o swollen feet and having pain L knee pain  Objective: See treatment diary below      Assessment: Tolerated treatment fair  Patient demonstrated fatigue post treatment and would benefit from continued PT for cont'd strengthening  Plan: Continue per plan of care  Progress treatment as tolerated         Dx: R shoulder pain s/p fall 2020; L knee non displaced patellar fx, distal pole; imbalance; L ankle ATFL prain  EPOC:3/17/21  CO-MORBIDITIES: depression, fibromyalgia, chronic fatigue syndrome; s/p cervical fusion, LBP  PERSONAL FACTORS:  Precautions: high pain levels; very tender to light touch      Manuals 3/26       3/16 3/19 3/23   RE- Eval             L ankle MFR/ PROM        JK JK JK   K tape stirrup        np     B knee PROM/ MFR        JK JK JK           20' 15' 15'   Neuro Re-Ed             sidestepping             Rhomberg FT EO on foam             Rhomberg FT EC on level surf             Fw walking over hurdles                                                    Ther Ex             LAQ 10"x10       15 ea 10 ea 10x5"   Quad sets 10x10"       10"x10 ea 5"x10 ea 5"x10 ea   Heel slides 10       10 ea 10ea 15 ea   Supine SLR 15       10 10 15   S/l hip abduction 15       10 10 15   Seated HS stretch 20"x3       20"x3 20"x3    Seated ball squeeze 5"x10       5"x15 5" 15 10x10"   Standing row        10 #1 -    Standing shld ext        10 #1 -    B ankle AROM 20       10 ea 10 ea 10   B ankle circles,; CW, CCW 20       10 ea 10 ea 10   DLS: fall outs 10                                      Modalities             TENS w/ MH post tx

## 2021-03-30 ENCOUNTER — OFFICE VISIT (OUTPATIENT)
Dept: PHYSICAL THERAPY | Facility: CLINIC | Age: 66
End: 2021-03-30
Payer: MEDICARE

## 2021-03-30 ENCOUNTER — OFFICE VISIT (OUTPATIENT)
Dept: OCCUPATIONAL THERAPY | Facility: CLINIC | Age: 66
End: 2021-03-30
Payer: MEDICARE

## 2021-03-30 DIAGNOSIS — M25.561 ACUTE PAIN OF RIGHT KNEE: Primary | ICD-10-CM

## 2021-03-30 DIAGNOSIS — M19.041 PRIMARY OSTEOARTHRITIS OF BOTH HANDS: Primary | ICD-10-CM

## 2021-03-30 DIAGNOSIS — M19.042 PRIMARY OSTEOARTHRITIS OF BOTH HANDS: Primary | ICD-10-CM

## 2021-03-30 DIAGNOSIS — S82.002D CLOSED NONDISPLACED FRACTURE OF LEFT PATELLA WITH ROUTINE HEALING, UNSPECIFIED FRACTURE MORPHOLOGY, SUBSEQUENT ENCOUNTER: ICD-10-CM

## 2021-03-30 PROCEDURE — 97140 MANUAL THERAPY 1/> REGIONS: CPT

## 2021-03-30 PROCEDURE — 97110 THERAPEUTIC EXERCISES: CPT

## 2021-03-30 PROCEDURE — 97110 THERAPEUTIC EXERCISES: CPT | Performed by: OCCUPATIONAL THERAPIST

## 2021-03-30 PROCEDURE — 97140 MANUAL THERAPY 1/> REGIONS: CPT | Performed by: OCCUPATIONAL THERAPIST

## 2021-03-30 PROCEDURE — 97112 NEUROMUSCULAR REEDUCATION: CPT

## 2021-03-30 NOTE — PROGRESS NOTES
Daily Note     Today's date: 3/30/2021  Patient name: Lester Scott  : 1955  MRN: [id number]  Referring provider: KARIN Taylor  Dx:   Encounter Diagnosis     ICD-10-CM    1  Primary osteoarthritis of both hands  M19 041     M19 042                   Subjective: My hands hurt, better after tx      Objective: See treatment diary below      Assessment: Tolerated treatment fair  Patient has improved pain after tx      Plan: Continue per plan of care        Dx: R shoulder pain s/p fall 2020; L knee non displaced patellar fx, distal pole; imbalance; L ankle ATFL prain  EPOC:21  CO-MORBIDITIES: depression, fibromyalgia, chronic fatigue syndrome; s/p cervical fusion, LBP  PERSONAL FACTORS:  Precautions: high pain levels; very tender to light touch      Manuals 3/19 3/23 3/26 3/30         graston B hands 8m 6m 6m 6m         MOB Ips/MCPs/wrist 6m 4m 4m 4m         Intrinsic/flexor stretches 4m 4m 4m 4m                      HEP 3x day             Behavior mod discussed 4m                                                                                          Ther Ex             TGE  2x10 2x10 2x10 3x10         A/PROM wrist 2x10 2x10 2x10 3x10         Wrist ext B  1#  3x10 1#  3x10 2#  3x10         Flex  ext  Yellow  3x10 Yellow  3x10 Yellow  3x10                                                             Ther Activity                                       Gait Training                                       Modalities             MH  10m 10m 10m 10m         Cp 5m 5m 5m

## 2021-03-30 NOTE — PROGRESS NOTES
Daily Note     Today's date: 3/30/2021  Patient name: Tho Johnson  : 1955  MRN: [id number]  Referring provider: KARIN Landers  Dx:   Encounter Diagnosis     ICD-10-CM    1  Acute pain of right knee  M25 561    2  Closed nondisplaced fracture of left patella with routine healing, unspecified fracture morphology, subsequent encounter  S82 002D                   Subjective: Pt reports she isn't able to clasp her bra  States frustrated w/ life occurences right now  C/o R knee pain  Reports sleeping w/ her ankle braces donned  Objective: See treatment diary below      Assessment: Tolerated treatment fair  Patient demonstrated fatigue post treatment and would benefit from continued PT for cont'd strengthening  Plan: Continue per plan of care  Progress treatment as tolerated         Dx: R shoulder pain s/p fall 2020; L knee non displaced patellar fx, distal pole; imbalance; L ankle ATFL prain  EPOC:21  CO-MORBIDITIES: depression, fibromyalgia, chronic fatigue syndrome; s/p cervical fusion, LBP  PERSONAL FACTORS:  Precautions: high pain levels; very tender to light touch      Manuals 3/26 3/30      3/16 3/19 3/23   RE- Eval             L ankle MFR/ PROM  JK      JK JK JK   K tape stirrup        np     B knee PROM/ MFR  JK      JK JK JK     10'      20' 15' 15'   Neuro Re-Ed             sidestepping             Rhomberg FT EO on foam             Rhomberg FT EC on level surf             Fw walking over hurdles                                                    Ther Ex             LAQ 10"x10 1 5# 10"x10      15 ea 10 ea 10x5"   Quad sets 10x10" 10"x10      10"x10 ea 5"x10 ea 5"x10 ea   Heel slides 10 10      10 ea 10ea 15 ea   Supine SLR 15 1 5# 10      10 10 15   S/l hip abduction 15 1 5# 10      10 10 15   Seated HS stretch 20"x3 20"x3      20"x3 20"x3    Seated ball squeeze 5"x10 5"x10      5"x15 5" 15 10x10"   Standing row        10 #1 -    Standing shld ext        10 #1 -    B ankle AROM 20 20      10 ea 10 ea 10   B ankle circles,; CW, CCW 20 20      10 ea 10 ea 10   DLS: fall outs 10 OTB 10"x10                                     Modalities             TENS w/ MH post tx

## 2021-04-02 ENCOUNTER — APPOINTMENT (OUTPATIENT)
Dept: OCCUPATIONAL THERAPY | Facility: CLINIC | Age: 66
End: 2021-04-02
Payer: MEDICARE

## 2021-04-02 ENCOUNTER — APPOINTMENT (OUTPATIENT)
Dept: PHYSICAL THERAPY | Facility: CLINIC | Age: 66
End: 2021-04-02
Payer: MEDICARE

## 2021-04-06 ENCOUNTER — OFFICE VISIT (OUTPATIENT)
Dept: PHYSICAL THERAPY | Facility: CLINIC | Age: 66
End: 2021-04-06
Payer: MEDICARE

## 2021-04-06 DIAGNOSIS — R26.89 IMBALANCE: ICD-10-CM

## 2021-04-06 DIAGNOSIS — S93.492D SPRAIN OF ANTERIOR TALOFIBULAR LIGAMENT OF LEFT ANKLE, SUBSEQUENT ENCOUNTER: ICD-10-CM

## 2021-04-06 DIAGNOSIS — M25.562 ACUTE PAIN OF LEFT KNEE: ICD-10-CM

## 2021-04-06 DIAGNOSIS — M25.511 ACUTE PAIN OF RIGHT SHOULDER: ICD-10-CM

## 2021-04-06 DIAGNOSIS — S82.002D CLOSED NONDISPLACED FRACTURE OF LEFT PATELLA WITH ROUTINE HEALING, UNSPECIFIED FRACTURE MORPHOLOGY, SUBSEQUENT ENCOUNTER: ICD-10-CM

## 2021-04-06 DIAGNOSIS — M25.561 ACUTE PAIN OF RIGHT KNEE: Primary | ICD-10-CM

## 2021-04-06 PROCEDURE — 97112 NEUROMUSCULAR REEDUCATION: CPT

## 2021-04-06 PROCEDURE — 97110 THERAPEUTIC EXERCISES: CPT

## 2021-04-06 NOTE — PROGRESS NOTES
Daily Note     Today's date: 2021  Patient name: Elise Smith  : 1955  MRN: [id number]  Referring provider: KARIN Latham  Dx:   Encounter Diagnosis     ICD-10-CM    1  Acute pain of right knee  M25 561    2  Closed nondisplaced fracture of left patella with routine healing, unspecified fracture morphology, subsequent encounter  S82 002D    3  Imbalance  R26 89    4  Acute pain of left knee  M25 562    5  Sprain of anterior talofibular ligament of left ankle, subsequent encounter  S93 492D    6  Acute pain of right shoulder  M25 511        Start Time: 1150  Stop Time: 1230  Total time in clinic (min): 40 minutes    Subjective: Patient notes fatigue prior to therapy  Patient states, "I feel better but it's not fixed yet"  Objective: See treatment diary below    Assessment: Tolerated treatment fair  Patient demonstrated fatigue post treatment and would benefit from continued PT for strength training and progressions  Plan: Continue per plan of care  Progress treatment as tolerated         Dx: R shoulder pain s/p fall 2020; L knee non displaced patellar fx, distal pole; imbalance; L ankle ATFL sprain  EPOC:21  CO-MORBIDITIES: depression, fibromyalgia, chronic fatigue syndrome; s/p cervical fusion, LBP  PERSONAL FACTORS:  Precautions: high pain levels; very tender to light touch    Manuals 3/26 3/30 4/6     3/16 3/19 3/23   RE- Eval             L ankle MFR/ PROM  JK PAMELA     JK JK JK   K tape stirrup        np     B knee PROM/ MFR  JK PAMELA BRITOK DARYLK JK     10' 10'     20' 15' 15'   Neuro Re-Ed             sidestepping             Rhomberg FT EO on foam             Rhomberg FT EC on level surf             Fw walking over hurdles                                                    Ther Ex             LAQ 10"x10 1 5# 10"x10 2#  10"x10 ea     15 ea 10 ea 10x5"   Quad sets 10x10" 10"x10 10"x10     10"x10 ea 5"x10 ea 5"x10 ea   Heel slides 10 10 10     10 ea 10ea 15 ea   Supine SLR 15 1 5# 10 2#  10x ea     10 10 15   S/l hip abduction 15 1 5# 10 2#  10x ea     10 10 15   Seated HS stretch 20"x3 20"x3 20"x3     20"x3 20"x3    Seated ball squeeze 5"x10 5"x10 5"x10     5"x15 5" 15 10x10"   Standing row        10 #1 -    Standing shld ext        10 #1 -    B ankle AROM 20 20 20     10 ea 10 ea 10   B ankle circles,; CW, CCW 20 20 20     10 ea 10 ea 10   DLS: fall outs 10 OTB 10"x10 OTB  10"x10                                    Modalities             TENS w/ MH post tx

## 2021-04-08 ENCOUNTER — OFFICE VISIT (OUTPATIENT)
Dept: OCCUPATIONAL THERAPY | Facility: CLINIC | Age: 66
End: 2021-04-08
Payer: MEDICARE

## 2021-04-08 ENCOUNTER — EVALUATION (OUTPATIENT)
Dept: PHYSICAL THERAPY | Facility: CLINIC | Age: 66
End: 2021-04-08
Payer: MEDICARE

## 2021-04-08 DIAGNOSIS — M25.561 ACUTE PAIN OF RIGHT KNEE: Primary | ICD-10-CM

## 2021-04-08 DIAGNOSIS — R26.89 IMBALANCE: ICD-10-CM

## 2021-04-08 DIAGNOSIS — M19.041 PRIMARY OSTEOARTHRITIS OF BOTH HANDS: Primary | ICD-10-CM

## 2021-04-08 DIAGNOSIS — M19.042 PRIMARY OSTEOARTHRITIS OF BOTH HANDS: Primary | ICD-10-CM

## 2021-04-08 DIAGNOSIS — S93.492D SPRAIN OF ANTERIOR TALOFIBULAR LIGAMENT OF LEFT ANKLE, SUBSEQUENT ENCOUNTER: ICD-10-CM

## 2021-04-08 DIAGNOSIS — M25.511 ACUTE PAIN OF RIGHT SHOULDER: ICD-10-CM

## 2021-04-08 DIAGNOSIS — S82.002D CLOSED NONDISPLACED FRACTURE OF LEFT PATELLA WITH ROUTINE HEALING, UNSPECIFIED FRACTURE MORPHOLOGY, SUBSEQUENT ENCOUNTER: ICD-10-CM

## 2021-04-08 PROCEDURE — 97110 THERAPEUTIC EXERCISES: CPT | Performed by: OCCUPATIONAL THERAPIST

## 2021-04-08 PROCEDURE — 97140 MANUAL THERAPY 1/> REGIONS: CPT | Performed by: PHYSICAL THERAPIST

## 2021-04-08 PROCEDURE — 97112 NEUROMUSCULAR REEDUCATION: CPT | Performed by: PHYSICAL THERAPIST

## 2021-04-08 PROCEDURE — 97140 MANUAL THERAPY 1/> REGIONS: CPT | Performed by: OCCUPATIONAL THERAPIST

## 2021-04-08 NOTE — PROGRESS NOTES
PT Re-Evaluation     Today's date: 2021  Patient name: Ruddy Millard  : 1955  MRN: [id number]  Referring provider: KARIN Camara  Dx:   Encounter Diagnosis     ICD-10-CM    1  Acute pain of right knee  M25 561    2  Closed nondisplaced fracture of left patella with routine healing, unspecified fracture morphology, subsequent encounter  S82 002D    3  Imbalance  R26 89    4  Sprain of anterior talofibular ligament of left ankle, subsequent encounter  S93 492D                   Assessment  Assessment details: Since starting skilled PT, pain levels in L knee and B ankles are decreasing, B ankle ROM and strength are improving, L knee ROM has improved with gradual functional progress  Recommend pt continue skilled PT  Impairments: abnormal or restricted ROM, activity intolerance, impaired physical strength and pain with function  Understanding of Dx/Px/POC: good   Prognosis: fair    Goals  STG's ( 3-4 weeks)  1  Pt will be independent in HEP--met  2  Decrease pain to 5/10 at best-met  LTG's ( 6- 8 weeks)  1  Improve FOTO score by 8-10 points-met  2  Improve R shoulder ROM to WFL's-met  3  Improve R shoulder strength by 1/2 grade-met  4  Pt will be able to complete overhead activities with greater ease-met  5  Pt will have improved tolerance for reaching behind her back-partial met  6  Pt will be able to go up and down the steps with less pain to get into her home  7  Improve L knee flexion ROM to WFL's  8  Improve L LE strength by 1/2 grade  9  Improve L ankle ROM to WFL's  10   Improve L ankle strength by 1/2 grade    Plan  Patient would benefit from: skilled physical therapy  Planned modality interventions: cryotherapy, thermotherapy: hydrocollator packs and TENS  Planned therapy interventions: joint mobilization, manual therapy, neuromuscular re-education, strengthening, stretching, therapeutic activities, therapeutic exercise, flexibility, functional ROM exercises and home exercise program  Frequency: 2x week  Duration in weeks: 6  Plan of Care beginning date: 4/8/2021  Plan of Care expiration date: 5/20/2021  Treatment plan discussed with: patient        Subjective Evaluation    History of Present Illness  Mechanism of injury: I E: Pt reports she fell on 12/9/20, which caused R shoulder pain  Pt notes the floor was uneven and she can't see well and needs cataract surgery  Pt has a history of cervical spine fusion  Pt reports her pain is gradually worsening in her shoulder and has worsened with shoveling snow  Pt has increased pain and difficulty putting her R arm behind her back, lifting and reaching overhead  Pt is able to drive  Pt is not able to hook her bra  Pt has not noticed pain with self care activities  Pt was a  at [employer] and felt increased soreness with opening bags and scanning items  3/2/21: Pt reports she can do activities easier, but it still hurts  Pt is able to reach overhead with greater ease  Pt continues to have difficulty hooking her bra behind her back  Pt is able to lift a grocery bag and laundry basket  Pt is now working at [employer], and it is difficult because it is a fast pace job  Pt reports L knee pain s/p fall  X-testing did not show a fracture at first  MD took more X-rays and it showed L knee nondisplaced patellar fx,distal pole  Pt was give a brace for her L knee  When she wears the brace on her L knee, it causes her R knee to hurt  Pt has increased pain and difficulty putting on shoes, when crossing her ankle over her opposite knee  Pt has increased knee pain when standing at work  Pt has increased pain when going up and down the steps  Pt needs to carry laundry and grocery bags up an down the steps and pt says " it's killing me"  Pt has increased pain when walking for long time periods  3/12/21: Pt is arriving to skilled PT with a new diagnosis of L ATFL sprain  Pt has increased pain when standing 5 hours at work   Pt has increased pain walking in the grocery store  Pt has increased pain when putting on her shoes or when she reaches overhead and goes up on her tip toes  21: Pt has increased pain with sleeping activities and is not sleeping > 5 hours  Pt reports her ankles seem to be improving  At times her bunion surgery activate pain, but is other wise is doing well  Pt has pain when she takes off the ankle brace  Pt is not currently working  Pt does not have ankle pain standing and walking activities  Pt notes her L knee feels better after the injection, and she wants to give an injection on the R knee  Pt has increased R knee pain with walking, standing and going up and down the steps  Knee pain can occur when sitting  Pt now has a "buggy" for her bags  Work: works at [employer]; calligraphy  Gait: slow speed  Pain  At best pain rating: 3  At worst pain ratin  Location: R shoulder; L knee best: 0/10  worst: 5/10; R knee: best: 3/10  worst: 7/10; L ankle  0/10 best   2/10 worst  R ankle: 0/10 best   4/10 worst  Quality: sharp  Aggravating factors: lifting and overhead activity    Social Support    Employment status: working    Diagnostic Tests  No diagnostic tests performed  Treatments  No previous or current treatments  Patient Goals  Patient goals for therapy: decreased pain  Patient goal: to have better ROM in my arm; less pain when when going up and down the steps          Objective     Neurological Testing     Sensation     Shoulder   Left Shoulder   Intact: light touch    Right Shoulder   Intact: light touch    Knee   Left Knee   Intact: light touch    Right Knee   Intact: light touch     Reflexes   Left   Biceps (C5/C6): normal (2+)  Brachioradialis (C6): normal (2+)  Triceps (C7): normal (2+)  Achilles (S1): normal (2+)    Right   Biceps (C5/C6): normal (2+)  Brachioradialis (C6): normal (2+)  Triceps (C7): normal (2+)  Achilles (S1): normal (2+)    Active Range of Motion   Cervical/Thoracic Spine Cervical    Flexion: 30 degrees   Extension: 50 degrees      Left lateral flexion: 20 degrees      Right lateral flexion: 15 degrees      Left rotation:  Restriction level: moderate  Right rotation:  Restriction level: moderate  Left Shoulder   Flexion: 120 degrees   Abduction: 110 degrees     Right Shoulder   Flexion: 140 degrees   Abduction: 130 degrees   External rotation 45°: 85 degrees   Internal rotation 45°: 90 degrees   Left Knee   Flexion: 120 degrees with pain  Extension: 0 degrees     Right Knee   Flexion: 120 degrees   Extension: 0 degrees   Left Ankle/Foot   Dorsiflexion (ke): 5 degrees   Plantar flexion: 65 degrees with pain  Inversion: 40 degrees with pain  Eversion: 20 degrees     Right Ankle/Foot   Dorsiflexion (ke): 10 degrees   Plantar flexion: 55 degrees   Inversion: 45 degrees   Eversion: 20 degrees     Additional Active Range of Motion Details  Posture: pt sits with rounded shoulders/ forward head position  Hypersensitivity to light touch in R upper trap, rhomboid, scapular region   Pt is very tender to light touch  (+) TTP R posterior shoulder, lateral shoulder  Pt moves with guarded positioning  Pt is R hand dominant    3/2/21:   (+) TTP R medial knee/ patellar tendon  (+) TTP L medial and lateral joint line, patellar tendon  Mild edema L ankle upon visual inspection  (+) TTP L ATFL/ lateral malleolar region  (+) TTP R deltoid ligament/ medial malleolar region    Passive Range of Motion     Right Shoulder   Flexion: 140 degrees with pain  Abduction: 141 degrees with pain  External rotation 45°: 86 degrees   Internal rotation 45°: 90 degrees     Strength/Myotome Testing     Right Shoulder     Planes of Motion   Flexion: 4+ (NT)   Abduction: 4+ (NT)   External rotation at 0°: 5   Internal rotation at 0°: 5     Left Hip   Planes of Motion   Flexion: 4+  Abduction: 4+  External rotation: 5  Internal rotation: 5    Right Hip   Planes of Motion   Flexion: 4+ (pain)  Abduction: 4+  External rotation: 5  Internal rotation: 5    Left Knee   Flexion: 4+  Extension: 5    Right Knee   Flexion: 5  Extension: 5 (pain)    Left Ankle/Foot   Dorsiflexion: 5  Plantar flexion: 3+  Inversion: 5  Eversion: 5    Right Ankle/Foot   Dorsiflexion: 5  Plantar flexion: 3+  Inversion: 5  Eversion: 4+         Dx: R shoulder pain,  L knee non displaced patellar fx, distal pole; imbalance; L ankle ATFL sprain  EPOC:4/23/21  CO-MORBIDITIES: depression, fibromyalgia, chronic fatigue syndrome; s/p cervical fusion, LBP  PERSONAL FACTORS:  Precautions: high pain levels; very tender to light touch    Manuals 3/26 3/30 4/6 4/8    3/16 3/19 3/23   RE- Eval    th         L ankle MFR/ PROM  JK PAMELA SINCLAIR JCAT   K tape stirrup        np     B knee PROM/ MFR  YAHIR BRITOK     10' 10' 30'    20' 15' 15'   Neuro Re-Ed             Sidestepping on level and foam beam    2 laps ea D/c        Rhomberg FT EO on foam    1'  D/c        Rhomberg FT EC on foam    30"x2 D/c        Fw walking over hurdles    2 laps D/c        Sidestep over hurdles    2 laps D/c        4 square hurdles    x2 B D/c                     Ther Ex             LAQ 10"x10 1 5# 10"x10 2#  10"x10 ea     15 ea 10 ea 10x5"   Quad sets 10x10" 10"x10 10"x10     10"x10 ea 5"x10 ea 5"x10 ea   Heel slides 10 10 10     10 ea 10ea 15 ea   Supine SLR 15 1 5# 10 2#  10x ea     10 10 15   S/l hip abduction 15 1 5# 10 2#  10x ea     10 10 15   Seated HS stretch 20"x3 20"x3 20"x3     20"x3 20"x3    Seated ball squeeze 5"x10 5"x10 5"x10     5"x15 5" 15 10x10"   Standing row        10 #1 -    Standing shld ext        10 #1 -    B ankle TB 4 way 20 20 20 TB NV    10 ea 10 ea 10   B ankle circles,; CW, CCW 20 20 20 HEP    10 ea 10 ea 10   DLS: fall outs 10 OTB 10"x10 OTB  10"x10          Standing HR             Standing gastroc stretch             Modalities             TENS w/ MH post tx

## 2021-04-08 NOTE — PROGRESS NOTES
Daily Note     Today's date: 2021  Patient name: Josh Rod  : 1955  MRN: [id number]  Referring provider: KARIN Zelaya  Dx:   Encounter Diagnosis     ICD-10-CM    1  Primary osteoarthritis of both hands  M19 041     M19 042                   Subjective: I am stiff       Objective: See treatment diary below      Assessment: Tolerated treatment fair  Patient has releif post tx  Plan: Continue per plan of care        Dx: R shoulder pain s/p fall 2020; L knee non displaced patellar fx, distal pole; imbalance; L ankle ATFL sprain  EPOC:21  CO-MORBIDITIES: depression, fibromyalgia, chronic fatigue syndrome; s/p cervical fusion, LBP  PERSONAL FACTORS:  Precautions: high pain levels; very tender to light touch         Manuals 3/19 3/23 3/26 3/30 4/8        graston B hands 8m 6m 6m 6m 6m        MOB Ips/MCPs/wrist 6m 4m 4m 4m 4m        Intrinsic/flexor stretches 4m 4m 4m 4m 4m                     HEP 3x day             Behavior mod discussed 4m                                                                                          Ther Ex             TGE  2x10 2x10 2x10 3x10 3x10        A/PROM wrist 2x10 2x10 2x10 3x10 3x10        Wrist ext B  1#  3x10 1#  3x10 2#  3x10 2#  3x10        Flex  ext  Yellow  3x10 Yellow  3x10 Yellow  3x10 Yellow  3x10                                                            Ther Activity                                       Gait Training                                       Modalities             MH  10m 10m 10m 10m 10m        Cp 5m 5m 5m  5m

## 2021-04-09 ENCOUNTER — OFFICE VISIT (OUTPATIENT)
Dept: OBGYN CLINIC | Facility: CLINIC | Age: 66
End: 2021-04-09
Payer: MEDICARE

## 2021-04-09 VITALS
TEMPERATURE: 97.8 F | BODY MASS INDEX: 35.2 KG/M2 | DIASTOLIC BLOOD PRESSURE: 81 MMHG | WEIGHT: 219 LBS | SYSTOLIC BLOOD PRESSURE: 131 MMHG | HEART RATE: 82 BPM | HEIGHT: 66 IN

## 2021-04-09 DIAGNOSIS — M17.11 PRIMARY OSTEOARTHRITIS OF RIGHT KNEE: Primary | ICD-10-CM

## 2021-04-09 PROCEDURE — 20610 DRAIN/INJ JOINT/BURSA W/O US: CPT | Performed by: ORTHOPAEDIC SURGERY

## 2021-04-09 PROCEDURE — 99213 OFFICE O/P EST LOW 20 MIN: CPT | Performed by: ORTHOPAEDIC SURGERY

## 2021-04-09 RX ORDER — LIDOCAINE HYDROCHLORIDE 10 MG/ML
3 INJECTION, SOLUTION INFILTRATION; PERINEURAL
Status: COMPLETED | OUTPATIENT
Start: 2021-04-09 | End: 2021-04-09

## 2021-04-09 RX ORDER — METHYLPREDNISOLONE ACETATE 40 MG/ML
1 INJECTION, SUSPENSION INTRA-ARTICULAR; INTRALESIONAL; INTRAMUSCULAR; SOFT TISSUE
Status: COMPLETED | OUTPATIENT
Start: 2021-04-09 | End: 2021-04-09

## 2021-04-09 RX ORDER — DOCUSATE SODIUM 100 MG/1
100 CAPSULE, LIQUID FILLED ORAL 2 TIMES DAILY
COMMUNITY
End: 2021-09-13

## 2021-04-09 RX ADMIN — METHYLPREDNISOLONE ACETATE 1 ML: 40 INJECTION, SUSPENSION INTRA-ARTICULAR; INTRALESIONAL; INTRAMUSCULAR; SOFT TISSUE at 09:26

## 2021-04-09 RX ADMIN — LIDOCAINE HYDROCHLORIDE 3 ML: 10 INJECTION, SOLUTION INFILTRATION; PERINEURAL at 09:26

## 2021-04-09 NOTE — PROGRESS NOTES
Ortho Sports Medicine Knee Follow Up Visit     Assesment:     76 y o  female right shoulder tendonitis, trigger points mid trapezius, left knee distal pole of patella fracture nondisplaced 12/9/21  Right knee osteoarthritis     Plan:    Conservative treatment:    Ice to knee for 20 minutes at least 1-2 times daily  Imaging: All imaging from today was reviewed by myself and explained to the patient  Injection:    The risks and benefits of the injection (which include but are not limited to: infection, bleeding,damage to nerve/artery, need for further intervention), as well as the risks and benefits of all alternative treatments were explained and understood  The patient elected to proceed with injection  The procedure was done with aseptic technique, and the patient tolerated the procedure well with no complications  A corticosteroid injection was performed  The patient should take 1-2 days off of activity, with gradual return to activity as tolerated  Ice to the knee 1-2 times daily for 20 minutes, for next 24-48 hrs  Surgery:     No surgery is recommended at this point, continue with conservative treatment plan as noted  Follow up:    No follow-ups on file  Chief Complaint   Patient presents with    Left Knee - Follow-up    Right Knee - Follow-up, Injections       History of Present Illness: The patient is returns for follow up of right shoulder tendonitis, trigger points mid trapezius, left knee distal pole of patella fracture nondisplaced 12/9/21 and right knee osteoarthritis  Since the prior visit, She reports minimal improvement  Patient is here today because she would like a cortisone injection into the right knee    Pain is located anterior, posterior, medial      Pain is improved by rest, ice, NSAIDS and physical therapy  Pain is aggravated by stairs, squatting, weight bearing, running, sitting, standing and pivoting on a planted foot      Symptoms include clicking, catching and popping  The patient has tried rest, ice, NSAIDS and physical therapy            Knee Surgical History:  None    Past Medical, Social and Family History:  Past Medical History:   Diagnosis Date    Anxiety     Arthritis     [facility] disorder ([facility] )     Cervical spinal stenosis     last assessed 5/13/14, resolved 10/10/16    Chronic constipation     last assessed 8/28/12, resovled 9/14/15    Chronic fatigue syndrome     last assessed 8/28/12, resolved 9/14/15    Chronic hyperglycemia     resolved 9/14/15    Chronic pain syndrome     last assessed 11/12/13, resolved 10/10/16    Depression     Essential hypertriglyceridemia     resolved 10/10/16    Fibromyalgia     GERD (gastroesophageal reflux disease)     Herpes simplex infection     last assessed 12/12/13, resolved 10/10/16    Hypokalemia     last assessed 9/14/15, resolved 11/23/15    Insomnia     last assessed 3/30/15, resolved 11/23/15    Median neuropathy     last assessed 7/14/15, resolved 10/10/16    Pneumonia     last assessed 7/16/13, resolved 5/10/13    Sacroiliitis ([facility] )     last assessed 10/22/13, resolved 10/27/14     Past Surgical History:   Procedure Laterality Date    CERVICAL FUSION      COLONOSCOPY  10/23/2013    10yrs     DILATION AND CURETTAGE OF UTERUS      HALLUX VALGUS CORRECTION      HEMORROIDECTOMY      NEUROPLASTY / TRANSPOSITION MEDIAN NERVE AT CARPAL TUNNEL      REDUCTION MAMMAPLASTY      TONSILLECTOMY      TOOTH EXTRACTION      TUBAL LIGATION       Allergies   Allergen Reactions    Cephalexin Rash    Latex Rash     Reaction Date: 21Mar2012;     Molds & Smuts Allergic Rhinitis    Other Allergic Rhinitis and Cough     Cigarette     Risperidone Palpitations     Reaction Date: 21Mar2012;     Sertraline Itching     Reaction Date: 21Mar2012;     Soy Isoflavones      Current Outpatient Medications on File Prior to Visit   Medication Sig Dispense Refill    ARIPiprazole (ABILIFY) 15 mg tablet Take 15 mg by mouth daily at bedtime      ascorbic acid (VITAMIN C) 500 mg tablet Take 500 mg by mouth daily      bisacodyl (DULCOLAX) 5 mg EC tablet Take 1 tablet by mouth daily as needed      Calcium 250 MG CAPS Take 500 mg by mouth      Cholecalciferol ([employer] VITAMIN D) 2000 units CAPS Take by mouth      Diclofenac Sodium (VOLTAREN) 1 % Apply 2 g topically 4 (four) times a day as needed (osteoarthritis) 150 g 1    docusate sodium (COLACE) 100 mg capsule Take 100 mg by mouth 2 (two) times a day      DULoxetine (CYMBALTA) 60 mg delayed release capsule Take 1 capsule by mouth daily      levalbuterol (XOPENEX HFA) 45 mcg/act inhaler Inhale 1-2 puffs every 4 (four) hours as needed for wheezing 1 Inhaler 0    loratadine (CLARITIN) 10 mg tablet Take 10 mg by mouth      Omega-3 Fatty Acids (fish oil) 1,000 mg Take 1,000 mg by mouth daily      Turmeric Curcumin 500 MG CAPS Take by mouth       No current facility-administered medications on file prior to visit        Social History     Socioeconomic History    Marital status:      Spouse name: Not on file    Number of children: Not on file    Years of education: Not on file    Highest education level: Not on file   Occupational History    Not on file   Social Needs    Financial resource strain: Not on file    Food insecurity     Worry: Not on file     Inability: Not on file    Transportation needs     Medical: Not on file     Non-medical: Not on file   Tobacco Use    Smoking status: Never Smoker    Smokeless tobacco: Never Used   Substance and Sexual Activity    Alcohol use: No    Drug use: No    Sexual activity: Not on file   Lifestyle    Physical activity     Days per week: Not on file     Minutes per session: Not on file    Stress: Not on file   Relationships    Social connections     Talks on phone: Not on file     Gets together: Not on file     Attends Rastafarian service: Not on file     Active member of club or organization: Not on file Attends meetings of clubs or organizations: Not on file     Relationship status: Not on file    Intimate partner violence     Fear of current or ex partner: No     Emotionally abused: No     Physically abused: No     Forced sexual activity: No   Other Topics Concern    Not on file   Social History Narrative    Always uses seat belt    Daily caffeine consumption, 1 serv/day    Has smoke detectors    Lives independently      I have reviewed the past medical, surgical, social and family history, medications and allergies as documented in the EMR  Review of systems: ROS is negative other than that noted in the HPI  Constitutional: Negative for fatigue and fever  Physical Exam:    Blood pressure 131/81, pulse 82, temperature 97 8 °F (36 6 °C), height 5' 6" (1 676 m), weight 99 3 kg (219 lb), not currently breastfeeding  General/Constitutional: NAD, well developed, well nourished  HENT: Normocephalic, atraumatic  CV: Intact distal pulses, regular rate  Resp: No respiratory distress or labored breathing  Lymphatic: No lymphadenopathy palpated  Neuro: Alert and Oriented x 3, no focal deficits  Psych: Normal mood, normal affect, normal judgement, normal behavior  Skin: Warm, dry, no rashes, no erythema      Knee Exam (focused)"             RIGHT LEFT   ROM:   0-130 0-130   Palpation: Effusion negative negative     MJL tenderness Positive Negative     LJL tenderness Positive Negative   Meniscus:  Severino Negative Negative    Apley's Compression Negative Negative   Instability: Varus stable stable     Valgus stable stable   Special Tests: Lachman Negative Negative     Posterior drawer Negative Negative     Anterior drawer Negative Negative     Pivot shift not tested not tested     Dial not tested not tested   Patella: Palpation no tenderness no tenderness     Mobility 1/4 1/4     Apprehension Negative Negative   Other: Single leg 1/4 squat not tested not tested    Large joint arthrocentesis: R knee  Universal Protocol:  Consent: Verbal consent obtained  Risks and benefits: risks, benefits and alternatives were discussed  Consent given by: patient and parent  Time out: Immediately prior to procedure a "time out" was called to verify the correct patient, procedure, equipment, support staff and site/side marked as required  Patient understanding: patient states understanding of the procedure being performed  Patient consent: the patient's understanding of the procedure matches consent given  Procedure consent: procedure consent matches procedure scheduled  Relevant documents: relevant documents present and verified  Test results: test results available and properly labeled  Site marked: the operative site was marked  Radiology Images displayed and confirmed   If images not available, report reviewed: imaging studies available  Patient identity confirmed: verbally with patient    Supporting Documentation  Indications: pain and joint swelling   Procedure Details  Location: knee - R knee  Needle size: 22 G  Ultrasound guidance: no  Approach: anterolateral  Medications administered: 3 mL lidocaine 1 %; 1 mL methylPREDNISolone acetate 40 mg/mL    Patient tolerance: patient tolerated the procedure well with no immediate complications  Dressing:  Sterile dressing applied               LE NV Exam: +2 DP/PT pulses bilaterally  Sensation intact to light touch L2-S1 bilaterally    No calf tenderness to palpation bilaterally      Knee Imaging    No imaging was performed today      Scribe Attestation    I,:  Bert Tarango am acting as a scribe while in the presence of the attending physician :       I,:  Reina Ellis DO personally performed the services described in this documentation    as scribed in my presence :

## 2021-04-13 ENCOUNTER — OFFICE VISIT (OUTPATIENT)
Dept: PHYSICAL THERAPY | Facility: CLINIC | Age: 66
End: 2021-04-13
Payer: MEDICARE

## 2021-04-13 ENCOUNTER — OFFICE VISIT (OUTPATIENT)
Dept: OCCUPATIONAL THERAPY | Facility: CLINIC | Age: 66
End: 2021-04-13
Payer: MEDICARE

## 2021-04-13 DIAGNOSIS — R26.89 IMBALANCE: ICD-10-CM

## 2021-04-13 DIAGNOSIS — M19.042 PRIMARY OSTEOARTHRITIS OF BOTH HANDS: Primary | ICD-10-CM

## 2021-04-13 DIAGNOSIS — M19.041 PRIMARY OSTEOARTHRITIS OF BOTH HANDS: Primary | ICD-10-CM

## 2021-04-13 DIAGNOSIS — S93.492D SPRAIN OF ANTERIOR TALOFIBULAR LIGAMENT OF LEFT ANKLE, SUBSEQUENT ENCOUNTER: ICD-10-CM

## 2021-04-13 DIAGNOSIS — M25.561 ACUTE PAIN OF RIGHT KNEE: Primary | ICD-10-CM

## 2021-04-13 DIAGNOSIS — S82.002D CLOSED NONDISPLACED FRACTURE OF LEFT PATELLA WITH ROUTINE HEALING, UNSPECIFIED FRACTURE MORPHOLOGY, SUBSEQUENT ENCOUNTER: ICD-10-CM

## 2021-04-13 PROCEDURE — 97140 MANUAL THERAPY 1/> REGIONS: CPT | Performed by: OCCUPATIONAL THERAPIST

## 2021-04-13 PROCEDURE — 97112 NEUROMUSCULAR REEDUCATION: CPT | Performed by: PHYSICAL THERAPIST

## 2021-04-13 PROCEDURE — 97110 THERAPEUTIC EXERCISES: CPT | Performed by: OCCUPATIONAL THERAPIST

## 2021-04-13 PROCEDURE — 97110 THERAPEUTIC EXERCISES: CPT | Performed by: PHYSICAL THERAPIST

## 2021-04-13 NOTE — PROGRESS NOTES
Daily Note     Today's date: 2021  Patient name: Chinyere Alonzo  : 1955  MRN: [id number]  Referring provider: KARIN Williamson  Dx:   Encounter Diagnosis     ICD-10-CM    1  Acute pain of right knee  M25 561    2  Closed nondisplaced fracture of left patella with routine healing, unspecified fracture morphology, subsequent encounter  S82 002D    3  Imbalance  R26 89    4  Sprain of anterior talofibular ligament of left ankle, subsequent encounter  S93 492D                   Subjective: patient reports she is not feeling great today  She mentions no worse than she normally feels though  Objective: See treatment diary below      Assessment: Patient tolerated treatment fair  She requires cuing throughout the session for proper technique, fair carryover  She has some discomfort with s/l abduction but able to complete as noted below  She demonstrates fatigue at the end of the session and would benefit from continuing physical therapy  Plan: Continue per plan of care        Dx: R shoulder pain s/p fall 2020; L knee non displaced patellar fx, distal pole; imbalance; L ankle ATFL sprain  EPOC:21  CO-MORBIDITIES: depression, fibromyalgia, chronic fatigue syndrome; s/p cervical fusion, LBP  PERSONAL FACTORS:  Precautions: high pain levels; very tender to light touch   recautions: high pain levels; very tender to light touch    Manuals 3/26 3/30 4/6 4/8 4/13   3/16 3/19 3/23   RE- Eval    th         L ankle MFR/ PROM  YAHIR SINCLAIR   K tape stirrup        np     B knee PROM/ MFR  YAHIR SINCLAIR JK     10' 10' 30'    20' 15' 15'   Neuro Re-Ed             Sidestepping on level and foam beam    2 laps ea D/c        Rhomberg FT EO on foam    1'  D/c        Rhomberg FT EC on foam    30"x2 D/c        Fw walking over hurdles    2 laps D/c        Sidestep over hurdles    2 laps D/c        4 square hurdles    x2 B D/c                     Ther Ex             LAQ 10"x10 1 5# 10"x10 2#  10"x10 ea  2# 10"x15 ea   15 ea 10 ea 10x5"   Quad sets 10x10" 10"x10 10"x10     10"x10 ea 5"x10 ea 5"x10 ea   Heel slides 10 10 10     10 ea 10ea 15 ea   Supine SLR 15 1 5# 10 2#  10x ea  2# 15xea   10 10 15   S/l hip abduction 15 1 5# 10 2#  10x ea  2# 15xea   10 10 15   Seated HS stretch 20"x3 20"x3 20"x3  20"x3 ea   20"x3 20"x3    Seated ball squeeze 5"x10 5"x10 5"x10  5"x15   5"x15 5" 15 10x10"   Standing row        10 #1 -    Standing shld ext        10 #1 -    B ankle TB 4 way 20 20 20 TB NV seated x10 ea   10 ea 10 ea 10   B ankle circles,; CW, CCW 20 20 20 HEP    10 ea 10 ea 10   DLS: fall outs 10 OTB 10"x10 OTB  10"x10          Standing HR             Standing gastroc stretch             bike     5'        Pulleys     2'/2'                     Modalities             TENS w/ MH post tx

## 2021-04-13 NOTE — PROGRESS NOTES
Daily Note     Today's date: 2021  Patient name: Lino Ritter  : 1955  MRN: [id number]  Referring provider: KARIN Lemos  Dx:   Encounter Diagnosis     ICD-10-CM    1  Primary osteoarthritis of both hands  M19 041     M19 042                   Subjective: I am in pain       Objective: See treatment diary below      Assessment: Tolerated treatment fair  Patient has continued pain and tenderness in the wrist and MCPs      Plan: Continue per plan of care        Dx: R shoulder pain s/p fall 2020; L knee non displaced patellar fx, distal pole; imbalance; L ankle ATFL sprain  EPOC:21  CO-MORBIDITIES: depression, fibromyalgia, chronic fatigue syndrome; s/p cervical fusion, LBP  PERSONAL FACTORS:  Precautions: high pain levels; very tender to light touch         Manuals 3/19 3/23 3/26 3/30 4/8 4/13       graston B hands 8m 6m 6m 6m 6m 4m       MOB Ips/MCPs/wrist 6m 4m 4m 4m 4m 4m       Intrinsic/flexor stretches 4m 4m 4m 4m 4m 4m                    HEP 3x day             Behavior mod discussed 4m                                                                                          Ther Ex             TGE  2x10 2x10 2x10 3x10 3x10 3x10       A/PROM wrist 2x10 2x10 2x10 3x10 3x10 3x10       Wrist ext B  1#  3x10 1#  3x10 2#  3x10 2#  3x10 2#  3x10       Flex  ext  Yellow  3x10 Yellow  3x10 Yellow  3x10 Yellow  3x10 yellow  3x10                                                           Ther Activity                                       Gait Training                                       Modalities             MH  10m 10m 10m 10m 10m 10m       Cp 5m 5m 5m  5m 5m

## 2021-04-15 ENCOUNTER — APPOINTMENT (OUTPATIENT)
Dept: PHYSICAL THERAPY | Facility: CLINIC | Age: 66
End: 2021-04-15
Payer: MEDICARE

## 2021-04-15 ENCOUNTER — APPOINTMENT (OUTPATIENT)
Dept: OCCUPATIONAL THERAPY | Facility: CLINIC | Age: 66
End: 2021-04-15
Payer: MEDICARE

## 2021-04-16 ENCOUNTER — OFFICE VISIT (OUTPATIENT)
Dept: PHYSICAL THERAPY | Facility: CLINIC | Age: 66
End: 2021-04-16
Payer: MEDICARE

## 2021-04-16 ENCOUNTER — OFFICE VISIT (OUTPATIENT)
Dept: OCCUPATIONAL THERAPY | Facility: CLINIC | Age: 66
End: 2021-04-16
Payer: MEDICARE

## 2021-04-16 DIAGNOSIS — M25.562 ACUTE PAIN OF LEFT KNEE: ICD-10-CM

## 2021-04-16 DIAGNOSIS — M19.042 PRIMARY OSTEOARTHRITIS OF BOTH HANDS: Primary | ICD-10-CM

## 2021-04-16 DIAGNOSIS — S93.492D SPRAIN OF ANTERIOR TALOFIBULAR LIGAMENT OF LEFT ANKLE, SUBSEQUENT ENCOUNTER: ICD-10-CM

## 2021-04-16 DIAGNOSIS — M25.511 ACUTE PAIN OF RIGHT SHOULDER: ICD-10-CM

## 2021-04-16 DIAGNOSIS — R26.89 IMBALANCE: ICD-10-CM

## 2021-04-16 DIAGNOSIS — S82.002D CLOSED NONDISPLACED FRACTURE OF LEFT PATELLA WITH ROUTINE HEALING, UNSPECIFIED FRACTURE MORPHOLOGY, SUBSEQUENT ENCOUNTER: ICD-10-CM

## 2021-04-16 DIAGNOSIS — M19.041 PRIMARY OSTEOARTHRITIS OF BOTH HANDS: Primary | ICD-10-CM

## 2021-04-16 DIAGNOSIS — M25.561 ACUTE PAIN OF RIGHT KNEE: Primary | ICD-10-CM

## 2021-04-16 PROCEDURE — 97140 MANUAL THERAPY 1/> REGIONS: CPT | Performed by: OCCUPATIONAL THERAPIST

## 2021-04-16 PROCEDURE — 97110 THERAPEUTIC EXERCISES: CPT

## 2021-04-16 PROCEDURE — 97110 THERAPEUTIC EXERCISES: CPT | Performed by: OCCUPATIONAL THERAPIST

## 2021-04-16 NOTE — PROGRESS NOTES
Daily Note     Today's date: 2021  Patient name: Selin Patterson  : 1955  MRN: [id number]  Referring provider: KARIN Mitchell  Dx:   Encounter Diagnosis     ICD-10-CM    1  Primary osteoarthritis of both hands  M19 041     M19 042                   Subjective: I am sore       Objective: See treatment diary below      Assessment: Tolerated treatment fair  Patient is more symptomatic on L than R       Plan: Continue per plan of care        Dx: R shoulder pain s/p fall 2020; L knee non displaced patellar fx, distal pole; imbalance; L ankle ATFL sprain  EPOC:21  CO-MORBIDITIES: depression, fibromyalgia, chronic fatigue syndrome; s/p cervical fusion, LBP  PERSONAL FACTORS:  Precautions: high pain levels; very tender to light touch   recautions: high pain levels; very tender to light touch        Manuals 3/19 3/23 3/26 3/30 4/8 4/13 4/16      graston B hands 8m 6m 6m 6m 6m 4m 4m      MOB Ips/MCPs/wrist 6m 4m 4m 4m 4m 4m 3m      Intrinsic/flexor stretches 4m 4m 4m 4m 4m 4m 3m                   HEP 3x day             Behavior mod discussed 4m                                                                                          Ther Ex             TGE  2x10 2x10 2x10 3x10 3x10 3x10 3x10      A/PROM wrist 2x10 2x10 2x10 3x10 3x10 3x10 3x10      Wrist ext B  1#  3x10 1#  3x10 2#  3x10 2#  3x10 2#  3x10 2#  3x10      Flex  ext  Yellow  3x10 Yellow  3x10 Yellow  3x10 Yellow  3x10 yellow  3x10 yellow  3x10      powergrip       Red  3x10                                             Ther Activity                                       Gait Training                                       Modalities             MH  10m 10m 10m 10m 10m 10m 8m      Cp 5m 5m 5m  5m 5m

## 2021-04-16 NOTE — PROGRESS NOTES
Daily Note     Today's date: 2021  Patient name: Josh Rod  : 1955  MRN: [id number]  Referring provider: KARIN Zelaya  Dx:   Encounter Diagnosis     ICD-10-CM    1  Acute pain of right knee  M25 561    2  Closed nondisplaced fracture of left patella with routine healing, unspecified fracture morphology, subsequent encounter  S82 002D    3  Imbalance  R26 89    4  Sprain of anterior talofibular ligament of left ankle, subsequent encounter  S93 492D    5  Acute pain of right shoulder  M25 511    6  Acute pain of left knee  M25 562        Start Time: 815  Stop Time: 0900  Total time in clinic (min): 45 minutes    Subjective: "I feel like the bottom is getting better  It's the top half of my body that hurts more than the bottom "  Chief complaint today is pain in both hands/wrists, L >R  Received without wearing knee brace today  Is curious if she can start to reduce the frequency she utilizes her knee brace  Objective: See treatment diary below  Advised by physical therapist, James Gould, that she can begin to reduce the frequency she uses her knee brace, and to use brace as needed if       Assessment: Tolerated treatment well  Pt is challenged with program but was able to complete all assigned exercise with no complaints of pain  Pt notes performance of bike went better today, compared to LV; slight discomfort in L knee during last 45-60 seconds on bike  Frequent compensation at both knee and hip on BLE with TB resisted ankle 4-way, requiring verbal and tactile cueing to correct form  Patient would benefit from continued PT to further improve strength, reduce frequency of symptoms, and maximize function  Plan: Continue per plan of care        Dx: R shoulder pain s/p fall 2020; L knee non displaced patellar fx, distal pole; imbalance; L ankle ATFL sprain  EPOC:21  CO-MORBIDITIES: depression, fibromyalgia, chronic fatigue syndrome; s/p cervical fusion, LBP  PERSONAL FACTORS:  Precautions: high pain levels; very tender to light touch   recautions: high pain levels; very tender to light touch    Manuals 3/26 3/30 4/6 4/8 4/13 4/16  3/16 3/19 3/23   RE- Eval    th         L ankle MFR/ PROM  JK JM     JK JK JK   K tape stirrup        np     B knee PROM/ MFR  JK JM     JK JK JK     10' 10' 30'    20' 15' 15'   Neuro Re-Ed             Sidestepping on level and foam beam    2 laps ea D/c        Rhomberg FT EO on foam    1'  D/c        Rhomberg FT EC on foam    30"x2 D/c        Fw walking over hurdles    2 laps D/c        Sidestep over hurdles    2 laps D/c        4 square hurdles    x2 B D/c                     Ther Ex             LAQ 10"x10 1 5# 10"x10 2#  10"x10 ea  2# 10"x15 ea 2# 10"x15 ea  15 ea 10 ea 10x5"   Quad sets 10x10" 10"x10 10"x10     10"x10 ea 5"x10 ea 5"x10 ea   Heel slides 10 10 10     10 ea 10ea 15 ea   Supine SLR 15 1 5# 10 2#  10x ea  2# 15xea 2# 15xea  10 10 15   S/l hip abduction 15 1 5# 10 2#  10x ea  2# 15xea 2# 15xea  10 10 15   Seated HS stretch 20"x3 20"x3 20"x3  20"x3 ea 20"x3 ea  20"x3 20"x3    Seated ball squeeze 5"x10 5"x10 5"x10  5"x15 5"x15  5"x15 5" 15 10x10"   Standing row        10 #1 -    Standing shld ext        10 #1 -    B ankle TB 4 way 20 20 20 TB NV seated x10 ea supine  OTB  3"x15ea  10 ea 10 ea 10   B ankle circles,; CW, CCW 20 20 20 HEP    10 ea 10 ea 10   DLS: fall outs 10 OTB 10"x10 OTB  10"x10          Standing HR             Standing gastroc stretch             bike     5' 5'       Pulleys     2'/2' 2'/2'                    Modalities             TENS w/ MH post tx

## 2021-04-20 ENCOUNTER — OFFICE VISIT (OUTPATIENT)
Dept: PHYSICAL THERAPY | Facility: CLINIC | Age: 66
End: 2021-04-20
Payer: MEDICARE

## 2021-04-20 ENCOUNTER — OFFICE VISIT (OUTPATIENT)
Dept: OCCUPATIONAL THERAPY | Facility: CLINIC | Age: 66
End: 2021-04-20
Payer: MEDICARE

## 2021-04-20 DIAGNOSIS — S93.492D SPRAIN OF ANTERIOR TALOFIBULAR LIGAMENT OF LEFT ANKLE, SUBSEQUENT ENCOUNTER: ICD-10-CM

## 2021-04-20 DIAGNOSIS — M25.561 ACUTE PAIN OF RIGHT KNEE: Primary | ICD-10-CM

## 2021-04-20 DIAGNOSIS — S82.002D CLOSED NONDISPLACED FRACTURE OF LEFT PATELLA WITH ROUTINE HEALING, UNSPECIFIED FRACTURE MORPHOLOGY, SUBSEQUENT ENCOUNTER: ICD-10-CM

## 2021-04-20 DIAGNOSIS — M25.562 ACUTE PAIN OF LEFT KNEE: ICD-10-CM

## 2021-04-20 DIAGNOSIS — M19.042 PRIMARY OSTEOARTHRITIS OF BOTH HANDS: Primary | ICD-10-CM

## 2021-04-20 DIAGNOSIS — M19.041 PRIMARY OSTEOARTHRITIS OF BOTH HANDS: Primary | ICD-10-CM

## 2021-04-20 DIAGNOSIS — M25.511 ACUTE PAIN OF RIGHT SHOULDER: ICD-10-CM

## 2021-04-20 DIAGNOSIS — R26.89 IMBALANCE: ICD-10-CM

## 2021-04-20 PROCEDURE — 97110 THERAPEUTIC EXERCISES: CPT | Performed by: OCCUPATIONAL THERAPIST

## 2021-04-20 PROCEDURE — 97140 MANUAL THERAPY 1/> REGIONS: CPT | Performed by: OCCUPATIONAL THERAPIST

## 2021-04-20 PROCEDURE — 97110 THERAPEUTIC EXERCISES: CPT

## 2021-04-20 NOTE — PROGRESS NOTES
Daily Note     Today's date: 2021  Patient name: Francia Duke  : 1955  MRN: [id number]  Referring provider: KARIN Pires  Dx:   Encounter Diagnosis     ICD-10-CM    1  Primary osteoarthritis of both hands  M19 041     M19 042                   Subjective: I have difficulty getting dressed      Objective: See treatment diary below      Assessment: Tolerated treatment fair  Patient should avoid shirts with buttons  She should take frequent rests with housework  Plan: Continue per plan of care        Dx: R shoulder pain s/p fall 2020; L knee non displaced patellar fx, distal pole; imbalance; L ankle ATFL sprain  EPOC:21  CO-MORBIDITIES: depression, fibromyalgia, chronic fatigue syndrome; s/p cervical fusion, LBP  PERSONAL FACTORS:  Precautions: high pain levels; very tender to light touch   recautions: high pain levels; very tender to light touch      Manuals 3/19 3/23 3/26 3/30 4/8 4/13 4/16      graston B hands 8m 6m 6m 6m 6m 4m 4m      MOB Ips/MCPs/wrist 6m 4m 4m 4m 4m 4m 3m      Intrinsic/flexor stretches 4m 4m 4m 4m 4m 4m 3m                   HEP 3x day             Behavior mod discussed 4m                                                                                          Ther Ex             TGE  2x10 2x10 2x10 3x10 3x10 3x10 3x10      A/PROM wrist 2x10 2x10 2x10 3x10 3x10 3x10 3x10      Wrist ext B  1#  3x10 1#  3x10 2#  3x10 2#  3x10 2#  3x10 2#  3x10      Flex  ext  Yellow  3x10 Yellow  3x10 Yellow  3x10 Yellow  3x10 yellow  3x10 yellow  3x10      powergrip       Red  3x10                                             Ther Activity                                       Gait Training                                       Modalities             MH  10m 10m 10m 10m 10m 10m 8m      Cp 5m 5m 5m  5m 5m 5m

## 2021-04-20 NOTE — PROGRESS NOTES
Daily Note     Today's date: 2021  Patient name: Lino Ritter  : 1955  MRN: [id number]  Referring provider: KARIN Lemos  Dx:   Encounter Diagnosis     ICD-10-CM    1  Acute pain of right knee  M25 561    2  Closed nondisplaced fracture of left patella with routine healing, unspecified fracture morphology, subsequent encounter  S82 002D    3  Imbalance  R26 89    4  Sprain of anterior talofibular ligament of left ankle, subsequent encounter  S93 492D    5  Acute pain of right shoulder  M25 511    6  Acute pain of left knee  M25 562        Start Time: 1010  Stop Time: 1050  Total time in clinic (min): 40 minutes    Subjective: Patient notes steady progress with both of her LE  "Today is a good day for me"  Objective: See treatment diary below  Assessment: Patient requires multiple verbal/tactile cueing t/o therapy for proper form  Could progress TB for ankle 4 way NV  Mild soreness following scapular strengthening  Patient would benefit from continued PT to further improve strength, reduce frequency of symptoms, and maximize function  Plan: Continue per plan of care        Dx: R shoulder pain s/p fall 2020; L knee non displaced patellar fx, distal pole; imbalance; L ankle ATFL sprain  EPOC: 21  CO-MORBIDITIES: depression, fibromyalgia, chronic fatigue syndrome; s/p cervical fusion, LBP  PERSONAL FACTORS:  Precautions: high pain levels; very tender to light touch    Manuals 3/26 3/30 4/6 4/8 13 16       RE- Eval    th         L ankle MFR/ PROM  JCAT SANTIAGO          K tape stirrup             B knee PROM/ MFR  YAHIR SANTIAGO            10' 10' 30'         Neuro Re-Ed             Sidestepping on level and foam beam    2 laps ea D/c        Rhomberg FT EO on foam    1'  D/c        Rhomberg FT EC on foam    30"x2 D/c        Fw walking over hurdles    2 laps D/c        Sidestep over hurdles    2 laps D/c        4 square hurdles    x2 B D/c                     Ther Ex             LAQ 10"x10 1 5# 10"x10 2#  10"x10 ea  2# 10"x15 ea 2# 10"x15 ea 2# 10"x15 ea      Quad sets 10x10" 10"x10 10"x10          Heel slides 10 10 10          Supine SLR 15 1 5# 10 2#  10x ea  2# 15xea 2# 15xea 2#   2x10ea      S/l hip abduction 15 1 5# 10 2#  10x ea  2# 15xea 2# 15xea 2#  2x10ea      Seated HS stretch 20"x3 20"x3 20"x3  20"x3 ea 20"x3 ea 20"x3 ea      Seated ball squeeze 5"x10 5"x10 5"x10  5"x15 5"x15 5"  x20      Standing row       PTB  x10      Standing shld ext         PTB   x10      B ankle TB 4 way 20 20 20 TB NV seated x10 ea supine  OTB  3"x15ea supine  OTB  3"x15ea      B ankle circles,; CW, CCW 20 20 20 HEP         DLS: fall outs 10 OTB 10"x10 OTB  10"x10          Standing HR             Standing gastroc stretch             bike     5' 5' 5'      Pulleys     2'/2' 2'/2' 2'/2'                   Modalities             TENS w/ MH post tx

## 2021-04-22 ENCOUNTER — APPOINTMENT (OUTPATIENT)
Dept: PHYSICAL THERAPY | Facility: CLINIC | Age: 66
End: 2021-04-22
Payer: MEDICARE

## 2021-04-22 ENCOUNTER — APPOINTMENT (OUTPATIENT)
Dept: OCCUPATIONAL THERAPY | Facility: CLINIC | Age: 66
End: 2021-04-22
Payer: MEDICARE

## 2021-04-23 ENCOUNTER — OFFICE VISIT (OUTPATIENT)
Dept: PHYSICAL THERAPY | Facility: CLINIC | Age: 66
End: 2021-04-23
Payer: MEDICARE

## 2021-04-23 ENCOUNTER — OFFICE VISIT (OUTPATIENT)
Dept: OCCUPATIONAL THERAPY | Facility: CLINIC | Age: 66
End: 2021-04-23
Payer: MEDICARE

## 2021-04-23 DIAGNOSIS — M25.562 ACUTE PAIN OF LEFT KNEE: ICD-10-CM

## 2021-04-23 DIAGNOSIS — M19.042 PRIMARY OSTEOARTHRITIS OF BOTH HANDS: Primary | ICD-10-CM

## 2021-04-23 DIAGNOSIS — M25.511 ACUTE PAIN OF RIGHT SHOULDER: ICD-10-CM

## 2021-04-23 DIAGNOSIS — M25.561 ACUTE PAIN OF RIGHT KNEE: Primary | ICD-10-CM

## 2021-04-23 DIAGNOSIS — M19.042 PRIMARY OSTEOARTHRITIS OF BOTH HANDS: ICD-10-CM

## 2021-04-23 DIAGNOSIS — S82.002D CLOSED NONDISPLACED FRACTURE OF LEFT PATELLA WITH ROUTINE HEALING, UNSPECIFIED FRACTURE MORPHOLOGY, SUBSEQUENT ENCOUNTER: ICD-10-CM

## 2021-04-23 DIAGNOSIS — M19.041 PRIMARY OSTEOARTHRITIS OF BOTH HANDS: ICD-10-CM

## 2021-04-23 DIAGNOSIS — R26.89 IMBALANCE: ICD-10-CM

## 2021-04-23 DIAGNOSIS — M19.041 PRIMARY OSTEOARTHRITIS OF BOTH HANDS: Primary | ICD-10-CM

## 2021-04-23 PROCEDURE — 97110 THERAPEUTIC EXERCISES: CPT | Performed by: OCCUPATIONAL THERAPIST

## 2021-04-23 PROCEDURE — 97140 MANUAL THERAPY 1/> REGIONS: CPT | Performed by: PHYSICAL THERAPIST

## 2021-04-23 PROCEDURE — 97110 THERAPEUTIC EXERCISES: CPT | Performed by: PHYSICAL THERAPIST

## 2021-04-23 PROCEDURE — 97140 MANUAL THERAPY 1/> REGIONS: CPT | Performed by: OCCUPATIONAL THERAPIST

## 2021-04-23 NOTE — PROGRESS NOTES
Daily Note     Today's date: 2021  Patient name: Antwan Brizuela  : 1955  MRN: [id number]  Referring provider: KARIN Troncoso  Dx:   Encounter Diagnosis     ICD-10-CM    1  Primary osteoarthritis of both hands  M19 041     M19 042                   Subjective: I am getting sleep , I am so happy  My R hand is better than my R       Objective: See treatment diary below      Assessment: Tolerated treatment well  Patient is making staedy gains with good carryover for ADL        Plan: Reeval panchito     Dx: R shoulder pain s/p fall 2020; L knee non displaced patellar fx, distal pole; imbalance; L ankle ATFL sprain  EPOC: 21  CO-MORBIDITIES: depression, fibromyalgia, chronic fatigue syndrome; s/p cervical fusion, LBP  PERSONAL FACTORS:  Precautions: high pain levels; very tender to light touch      Manuals 3/19 3/23 3/26 3/30 4/8 4/13 4/16 4/23     graston B hands 8m 6m 6m 6m 6m 4m 4m 4m     MOB Ips/MCPs/wrist 6m 4m 4m 4m 4m 4m 3m 3m     Intrinsic/flexor stretches 4m 4m 4m 4m 4m 4m 3m 3m                  HEP 3x day             Behavior mod discussed 4m                                                                                          Ther Ex             TGE  2x10 2x10 2x10 3x10 3x10 3x10 3x10 3x10     A/PROM wrist 2x10 2x10 2x10 3x10 3x10 3x10 3x10 3x10     Wrist ext B  1#  3x10 1#  3x10 2#  3x10 2#  3x10 2#  3x10 2#  3x10 2#  3x10     Flex  ext  Yellow  3x10 Yellow  3x10 Yellow  3x10 Yellow  3x10 yellow  3x10 yellow  3x10 yellow  3x10     powergrip       Red  3x10 Red  4x10                                            Ther Activity                                       Gait Training                                       Modalities             MH  10m 10m 10m 10m 10m 10m 8m 8m     Cp 5m 5m 5m  5m 5m 5m 5m

## 2021-04-23 NOTE — PROGRESS NOTES
Daily Note     Today's date: 2021  Patient name: Crys Hazel  : 1955  MRN: [id number]  Referring provider: KARIN Keller  Dx:   Encounter Diagnosis     ICD-10-CM    1  Acute pain of right knee  M25 561    2  Closed nondisplaced fracture of left patella with routine healing, unspecified fracture morphology, subsequent encounter  S82 002D    3  Imbalance  R26 89    4  Acute pain of right shoulder  M25 511    5  Acute pain of left knee  M25 562                   Subjective: Pt reports her ankles are feeling pretty good  Pt wears compression stockings  Pt has some knee pain, but she has been on her feet more  Objective: See treatment diary below      Assessment: Tolerated treatment well  Patient exhibited good technique with therapeutic exercises  Pt tolerated increased weight and resistance with there ex  Plan: Continue per plan of care  Continue 2 more visits with progression to HEP       Dx: R shoulder pain s/p fall 2020; L knee non displaced patellar fx, distal pole; imbalance; L ankle ATFL sprain  EPOC: 21  CO-MORBIDITIES: depression, fibromyalgia, chronic fatigue syndrome; s/p cervical fusion, LBP  PERSONAL FACTORS:  Precautions: high pain levels; very tender to light touch    Manuals 3/26 3/30 4//     RE- Eval    th         L ankle MFR/ PROM  [facility]     K tape stirrup             B knee PROM/ MFR  JK [facility]       10' 10' 30'    10'     Neuro Re-Ed             Sidestepping on level and foam beam    2 laps ea D/c        Rhomberg FT EO on foam    1'  D/c        Rhomberg FT EC on foam    30"x2 D/c        Fw walking over hurdles    2 laps D/c        Sidestep over hurdles    2 laps D/c        4 square hurdles    x2 B D/c                     Ther Ex             LAQ 10"x10 1 5# 10"x10 2#  10"x10 ea  2# 10"x15 ea 2# 10"x15 ea 2# 10"x15 ea 2 5# 10x10"     Quad sets 10x10" 10"x10 10"x10          Heel slides 10 10 10     10     Supine SLR 15 1 5# 10 2#  10x ea  2# 15xea 2# 15xea 2#   2x10ea 2 5# x10     S/l hip abduction 15 1 5# 10 2#  10x ea  2# 15xea 2# 15xea 2#  2x10ea 2 5#x10     Seated HS stretch 20"x3 20"x3 20"x3  20"x3 ea 20"x3 ea 20"x3 ea 20"x3 B     Seated ball squeeze 5"x10 5"x10 5"x10  5"x15 5"x15 5"  x20 5"x7 w/ bridge     TB LPD       PTB  x10 OTBx10     TB row         PTB   x10 Ox10     B ankle TB 4 way 20 20 20 TB NV seated x10 ea supine  OTB  3"x15ea supine  OTB  3"x15ea GTB x10 ea     B ankle circles,; CW, CCW 20 20 20 HEP         DLS: fall outs 10 OTB 10"x10 OTB  10"x10          Standing HR        10     Standing gastroc stretch             bike     5' 5' 5' 5'     Pulleys     2'/2' 2'/2' 2'/2' 2'/2'                  Modalities             TENS w/ MH post tx

## 2021-04-27 ENCOUNTER — OFFICE VISIT (OUTPATIENT)
Dept: PHYSICAL THERAPY | Facility: CLINIC | Age: 66
End: 2021-04-27
Payer: MEDICARE

## 2021-04-27 ENCOUNTER — CONSULT (OUTPATIENT)
Dept: PAIN MEDICINE | Facility: CLINIC | Age: 66
End: 2021-04-27
Payer: MEDICARE

## 2021-04-27 ENCOUNTER — OFFICE VISIT (OUTPATIENT)
Dept: OCCUPATIONAL THERAPY | Facility: CLINIC | Age: 66
End: 2021-04-27
Payer: MEDICARE

## 2021-04-27 VITALS
DIASTOLIC BLOOD PRESSURE: 76 MMHG | TEMPERATURE: 97.6 F | SYSTOLIC BLOOD PRESSURE: 126 MMHG | BODY MASS INDEX: 36 KG/M2 | HEIGHT: 66 IN | WEIGHT: 224 LBS | HEART RATE: 78 BPM

## 2021-04-27 DIAGNOSIS — M50.30 DDD (DEGENERATIVE DISC DISEASE), CERVICAL: ICD-10-CM

## 2021-04-27 DIAGNOSIS — M54.6 CHRONIC BILATERAL THORACIC BACK PAIN: ICD-10-CM

## 2021-04-27 DIAGNOSIS — M25.561 ACUTE PAIN OF RIGHT KNEE: Primary | ICD-10-CM

## 2021-04-27 DIAGNOSIS — S82.002D CLOSED NONDISPLACED FRACTURE OF LEFT PATELLA WITH ROUTINE HEALING, UNSPECIFIED FRACTURE MORPHOLOGY, SUBSEQUENT ENCOUNTER: ICD-10-CM

## 2021-04-27 DIAGNOSIS — R26.89 IMBALANCE: ICD-10-CM

## 2021-04-27 DIAGNOSIS — M51.36 DISC DEGENERATION, LUMBAR: ICD-10-CM

## 2021-04-27 DIAGNOSIS — M19.041 PRIMARY OSTEOARTHRITIS OF BOTH HANDS: Primary | ICD-10-CM

## 2021-04-27 DIAGNOSIS — M25.511 ACUTE PAIN OF RIGHT SHOULDER: ICD-10-CM

## 2021-04-27 DIAGNOSIS — M79.18 MYOFASCIAL PAIN SYNDROME: ICD-10-CM

## 2021-04-27 DIAGNOSIS — M79.7 FIBROMYALGIA: ICD-10-CM

## 2021-04-27 DIAGNOSIS — G89.29 CHRONIC NECK PAIN: ICD-10-CM

## 2021-04-27 DIAGNOSIS — M25.562 ACUTE PAIN OF LEFT KNEE: ICD-10-CM

## 2021-04-27 DIAGNOSIS — G89.29 CHRONIC BILATERAL LOW BACK PAIN WITHOUT SCIATICA: ICD-10-CM

## 2021-04-27 DIAGNOSIS — M19.042 PRIMARY OSTEOARTHRITIS OF BOTH HANDS: Primary | ICD-10-CM

## 2021-04-27 DIAGNOSIS — M54.2 CHRONIC NECK PAIN: ICD-10-CM

## 2021-04-27 DIAGNOSIS — G89.29 CHRONIC BILATERAL THORACIC BACK PAIN: ICD-10-CM

## 2021-04-27 DIAGNOSIS — M54.50 CHRONIC BILATERAL LOW BACK PAIN WITHOUT SCIATICA: ICD-10-CM

## 2021-04-27 DIAGNOSIS — G89.4 CHRONIC PAIN SYNDROME: Primary | ICD-10-CM

## 2021-04-27 DIAGNOSIS — M25.50 CHRONIC JOINT PAIN: ICD-10-CM

## 2021-04-27 DIAGNOSIS — G89.29 CHRONIC JOINT PAIN: ICD-10-CM

## 2021-04-27 PROCEDURE — 99203 OFFICE O/P NEW LOW 30 MIN: CPT | Performed by: NURSE PRACTITIONER

## 2021-04-27 PROCEDURE — 97140 MANUAL THERAPY 1/> REGIONS: CPT | Performed by: OCCUPATIONAL THERAPIST

## 2021-04-27 PROCEDURE — 97110 THERAPEUTIC EXERCISES: CPT

## 2021-04-27 PROCEDURE — 97140 MANUAL THERAPY 1/> REGIONS: CPT

## 2021-04-27 PROCEDURE — 97110 THERAPEUTIC EXERCISES: CPT | Performed by: OCCUPATIONAL THERAPIST

## 2021-04-27 NOTE — PROGRESS NOTES
Daily Note     Today's date: 2021  Patient name: Dg Carlos  : 1955  MRN: [id number]  Referring provider: KARIN Pierce  Dx:   Encounter Diagnosis     ICD-10-CM    1  Primary osteoarthritis of both hands  M19 041     M19 042                   Subjective: I am doing ok       Objective: See treatment diary below      Assessment: Tolerated treatment well  Patient has improved charan to activity       Plan: Reeval     Dx: R shoulder pain s/p fall 2020; L knee non displaced patellar fx, distal pole; imbalance; L ankle ATFL sprain  EPOC: 21  CO-MORBIDITIES: depression, fibromyalgia, chronic fatigue syndrome; s/p cervical fusion, LBP  PERSONAL FACTORS:  Precautions: high pain levels; very tender to light touch      Manuals 3/19 3/23 3/26 3/30 4/8 4/13 4/16 4/23 4/27    graston B hands 8m 6m 6m 6m 6m 4m 4m 4m 4m    MOB Ips/MCPs/wrist 6m 4m 4m 4m 4m 4m 3m 3m 3m    Intrinsic/flexor stretches 4m 4m 4m 4m 4m 4m 3m 3m 3m                 HEP 3x day             Behavior mod discussed 4m                                                                                          Ther Ex             TGE  2x10 2x10 2x10 3x10 3x10 3x10 3x10 3x10 3x10    A/PROM wrist 2x10 2x10 2x10 3x10 3x10 3x10 3x10 3x10 2x10    Wrist ext B  1#  3x10 1#  3x10 2#  3x10 2#  3x10 2#  3x10 2#  3x10 2#  3x10 2#  3x10    Flex  ext  Yellow  3x10 Yellow  3x10 Yellow  3x10 Yellow  3x10 yellow  3x10 yellow  3x10 yellow  3x10 Yellow  3x10    powergrip       Red  3x10 Red  4x10 Red  4x10    flexbar P/S         Yellow  3x10                              Ther Activity                                       Gait Training                                       Modalities             MH  10m 10m 10m 10m 10m 10m 8m 8m 8m    Cp 5m 5m 5m  5m 5m 5m 5m 5m

## 2021-04-27 NOTE — PATIENT INSTRUCTIONS
1  Use Heat and Ice as tolerated, possibly Heat in AM and cold in the evening, no more than 20 minutes  2  Discuss with the psychiatrist about increasing Cymbalta to 90 mg daily to help more with the pain and to possibly help her sleep  3  Continue to follow up with orthopedics  4  Continue to follow up with physical therapy and the home exercise program, as tolerated, pain is your guide  5  F/U with our office PRN

## 2021-04-27 NOTE — PROGRESS NOTES
Assessment:  1  Chronic pain syndrome    2  Chronic neck pain    3  Chronic bilateral thoracic back pain    4  Chronic bilateral low back pain without sciatica    5  Chronic joint pain    6  Myofascial pain syndrome    7  Fibromyalgia    8  Disc degeneration, lumbar    9  DDD (degenerative disc disease), cervical        Plan:    While the patient was in the office today, I did have a thorough conversation with the patient regarding their chronic pain syndrome, symptoms, medication regimen, and treatment plan  I discussed with the patient at this point time since there is overall improvement in her pain from her fall between the physical therapy and the procedures an injection she has had with orthopedics, do not feel that there any other procedures or injections that we would have to offer at this time  I encouraged the patient to follow-up with orthopedics regarding her injections and procedures and her continued treatment plan  I also encouraged the patient continue with physical therapy and stressed the importance of adhering to the home exercise and stretching program, slowly and steadily increasing her activity, as tolerated, allowing pain to be her guide  The patient was agreeable and verbalized an understanding  I explained to the patient that at this point 1 recommendation I would have with regards to her medication regimen is that since she is just on the cusp of the therapeutic dose of Cymbalta and is not helping her pain as much as it did initially, it would be in her best interest to follow-up with her psychiatrist to discuss the possibility of increasing the Cymbalta to 90 mg a day to see if that would provide better overall relief of her chronic pain symptoms  I encouraged the patient continue to use the over-the-counter Tylenol and Advil as needed and not to take more than is written on the directions  The patient was agreeable and verbalized an understanding             I explained the patient that I do feel she is doing what she can and another simple thing that she can try is to use 20 minutes of low heat in the morning to try to loosen up her muscles from being stiff from lying at nighttime and to try to call her muscles down later on in the day or evening as her pain worsens by using 20 minutes of ice  The patient was agreeable and verbalized an understanding  I discussed with the patient that at this point time she can followup with our office on an as-needed basis  I did review the patient that if her pain symptoms should change, worsen, and/or if she would experience any new symptoms as she would like to be evaluated for, she should give our office a call  The patient was agreeable and verbalized an understanding  History of Present Illness: The patient is a 78 y o  female who presents for consultation in regards to Headache, Neck Pain, Shoulder Pain, Arm Pain, Leg Pain, Knee Pain, and Foot Pain  Symptoms have been present for over 5 months with regards to the current flare up,However, she has dealt with chronic and diffuse pain symptoms for many years as a result of fibromyalgia, but her biggest issues of the worsening neck, shoulder and hand pain as well as her bilateral knee and ankle pain status post a fall in December of 2020  Symptoms began following a non work related injury  The patient reports that since the fall she has been doing physical therapy for her knees and ankle is and feels that the therapy has been helping the ankle pain for sugar with some improvement of the knee pain and just had a right knee injection on April 9, 2021 with Dr Judd Oppenheim and has been following up with Dr Kayy Cain for the ankle pain  Pain is reported to be 5 on the numeric rating scale  Symptoms are felt nearly constantly and worst in the no typical pattern  Symptoms are characterized as cramping, sharp, cutting and pressure-like    Symptoms are associated with bilateral arm weakness  Aggravating factors include standing, bending, leaning forward, leaning bckward, exercise and coughing/sneezing  Relieving factors include lying down, sitting and relaxation  No change in symptoms with walking and bowel movements  Treatments that have been helpful include prior injections including Joint injections and lumbar epidural steroid injection, physical therapy, chiropractic manipulation, heat/ice and psychotherapy  Medications to relieve symptoms include:  Cymbalta 60 mg daily as well as over-the-counter Tylenol and Advil  The patient reports that when she was initially started on the Cymbalta it definitely seemed to be more helpful that is currently for her pain symptoms as she was doing well until she fell  She does report that even now Tylenol and Advil is help and in general she is not interested in any new or different medications and definitely not interested in opioid medications as she has not done well with them in the past   She reports that the main reason she presented for her consultation today was to make sure that she was doing all the right things with regards to management of her pain and if we had any other suggestions that could possibly make her pain more manageable as she is currently working on the process of retiring and possibly moving  Review of Systems:    Review of Systems   Constitutional: Positive for unexpected weight change  Negative for fever  HENT: Negative for trouble swallowing  Eyes: Positive for pain  Negative for visual disturbance  Respiratory: Positive for shortness of breath  Negative for wheezing  Cardiovascular: Positive for chest pain  Negative for palpitations  Gastrointestinal: Negative for constipation, diarrhea, nausea and vomiting  Endocrine: Negative for cold intolerance, heat intolerance and polydipsia  Genitourinary: Positive for frequency  Negative for difficulty urinating     Musculoskeletal: Positive for joint swelling  Negative for arthralgias, gait problem and myalgias  Skin: Positive for wound  Negative for rash  Neurological: Positive for dizziness and headaches  Negative for seizures, syncope and weakness  Hematological: Does not bruise/bleed easily  Psychiatric/Behavioral: Positive for decreased concentration and dysphoric mood  The patient is nervous/anxious  All other systems reviewed and are negative          Past Medical History:   Diagnosis Date    Anxiety     Arthritis     Basal cell carcinoma (BCC)     Bipolar disorder (HCC)     Cervical spinal stenosis     last assessed 5/13/14, resolved 10/10/16    Chronic constipation     last assessed 8/28/12, resovled 9/14/15    Chronic fatigue syndrome     last assessed 8/28/12, resolved 9/14/15    Chronic hyperglycemia     resolved 9/14/15    Chronic pain syndrome     last assessed 11/12/13, resolved 10/10/16    Depression     Essential hypertriglyceridemia     resolved 10/10/16    Fibromyalgia     GERD (gastroesophageal reflux disease)     Herpes simplex infection     last assessed 12/12/13, resolved 10/10/16    Hypokalemia     last assessed 9/14/15, resolved 11/23/15    Insomnia     last assessed 3/30/15, resolved 11/23/15    Median neuropathy     last assessed 7/14/15, resolved 10/10/16    Pneumonia     last assessed 7/16/13, resolved 5/10/13    Sacroiliitis ([facility] [address] )     last assessed 10/22/13, resolved 10/27/14       Past Surgical History:   Procedure Laterality Date    CERVICAL FUSION      COLONOSCOPY  10/23/2013    10yrs     DILATION AND CURETTAGE OF UTERUS      HALLUX VALGUS CORRECTION      HEMORROIDECTOMY      NEUROPLASTY / TRANSPOSITION MEDIAN NERVE AT CARPAL TUNNEL      REDUCTION MAMMAPLASTY      TONSILLECTOMY      TOOTH EXTRACTION      TUBAL LIGATION         Family History   Problem Relation Age of Onset    Hypertension Mother     Osteoporosis Mother     Arthritis Mother     Sudden death Mother         brain aneurysm  Heart attack Father     Melanoma Father     Coronary artery disease Father     Hyperlipidemia Father     Diabetes Paternal Grandmother     Coronary artery disease Paternal Grandfather     Heart attack Family     Arthritis Family     Diabetes Family     Hypertension Family     Heart disease Family     Osteoporosis Family     No Known Problems Daughter     No Known Problems Maternal Grandmother     No Known Problems Maternal Grandfather        Social History     Occupational History    Not on file   Tobacco Use    Smoking status: Never Smoker    Smokeless tobacco: Never Used   Substance and Sexual Activity    Alcohol use: No    Drug use: No    Sexual activity: Not on file         Current Outpatient Medications:     ARIPiprazole (ABILIFY) 15 mg tablet, Take 15 mg by mouth daily at bedtime, Disp: , Rfl:     ascorbic acid (VITAMIN C) 500 mg tablet, Take 500 mg by mouth daily, Disp: , Rfl:     bisacodyl (DULCOLAX) 5 mg EC tablet, Take 1 tablet by mouth daily as needed, Disp: , Rfl:     Calcium 250 MG CAPS, Take 500 mg by mouth, Disp: , Rfl:     Cholecalciferol ([employer] VITAMIN D) 2000 units CAPS, Take by mouth, Disp: , Rfl:     Diclofenac Sodium (VOLTAREN) 1 %, APPLY 2 G TOPICALLY 4 (FOUR) TIMES A DAY AS NEEDED (OSTEOARTHRITIS), Disp: 300 g, Rfl: 1    docusate sodium (COLACE) 100 mg capsule, Take 100 mg by mouth 2 (two) times a day, Disp: , Rfl:     DULoxetine (CYMBALTA) 60 mg delayed release capsule, Take 1 capsule by mouth daily, Disp: , Rfl:     loratadine (CLARITIN) 10 mg tablet, Take 10 mg by mouth, Disp: , Rfl:     Omega-3 Fatty Acids (fish oil) 1,000 mg, Take 1,000 mg by mouth daily, Disp: , Rfl:     Turmeric Curcumin 500 MG CAPS, Take by mouth, Disp: , Rfl:     Allergies   Allergen Reactions    Cephalexin Rash    Latex Rash     Reaction Date: 21Mar2012;     Molds & Smuts Allergic Rhinitis    Other Allergic Rhinitis and Cough     Cigarette     Risperidone Palpitations Reaction Date: 21Mar2012;     Sertraline Itching     Reaction Date: 21Mar2012;     Soy Isoflavones        Physical Exam:    /76 (BP Location: Left arm, Patient Position: Sitting, Cuff Size: Standard)   Pulse 78   Temp 97 6 °F (36 4 °C)   Ht 5' 6" (1 676 m)   Wt 102 kg (224 lb)   BMI 36 15 kg/m²     Constitutional: normal, well developed, well nourished, alert, in no distress and non-toxic and no overt pain behavior  and obese  Eyes: anicteric  HEENT: grossly intact  Neck: supple, symmetric, trachea midline and no masses   Pulmonary:even and unlabored  Cardiovascular:No edema or pitting edema present  Skin:Normal without rashes or lesions and well hydrated  Psychiatric:Mood and affect appropriate  Neurologic:Cranial Nerves II-XII grossly intact  Musculoskeletal:The patient's gait was slightly antalgic, but steady without the use of any assistive devices      Imaging    XRAY RIGHT HAND; LEFT HAND 3/12/2021     INDICATION:   M79 641: Pain in right hand  M79 642: Pain in left hand      COMPARISON:  None     VIEWS:  XR HAND 3+ VW RIGHT, XR HAND 3+ VW LEFT   Images: 6     For the purposes of institution wide universal language the following terms will apply: (thumb=1st digit/finger, index finger=2nd digit/finger, long finger=3rd digit/finger, ring=4th digit/finger and small finger=5th digit/finger)     FINDINGS: (Right hand, left hand)     There is no acute fracture or dislocation      There are degenerative changes in the DIP joints of most of the fingers bilaterally      No lytic or blastic osseous lesion      Soft tissues are unremarkable      IMPRESSION:     No acute osseous abnormality      Degenerative changes as described    XRAY RIGHT HAND; LEFT HAND 3/12/2021     INDICATION:   M79 641: Pain in right hand  M79 642: Pain in left hand      COMPARISON:  None     VIEWS:  XR HAND 3+ VW RIGHT, XR HAND 3+ VW LEFT   Images: 6     For the purposes of institution wide universal language the following terms will apply: (thumb=1st digit/finger, index finger=2nd digit/finger, long finger=3rd digit/finger, ring=4th digit/finger and small finger=5th digit/finger)     FINDINGS: (Right hand, left hand)     There is no acute fracture or dislocation      There are degenerative changes in the DIP joints of most of the fingers bilaterally      No lytic or blastic osseous lesion      Soft tissues are unremarkable      IMPRESSION:     No acute osseous abnormality      Degenerative changes as described  XRAY LEFT ANKLE 3/11/2021     INDICATION:   M25 572: Pain in left ankle and joints of left foot      COMPARISON:  None     VIEWS:  XR ANKLE 3+ VW LEFT         FINDINGS:     There is no acute fracture or dislocation      Calcaneal spur(s) noted      No lytic or blastic osseous lesion      Soft tissues are unremarkable      IMPRESSION:     No acute osseous abnormality      XRAY RIGHT KNEE 2/26/2021      INDICATION:   M25 562: Pain in left knee      COMPARISON:  Concurrently performed radiographs of the contralateral knee      VIEWS:  XR KNEE 3 VW RIGHT NON INJURY         FINDINGS:     There is no acute fracture or dislocation      There is no joint effusion      No significant degenerative changes      No lytic or blastic osseous lesion      Soft tissues are unremarkable      IMPRESSION:     No acute osseous abnormality      XRAY LEFT KNEE 2/26/2021     INDICATION:   M25 562: Pain in left knee      COMPARISON:  Concurrently performed radiographs of the contralateral knee      VIEWS:  XR KNEE 4+ VW LEFT INJURY         FINDINGS:     There is no acute fracture or dislocation      There is no joint effusion      No significant degenerative changes      No lytic or blastic osseous lesion      Soft tissues are unremarkable      IMPRESSION:     No acute osseous abnormality      XRAY RIGHT SHOULDER 2/26/2021     INDICATION:   M25 511: Pain in right shoulder      COMPARISON:  None     VIEWS:  XR SHOULDER 2+ VW RIGHT        FINDINGS:     There is no acute fracture or dislocation      No significant degenerative changes      No lytic or blastic osseous lesion      Soft tissues are unremarkable      IMPRESSION:     No acute osseous abnormality

## 2021-04-27 NOTE — PROGRESS NOTES
Daily Note     Today's date: 2021  Patient name: Deedee Talavera  : 1955  MRN: [id number]  Referring provider: KARIN Andre  Dx:   Encounter Diagnosis     ICD-10-CM    1  Acute pain of right knee  M25 561    2  Closed nondisplaced fracture of left patella with routine healing, unspecified fracture morphology, subsequent encounter  [id number] [id number]    3  Imbalance  R26 89    4  Acute pain of right shoulder  M25 511    5  Acute pain of left knee  M25 562                   Subjective: Pt reports having a little bit more pain than usual in both knees and R shldr   "I think today is a bad fibromyalgia day "  Notes she say her doctor this AM, who advised her the best way to manage pain is to keep moving and perform HEP   "Constant" cramping along R medial thigh  Has noticed much improvement since starting    Felt less "sore" following completion of today's treatment  Objective: See treatment diary below      Assessment: Tolerated treatment well  Continued with program as outlined below  No progressions made today d/t subjective comments  TTP and soft tissue restrictions present along distal aspect of R medial thigh, which began to resolve with manual STM  Cramping present in L HS during ball squeeze with bridge, additional reps held and ball squeeze in sitting performed instead  Would benefit from returning to performance of bridge NV  Patient demonstrated fatigue post treatment and would benefit from continued PT  Plan: Continue per plan of care        DDx: R shoulder pain s/p fall 2020; L knee non displaced patellar fx, distal pole; imbalance; L ankle ATFL sprain  EPOC: 21  CO-MORBIDITIES: depression, fibromyalgia, chronic fatigue syndrome; s/p cervical fusion, LBP  PERSONAL FACTORS:  Precautions: high pain levels; very tender to light touch    Manuals 3/26 3/30 4    RE- Eval    th         L ankle MFR/ PROM  JK JM     th AFB    K tape stirrup B knee PROM/ MFR  JK JM     th AFB      10' 10' 30'    10' 10'    Neuro Re-Ed             Sidestepping on level and foam beam    2 laps ea D/c        Rhomberg FT EO on foam    1'  D/c        Rhomberg FT EC on foam    30"x2 D/c        Fw walking over hurdles    2 laps D/c        Sidestep over hurdles    2 laps D/c        4 square hurdles    x2 B D/c                     Ther Ex             LAQ 10"x10 1 5# 10"x10 2#  10"x10 ea  2# 10"x15 ea 2# 10"x15 ea 2# 10"x15 ea 2 5# 10x10" 2 5# 10x10"    Quad sets 10x10" 10"x10 10"x10          Heel slides 10 10 10     10 10    Supine SLR 15 1 5# 10 2#  10x ea  2# 15xea 2# 15xea 2#   2x10ea 2 5# x10 2 5# x10    S/l hip abduction 15 1 5# 10 2#  10x ea  2# 15xea 2# 15xea 2#  2x10ea 2 5#x10 2 5#  x10    Seated HS stretch 20"x3 20"x3 20"x3  20"x3 ea 20"x3 ea 20"x3 ea 20"x3 B 20"x3 B    Seated ball squeeze 5"x10 5"x10 5"x10  5"x15 5"x15 5"  x20 5"x7 w/ bridge Sitting  5"x15 W/  bridge   TB LPD       PTB  x10 OTBx10 OTB  x10    TB row         PTB   x10 Ox10 OTB  x10    B ankle TB 4 way 20 20 20 TB NV seated x10 ea supine  OTB  3"x15ea supine  OTB  3"x15ea GTB x10 ea GTB x10 ea    B ankle circles,; CW, CCW 20 20 20 HEP         DLS: fall outs 10 OTB 10"x10 OTB  10"x10          Standing HR        10 nv    Standing gastroc stretch             bike     5' 5' 5' 5' 5'    Pulleys     2'/2' 2'/2' 2'/2' 2'/2' 2'/2'                 Modalities             TENS w/ MH post tx

## 2021-04-28 PROBLEM — G89.29 CHRONIC BILATERAL THORACIC BACK PAIN: Status: ACTIVE | Noted: 2021-04-28

## 2021-04-28 PROBLEM — M54.2 CHRONIC NECK PAIN: Status: ACTIVE | Noted: 2021-04-28

## 2021-04-28 PROBLEM — M25.50 CHRONIC JOINT PAIN: Status: ACTIVE | Noted: 2021-04-28

## 2021-04-28 PROBLEM — M54.50 CHRONIC BILATERAL LOW BACK PAIN WITHOUT SCIATICA: Status: ACTIVE | Noted: 2021-04-28

## 2021-04-28 PROBLEM — M79.18 MYOFASCIAL PAIN SYNDROME: Status: ACTIVE | Noted: 2021-04-28

## 2021-04-28 PROBLEM — G89.29 CHRONIC NECK PAIN: Status: ACTIVE | Noted: 2021-04-28

## 2021-04-28 PROBLEM — G89.29 CHRONIC JOINT PAIN: Status: ACTIVE | Noted: 2021-04-28

## 2021-04-28 PROBLEM — G89.29 CHRONIC BILATERAL LOW BACK PAIN WITHOUT SCIATICA: Status: ACTIVE | Noted: 2021-04-28

## 2021-04-28 PROBLEM — M54.6 CHRONIC BILATERAL THORACIC BACK PAIN: Status: ACTIVE | Noted: 2021-04-28

## 2021-04-29 ENCOUNTER — APPOINTMENT (OUTPATIENT)
Dept: OCCUPATIONAL THERAPY | Facility: CLINIC | Age: 66
End: 2021-04-29
Payer: MEDICARE

## 2021-04-29 ENCOUNTER — APPOINTMENT (OUTPATIENT)
Dept: PHYSICAL THERAPY | Facility: CLINIC | Age: 66
End: 2021-04-29
Payer: MEDICARE

## 2021-04-29 ENCOUNTER — OFFICE VISIT (OUTPATIENT)
Dept: OBGYN CLINIC | Facility: CLINIC | Age: 66
End: 2021-04-29
Payer: MEDICARE

## 2021-04-29 VITALS
DIASTOLIC BLOOD PRESSURE: 70 MMHG | BODY MASS INDEX: 36 KG/M2 | SYSTOLIC BLOOD PRESSURE: 130 MMHG | WEIGHT: 224 LBS | HEIGHT: 66 IN

## 2021-04-29 DIAGNOSIS — S93.492D SPRAIN OF ANTERIOR TALOFIBULAR LIGAMENT OF LEFT ANKLE, SUBSEQUENT ENCOUNTER: Primary | ICD-10-CM

## 2021-04-29 PROCEDURE — 99213 OFFICE O/P EST LOW 20 MIN: CPT | Performed by: ORTHOPAEDIC SURGERY

## 2021-04-29 NOTE — PATIENT INSTRUCTIONS
Continue home exercise program after you are discharged by the physical therapist    Continue compression stockings as needed for swelling control     [employer], [employer], [employer] are good brands but I recommend going to a dedicate shoe store (not [facility] [employer] or [employer] ) At these types of stores, they have experts that can fit you for shoes appropriate for your foot problem  [employer]  [address] [place] [address], [address] [facility], [address] [place], [address] [facility]    [employer]   [address]   [facility], [address]  [address] [facility], [address] [place] [address]     [employer]  [address] [facility] [address], [place], [address] [place] [address] [address] [facility], [address] [place] [address]    [employer]  [address], [place]

## 2021-04-29 NOTE — PROGRESS NOTES
Davis Carrel, M D  Attending, Orthopaedic Surgery  [facility] [address] [address] [facility]      ORTHOPAEDIC FOOT AND ANKLE CLINIC VISIT     Assessment:     Encounter Diagnosis   Name Primary?  Sprain of anterior talofibular ligament of left ankle, subsequent encounter Yes            Plan:   · The patient verbalized understanding of exam findings and treatment plan  We engaged in the shared decision-making process and treatment options were discussed at length with the patient  Surgical and conservative management discussed today along with risks and benefits  · Continue home exercise program after discharged by physical therapist  · Discussed importance of supportive shoe wear  · Continue compression stocking as needed for swelling control   · If her right foot pain worsens and she has daily walking pain, we will be happy to see her back for evaluation and we will obtain x-rays of the right foot  She recently retired and states that her pain has improved now that she does not need to stand on her feet 8+ hours per day  Return if symptoms worsen or fail to improve  History of Present Illness:   Chief Complaint: Follow up left ankle    Sukh Liu is a 78 y o  female who is being seen in follow-up for left ATFL sprain and ankle weakness  When we last saw she we recommended PT to focus on ankle strengthening  Pain has significantly improved  She denies any residual pain in the left ankle  She does note pain in her right 1st metatarsal when she has been on her feet 8+ hours  She does not have daily walking pain  She had bunion correction surgery by Dr Mandi Shirley in 2002        Pain/symptom timing:  Worse during the day when active  Pain/symptom context:  Worse with activites and work  Pain/symptom modifying factors:  Rest makes better, activities make worse  Pain/symptom associated signs/symptoms: none    Prior treatment   · NSAIDsYes   · Injections No   · Bracing/Orthotics No · Physical Therapy Yes     Orthopedic Surgical History:   See below     Past Medical, Surgical and Social History:  Past Medical History:  has a past medical history of Anxiety, Arthritis, Basal cell carcinoma (BCC), Bipolar disorder ([facility] ), Cervical spinal stenosis, Chronic constipation, Chronic fatigue syndrome, Chronic hyperglycemia, Chronic pain syndrome, Depression, Essential hypertriglyceridemia, Fibromyalgia, GERD (gastroesophageal reflux disease), Herpes simplex infection, Hypokalemia, Insomnia, Median neuropathy, Pneumonia, and Sacroiliitis ([facility] )  Problem List: does not have any pertinent problems on file  Past Surgical History:  has a past surgical history that includes Reduction mammaplasty; Cervical fusion; Colonoscopy (10/23/2013); Dilation and curettage of uterus; Hallux valgus correction; Hemorroidectomy; Neuroplasty / transposition median nerve at carpal tunnel; Tooth extraction; Tonsillectomy; and Tubal ligation  Family History: family history includes Arthritis in her family and mother; Coronary artery disease in her father and paternal grandfather; Diabetes in her family and paternal grandmother; Heart attack in her family and father; Heart disease in her family; Hyperlipidemia in her father; Hypertension in her family and mother; Melanoma in her father; No Known Problems in her daughter, maternal grandfather, and maternal grandmother; Osteoporosis in her family and mother; Sudden death in her mother  Social History:  reports that she has never smoked  She has never used smokeless tobacco  She reports that she does not drink alcohol or use drugs  Current Medications: has a current medication list which includes the following prescription(s): aripiprazole, ascorbic acid, bisacodyl, calcium, cholecalciferol, diclofenac sodium, docusate sodium, duloxetine, loratadine, fish oil, and turmeric curcumin    Allergies: is allergic to cephalexin; latex; molds & smuts; other; risperidone; sertraline; and soy isoflavones  Review of Systems:  General- denies fever/chills  HEENT- denies hearing loss or sore throat  Eyes- denies eye pain or visual disturbances, denies red eyes  Respiratory- denies cough or SOB  Cardio- denies chest pain or palpitations  GI- denies abdominal pain  Endocrine- denies urinary frequency  Urinary- denies pain with urination  Musculoskeletal- Negative except noted above  Skin- denies rashes or wounds  Neurological- denies dizziness or headache  Psychiatric- denies anxiety or difficulty concentrating    Physical Exam:   /70   Ht 5' 6" (1 676 m)   Wt 102 kg (224 lb)   BMI 36 15 kg/m²   General/Constitutional: No apparent distress: well-nourished and well developed  Eyes: normal ocular motion  Lymphatic: No appreciable lymphadenopathy  Respiratory: Non-labored breathing  Vascular: No edema, swelling or tenderness, except as noted in detailed exam   Integumentary: No impressive skin lesions present, except as noted in detailed exam   Neuro: No ataxia or tremors noted  Psych: Normal mood and affect, oriented to person, place and time  Appropriate affect  Musculoskeletal: Normal, except as noted in detailed exam and in HPI  Examination    Left    Gait Normal   Musculoskeletal Nontender to palpation     Skin Normal       Nails Normal    Range of Motion  20 degrees dorsiflexion, 40 degrees plantarflexion  Subtalar motion: normal    Stability Stable    Muscle Strength 5/5 tibialis anterior  5/5 gastrocnemius-soleus  5/5 posterior tibialis  5/5 peroneal/eversion strength  5/5 EHL  5/5 FHL    Neurologic Normal    Sensation  Intact to light touch throughout sural, saphenous, superficial peroneal, deep peroneal and medial/lateral plantar nerve distributions  Bel Alton-Daniel 5 07 filament (10g) testing  deferred      Cardiovascular Brisk capillary refill < 2 seconds,intact DP and PT pulses    Special Tests None      Imaging Studies:   No new imaging    Scribe Attestation    I,: Bety Barrow PA-C am acting as a scribe while in the presence of the attending physician :       I,:  Jose Parker MD personally performed the services described in this documentation    as scribed in my presence :               Jay Rower Lachman, MD  Foot & Ankle Surgery   Department [address]      I personally performed the service  Jay Rower Lachman, MD

## 2021-04-30 ENCOUNTER — EVALUATION (OUTPATIENT)
Dept: PHYSICAL THERAPY | Facility: CLINIC | Age: 66
End: 2021-04-30
Payer: MEDICARE

## 2021-04-30 ENCOUNTER — OFFICE VISIT (OUTPATIENT)
Dept: OCCUPATIONAL THERAPY | Facility: CLINIC | Age: 66
End: 2021-04-30
Payer: MEDICARE

## 2021-04-30 DIAGNOSIS — M19.041 PRIMARY OSTEOARTHRITIS OF BOTH HANDS: Primary | ICD-10-CM

## 2021-04-30 DIAGNOSIS — M25.561 ACUTE PAIN OF RIGHT KNEE: Primary | ICD-10-CM

## 2021-04-30 DIAGNOSIS — S82.002D CLOSED NONDISPLACED FRACTURE OF LEFT PATELLA WITH ROUTINE HEALING, UNSPECIFIED FRACTURE MORPHOLOGY, SUBSEQUENT ENCOUNTER: ICD-10-CM

## 2021-04-30 DIAGNOSIS — M19.042 PRIMARY OSTEOARTHRITIS OF BOTH HANDS: Primary | ICD-10-CM

## 2021-04-30 DIAGNOSIS — R26.89 IMBALANCE: ICD-10-CM

## 2021-04-30 DIAGNOSIS — S93.492D SPRAIN OF ANTERIOR TALOFIBULAR LIGAMENT OF LEFT ANKLE, SUBSEQUENT ENCOUNTER: ICD-10-CM

## 2021-04-30 DIAGNOSIS — M25.562 ACUTE PAIN OF LEFT KNEE: ICD-10-CM

## 2021-04-30 PROCEDURE — 97110 THERAPEUTIC EXERCISES: CPT | Performed by: PHYSICAL THERAPIST

## 2021-04-30 PROCEDURE — 97110 THERAPEUTIC EXERCISES: CPT | Performed by: OCCUPATIONAL THERAPIST

## 2021-04-30 PROCEDURE — 97140 MANUAL THERAPY 1/> REGIONS: CPT | Performed by: PHYSICAL THERAPIST

## 2021-04-30 PROCEDURE — 97140 MANUAL THERAPY 1/> REGIONS: CPT | Performed by: OCCUPATIONAL THERAPIST

## 2021-04-30 NOTE — PROGRESS NOTES
Daily Note     Today's date: 2021  Patient name: Chinyere Alonzo  : 1955  MRN: [id number]  Referring provider: KARIN Williamson  Dx:   Encounter Diagnosis     ICD-10-CM    1  Primary osteoarthritis of both hands  M19 041     M19 042                   Subjective: I am doing better, L is still bothering me      Objective: See treatment diary below               Assessment  Assessment details: Lenin Wheat presents  S/p fall with residual  Pain and weakness  She has intrinsic tightness and flexor tightness   She has weak   and pinch   She is limited with use of B hands for ADL , housekeeping , meal prep and work   She lives alone   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   [address] [place] has improved pain   She has increased strength   She has improved ADL for self care and meal prep   Fatigue limits prolonged ADL  Impairments:    pain with function and weight-bearing intolerance  Functional limitations: limited with bottles , jars , lifting   Symptom irritability: moderateUnderstanding of Dx/Px/POC: good   Prognosis: good    Goals  STG- 2wks  1- I with HEP- met   2- improve worst pain 8/10- met     LTG- 4 wks  1- no intrinsic tightness/ext tightness- met   2- improve pinch strength 3 # - met   3- worst pain 3/10 - partially met   4- I ADL - self care, meal prep  -met     Plan - discharge         Subjective Evaluation    History of Present Illness  Date of onset: 2021  Mechanism of injury: Lenin Wheat fell onto B hands in January and has had hand pain since   She also reports shoulder pain    She is receiving PT for her shoulders  Quality of life: fair    Pain  Current pain rating: 3  At best pain ratin  At worst pain ratin previous  10  Location: B hands   Quality: dull ache, cramping and needle-like  Relieving factors: rest  Progression: no change    Social Support  Steps to enter house: yes  Stairs in house: yes   Lives in: multiple-level home  Lives with: alone    Employment status: working  Hand dominance: right    Treatments  No previous or current treatments  Patient Goals  Patient goals for therapy: decreased edema, increased strength, independence with ADLs/IADLs and return to work  Patient goal: Improve use of hands without pain; do caligraphy         Objective     Observations     Additional Observation Details  Early  DJD in IPs    Palpation     Additional Palpation Details  C/o tenderness Ips , CMC , wrists B     Neurological Testing     Additional Neurological Details  C/o parasthesias intermittently     2 point 5mm all      Active Range of Motion     Left Wrist   Wrist flexion: 50 previous 40 degrees   Wrist extension: 72 degrees     Right Wrist   Wrist flexion:  50 previous 40 degrees   Wrist extension: 75 degrees     Additional Active Range of Motion Details  Thumb and digit ROM WNL B     Strength/Myotome Testing     Left Wrist/Hand   Normal wrist strength     (2nd hand position)     Trial 1:38 previous  20    Thumb Strength  Key/Lateral Pinch     Trial 1:10 previous 2  Palmar/Three-Point Pinch     Trial 1:8 previous 2    Right Wrist/Hand   Normal wrist strength     (2nd hand position)     Trial 1:45 previous  38    Thumb Strength   Key/Lateral Pinch     Trial 1: 11 previous 2  Palmar/Three-Point Pinch     Trial 1: 9 previous 5    Tests     Left Wrist/Hand    extrinsic flexor tightness and intrinsic muscle tightness  - RESOLVED   Right Wrist/Hand     extrinsic flexor tightness and intrinsic muscle tightness  - RESOLVED  Negative CMC grind       Swelling     Left Wrist/Hand   Circumference wrist: 16 7 previous 17 cm    Right Wrist/Hand   Circumference wrist: 16 8 previous 17 cm        Plan: discharge - pt will contact MD if she flares     Dx: R shoulder pain s/p fall 12/2020; L knee non displaced patellar fx, distal pole; imbalance; L ankle ATFL sprain  EPOC: 5/20/21  CO-MORBIDITIES: depression, fibromyalgia, chronic fatigue syndrome; s/p cervical fusion, LBP  PERSONAL FACTORS:  Precautions: high pain levels; very tender to light touch          Manuals 4/30            graston B hands 4m            MOB Ips/MCPs/wrist 4m            Intrinsic/flexor stretches 4m                         HEP 3x day             Behavior mod discussed 4m                                                                                          Ther Ex             TGE  2x10            A/PROM wrist 2x10            Wrist e/f  2#  3x10            flexgrip ext  Yellow  3x10            powerweb  Green  4x10                                                   Ther Activity                                       Gait Training                                       Modalities             MH  10m                                Manuals 3/19 3/23 3/26 3/30 4/8 4/13 4/16 4/23 4/27    graston B hands 8m 6m 6m 6m 6m 4m 4m 4m 4m    MOB Ips/MCPs/wrist 6m 4m 4m 4m 4m 4m 3m 3m 3m    Intrinsic/flexor stretches 4m 4m 4m 4m 4m 4m 3m 3m 3m                 HEP 3x day             Behavior mod discussed 4m                                                                                          Ther Ex             TGE  2x10 2x10 2x10 3x10 3x10 3x10 3x10 3x10 3x10    A/PROM wrist 2x10 2x10 2x10 3x10 3x10 3x10 3x10 3x10 2x10    Wrist ext B  1#  3x10 1#  3x10 2#  3x10 2#  3x10 2#  3x10 2#  3x10 2#  3x10 2#  3x10    Flex  ext  Yellow  3x10 Yellow  3x10 Yellow  3x10 Yellow  3x10 yellow  3x10 yellow  3x10 yellow  3x10 Yellow  3x10    powergrip       Red  3x10 Red  4x10 Red  4x10    flexbar P/S         Yellow  3x10                              Ther Activity                                       Gait Training                                       Modalities               10m 10m 10m 10m 10m 10m 8m 8m 8m    Cp 5m 5m 5m  5m 5m 5m 5m 5m

## 2021-04-30 NOTE — PROGRESS NOTES
PT Re-Evaluation     Today's date: 2021  Patient name: Crys Hazel  : 1955  MRN: [id number]  Referring provider: KARIN Keller  Dx:   Encounter Diagnosis     ICD-10-CM    1  Acute pain of right knee  M25 561    2  Closed nondisplaced fracture of left patella with routine healing, unspecified fracture morphology, subsequent encounter  S82 002D    3  Imbalance  R26 89    4  Acute pain of left knee  M25 562    5  Sprain of anterior talofibular ligament of left ankle, subsequent encounter  S93 492D                   Assessment  Assessment details: Since starting skilled PT, pain levels are decreased, ROM improved, LE strength improved with good functional progress  Recommend pt be discharged to an independent HEP at this time  Impairments: abnormal or restricted ROM and pain with function  Understanding of Dx/Px/POC: good   Prognosis: fair    Goals  STG's ( 3-4 weeks)  1  Pt will be independent in HEP--met  2  Decrease pain to 5/10 at best-met  LTG's ( 6- 8 weeks)  1  Improve FOTO score by 8-10 points-met  2  Improve R shoulder ROM to WFL's-met  3  Improve R shoulder strength by 1/2 grade-met  4  Pt will be able to complete overhead activities with greater ease-met  5  Pt will have improved tolerance for reaching behind her back-partial met  6  Pt will be able to go up and down the steps with less pain to get into her home-met  7  Improve L knee flexion ROM to WFL's-met  8  Improve L LE strength by 1/2 grade-met  9  Improve L ankle ROM to WFL's-met  10  Improve L ankle strength by 1/2 grade-partial met    Plan  Frequency: D/c to HEP  Plan of Care beginning date: 2021  Plan of Care expiration date: 2021  Treatment plan discussed with: patient        Subjective Evaluation    History of Present Illness  Mechanism of injury: I E: Pt reports she fell on 20, which caused R shoulder pain  Pt notes the floor was uneven and she can't see well and needs cataract surgery   Pt has a history of cervical spine fusion  Pt reports her pain is gradually worsening in her shoulder and has worsened with shoveling snow  Pt has increased pain and difficulty putting her R arm behind her back, lifting and reaching overhead  Pt is able to drive  Pt is not able to hook her bra  Pt has not noticed pain with self care activities  Pt was a  at [employer] and felt increased soreness with opening bags and scanning items  3/2/21: Pt reports she can do activities easier, but it still hurts  Pt is able to reach overhead with greater ease  Pt continues to have difficulty hooking her bra behind her back  Pt is able to lift a grocery bag and laundry basket  Pt is now working at [employer], and it is difficult because it is a fast pace job  Pt reports L knee pain s/p fall  X-testing did not show a fracture at first  MD took more X-rays and it showed L knee nondisplaced patellar fx,distal pole  Pt was give a brace for her L knee  When she wears the brace on her L knee, it causes her R knee to hurt  Pt has increased pain and difficulty putting on shoes, when crossing her ankle over her opposite knee  Pt has increased knee pain when standing at work  Pt has increased pain when going up and down the steps  Pt needs to carry laundry and grocery bags up an down the steps and pt says " it's killing me"  Pt has increased pain when walking for long time periods  3/12/21: Pt is arriving to skilled PT with a new diagnosis of L ATFL sprain  Pt has increased pain when standing 5 hours at work  Pt has increased pain walking in the grocery store  Pt has increased pain when putting on her shoes or when she reaches overhead and goes up on her tip toes  4/8/21: Pt has increased pain with sleeping activities and is not sleeping > 5 hours  Pt reports her ankles seem to be improving  At times her bunion surgery activate pain, but is other wise is doing well  Pt has pain when she takes off the ankle brace   Pt is not currently working  Pt does not have ankle pain standing and walking activities  Pt notes her L knee feels better after the injection, and she wants to give an injection on the R knee  Pt has increased R knee pain with walking, standing and going up and down the steps  Knee pain can occur when sitting  Pt now has a "buggy" for her bags  4/3/0/21; Pt reports she is feeling good with her ankles  Pt has some pain in her R bunion if she stands too long  R knee is feeling better after the injection  Her L knee hurts after she stands too long  Pt is doing better in general with walking and going up and down the steps  Work: works at [employer]; calligraphy  Gait: slow speed  Pain  At best pain rating: 3  At worst pain ratin  Location: R shoulder; L knee best: 0/10  worst: 3/10; R knee: best: 0/10  worst: 1/10; L ankle  0/10 best   1/10 worst  R ankle: 0/10 best   4/10 worst  Quality: sharp  Aggravating factors: lifting and overhead activity    Social Support    Employment status: working    Diagnostic Tests  No diagnostic tests performed  Treatments  No previous or current treatments  Patient Goals  Patient goals for therapy: decreased pain  Patient goal: to have better ROM in my arm; less pain when when going up and down the steps          Objective     Neurological Testing     Sensation     Shoulder   Left Shoulder   Intact: light touch    Right Shoulder   Intact: light touch    Knee   Left Knee   Intact: light touch    Right Knee   Intact: light touch     Reflexes   Left   Biceps (C5/C6): normal (2+)  Brachioradialis (C6): normal (2+)  Triceps (C7): normal (2+)  Achilles (S1): normal (2+)    Right   Biceps (C5/C6): normal (2+)  Brachioradialis (C6): normal (2+)  Triceps (C7): normal (2+)  Achilles (S1): normal (2+)    Active Range of Motion   Cervical/Thoracic Spine       Cervical    Flexion: 30 degrees   Extension: 50 degrees      Left lateral flexion: 20 degrees      Right lateral flexion: 15 degrees      Left rotation:  Restriction level: moderate  Right rotation:  Restriction level: moderate  Left Shoulder   Flexion: 120 degrees   Abduction: 110 degrees     Right Shoulder   Flexion: 150 degrees   Abduction: 125 degrees   External rotation 45°: 85 degrees   Internal rotation 45°: 90 degrees   Left Knee   Flexion: 120 degrees with pain  Extension: 0 degrees     Right Knee   Flexion: 120 degrees   Extension: 0 degrees   Left Ankle/Foot   Dorsiflexion (ke): 5 degrees   Plantar flexion: 65 degrees with pain  Inversion: 40 degrees with pain  Eversion: 20 degrees     Right Ankle/Foot   Dorsiflexion (ke): 10 degrees   Plantar flexion: 55 degrees   Inversion: 45 degrees   Eversion: 20 degrees     Additional Active Range of Motion Details  Posture: pt sits with rounded shoulders/ forward head position  Hypersensitivity to light touch in R upper trap, rhomboid, scapular region   Pt is very tender to light touch  (+) TTP R posterior shoulder, lateral shoulder  Pt moves with guarded positioning  Pt is R hand dominant    3/2/21:   (+) TTP R medial knee/ patellar tendon  (+) TTP L medial and lateral joint line, patellar tendon  Mild edema L ankle upon visual inspection  (+) TTP L ATFL/ lateral malleolar region  (+) TTP R deltoid ligament/ medial malleolar region    Passive Range of Motion     Right Shoulder   Flexion: 140 degrees with pain  Abduction: 141 degrees with pain  External rotation 45°: 86 degrees   Internal rotation 45°: 90 degrees     Strength/Myotome Testing     Right Shoulder     Planes of Motion   Flexion: 4+ (NT)   Abduction: 4+ (NT)   External rotation at 0°: 5   Internal rotation at 0°: 5     Left Hip   Planes of Motion   Flexion: 4+  Abduction: 4+  External rotation: 5  Internal rotation: 5    Right Hip   Planes of Motion   Flexion: 4+ (pain)  Abduction: 4+  External rotation: 5  Internal rotation: 5    Left Knee   Flexion: 4+  Extension: 5    Right Knee   Flexion: 5  Extension: 5 (pain)    Left Ankle/Foot Dorsiflexion: 5  Plantar flexion: 3+  Inversion: 5  Eversion: 5    Right Ankle/Foot   Dorsiflexion: 5  Plantar flexion: 3+  Inversion: 5  Eversion: 5         DDx: R shoulder pain s/p fall 12/2020; L knee non displaced patellar fx, distal pole; imbalance; L ankle ATFL sprain  EPOC: 5/20/21  CO-MORBIDITIES: depression, fibromyalgia, chronic fatigue syndrome; s/p cervical fusion, LBP  PERSONAL FACTORS:  Precautions: high pain levels; very tender to light touch    Manuals 3/26 3/30 4/6 4/8 4/13 4/16 4/20 4/23 4/27 4/30   RE- Eval    [facility]   L ankle MFR/ PROM  [facility]    K tape stirrup             B knee PROM/ MFR  [facility] [employer]      10' 10' 30'    10' 10' 15'   Neuro Re-Ed             Sidestepping on level and foam beam    2 laps ea D/c        Rhomberg FT EO on foam    1'  D/c        Rhomberg FT EC on foam    30"x2 D/c        Fw walking over hurdles    2 laps D/c        Sidestep over hurdles    2 laps D/c        4 square hurdles    x2 B D/c                     Ther Ex             LAQ 10"x10 1 5# 10"x10 2#  10"x10 ea  2# 10"x15 ea 2# 10"x15 ea 2# 10"x15 ea 2 5# 10x10" 2 5# 10x10"    Standing adductor stretch 10x10" 10"x10 10"x10       20"x3   Heel slides 10 10 10     10 10    Supine SLR 15 1 5# 10 2#  10x ea  2# 15xea 2# 15xea 2#   2x10ea 2 5# x10 2 5# x10    S/l hip abduction 15 1 5# 10 2#  10x ea  2# 15xea 2# 15xea 2#  2x10ea 2 5#x10 2 5#  x10    Seated HS stretch 20"x3 20"x3 20"x3  20"x3 ea 20"x3 ea 20"x3 ea 20"x3 B 20"x3 B 20"x3 B   Seated ball squeeze 5"x10 5"x10 5"x10  5"x15 5"x15 5"  x20 5"x7 w/ bridge Sitting  5"x15 W/  bridge   TB LPD       PTB  x10 OTBx10 OTB  x10    TB row         PTB   x10 Ox10 OTB  x10    B ankle TB 4 way 20 20 20 TB NV seated x10 ea supine  OTB  3"x15ea supine  OTB  3"x15ea GTB x10 ea GTB x10 ea    B ankle circles,; CW, CCW 20 20 20 HEP         DLS: fall outs 10 OTB 10"x10 OTB  10"x10          Standing HR        10 nv    Standing gastroc stretch             bike     5' 5' 5' 5' 5'    Pulleys     2'/2' 2'/2' 2'/2' 2'/2' 2'/2' 2'/2'                Modalities             TENS w/ MH post tx

## 2021-05-01 ENCOUNTER — IMMUNIZATIONS (OUTPATIENT)
Dept: FAMILY MEDICINE CLINIC | Facility: HOSPITAL | Age: 66
End: 2021-05-01

## 2021-05-01 DIAGNOSIS — Z23 ENCOUNTER FOR IMMUNIZATION: Primary | ICD-10-CM

## 2021-05-01 PROCEDURE — 0001A SARS-COV-2 / COVID-19 MRNA VACCINE (PFIZER-BIONTECH) 30 MCG: CPT

## 2021-05-01 PROCEDURE — 91300 SARS-COV-2 / COVID-19 MRNA VACCINE (PFIZER-BIONTECH) 30 MCG: CPT

## 2021-05-26 ENCOUNTER — IMMUNIZATIONS (OUTPATIENT)
Dept: FAMILY MEDICINE CLINIC | Facility: HOSPITAL | Age: 66
End: 2021-05-26

## 2021-05-26 DIAGNOSIS — Z23 ENCOUNTER FOR IMMUNIZATION: Primary | ICD-10-CM

## 2021-05-26 PROCEDURE — 91300 SARS-COV-2 / COVID-19 MRNA VACCINE (PFIZER-BIONTECH) 30 MCG: CPT

## 2021-05-26 PROCEDURE — 0002A SARS-COV-2 / COVID-19 MRNA VACCINE (PFIZER-BIONTECH) 30 MCG: CPT

## 2021-07-07 ENCOUNTER — TELEPHONE (OUTPATIENT)
Dept: PSYCHIATRY | Facility: CLINIC | Age: 66
End: 2021-07-07

## 2021-07-07 NOTE — TELEPHONE ENCOUNTER
Rach is a former pt of Russ Holter   Please call to schedule she would like to continue with him in our office

## 2021-07-07 NOTE — TELEPHONE ENCOUNTER
Behavorial Health Outpatient Intake Questions    Referred by: Harmeet Wilson    Please advised interviewee that they need to answer all questions truthfully to allow for best care and any misrepresentations of information may affect their ability to be seen at this clinic   => Was this discussed? Yes     Behavorial Health Outpatient Intake History -     Presenting Problem (in patient's words): Anxiety and depression  Are there any developmental disabilities? ? If yes, can they speak to you on the phone? If they are too limited to speak to you on phone, refer out No    Are you taking any psychiatric medications? Yes    => If yes, who prescribes? If yes, are they injectable medications? Abilify, Cymbalta - Dr Kierra Luong     Does the patient have a language barrier or hearing impairment? No    Have you been treated at [address] [place] [address] by a therapist or a doctor in the past? If yes, who? No    Has the patient been hospitalized for mental health? Yes   If yes, how long ago was last hospitalization and where was it? Over 10 years ago  Do you actively use alcohol or marijuana or illegal substances? If yes, what and how much - refer out to Drug and alcohol treatment if use is excessive or daily use of illegal substances No concerns of substance abuse are reported  Do you have a community treatment team or ? [facility] - [facility] History-     Does the patient have any history of arrests, group home/long-term time, or DUIs? No  If Yes-  1) What types of charges? 2) When were they last incarcerated? 3) Are they currently on parole or probation? Minor Child-    Who has custody of the child? Is there a custody agreement? If there is a custody agreement remind parent that they must bring a copy to the first appt or they will not be seen       Intake Team, please check with provider before scheduling if flags come up such as:  - complex case  - legal history (other than DUI)  - communication barrier concerns are present  - if, in your judgment, this needs further review    ACCEPTED as a patient Yes  => Appointment Date: 07/28/2021 w/ Radu FREEMAN    Referred Elsewhere? No    Name of Insurance Co: Zahra Swartz ID# [id number]  Insurance Phone #  If ins is primary or secondary  If patient is a minor, parents information such as Name, D  O B of guarantor

## 2021-07-21 NOTE — TELEPHONE ENCOUNTER
Pt called she needs to also schedule with a Dr because they wont see her anymore due to her seeing Mariam Rued in our office   Please call

## 2021-07-28 ENCOUNTER — SOCIAL WORK (OUTPATIENT)
Dept: BEHAVIORAL/MENTAL HEALTH CLINIC | Facility: CLINIC | Age: 66
End: 2021-07-28
Payer: MEDICARE

## 2021-07-28 DIAGNOSIS — F31.81 BIPOLAR II DISORDER, MOST RECENT EPISODE MAJOR DEPRESSIVE (HCC): ICD-10-CM

## 2021-07-28 DIAGNOSIS — Z86.59 HISTORY OF POSTTRAUMATIC STRESS DISORDER (PTSD): Primary | ICD-10-CM

## 2021-07-28 PROCEDURE — 90791 PSYCH DIAGNOSTIC EVALUATION: CPT | Performed by: COUNSELOR
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
## 2021-07-28 NOTE — BH TREATMENT PLAN
Cecille Jose  1955       Date of Initial Treatment Plan: 07/28/2021   Date of Current Treatment Plan: 07/28/21    Treatment Plan Number 1     Strengths/Personal Resources for Self Care: Strong interests in her spirituality, her hobbies (like calligraphy), spending time with her dog Alyssa Oleary, engaging with family members, engaging with her Sikh and community    Diagnosis:   1  History of posttraumatic stress disorder (PTSD)     2  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, most recent episode major depressive ([address] )         Area of Needs: Lingering symptoms of PTSD      Long Term Goal 1: Stephany Cardona will report reduced symptoms of PTSD, including reduced symptoms of depression, also scoring lower than 38 on PCL-5  Target Date: 1/28/2022  Completion Date: n/a         Short Term Objectives for Goal 1: This writer will provide Stephany Cardona with PCT, CPT and CBT through empathic listening, validation of feelings, reflection of content and feelings, exploration of her spirituality, psychoeducation regarding intimacy, CPT work together  Stephany Cardona will engage in all homework prescribed, medication management, engage in work during sessions, explore her thoughts regarding her traumatic history, engage with social supports  GOAL 1: Modality: Individual 2x per month   Completion Date n/a, Medication Management and The person(s) responsible for carrying out the plan is  Stephany Cardona, this writer and her psychiatrist         [address]: Diagnosis and Treatment Plan explained to David Cherry relates understanding diagnosis and is agreeable to Treatment Plan  Client Comments : Please share your thoughts, feelings, need and/or experiences regarding your treatment plan:  In agreement

## 2021-08-09 ENCOUNTER — OFFICE VISIT (OUTPATIENT)
Dept: FAMILY MEDICINE CLINIC | Facility: CLINIC | Age: 66
End: 2021-08-09
Payer: MEDICARE

## 2021-08-09 VITALS
BODY MASS INDEX: 35.77 KG/M2 | HEIGHT: 66 IN | WEIGHT: 222.6 LBS | DIASTOLIC BLOOD PRESSURE: 82 MMHG | TEMPERATURE: 98.4 F | HEART RATE: 74 BPM | SYSTOLIC BLOOD PRESSURE: 140 MMHG | RESPIRATION RATE: 15 BRPM

## 2021-08-09 DIAGNOSIS — M25.551 RIGHT HIP PAIN: Primary | ICD-10-CM

## 2021-08-09 PROBLEM — L57.0 ACTINIC KERATOSIS: Status: ACTIVE | Noted: 2020-02-13

## 2021-08-09 PROCEDURE — 99214 OFFICE O/P EST MOD 30 MIN: CPT | Performed by: NURSE PRACTITIONER

## 2021-08-09 NOTE — PROGRESS NOTES
Assessment/Plan:     Diagnoses and all orders for this visit:    Right hip pain  -     Ambulatory referral to Physical Therapy; Future      Pt referral placed for patient  She can utilize Tylenol PRN for pain and alternate heat/ice applications  She is to contact our office for any persistent or worsening symptoms  Patient states they understand and agree with treatment plan  Pt to f/u PRN  Subjective:      Patient ID: Beth Paul is a 78 y o  female  Pt presents for right inner groin pain and thigh pain since May 2021  She notes the pain is sharp and is constant  She also notes some numbness and tingling to the thigh area  She denies any specific injury or cause of the pain  Pt admits that movement aggravates the pain  She notes that the pain is better laying down, but does not fully go away  She notes that Ibuprofen slightly improves the pain  Pt does have hx of fibromyalgia and chronic pain syndrome  She was undergoing PT for her knees s/p mechanical fall back in Fall 2020  The following portions of the patient's history were reviewed and updated as appropriate: allergies, current medications, past family history, past medical history, past social history, past surgical history and problem list     Review of Systems   Constitutional: Negative  Negative for activity change, appetite change, chills and fever  Respiratory: Negative  Cardiovascular: Negative  Negative for chest pain  Gastrointestinal: Negative  Negative for diarrhea and nausea  Genitourinary: Negative  Negative for decreased urine volume and difficulty urinating  Musculoskeletal: Positive for arthralgias (right hip pain constant since May 2021)  Negative for back pain and gait problem  Objective:      /82   Pulse 74   Temp 98 4 °F (36 9 °C) (Oral)   Resp 15   Ht 5' 5 75" (1 67 m)   Wt 101 kg (222 lb 9 6 oz)   BMI 36 20 kg/m²          Physical Exam  Vitals reviewed     Constitutional: General: She is not in acute distress  Appearance: Normal appearance  She is not ill-appearing  Cardiovascular:      Pulses: Normal pulses  Heart sounds: Normal heart sounds  No murmur heard  Pulmonary:      Effort: Pulmonary effort is normal  No respiratory distress  Breath sounds: Normal breath sounds  No wheezing  Musculoskeletal:         General: Tenderness (tenderness noted to right inner groin/thigh area) present  No swelling or signs of injury  Normal range of motion  Right lower leg: No edema  Left lower leg: No edema  Comments: Normal hip ROM including adduction, abduction, internal and external rotation, flexion and extension without pain   Skin:     Capillary Refill: Capillary refill takes less than 2 seconds  Neurological:      General: No focal deficit present  Mental Status: She is alert and oriented to person, place, and time  Mental status is at baseline  Motor: No weakness  Coordination: Coordination normal       Gait: Gait normal    Psychiatric:         Mood and Affect: Mood normal          Behavior: Behavior normal          Thought Content:  Thought content normal          Judgment: Judgment normal

## 2021-08-10 ENCOUNTER — TELEPHONE (OUTPATIENT)
Dept: PSYCHIATRY | Facility: CLINIC | Age: 66
End: 2021-08-10

## 2021-08-10 NOTE — TELEPHONE ENCOUNTER
Pt called wanting to speak to a manager however Stacey Lamas was gone for the day and I offered to step in and try to help  The Pt was very very upset that her appts were canceled  She has been seeing Johnny Montilla for 7 years now and she can't go with out seeing him  Pt left [facility] just to continue seeing Johnny Montilla  She doesn't want to really start over with a Provider but she guesses she doesn't have a choice  She said she needs to continue getting help or she will end up in the hospital      I explained the reasoning the appts where canceled were due to Credentials and medicaid  I told her that if she would start having any suicidal thoughts or wanted to harm herself to go to the local ED or call 9-1-1 or Crisis  Pt said that wasn't going to happen as she wasn't going to [facility]  If she went to [facility] she would kill herself first  I talked the Pt out of talking like that and explained we have other places besides [facility] such as [facility] that is near her  She said ok yeah that could work  I asked her before hanging up if she wanted the crisis number as she said she has a lot she needs to get off her chest and wanted to talk to Johnny Montilla about tomorrow  She said no she has a degree is psych and knows what will happen when you call Crisis number  Prior to hanging up Pt asked if there was any possible way she could get in with a Doctor sooner then her September appt as she really needs these services bad  Told her I would make note of it and mention to [facility] for if any cancellations come up they can keep her in mind  Left her know that someone from [facility] will also reach out to get her set up with a new Therapist as well

## 2021-08-11 ENCOUNTER — SOCIAL WORK (OUTPATIENT)
Dept: BEHAVIORAL/MENTAL HEALTH CLINIC | Facility: CLINIC | Age: 66
End: 2021-08-11
Payer: MEDICARE

## 2021-08-11 ENCOUNTER — TELEPHONE (OUTPATIENT)
Dept: PSYCHIATRY | Facility: CLINIC | Age: 66
End: 2021-08-11

## 2021-08-11 DIAGNOSIS — Z86.59 HISTORY OF POSTTRAUMATIC STRESS DISORDER (PTSD): Primary | ICD-10-CM

## 2021-08-11 DIAGNOSIS — F31.81 BIPOLAR II DISORDER, MOST RECENT EPISODE MAJOR DEPRESSIVE (HCC): ICD-10-CM

## 2021-08-11 PROCEDURE — 90834 PSYTX W PT 45 MINUTES: CPT | Performed by: COUNSELOR

## 2021-08-11 NOTE — PSYCH
Psychotherapy Provided: Individual Psychotherapy 45 minutes   1:00pm - 1:45pm  Length of time in session: 45 minutes, follow up in 1 week    No diagnosis found  Goals addressed in session: Goal 1     Pain:      none    0    Current suicide risk : Low     D: Processed recent phone calls, being upset that this writer was told about the inability to continue to see her  Discussed that practice administrators are looking into the issue to work it out, that as of this writing she may continue to see this writer  Says she has really settled into her new apartment  Discussed her experiences with her sister in law and her brother, going out to dinner with them this weekend  Reports she has been attending [employer] through her [employer], that she might end up re-learning guitar with a member named Mila Saul  Discussed her experiences with Belinda at the [address] [place] [address] and enjoyed her time with her  A: Ursula Tran appears to be in a euthymic mood with congruent affect, experiencing mild symptoms of PTSD through excessive guilt, thoughts of hopelessness and worthlessness at times, mild lethargy, excessive worry at times  No SI/HI apparent, no WANG apparent or reported  P: Check in with considering re-learning guitar through a teacher known by a [employer] member  Check in with having her grandchildren's numbers, going back to diner again enjoying time with Christal Barrera  Consider CPT in future  Behavioral Health Treatment Plan [facility]: Diagnosis and Treatment Plan explained to Concepcion Mtz relates understanding diagnosis and is agreeable to Treatment Plan   Yes

## 2021-08-11 NOTE — TELEPHONE ENCOUNTER
Pt LVM that she is very upset that Amanda LIMON can't see her due to her insurance  She said he saw her before and doesn' understand why she can't be seen by him  She said she want's a call back she needs to be seen  I spoke to Darren and she said Aby Hurley told her to hold off at the moment till he can get more information  I am forwarding this information to [facility] as an [place]  I did not call the pt back   Waiting for more information

## 2021-08-12 ENCOUNTER — EVALUATION (OUTPATIENT)
Dept: PHYSICAL THERAPY | Facility: CLINIC | Age: 66
End: 2021-08-12
Payer: MEDICARE

## 2021-08-12 DIAGNOSIS — M25.551 RIGHT HIP PAIN: Primary | ICD-10-CM

## 2021-08-12 PROCEDURE — 97110 THERAPEUTIC EXERCISES: CPT | Performed by: PHYSICAL THERAPIST

## 2021-08-12 PROCEDURE — 97161 PT EVAL LOW COMPLEX 20 MIN: CPT | Performed by: PHYSICAL THERAPIST

## 2021-08-12 NOTE — PROGRESS NOTES
PT Evaluation     Today's date: 2021  Patient name: Christiano Hinojosa  : 1955  MRN: [id number]  Referring provider: KARIN Stock  Dx:   Encounter Diagnosis     ICD-10-CM    1  Right hip pain  M25 551                   Assessment  Assessment details: Christiano Hinojosa is a 78 y o  female presenting to outpatient physical therapy at [place] [address] with complaints of R hip and groin pain   They present with decreased range of motion, decreased strength, limited flexibility, poor postural awareness, poor body mechanics, poor balance, decreased tolerance to activity and decreased functional mobility due to Right hip pain  (primary encounter diagnosis)  Jordnaa Stearns would benefit from skilled physical therapy to address noted impairments in order to allow for full functional return to work and ADL-related activities  Thank you for the referral!  Impairments: abnormal gait, abnormal muscle firing, abnormal or restricted ROM, activity intolerance, impaired balance, impaired physical strength, lacks appropriate home exercise program, pain with function and poor body mechanics  Barriers to therapy: n/a  Understanding of Dx/Px/POC: excellent  Goals  ST   Independent with HEP in 2 weeks  2  Decrease pain by 50% in 3 wks  3   Increase R ASLR ROM by 30 degrees in 3 weeks     LT  Achieve FOTO score of 38/100 in 6 weeks   2   Able to perform lumbar AROM all directions without pain in 6 weeks  3  Strength = 5/5 R hip in 6 weeks      Plan  Plan details: RE in 5 weeks    Patient would benefit from: skilled physical therapy  Planned modality interventions: cryotherapy, electrical stimulation/Russian stimulation and thermotherapy: hydrocollator packs  Planned therapy interventions: abdominal trunk stabilization, manual therapy, neuromuscular re-education, therapeutic activities, therapeutic exercise, body mechanics training and home exercise program  Frequency: 2x week  Duration in visits: 12  Duration in weeks: 6  Plan of Care beginning date: 8/12/2021  Plan of Care expiration date: 9/17/2021  Treatment plan discussed with: patient        Subjective Evaluation    History of Present Illness  Mechanism of injury: Pt c/o R interior thigh and groin pain as well as low back pain that is now radiating to her neck  Onset 3 months ago when she was doing a bridge exercise and she felt her pain begin  No imaging performed  Pain is rated 5/10 at rest, 10/10 with activity  Agg with dressing, showering, sit/stand, stairs  Eased with rest, leaning fwd  Has tried tylenol without relief  Intermittent numbness and tingling to R anterior thigh  Incontinence has worsened with this pain  Denies LE weakness, recent infection  H/o basal cell carcinoma  She lives alone in [facility]  Independent with all ADLs, driving  Driving is painful  She enjoys movies, calligraphy, playing guitar, arts/crafts  She has a h/o fibromyalgia and chronic pain syndrome though she does not think this is a flare up of her fibro symptoms  She has a h/o mechanical fall in the fall of 2020  Pt reports she injured her knees, shoulder and back and came to this clinic for PT    Patient Goals  Patient goals for therapy: decreased pain, improved balance, increased motion, return to sport/leisure activities, independence with ADLs/IADLs and increased strength          Objective     Postural Observations    Additional Postural Observation Details  Decreased lumbar lordosis    Tenderness     Additional Tenderness Details  TTP R lumbar paraspinals, SIJ, g max, g med, greater troch, proximal ITB, hip flexor, proximal quadriceps, adductor, medial knee joint line    (+) directional preference: flexion  (+) R slump test  (+) CAROL    R ASLR to 0 deg, unable secondary to hip flexion weakness  L ASLR to 80 deg    Glute, hip flexor, HS tightness bilaterally R>L    Neurological Testing     Sensation     Lumbar   Left   Intact: light touch    Right   Intact: light touch    Reflexes   Left   Patellar (L4): normal (2+)  Achilles (S1): normal (2+)    Right   Patellar (L4): normal (2+)  Achilles (S1): normal (2+)    Active Range of Motion     Lumbar   Flexion:  WFL  Extension:  with pain Restriction level: moderate  Left lateral flexion:  WFL  Right lateral flexion:  WFL and with pain  Left rotation:  WFL  Right rotation:  WFL and with pain  Mechanical Assessment    Cervical      Thoracic      Lumbar    Standing flexion: repeated movements   Pain location:no change  Standing extension: repeated movements  Pain intensity: worse    Strength/Myotome Testing     Left Hip   Planes of Motion   Flexion: 5  Abduction: 4+  Adduction: 4  External rotation: 5  Internal rotation: 5    Right Hip   Planes of Motion   Flexion: 3+  Abduction: 4  Adduction: 4  External rotation: 4+  Internal rotation: 4+    Left Knee   Flexion: 5  Extension: 5    Right Knee   Flexion: 4  Extension: 4+    Left Ankle/Foot   Dorsiflexion: 5    Right Ankle/Foot   Dorsiflexion: 5    Muscle Activation     Additional Muscle Activation Details  TA activation poor  Functional Assessment        Comments  Gait: antalgic, decreased gait speed, decreased spinal rotation, minimal arm swing    SLS: 30 sec supported bilaterally    Sit to stand: painful  Bed mobility: painful, increased time to perform             Precautions: h/o depression, fibromyalgia, chronic fatigue syndrome; s/p cervical fusion, LBP    HEP:  Access Code: [email or web address]  URL: [email or web address]  Date: 08/12/2021  Prepared by: Moni Dong    Exercises  Seated Lumbar Flexion Stretch - 10 reps  Seated Hamstring Stretch - 10 reps  Side Lunge Adductor Stretch - 10 reps  Standing Hip Flexor Stretch - 10 reps         8/12            Manuals             RLE LAD                                                    Neuro Re-Ed             TA activation             TA+march             TA+bridge Ther Ex             bike             Lumbar flexion arom seated x10            HS stretch seated x10 ea            Adductor stretch x10 ea            Hip flexor stretch Stand x10 ea            4-way hip             Sit to stand                                                                 Ther Activity                                       Gait Training                                       Modalities

## 2021-08-16 ENCOUNTER — OFFICE VISIT (OUTPATIENT)
Dept: URGENT CARE | Facility: CLINIC | Age: 66
End: 2021-08-16
Payer: MEDICARE

## 2021-08-16 ENCOUNTER — APPOINTMENT (OUTPATIENT)
Dept: RADIOLOGY | Facility: CLINIC | Age: 66
End: 2021-08-16
Payer: MEDICARE

## 2021-08-16 VITALS
WEIGHT: 222 LBS | BODY MASS INDEX: 36.99 KG/M2 | DIASTOLIC BLOOD PRESSURE: 82 MMHG | HEART RATE: 89 BPM | SYSTOLIC BLOOD PRESSURE: 157 MMHG | HEIGHT: 65 IN | RESPIRATION RATE: 20 BRPM | TEMPERATURE: 97.9 F | OXYGEN SATURATION: 96 %

## 2021-08-16 DIAGNOSIS — M25.551 RIGHT HIP PAIN: ICD-10-CM

## 2021-08-16 DIAGNOSIS — M25.551 RIGHT HIP PAIN: Primary | ICD-10-CM

## 2021-08-16 PROCEDURE — 73502 X-RAY EXAM HIP UNI 2-3 VIEWS: CPT

## 2021-08-16 PROCEDURE — 99213 OFFICE O/P EST LOW 20 MIN: CPT | Performed by: PHYSICIAN ASSISTANT

## 2021-08-16 PROCEDURE — G0463 HOSPITAL OUTPT CLINIC VISIT: HCPCS | Performed by: PHYSICIAN ASSISTANT

## 2021-08-16 RX ORDER — PREDNISONE 10 MG/1
TABLET ORAL
Qty: 21 TABLET | Refills: 0 | Status: SHIPPED | OUTPATIENT
Start: 2021-08-16 | End: 2021-08-30 | Stop reason: HOSPADM

## 2021-08-16 NOTE — PROGRESS NOTES
NAME: Zhou Nolan is a 78 y o  female  : 1955    MRN: [id number]      Assessment and Plan   Right hip pain [M25 551]  1  Right hip pain  XR hip/pelv 2-3 vws right if performed    Ambulatory referral to Orthopedic Surgery    predniSONE 10 mg tablet     Patient requesting x-rays  Right hip x-ray: no acute fractures visualized  Discussed with patient a trial of prednisone to help with pain  Encouraged ice heat and stretching  Referral placed for ortho - she should f/u with them  ER if anything changes or worsens  She acknowledges  Patient Instructions   Patient Instructions   Prednisone as directed  Ice and heat to the area  Light stretching   F/u with ortho- referral placed for you today   If anything changes or worsens go to the ER    Proceed to ER if symptoms worsen  Chief Complaint     Chief Complaint   Patient presents with    Hip Pain     Right inner thigh pain that radiates into knee  Was at PT on  that worsened the pain  Nothing taken for symptoms         History of Present Illness    Patient with history of anxiety/depression, fibromyalgia,  And GERD presents complaining of right hip pain times 3 months  States back in December she had a fall and had been attending physical therapy for her shoulder, knees and feet  States about 3 months ago she intermittently started to develop pain to the medial aspect of her right thigh which would radiate down to her knee  Denies any new injury or inciting event  States over the past few weeks the pain has become constant and is worst with any external rotation of the hip  Reports intermittent tingling to the toes  Denies any weakness in the leg  Denies any fevers or chills  States she took a dose of ibuprofen which did help with the pain so she stopped  Reports she saw her family doctor on Monday for the same thing who sent her to physical therapy    States on Thursday she had 1 session of physical therapy and has been doing her home exercise program since but states she feels it is making it worse  States the pain is better with lying and worse with any movements  Also reports some intermittent tingling to the right thigh  Review of Systems   Review of Systems   Constitutional: Negative for chills and fever  Respiratory: Negative for chest tightness, shortness of breath and wheezing  Cardiovascular: Negative for chest pain  Gastrointestinal: Negative for abdominal pain, diarrhea, nausea and vomiting  Musculoskeletal:        Right hip pain   Neurological: Negative for dizziness, light-headedness and headaches           Current Medications       Current Outpatient Medications:     ARIPiprazole (ABILIFY) 15 mg tablet, Take 15 mg by mouth daily at bedtime, Disp: , Rfl:     ascorbic acid (VITAMIN C) 500 mg tablet, Take 500 mg by mouth daily, Disp: , Rfl:     bisacodyl (DULCOLAX) 5 mg EC tablet, Take 1 tablet by mouth daily as needed, Disp: , Rfl:     Calcium 250 MG CAPS, Take 500 mg by mouth, Disp: , Rfl:     Cholecalciferol (CVS VITAMIN D) 2000 units CAPS, Take by mouth, Disp: , Rfl:     Diclofenac Sodium (VOLTAREN) 1 %, APPLY 2 G TOPICALLY 4 (FOUR) TIMES A DAY AS NEEDED (OSTEOARTHRITIS), Disp: 300 g, Rfl: 1    DULoxetine (CYMBALTA) 60 mg delayed release capsule, Take 1 capsule by mouth daily, Disp: , Rfl:     loratadine (CLARITIN) 10 mg tablet, Take 10 mg by mouth, Disp: , Rfl:     Turmeric Curcumin 500 MG CAPS, Take by mouth, Disp: , Rfl:     docusate sodium (COLACE) 100 mg capsule, Take 100 mg by mouth 2 (two) times a day (Patient not taking: Reported on 8/16/2021), Disp: , Rfl:     Omega-3 Fatty Acids (fish oil) 1,000 mg, Take 1,000 mg by mouth daily (Patient not taking: Reported on 8/16/2021), Disp: , Rfl:     predniSONE 10 mg tablet, Take 6 tablets today, 5 tablets tomorrow, 4 the next day, 3 the next day, 2 the following and 1 the last day- all with food, Disp: 21 tablet, Rfl: 0    Current Allergies     Allergies as of 08/16/2021 - Reviewed 08/16/2021   Allergen Reaction Noted    Cephalexin Rash 09/14/2015    Latex Rash 04/21/2012    Molds & smuts Allergic Rhinitis 03/11/2016    Other Allergic Rhinitis and Cough 04/30/2015    Risperidone Palpitations 04/21/2012    Sertraline Itching 04/21/2012    Soy isoflavones  07/30/2015              Past Medical History:   Diagnosis Date    Anxiety     Arthritis     Basal cell carcinoma (BCC)     Bipolar disorder ([facility] [address] )     Cervical spinal stenosis     last assessed 5/13/14, resolved 10/10/16    Chronic constipation     last assessed 8/28/12, resovled 9/14/15    Chronic fatigue syndrome     last assessed 8/28/12, resolved 9/14/15    Chronic hyperglycemia     resolved 9/14/15    Chronic pain syndrome     last assessed 11/12/13, resolved 10/10/16    Depression     Essential hypertriglyceridemia     resolved 10/10/16    Fibromyalgia     GERD (gastroesophageal reflux disease)     Herpes simplex infection     last assessed 12/12/13, resolved 10/10/16    Hypokalemia     last assessed 9/14/15, resolved 11/23/15    Insomnia     last assessed 3/30/15, resolved 11/23/15    Median neuropathy     last assessed 7/14/15, resolved 10/10/16    Pneumonia     last assessed 7/16/13, resolved 5/10/13    Sacroiliitis ([facility] [address] )     last assessed 10/22/13, resolved 10/27/14       Past Surgical History:   Procedure Laterality Date    CERVICAL FUSION      COLONOSCOPY  10/23/2013    10yrs     DILATION AND CURETTAGE OF UTERUS      HALLUX VALGUS CORRECTION      HEMORROIDECTOMY      NEUROPLASTY / TRANSPOSITION MEDIAN NERVE AT CARPAL TUNNEL      REDUCTION MAMMAPLASTY      TONSILLECTOMY      TOOTH EXTRACTION      TUBAL LIGATION         Family History   Problem Relation Age of Onset    Hypertension Mother     Osteoporosis Mother     Arthritis Mother     Sudden death Mother         brain aneurysm    Heart attack Father     Melanoma Father     Coronary artery disease Father    Selene Buckley Hyperlipidemia Father     Cancer Father     Diabetes Paternal Grandmother     Coronary artery disease Paternal Grandfather     Heart attack Family     Arthritis Family     Diabetes Family     Hypertension Family     Heart disease Family     Osteoporosis Family     No Known Problems Daughter     No Known Problems Maternal Grandmother     No Known Problems Maternal Grandfather          Medications have been verified  The following portions of the patient's history were reviewed and updated as appropriate: allergies, current medications, past family history, past medical history, past social history, past surgical history and problem list     Objective   /82   Pulse 89   Temp 97 9 °F (36 6 °C)   Resp 20   Ht 5' 5" (1 651 m)   Wt 101 kg (222 lb)   SpO2 96%   BMI 36 94 kg/m²      Physical Exam     Physical Exam  Vitals and nursing note reviewed  Constitutional:       General: She is not in acute distress  Appearance: Normal appearance  She is not ill-appearing, toxic-appearing or diaphoretic  Cardiovascular:      Rate and Rhythm: Normal rate and regular rhythm  Pulmonary:      Effort: Pulmonary effort is normal  No respiratory distress  Musculoskeletal:      Comments: Right hip:  Tender palpation at the medial aspect of the groin adjacent to the pubis symphysis  No tenderness at the greater trochanter or pelvis  Flexion of hip to 45° with pain  Full extension while laying relieves pain  External rotation to about 20° before severe pain  Internal rotation also with pain  Full strength against resisted flexion with pain  Antalgic gait with full strength of lower extremities bilaterally  Distal pulses intact  Neurological:      Mental Status: She is alert

## 2021-08-16 NOTE — PATIENT INSTRUCTIONS
Prednisone as directed  Ice and heat to the area  Light stretching   F/u with ortho- referral placed for you today   If anything changes or worsens go to the ER

## 2021-08-17 ENCOUNTER — APPOINTMENT (OUTPATIENT)
Dept: PHYSICAL THERAPY | Facility: CLINIC | Age: 66
End: 2021-08-17
Payer: MEDICARE

## 2021-08-18 ENCOUNTER — APPOINTMENT (OUTPATIENT)
Dept: RADIOLOGY | Facility: CLINIC | Age: 66
End: 2021-08-18
Payer: MEDICARE

## 2021-08-18 ENCOUNTER — OFFICE VISIT (OUTPATIENT)
Dept: OBGYN CLINIC | Facility: CLINIC | Age: 66
End: 2021-08-18
Payer: MEDICARE

## 2021-08-18 VITALS
BODY MASS INDEX: 38.32 KG/M2 | HEIGHT: 65 IN | WEIGHT: 230 LBS | SYSTOLIC BLOOD PRESSURE: 120 MMHG | DIASTOLIC BLOOD PRESSURE: 80 MMHG

## 2021-08-18 DIAGNOSIS — M47.26 OTHER SPONDYLOSIS WITH RADICULOPATHY, LUMBAR REGION: ICD-10-CM

## 2021-08-18 DIAGNOSIS — M54.50 MIDLINE LOW BACK PAIN, UNSPECIFIED CHRONICITY, UNSPECIFIED WHETHER SCIATICA PRESENT: ICD-10-CM

## 2021-08-18 DIAGNOSIS — M54.10 BACK PAIN WITH RIGHT-SIDED RADICULOPATHY: Primary | ICD-10-CM

## 2021-08-18 PROCEDURE — 72100 X-RAY EXAM L-S SPINE 2/3 VWS: CPT

## 2021-08-18 PROCEDURE — 99213 OFFICE O/P EST LOW 20 MIN: CPT | Performed by: ORTHOPAEDIC SURGERY

## 2021-08-18 NOTE — PROGRESS NOTES
Assessment:     1  Back pain with right-sided radiculopathy    2  Other spondylosis with radiculopathy, lumbar region        Plan:     Problem List Items Addressed This Visit        Nervous and Auditory    Other spondylosis with radiculopathy, lumbar region    Relevant Orders    Ambulatory referral to Physical Therapy      Other Visit Diagnoses     Back pain with right-sided radiculopathy    -  Primary    Relevant Orders    XR spine lumbar 2 or 3 views injury       Findings consistent with lumbar spine degenerative disc disease with right leg radiculopathy  Discussed findings and treatment options with the patient  X-rays of the right hip and lumbar spine were reviewed with patient  Discussed with patient the pain she is having in her right hip and thigh is coming from her lower back  I recommended physical therapy for core strengthening exercises  If patient continues to have pain we will refer patient to pain management  Follow-up in 6-8 weeks  All patient's questions were answered to her satisfaction  This note is created using dictation transcription  It may contain typographical errors, grammatical errors, improperly dictated words, background noise and other errors  Patient ID: Heather Castaneda is a 78 y o  female  HPI:  Patient is a 25-year-old female who presents today for right hip pain  She states she has been having right hip pain since May when she was moving into a new home  She states at that time the pain would come and go but has been more constant the last month  She states she is having constant achy pain and sharp pain with activity  She is having pain in the right groin region radiating down to the medial knee  She does state numbness over the anterior aspect of the right thigh  Pain is worse with external rotation, up and down steps, standing, and getting out of a car   Patient states she went to physical therapy on 08/12/2021 and states she started having pain going across the lower back doing the therapy sessions  She has hx of lumbar herniation and was treated with lumbar cherise  She was seen on urgent care on 08/16/2021 and was placed on a prednisone taper and is on day 3 of the taper  Information on patient's intake form was reviewed      Allergy:  Allergies   Allergen Reactions    Cephalexin Rash    Latex Rash     Reaction Date: 21Mar2012;     Molds & Smuts Allergic Rhinitis    Other Allergic Rhinitis and Cough     Cigarette     Risperidone Palpitations     Reaction Date: 21Mar2012;     Sertraline Itching     Reaction Date: 21Mar2012;     Soy Isoflavones      Medications:  all current active meds have been reviewed    Past Medical History:  Past Medical History:   Diagnosis Date    Anxiety     Arthritis     Basal cell carcinoma (BCC)     Bipolar disorder ([facility] [address] )     Cervical spinal stenosis     last assessed 5/13/14, resolved 10/10/16    Chronic constipation     last assessed 8/28/12, resovled 9/14/15    Chronic fatigue syndrome     last assessed 8/28/12, resolved 9/14/15    Chronic hyperglycemia     resolved 9/14/15    Chronic pain syndrome     last assessed 11/12/13, resolved 10/10/16    Depression     Essential hypertriglyceridemia     resolved 10/10/16    Fibromyalgia     GERD (gastroesophageal reflux disease)     Herpes simplex infection     last assessed 12/12/13, resolved 10/10/16    Hypokalemia     last assessed 9/14/15, resolved 11/23/15    Insomnia     last assessed 3/30/15, resolved 11/23/15    Median neuropathy     last assessed 7/14/15, resolved 10/10/16    Pneumonia     last assessed 7/16/13, resolved 5/10/13    Sacroiliitis ([facility] [address] )     last assessed 10/22/13, resolved 10/27/14       Past Surgical History:  Past Surgical History:   Procedure Laterality Date    CERVICAL FUSION      COLONOSCOPY  10/23/2013    10yrs     DILATION AND CURETTAGE OF UTERUS      HALLUX VALGUS CORRECTION      HEMORROIDECTOMY      NEUROPLASTY / TRANSPOSITION MEDIAN NERVE AT CARPAL TUNNEL      REDUCTION MAMMAPLASTY      TONSILLECTOMY      TOOTH EXTRACTION      TUBAL LIGATION         Family History:  Family History   Problem Relation Age of Onset    Hypertension Mother     Osteoporosis Mother     Arthritis Mother     Sudden death Mother         brain aneurysm    Heart attack Father     Melanoma Father     Coronary artery disease Father     Hyperlipidemia Father     Cancer Father     Diabetes Paternal Grandmother     Coronary artery disease Paternal Grandfather     Heart attack Family     Arthritis Family     Diabetes Family     Hypertension Family     Heart disease Family     Osteoporosis Family     No Known Problems Daughter     No Known Problems Maternal Grandmother     No Known Problems Maternal Grandfather        Social History:  Social History     Substance and Sexual Activity   Alcohol Use No     Social History     Substance and Sexual Activity   Drug Use No     Social History     Tobacco Use   Smoking Status Never Smoker   Smokeless Tobacco Never Used           ROS:  Review of Systems   Constitutional: Negative  Negative for chills, fever and unexpected weight change  HENT: Negative  Negative for hearing loss, nosebleeds and postnasal drip  Eyes: Negative  Negative for pain, redness and visual disturbance  Respiratory: Negative  Negative for cough, shortness of breath and wheezing  Cardiovascular: Negative  Negative for chest pain, palpitations and leg swelling  Gastrointestinal: Negative  Negative for abdominal pain, nausea and vomiting  Endocrine: Negative for polydipsia and polyuria  Genitourinary: Negative for dysuria  Musculoskeletal: Positive for arthralgias (Right leg) and back pain  Skin: Negative for rash and wound  Neurological: Negative for dizziness, weakness, numbness and headaches  Hematological: Negative  Psychiatric/Behavioral: Negative for decreased concentration and suicidal ideas   The patient is not nervous/anxious  Objective:  BP Readings from Last 1 Encounters:   08/18/21 120/80      Wt Readings from Last 1 Encounters:   08/18/21 104 kg (230 lb)      BMI:   Estimated body mass index is 38 27 kg/m² as calculated from the following:    Height as of this encounter: 5' 5" (1 651 m)  Weight as of this encounter: 104 kg (230 lb)  EXAM:   Physical Exam  Vitals and nursing note reviewed  Constitutional:       Appearance: She is well-developed  She is obese  HENT:      Head: Normocephalic and atraumatic  Right Ear: External ear normal       Left Ear: External ear normal    Eyes:      Extraocular Movements: Extraocular movements intact  Conjunctiva/sclera: Conjunctivae normal    Pulmonary:      Effort: Pulmonary effort is normal    Musculoskeletal:      Cervical back: Neck supple  Lumbar back: Negative right straight leg raise test and negative left straight leg raise test    Skin:     General: Skin is warm and dry  Neurological:      Mental Status: She is alert and oriented to person, place, and time  Deep Tendon Reflexes: Reflexes are normal and symmetric  Psychiatric:         Mood and Affect: Mood normal          Behavior: Behavior normal        Right Hip Exam     Tenderness   The patient is experiencing no tenderness  Range of Motion   Abduction: normal   Extension: normal   Flexion: normal   External rotation: normal   Internal rotation: normal     Muscle Strength   The patient has normal right hip strength  Tests   CAROL: positive (mild)  Jesus: negative    Other   Erythema: absent  Sensation: normal  Pulse: present      Back Exam     Tenderness   The patient is experiencing no tenderness  Muscle Strength   The patient has normal back strength      Tests   Straight leg raise right: negative  Straight leg raise left: negative    Other   Sensation: normal  Gait: normal             Radiographs:  I have personally reviewed pertinent films in PACS and my interpretation is X-ray right hip demonstrates mild osteoarthritis  X-ray lumbar spine demonstrates multiple level degenerative changes L4-5 and L5-S1  No spondylolisthesis or spondylolysis       Scribe Attestation    I,:  Jennifer Reeves am acting as a scribe while in the presence of the attending physician :       I,:  Ken Mejia MD personally performed the services described in this documentation    as scribed in my presence :

## 2021-08-19 ENCOUNTER — APPOINTMENT (OUTPATIENT)
Dept: PHYSICAL THERAPY | Facility: CLINIC | Age: 66
End: 2021-08-19
Payer: MEDICARE

## 2021-08-24 ENCOUNTER — EVALUATION (OUTPATIENT)
Dept: PHYSICAL THERAPY | Facility: CLINIC | Age: 66
End: 2021-08-24
Payer: MEDICARE

## 2021-08-24 ENCOUNTER — APPOINTMENT (OUTPATIENT)
Dept: PHYSICAL THERAPY | Facility: CLINIC | Age: 66
End: 2021-08-24
Payer: MEDICARE

## 2021-08-24 DIAGNOSIS — M47.26 OTHER SPONDYLOSIS WITH RADICULOPATHY, LUMBAR REGION: Primary | ICD-10-CM

## 2021-08-24 PROCEDURE — 97162 PT EVAL MOD COMPLEX 30 MIN: CPT | Performed by: PHYSICAL THERAPIST

## 2021-08-24 PROCEDURE — 97014 ELECTRIC STIMULATION THERAPY: CPT | Performed by: PHYSICAL THERAPIST

## 2021-08-24 NOTE — PROGRESS NOTES
PT Evaluation     Today's date: 2021  Patient name: Alonso Briscoe  : 1955  MRN: [id number]  Referring provider: Mackenzie Peralta MD  Dx:   Encounter Diagnosis     ICD-10-CM    1  Other spondylosis with radiculopathy, lumbar region  M47 [id number] Ambulatory referral to Physical Therapy                  Assessment  Assessment details: Pt is a 78y o  year old female coming to outpatient PT with a diagnosis of lumbar spondylosis with recent aggravation about 3 months ago  Pt presents with increased pain and TTP, decreased lumbar ROM, decreased R LE strength,  and overall decreased functional mobility  Pt would benefit from skilled PT services in order to address these deficits and reach maximum level of function  Thank you kindly for the referral!  Impairments: abnormal muscle tone, abnormal or restricted ROM, activity intolerance, impaired physical strength, lacks appropriate home exercise program and pain with function  Understanding of Dx/Px/POC: good   Prognosis: good    Goals  STG's ( 3-4 weeks)  1  Pt will be independent in HEP  2  Improve lumbar ROM to max ability  LTG's ( 6- 8 weeks)  1  Improve FOTO score by 8-10 points  2  Improve R LE strength by 1/2 grade  3  Reduce R LE radicular sx  4  Pt will have less LBP and R LE pain with dressing and self care activities  5  Pt will have improve tolerance for standing activities      Plan  Patient would benefit from: PT eval and skilled physical therapy  Planned modality interventions: TENS  Planned therapy interventions: joint mobilization, manual therapy, neuromuscular re-education, functional ROM exercises, flexibility, home exercise program, strengthening, stretching, therapeutic activities and therapeutic exercise  Frequency: 2x week  Duration in weeks: 6  Plan of Care beginning date: 2021  Plan of Care expiration date: 10/5/2021  Treatment plan discussed with: patient        Subjective Evaluation    History of Present Illness  Mechanism of injury: Pt reports increased LBP onset about 3 months ago when she was moving  Pt has a history of 2 herniated discs and has had CANDIDA in the past with good relief  Pt reports new X-rays show bone spurs and OA  Pt has increased pain when getting dressed, getting a shower, standing activities when cooking  Mild pain with walking  Pt has less pain with sitting  Pt is not sleeping well at night  Pt reports pain radiates from her R groin and goes down her leg  Pt is avoiding lifting activities      Work: retired  Hobbies: crafts, calligraphy  Gait: slow speed  Pain  At best pain ratin  At worst pain rating: 10  Location: lumbar spine; numbness in R LE  Quality: radiating and needle-like  Relieving factors: rest  Aggravating factors: standing    Social Support  Steps to enter house: no  Stairs in house: no   Lives in: apartment  Lives with: alone    Employment status: not working    Diagnostic Tests  X-ray: abnormal  Treatments  Previous treatment: medication (Prednisone)  Patient Goals  Patient goals for therapy: decreased pain and independence with ADLs/IADLs  Patient goal: to be able to function; to strengthen my core to prevent future problems; to loose weight        Objective     Neurological Testing     Sensation     Lumbar   Left   Intact: light touch    Right   Intact: light touch    Reflexes   Left   Patellar (L4): trace (1+)  Achilles (S1): normal (2+)    Right   Patellar (L4): trace (1+)  Achilles (S1): normal (2+)    Active Range of Motion     Lumbar   Flexion: 45 degrees   Extension: 28 degrees   Left lateral flexion: 20 degrees       Right lateral flexion: 20 degrees   Left rotation:  Restriction level: maximal  Right rotation:  with pain Restriction level: maximal    Additional Active Range of Motion Details  In standing: symmetrical iliac crest and PSIS levels  (+) TTP R lumbosacral psp; increased R sided muscle tone  HS flexibility: R: 65*  L:75* with opposite knee flexed   Pt is unable to perform R piriformis test  Pain with R [facility]      Strength/Myotome Testing     Lumbar   Left   Normal strength    Left Hip   Planes of Motion   Flexion: 4    Right Hip   Planes of Motion   Flexion: 3 (pain)  Abduction: 4+ (pain)  External rotation: 4+  Internal rotation: 4 (pain)    Right Knee   Flexion: 5  Extension: 5    Right Ankle/Foot   Dorsiflexion: 5 (pain)            Dx: lumbar spondylosis  EPOC: 10/5/21  CO-MORBIDITIES:  PERSONAL FACTORS:  Precautions: none      Manuals 8/24            R lumbar/ gluteal MFR             R hip adductor MFR                                       Neuro Re-Ed             DLs: abd brac             DLS: bent leg fallout             clamshells             bridge                                                    Ther Ex             LTR             Seated sciatic n glide             HS stretch                                                                              Ther Activity                                       Gait Training                                       Modalities             TENS in sitting 10'

## 2021-08-25 ENCOUNTER — SOCIAL WORK (OUTPATIENT)
Dept: BEHAVIORAL/MENTAL HEALTH CLINIC | Facility: CLINIC | Age: 66
End: 2021-08-25
Payer: MEDICARE

## 2021-08-25 DIAGNOSIS — F31.81 BIPOLAR II DISORDER, MOST RECENT EPISODE MAJOR DEPRESSIVE (HCC): Primary | ICD-10-CM

## 2021-08-25 DIAGNOSIS — Z86.59 HISTORY OF POSTTRAUMATIC STRESS DISORDER (PTSD): ICD-10-CM

## 2021-08-25 PROCEDURE — 90834 PSYTX W PT 45 MINUTES: CPT | Performed by: COUNSELOR

## 2021-08-25 NOTE — PSYCH
Psychotherapy Provided: Individual Psychotherapy 45 minutes     Length of time in session: 46 minutes, follow up in 2 week    Encounter Diagnosis     ICD-10-CM    1  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, most recent episode major depressive ([facility] [address] )  F31 81    2  History of posttraumatic stress disorder (PTSD)  Z86 59        Goals addressed in session: Goal 1     Pain:      none    0    Current suicide risk : Low     Time: 1:04pm - 1:50pm    D: She reports her pain is actually from her back rather than her hip  She says that she has been drinking shakes in the morning to assist with weight loss along with physical therapy  She says she has been able to continue to pay off her bills, will pay down her debt in Feb 2022  Discussed her strengths, including cooking, reducing her debts, socializing, leaning into her garry, considering writing more music, dieting  Resume CPT interventions beginning back at the [facility] worksheets  Reviewed what CPT is and what stuck points are, what her stuck points are through CPT work  A: Isabelle Rivera appeared to be in a euthymic mood with congruent affect, seemed to be experiencing moderate symptoms of PTSD through thoughts of worthlessness, intrusive memories, racing thoughts at times, appeared to be kempt, no SI/HI nor WANG risk apparent or reported, seemed to respond well to CPT work today  P: Check in with drinking shakes, going to PT  Begin at session 3 of CPT work, reviewing her ABC worksheet homework  Discuss ABC worksheet examples and stuck points again if she had trouble with filling out worksheets  Behavioral Health Treatment Plan [facility]: Diagnosis and Treatment Plan explained to Dionna Werner relates understanding diagnosis and is agreeable to Treatment Plan   Yes

## 2021-08-26 ENCOUNTER — APPOINTMENT (OUTPATIENT)
Dept: PHYSICAL THERAPY | Facility: CLINIC | Age: 66
End: 2021-08-26
Payer: MEDICARE

## 2021-08-26 ENCOUNTER — TELEPHONE (OUTPATIENT)
Dept: FAMILY MEDICINE CLINIC | Facility: CLINIC | Age: 66
End: 2021-08-26

## 2021-08-26 ENCOUNTER — OFFICE VISIT (OUTPATIENT)
Dept: PHYSICAL THERAPY | Facility: CLINIC | Age: 66
End: 2021-08-26
Payer: MEDICARE

## 2021-08-26 DIAGNOSIS — M25.551 RIGHT HIP PAIN: ICD-10-CM

## 2021-08-26 DIAGNOSIS — M47.26 OTHER SPONDYLOSIS WITH RADICULOPATHY, LUMBAR REGION: Primary | ICD-10-CM

## 2021-08-26 NOTE — TELEPHONE ENCOUNTER
Patient has been going for PT but she thinks it is causing her more pain      She was wondering if she has to come in to see you for an OV or can you simply put an order/referral for her to see a Spine Specialist?

## 2021-08-26 NOTE — PROGRESS NOTES
Daily Note     Today's date: 2021  Patient name: Frank Cotter  : 1955  MRN: [id number]  Referring provider: Shahida Dowell MD  Dx: No diagnosis found  Subjective: ***      Objective: See treatment diary below      Assessment: Tolerated treatment {Tolerated treatment :}   Patient {assessment:[id number]}      Plan: {PLAN:}     Dx: lumbar spondylosis  EPOC: 10/5/21  CO-MORBIDITIES:  PERSONAL FACTORS:  Precautions: none      Manuals             R lumbar/ gluteal MFR             R hip adductor MFR                                       Neuro Re-Ed             DLs: abd brac             DLS: bent leg fallout             clamshells             bridge                                                    Ther Ex             LTR             Seated sciatic n glide             HS stretch                                                                              Ther Activity                                       Gait Training                                       Modalities             TENS in sitting 10'

## 2021-08-26 NOTE — PROGRESS NOTES
Patient presented to clinic with significant worsening of lumbar/RLE pain  When questioned, patient revealed untreated urinary incontinence issue that began around the time of her low back pain and also notes saddle-region paresthesia  Separately, patient reports cervical fusion in 2003 and just recently began to experience similar symptoms ("head heaviness") that she experienced prior to the fusion  Patient was instructed to go to the ER out of caution to rule out cauda equina syndrome and to insure integrity of her cervical spine  Patient agreed and stated she would head straight to [address]  [facility]

## 2021-08-27 ENCOUNTER — HOSPITAL ENCOUNTER (INPATIENT)
Facility: HOSPITAL | Age: 66
LOS: 3 days | Discharge: HOME | DRG: 552 | End: 2021-08-30
Attending: EMERGENCY MEDICINE | Admitting: INTERNAL MEDICINE
Payer: MEDICARE

## 2021-08-27 ENCOUNTER — APPOINTMENT (INPATIENT)
Dept: RADIOLOGY | Facility: HOSPITAL | Age: 66
DRG: 552 | End: 2021-08-27
Payer: MEDICARE

## 2021-08-27 ENCOUNTER — OFFICE VISIT (OUTPATIENT)
Dept: FAMILY MEDICINE CLINIC | Facility: CLINIC | Age: 66
End: 2021-08-27
Payer: MEDICARE

## 2021-08-27 ENCOUNTER — APPOINTMENT (EMERGENCY)
Dept: RADIOLOGY | Facility: HOSPITAL | Age: 66
DRG: 552 | End: 2021-08-27
Payer: MEDICARE

## 2021-08-27 VITALS
RESPIRATION RATE: 16 BRPM | DIASTOLIC BLOOD PRESSURE: 76 MMHG | BODY MASS INDEX: 35.81 KG/M2 | SYSTOLIC BLOOD PRESSURE: 122 MMHG | HEART RATE: 70 BPM | WEIGHT: 222.8 LBS | HEIGHT: 66 IN

## 2021-08-27 DIAGNOSIS — G89.29 ACUTE EXACERBATION OF CHRONIC LOW BACK PAIN: Primary | ICD-10-CM

## 2021-08-27 DIAGNOSIS — G89.29 CHRONIC BACK PAIN: ICD-10-CM

## 2021-08-27 DIAGNOSIS — M54.16 LUMBAR BACK PAIN WITH RADICULOPATHY AFFECTING RIGHT LOWER EXTREMITY: ICD-10-CM

## 2021-08-27 DIAGNOSIS — M54.50 ACUTE EXACERBATION OF CHRONIC LOW BACK PAIN: Primary | ICD-10-CM

## 2021-08-27 DIAGNOSIS — M54.2 NECK PAIN: Primary | ICD-10-CM

## 2021-08-27 DIAGNOSIS — M54.9 CHRONIC BACK PAIN: ICD-10-CM

## 2021-08-27 DIAGNOSIS — R20.0 SADDLE ANESTHESIA: ICD-10-CM

## 2021-08-27 DIAGNOSIS — N39.498 OTHER URINARY INCONTINENCE: ICD-10-CM

## 2021-08-27 DIAGNOSIS — M54.6 ACUTE MIDLINE THORACIC BACK PAIN: ICD-10-CM

## 2021-08-27 PROBLEM — K59.00 CONSTIPATION: Status: ACTIVE | Noted: 2021-08-27

## 2021-08-27 LAB
ANION GAP SERPL CALCULATED.3IONS-SCNC: 5 MMOL/L (ref 4–13)
BACTERIA UR QL AUTO: ABNORMAL /HPF
BASOPHILS # BLD AUTO: 0.04 THOUSANDS/ΜL (ref 0–0.1)
BASOPHILS NFR BLD AUTO: 1 % (ref 0–1)
BILIRUB UR QL STRIP: NEGATIVE
BUN SERPL-MCNC: 14 MG/DL (ref 5–25)
CALCIUM SERPL-MCNC: 8.8 MG/DL (ref 8.3–10.1)
CHLORIDE SERPL-SCNC: 107 MMOL/L (ref 100–108)
CLARITY UR: CLEAR
CO2 SERPL-SCNC: 26 MMOL/L (ref 21–32)
COLOR UR: YELLOW
CREAT SERPL-MCNC: 0.85 MG/DL (ref 0.6–1.3)
EOSINOPHIL # BLD AUTO: 0.16 THOUSAND/ΜL (ref 0–0.61)
EOSINOPHIL NFR BLD AUTO: 2 % (ref 0–6)
ERYTHROCYTE [DISTWIDTH] IN BLOOD BY AUTOMATED COUNT: 14.5 % (ref 11.6–15.1)
GFR SERPL CREATININE-BSD FRML MDRD: 71 ML/MIN/1.73SQ M
GLUCOSE SERPL-MCNC: 133 MG/DL (ref 65–140)
GLUCOSE UR STRIP-MCNC: NEGATIVE MG/DL
HCT VFR BLD AUTO: 40.8 % (ref 34.8–46.1)
HGB BLD-MCNC: 13.6 G/DL (ref 11.5–15.4)
HGB UR QL STRIP.AUTO: NEGATIVE
HYALINE CASTS #/AREA URNS LPF: ABNORMAL /LPF
IMM GRANULOCYTES # BLD AUTO: 0.04 THOUSAND/UL (ref 0–0.2)
IMM GRANULOCYTES NFR BLD AUTO: 1 % (ref 0–2)
KETONES UR STRIP-MCNC: NEGATIVE MG/DL
LEUKOCYTE ESTERASE UR QL STRIP: ABNORMAL
LYMPHOCYTES # BLD AUTO: 2.22 THOUSANDS/ΜL (ref 0.6–4.47)
LYMPHOCYTES NFR BLD AUTO: 26 % (ref 14–44)
MCH RBC QN AUTO: 32.9 PG (ref 26.8–34.3)
MCHC RBC AUTO-ENTMCNC: 33.3 G/DL (ref 31.4–37.4)
MCV RBC AUTO: 99 FL (ref 82–98)
MONOCYTES # BLD AUTO: 0.73 THOUSAND/ΜL (ref 0.17–1.22)
MONOCYTES NFR BLD AUTO: 9 % (ref 4–12)
NEUTROPHILS # BLD AUTO: 5.35 THOUSANDS/ΜL (ref 1.85–7.62)
NEUTS SEG NFR BLD AUTO: 61 % (ref 43–75)
NITRITE UR QL STRIP: NEGATIVE
NON-SQ EPI CELLS URNS QL MICRO: ABNORMAL /HPF
NRBC BLD AUTO-RTO: 0 /100 WBCS
PH UR STRIP.AUTO: 6 [PH]
PLATELET # BLD AUTO: 230 THOUSANDS/UL (ref 149–390)
PMV BLD AUTO: 10.4 FL (ref 8.9–12.7)
POTASSIUM SERPL-SCNC: 3.7 MMOL/L (ref 3.5–5.3)
PROT UR STRIP-MCNC: NEGATIVE MG/DL
RBC # BLD AUTO: 4.14 MILLION/UL (ref 3.81–5.12)
RBC #/AREA URNS AUTO: ABNORMAL /HPF
SODIUM SERPL-SCNC: 138 MMOL/L (ref 136–145)
SP GR UR STRIP.AUTO: 1.02 (ref 1–1.03)
UROBILINOGEN UR QL STRIP.AUTO: 1 E.U./DL
WBC # BLD AUTO: 8.54 THOUSAND/UL (ref 4.31–10.16)
WBC #/AREA URNS AUTO: ABNORMAL /HPF

## 2021-08-27 PROCEDURE — 72125 CT NECK SPINE W/O DYE: CPT

## 2021-08-27 PROCEDURE — 99285 EMERGENCY DEPT VISIT HI MDM: CPT

## 2021-08-27 PROCEDURE — 72148 MRI LUMBAR SPINE W/O DYE: CPT

## 2021-08-27 PROCEDURE — 99223 1ST HOSP IP/OBS HIGH 75: CPT | Performed by: INTERNAL MEDICINE

## 2021-08-27 PROCEDURE — 72146 MRI CHEST SPINE W/O DYE: CPT

## 2021-08-27 PROCEDURE — 81001 URINALYSIS AUTO W/SCOPE: CPT | Performed by: EMERGENCY MEDICINE

## 2021-08-27 PROCEDURE — 99285 EMERGENCY DEPT VISIT HI MDM: CPT | Performed by: EMERGENCY MEDICINE

## 2021-08-27 PROCEDURE — 85025 COMPLETE CBC W/AUTO DIFF WBC: CPT

## 2021-08-27 PROCEDURE — 96374 THER/PROPH/DIAG INJ IV PUSH: CPT

## 2021-08-27 PROCEDURE — 99214 OFFICE O/P EST MOD 30 MIN: CPT | Performed by: NURSE PRACTITIONER

## 2021-08-27 PROCEDURE — 80048 BASIC METABOLIC PNL TOTAL CA: CPT

## 2021-08-27 PROCEDURE — 36415 COLL VENOUS BLD VENIPUNCTURE: CPT

## 2021-08-27 RX ORDER — LORATADINE 10 MG/1
10 TABLET ORAL DAILY
Status: DISCONTINUED | OUTPATIENT
Start: 2021-08-28 | End: 2021-08-30 | Stop reason: HOSPADM

## 2021-08-27 RX ORDER — HYDROCODONE BITARTRATE AND ACETAMINOPHEN 5; 325 MG/1; MG/1
2 TABLET ORAL EVERY 4 HOURS PRN
Status: DISCONTINUED | OUTPATIENT
Start: 2021-08-27 | End: 2021-08-28

## 2021-08-27 RX ORDER — POLYETHYLENE GLYCOL 3350 17 G/17G
17 POWDER, FOR SOLUTION ORAL DAILY
Status: DISCONTINUED | OUTPATIENT
Start: 2021-08-28 | End: 2021-08-30 | Stop reason: HOSPADM

## 2021-08-27 RX ORDER — DULOXETIN HYDROCHLORIDE 60 MG/1
60 CAPSULE, DELAYED RELEASE ORAL DAILY
Status: DISCONTINUED | OUTPATIENT
Start: 2021-08-28 | End: 2021-08-30 | Stop reason: HOSPADM

## 2021-08-27 RX ORDER — KETOROLAC TROMETHAMINE 30 MG/ML
30 INJECTION, SOLUTION INTRAMUSCULAR; INTRAVENOUS ONCE
Status: DISCONTINUED | OUTPATIENT
Start: 2021-08-27 | End: 2021-08-27

## 2021-08-27 RX ORDER — IBUPROFEN 400 MG/1
400 TABLET ORAL EVERY 6 HOURS SCHEDULED
Status: DISCONTINUED | OUTPATIENT
Start: 2021-08-28 | End: 2021-08-29

## 2021-08-27 RX ORDER — HYDROCODONE BITARTRATE AND ACETAMINOPHEN 5; 325 MG/1; MG/1
1 TABLET ORAL EVERY 4 HOURS PRN
Status: DISCONTINUED | OUTPATIENT
Start: 2021-08-27 | End: 2021-08-28

## 2021-08-27 RX ORDER — MELATONIN
1000 DAILY
Status: DISCONTINUED | OUTPATIENT
Start: 2021-08-28 | End: 2021-08-30 | Stop reason: HOSPADM

## 2021-08-27 RX ORDER — HYDROMORPHONE HCL/PF 1 MG/ML
0.5 SYRINGE (ML) INJECTION EVERY 4 HOURS PRN
Status: DISCONTINUED | OUTPATIENT
Start: 2021-08-27 | End: 2021-08-28

## 2021-08-27 RX ORDER — CYCLOBENZAPRINE HCL 10 MG
10 TABLET ORAL 3 TIMES DAILY
Status: DISCONTINUED | OUTPATIENT
Start: 2021-08-27 | End: 2021-08-27

## 2021-08-27 RX ORDER — TIZANIDINE 4 MG/1
4 TABLET ORAL 3 TIMES DAILY
Status: DISCONTINUED | OUTPATIENT
Start: 2021-08-27 | End: 2021-08-30 | Stop reason: HOSPADM

## 2021-08-27 RX ORDER — ARIPIPRAZOLE 15 MG/1
15 TABLET ORAL
Status: DISCONTINUED | OUTPATIENT
Start: 2021-08-27 | End: 2021-08-30 | Stop reason: HOSPADM

## 2021-08-27 RX ORDER — ONDANSETRON 2 MG/ML
4 INJECTION INTRAMUSCULAR; INTRAVENOUS EVERY 6 HOURS PRN
Status: DISCONTINUED | OUTPATIENT
Start: 2021-08-27 | End: 2021-08-30 | Stop reason: HOSPADM

## 2021-08-27 RX ORDER — KETOROLAC TROMETHAMINE 30 MG/ML
15 INJECTION, SOLUTION INTRAMUSCULAR; INTRAVENOUS ONCE
Status: COMPLETED | OUTPATIENT
Start: 2021-08-27 | End: 2021-08-27

## 2021-08-27 RX ORDER — ACETAMINOPHEN 325 MG/1
650 TABLET ORAL EVERY 6 HOURS PRN
Status: DISCONTINUED | OUTPATIENT
Start: 2021-08-27 | End: 2021-08-30 | Stop reason: HOSPADM

## 2021-08-27 RX ADMIN — HYDROCODONE BITARTRATE AND ACETAMINOPHEN 2 TABLET: 5; 325 TABLET ORAL at 23:57

## 2021-08-27 RX ADMIN — TIZANIDINE 4 MG: 4 TABLET ORAL at 23:57

## 2021-08-27 RX ADMIN — ARIPIPRAZOLE 15 MG: 15 TABLET ORAL at 23:57

## 2021-08-27 RX ADMIN — BISACODYL 5 MG: 5 TABLET, COATED ORAL at 23:57

## 2021-08-27 RX ADMIN — KETOROLAC TROMETHAMINE 15 MG: 30 INJECTION, SOLUTION INTRAMUSCULAR; INTRAVENOUS at 17:34

## 2021-08-27 RX ADMIN — IBUPROFEN 400 MG: 400 TABLET ORAL at 23:57

## 2021-08-27 NOTE — ED ATTENDING ATTESTATION
8/27/2021  I, Oli Grayson MD, saw and evaluated the patient  I have discussed the patient with the resident/non-physician practitioner and agree with the resident's/non-physician practitioner's findings, Plan of Care, and MDM as documented in the resident's/non-physician practitioner's note, except where noted  All available labs and Radiology studies were reviewed  I was present for key portions of any procedure(s) performed by the resident/non-physician practitioner and I was immediately available to provide assistance  At this point I agree with the current assessment done in the Emergency Department  I have conducted an independent evaluation of this patient a history and physical is as follows:    OA: 77 y/o f with chronic back pain who presents with worsening pain  Pain is R back with intermittent radiating to her R groin/thigh with associated numbness to her thigh  + intermittent urinary incontinence that has been ongoing x months but acutely worsened over the past 3 weeks  No changes in stool patterns  + intermittent weakness and difficulty ambulating  Occasionally feels as though she will fall but is able to catch herself before she falls  No abd pain  No n/v  No fevers  No cp/sob  Denies other urinary sxms  PE, NAD, VSS, NC/AT, MMM, neck supple/FROM, RR, lungs CTAB, abd soft, +Bs, -r/g, - LE edema, - calf ttp, + decreased sensation over right upper thigh, decreased strength RLE v LLE, + intact patellar and ankle reflexes, normal babinski, + ttp over T and L spine, no step off or deformity  AAO, intact pulses, a/p acute on chronic back pain now with incontinence and difficulty ambulating  Pt seen by PCP earlier in the day for the same, advised transfer to the ED for further evaluation  Of note, pt also with chronic c spine pain and advised xray for the same  Will obtain basic blood work, imaging including MRI of back as well as imaging of cspine   Pain control        ED Course Critical Care Time  Procedures

## 2021-08-27 NOTE — DISCHARGE INSTRUCTIONS
Please follow up with Amirah for further care  Please take medications as prescribed  YOU CAN USE BENGAY FOR MUSCLE PAIN AND WARM COMPRESSORS FOR UPPER INNER THIGH    Muscle Strain   WHAT YOU NEED TO KNOW:   A muscle strain is a twist, pull, or tear of a muscle or tendon  A tendon is a strong elastic tissue that connects a muscle to a bone  Signs of a strained muscle include bruising and swelling over the area, pain with movement, and loss of strength  DISCHARGE INSTRUCTIONS:   Seek care immediately or call 911 if:   · You suddenly cannot feel or move your injured muscle  Contact your healthcare provider if:   · Your pain and swelling worsen or do not go away  · You have questions or concerns about your condition or care  Medicines:   · NSAIDs , such as ibuprofen, help decrease swelling, pain, and fever  This medicine is available with or without a doctor's order  NSAIDs can cause stomach bleeding or kidney problems in certain people  If you take blood thinner medicine, always ask your healthcare provider if NSAIDs are safe for you  Always read the medicine label and follow directions  · Muscle relaxers  help decrease pain and muscle spasms  · Take your medicine as directed  Contact your healthcare provider if you think your medicine is not helping or if you have side effects  Tell him or her if you are allergic to any medicine  Keep a list of the medicines, vitamins, and herbs you take  Include the amounts, and when and why you take them  Bring the list or the pill bottles to follow-up visits  Carry your medicine list with you in case of an emergency  Follow up with your healthcare provider as directed: Your healthcare provider may suggest that you have a follow-up visit before you go back to your usual activity  Write down your questions so you remember to ask them during your visits    Self-care:   · 3 to 7 days after the injury:  Use Rest, Ice, Compression, and Elevation (RICE) to help stop bruising and decrease pain and swelling  ? Rest:  Rest your muscle to allow your injury to heal  When the pain decreases, begin normal, slow movements  For mild and moderate muscle strains, you should rest your muscles for about 2 days  However, if you have a severe muscle strain, you should rest for 10 to 14 days  You may need to use crutches to walk if your muscle strain is in your legs or lower body  ? Ice:  Put an ice pack on the injured area  Put a towel between the ice pack and your skin  Do not put the ice pack directly on your skin  You can use a package of frozen peas instead of an ice pack  ? Compression:  You may need to wrap an elastic bandage around the area to decrease swelling  It should be tight enough for you to feel support  Do not wrap it too tightly  ? Elevation:  Keep the injured muscle raised above your heart if possible  For example, if you have a strain of your lower leg muscle, lie down and prop your leg up on pillows  This helps decrease pain and swelling  · 3 to 21 days after the injury:  Start to slowly and regularly exercise your strained muscle  This will help it heal  If you feel pain, decrease how hard you are exercising  · 1 to 6 weeks after the injury:  Stretch the injured muscle  Hold the stretch for about 30 seconds  Do this 4 times a day  You may stretch the muscle until you feel a slight pull  Stop stretching if you feel pain  · 2 weeks to 6 months after the injury:  The goal of this phase is to return to the activity you were doing before the injury happened, without hurting the muscle again  · 3 weeks to 6 months after the injury:  Keep stretching and strengthening your muscles to avoid injury  Slowly increase the time and distance that you exercise   You may still have signs and symptoms of muscle strain 6 months after the injury, even if you do things to help it heal  In this case, you may need surgery on the muscle  Prevent muscle strains:   · Always wear proper shoes when you play sports:  Replace your old running shoes with new ones often if you are a runner  Use special shoe inserts or arch supports to correct leg or foot problems  Ask your healthcare provider for more information on shoe supports  · Do warm up and cool down exercises:  Do stretching exercises before you work out or do sports activities  These exercises will help loosen and decrease stress on your muscles  Cool down and stretch after your workout  Do not stop and rest after a workout without cooling down first     · Keep your muscles strong with strength training exercises:  Exercises such as weight lifting and stretching exercises help keep your muscles flexible and strong  A physical therapist or  may help you with these exercises  · Slowly start your exercise or sports training program:  Follow your healthcare provider's advice on when to start exercising  Slowly increase time, distance, and how often you train  Sudden increases in how often you train may cause you to injure your muscle again  © Copyright [employer] 2021 Information is for End User's use only and may not be sold, redistributed or otherwise used for commercial purposes  All illustrations and images included in CareNotes® are the copyrighted property of [employer]  or [employer]   The above information is an  only  It is not intended as medical advice for individual conditions or treatments  Talk to your doctor, nurse or pharmacist before following any medical regimen to see if it is safe and effective for you

## 2021-08-27 NOTE — PROGRESS NOTES
Assessment/Plan:     Diagnoses and all orders for this visit:    Neck pain  -     XR spine cervical 2 or 3 vw injury; Future    Cervical XR ordered to further evaluate  Limited neck ROM  Previous fusion in 2003 to C3  Pt notified we will contact her with results once available  Continue Tylenol PRN, heat/ice applications  Acute midline thoracic back pain  -     XR spine thoracic 3 vw; Future    Thoracic spine xray ordered  Limited ROM  Pt notified we will contact her with results once available  Continue Tylenol PRN, heat/ice applications  Saddle anesthesia  -     Transfer to other facility    Pt advised to go to [facility] ER STAT for further work up of saddle anesthesia and urinary incontinence  Pt states she is agreeable to go to ER  Other urinary incontinence  -     Transfer to other facility    Pt advised to go to [facility] ER STAT for further work up of saddle anesthesia and urinary incontinence  Pt states she is agreeable to go to ER  PT referred to [facility] ER STAT  Subjective:      Patient ID: Cecille Garcia is a 78 y o  female  Pt presents for mid-lumbar back pain that has been ongoing since fall 2020 after a fall injury  She has been going to PT & also was seen by Dr Amy Mane with orthopedics and had lumbar xrays done showing some degenerative changes L3-L5  During PT session yesterday 08/26, pt admitted to some urinary incontinence which she states has been since the fall in 2020 and noted saddle anesthesia  Pt was urged to go to ER by PT to r/o cauda equina and went to ER and later left without treatment or workup stating "I had to wait an hour and a half and no one did anything"  Today, she presents to office with complaints of lower back pain, urinary incontinence, saddle anesthesia and right hip pain             The following portions of the patient's history were reviewed and updated as appropriate: allergies, current medications, past family history, past medical history, past social history, past surgical history and problem list     Review of Systems   Constitutional: Negative for chills and fever  HENT: Negative for ear pain and sore throat  Eyes: Negative for pain and visual disturbance  Respiratory: Negative for cough and shortness of breath  Cardiovascular: Negative for chest pain and palpitations  Gastrointestinal: Negative for abdominal pain and vomiting  Genitourinary: Negative for dysuria and hematuria  Urinary incontinence since fall 2020 per pt report   Musculoskeletal: Positive for back pain (low lumbar back pain since fall 2020) and neck pain (chronic neck pain w/ hx of c3 fusion in 2003)  Negative for arthralgias and gait problem  Skin: Negative for color change and rash  Neurological: Positive for numbness (numbness noted by patient to saddle region)  Negative for seizures and syncope  All other systems reviewed and are negative  Objective:      /76 (BP Location: Left arm, Patient Position: Sitting, Cuff Size: Large)   Pulse 70   Resp 16   Ht 5' 5 75" (1 67 m)   Wt 101 kg (222 lb 12 8 oz)   Breastfeeding No   BMI 36 23 kg/m²          Physical Exam  Vitals reviewed  Constitutional:       General: She is not in acute distress  Appearance: Normal appearance  She is not ill-appearing  HENT:      Head: Normocephalic  Neck:      Comments: Limited neck flexion rafi rotation and lateral flexion r/t pain,   normal neck extension  Cardiovascular:      Rate and Rhythm: Normal rate and regular rhythm  Pulses: Normal pulses  Heart sounds: Normal heart sounds  No murmur heard  Pulmonary:      Effort: Pulmonary effort is normal  No respiratory distress  Breath sounds: Normal breath sounds  No wheezing  Musculoskeletal:         General: No swelling  Normal range of motion  Right lower leg: No edema  Left lower leg: No edema        Comments: Normal heel and toe walk  Rafi dorsiflexion and plantar flexion 5/5  +2 liz patellar reflexes  Limited spinal flexion, liz rotation, liz lateral flexion and extension r/t pain  Skin:     General: Skin is warm and dry  Neurological:      Mental Status: She is alert and oriented to person, place, and time  Mental status is at baseline  Motor: No weakness  Gait: Gait normal       Deep Tendon Reflexes:      Reflex Scores:       Patellar reflexes are 2+ on the right side and 2+ on the left side  Psychiatric:         Mood and Affect: Mood normal          Behavior: Behavior normal          Thought Content: Thought content normal          Judgment: Judgment normal          Falls Plan of Care: Balance, strength, and gait training instructions were provided

## 2021-08-27 NOTE — ED PROVIDER NOTES
History  Chief Complaint   Patient presents with    Back Pain     Pt tried to be seen in the ED yesterday but the wait was too long  Pt saw her doctor and was sent to the ED for xrays and an MRI of her back  Pt c/o pain and numbness x 3 months  Patient is a 54-year-old female with extensive history of chronic back pain, presenting to the ED for evaluation for acute on chronic pain  Patient states that she has a cervical fusion and has been evaluated by physical therapy intermittently over the years  3-4 months ago while at PT, she did an exercise and experienced acute pain in her R lower back, which radiated down to the R groin and R knee area  Since then, patient has been experiencing intermittent R sided back pain, as well as paresthesias to the R groin and medial aspect of the R thigh  Over that same period, patient states she has had intermittent urinary incontinence, no stool incontinence  Over the last 3-4 weeks, her symptoms have intensified  She feels that she is unable to make it to the bathroom in time before she loses urine, and states there have been instances when her R leg feels as if it is going to collapse  The sensation to light touch on her R thigh "feels different than the left " Patient continues to be ambulatory, and has not fallen recently  Patient also notes some persistent R neck pain  She saw her PMD today and was referred to the ED today for MRI and neck XR to examine the hardware that is present  Patient states that she does not take pain medication at home and has "dealt with her pain up to this point "          Prior to Admission Medications   Prescriptions Last Dose Informant Patient Reported? Taking?    ARIPiprazole (ABILIFY) 15 mg tablet 8/27/2021 at Unknown time Self Yes Yes   Sig: Take 15 mg by mouth daily at bedtime   Calcium 250 MG CAPS 8/27/2021 at Unknown time Self Yes Yes   Sig: Take 500 mg by mouth   Cholecalciferol ([employer] VITAMIN D) 2000 units CAPS 8/27/2021 at Unknown time Self Yes Yes   Sig: Take by mouth   DULoxetine (CYMBALTA) 60 mg delayed release capsule 8/27/2021 at Unknown time Self Yes Yes   Sig: Take 1 capsule by mouth daily   Diclofenac Sodium (VOLTAREN) 1 % 8/27/2021 at Unknown time Self No Yes   Sig: APPLY 2 G TOPICALLY 4 (FOUR) TIMES A DAY AS NEEDED (OSTEOARTHRITIS)   Omega-3 Fatty Acids (fish oil) 1,000 mg Not Taking at Unknown time Self Yes No   Sig: Take 1,000 mg by mouth daily    Patient not taking: Reported on 8/27/2021   Turmeric Curcumin 500 MG CAPS 8/27/2021 at Unknown time Self Yes Yes   Sig: Take by mouth   ascorbic acid (VITAMIN C) 500 mg tablet 8/27/2021 at Unknown time Self Yes Yes   Sig: Take 500 mg by mouth daily   bisacodyl (DULCOLAX) 5 mg EC tablet 8/27/2021 at Unknown time Self Yes Yes   Sig: Take 1 tablet by mouth daily as needed   docusate sodium (COLACE) 100 mg capsule Not Taking at Unknown time Self Yes No   Sig: Take 100 mg by mouth 2 (two) times a day    Patient not taking: Reported on 8/27/2021   loratadine (CLARITIN) 10 mg tablet 8/27/2021 at Unknown time Self Yes Yes   Sig: Take 10 mg by mouth   predniSONE 10 mg tablet Not Taking at Unknown time  No No   Sig: Take 6 tablets today, 5 tablets tomorrow, 4 the next day, 3 the next day, 2 the following and 1 the last day- all with food   Patient not taking: Reported on 8/27/2021      Facility-Administered Medications: None       Past Medical History:   Diagnosis Date    Anxiety     Arthritis     Basal cell carcinoma (BCC)     Bipolar disorder ([facility] [address] )     Cervical spinal stenosis     last assessed 5/13/14, resolved 10/10/16    Chronic constipation     last assessed 8/28/12, resovled 9/14/15    Chronic fatigue syndrome     last assessed 8/28/12, resolved 9/14/15    Chronic hyperglycemia     resolved 9/14/15    Chronic pain syndrome     last assessed 11/12/13, resolved 10/10/16    Depression     Essential hypertriglyceridemia     resolved 10/10/16    Fibromyalgia     GERD (gastroesophageal reflux disease)     Herpes simplex infection     last assessed 12/12/13, resolved 10/10/16    Hypokalemia     last assessed 9/14/15, resolved 11/23/15    Insomnia     last assessed 3/30/15, resolved 11/23/15    Median neuropathy     last assessed 7/14/15, resolved 10/10/16    Pneumonia     last assessed 7/16/13, resolved 5/10/13    Sacroiliitis ([address] )     last assessed 10/22/13, resolved 10/27/14       Past Surgical History:   Procedure Laterality Date    CERVICAL FUSION      COLONOSCOPY  10/23/2013    10yrs     DILATION AND CURETTAGE OF UTERUS      HALLUX VALGUS CORRECTION      HEMORROIDECTOMY      NEUROPLASTY / [address] AT CARPAL TUNNEL      REDUCTION MAMMAPLASTY      TONSILLECTOMY      TOOTH EXTRACTION      TUBAL LIGATION         Family History   Problem Relation Age of Onset    Hypertension Mother     Osteoporosis Mother     Arthritis Mother     Sudden death Mother         brain aneurysm    Heart attack Father     Melanoma Father     Coronary artery disease Father     Hyperlipidemia Father    [facility] Father     Diabetes Paternal Grandmother     Coronary artery disease Paternal Grandfather     Heart attack Family     Arthritis Family     Diabetes Family     Hypertension Family     Heart disease Family     Osteoporosis Family     No Known Problems Daughter     No Known Problems Maternal Grandmother     No Known Problems Maternal Grandfather      I have reviewed and agree with the history as documented      E-Cigarette/Vaping    E-Cigarette Use Never User      E-Cigarette/Vaping Substances    Nicotine No     THC No     CBD No     Flavoring No     Other No     Unknown No      Social History     Tobacco Use    Smoking status: Never Smoker    Smokeless tobacco: Never Used   Vaping Use    Vaping Use: Never used   Substance Use Topics    Alcohol use: Not Currently    Drug use: No        Review of Systems   Constitutional: Negative for <-- Click to add NO pertinent Past Medical History chills and fever  HENT: Negative for ear pain and sore throat  Eyes: Negative for pain and visual disturbance  Respiratory: Negative for cough and shortness of breath  Cardiovascular: Negative for chest pain, palpitations and leg swelling  Gastrointestinal: Negative for abdominal pain and vomiting  Genitourinary: Negative for difficulty urinating, dysuria and hematuria  Musculoskeletal: Positive for arthralgias, back pain, myalgias and neck pain  Skin: Negative for color change and rash  Neurological: Positive for weakness (R leg) and numbness (R groin/R knee area)  Negative for dizziness, seizures, syncope, facial asymmetry, speech difficulty and headaches  Intermittent urinary incontinence   All other systems reviewed and are negative  Physical Exam  ED Triage Vitals [08/27/21 1409]   Temperature Pulse Respirations Blood Pressure SpO2   98 1 °F (36 7 °C) 92 18 157/82 100 %      Temp Source Heart Rate Source Patient Position - Orthostatic VS BP Location FiO2 (%)   Oral Monitor Sitting Right arm --      Pain Score       9             Orthostatic Vital Signs  Vitals:    08/27/21 1830 08/27/21 2030 08/27/21 2324 08/28/21 0813   BP: 126/68 134/71 133/74 98/60   Pulse: 80 76 77 76   Patient Position - Orthostatic VS: Lying Lying         Physical Exam  Vitals and nursing note reviewed  Constitutional:       General: She is not in acute distress  Appearance: Normal appearance  She is well-developed  HENT:      Head: Normocephalic and atraumatic  Right Ear: External ear normal       Left Ear: External ear normal       Nose: Nose normal  No congestion  Mouth/Throat:      Mouth: Mucous membranes are moist       Pharynx: Oropharynx is clear  Eyes:      Conjunctiva/sclera: Conjunctivae normal       Pupils: Pupils are equal, round, and reactive to light  Cardiovascular:      Rate and Rhythm: Normal rate and regular rhythm  Pulses: Normal pulses        Heart sounds: Normal heart sounds  No murmur heard  Pulmonary:      Effort: Pulmonary effort is normal  No respiratory distress  Breath sounds: Normal breath sounds  Abdominal:      Palpations: Abdomen is soft  Tenderness: There is no abdominal tenderness  Musculoskeletal:         General: No deformity or signs of injury  Cervical back: Neck supple  Tenderness (paracervical tenderness) present  Comments: Tenderness of the paracervical muscles bilaterally    Point tenderness of the thoracic spine at T7-T8    Tenderness of the paramusculature of the lumbar spine    No stepoffs noted  Skin:     General: Skin is warm and dry  Neurological:      Mental Status: She is alert and oriented to person, place, and time  GCS: GCS eye subscore is 4  GCS verbal subscore is 5  GCS motor subscore is 6  Cranial Nerves: Cranial nerves are intact  No facial asymmetry  Comments: There is mild deficit to 2 pt discrimination of the R anterior thigh compared to L anterior thigh    Patient has 5/5 strength upper extremities and LLE  On RLE, there is 3/5 strength to dorsiflexion and 3/5 strength of lifting L leg  DTRs 2+ symmetric upper extremities and lower extremities  Babinski downgoing bilaterally    Patient able to stand on toes without losing balance  Gait is intact           ED Medications  Medications   ARIPiprazole (ABILIFY) tablet 15 mg (15 mg Oral Given 8/27/21 2357)   bisacodyl (DULCOLAX) EC tablet 5 mg (5 mg Oral Given 8/27/21 2357)   polyethylene glycol (MIRALAX) packet 17 g (17 g Oral Given 8/28/21 0806)   cholecalciferol (VITAMIN D3) tablet 1,000 Units (1,000 Units Oral Given 8/28/21 0807)   DULoxetine (CYMBALTA) delayed release capsule 60 mg (60 mg Oral Given 8/28/21 0806)   loratadine (CLARITIN) tablet 10 mg (10 mg Oral Given 8/28/21 0807)   acetaminophen (TYLENOL) tablet 650 mg (has no administration in time range)   ondansetron (ZOFRAN) injection 4 mg (has no administration in time range)   enoxaparin (LOVENOX) subcutaneous injection 40 mg (40 mg Subcutaneous Given 8/28/21 0807)   HYDROcodone-acetaminophen (NORCO) 5-325 mg per tablet 2 tablet (2 tablets Oral Given 8/27/21 2357)   HYDROcodone-acetaminophen (NORCO) 5-325 mg per tablet 1 tablet (has no administration in time range)   HYDROmorphone (DILAUDID) injection 0 5 mg (has no administration in time range)   ibuprofen (MOTRIN) tablet 400 mg (400 mg Oral Given 8/28/21 0532)   tiZANidine (ZANAFLEX) tablet 4 mg (4 mg Oral Given 8/28/21 0806)   ketorolac (TORADOL) injection 15 mg (15 mg Intravenous Given 8/27/21 1734)       Diagnostic Studies  Results Reviewed     Procedure Component Value Units Date/Time    Urine Microscopic [[id number]]  (Abnormal) Collected: 08/27/21 2024    Lab Status: Final result Specimen: Urine, Clean Catch Updated: 08/27/21 2054     RBC, UA None Seen /hpf      WBC, UA 4-10 /hpf      Epithelial Cells Occasional /hpf      Bacteria, UA None Seen /hpf      Hyaline Casts, UA None Seen /lpf     UA (URINE) with reflex to Scope [[id number]]  (Abnormal) Collected: 08/27/21 2024    Lab Status: Final result Specimen: Urine, Clean Catch Updated: 08/27/21 2048     Color, UA Yellow     Clarity, UA Clear     Specific [place], UA 1 020     pH, UA 6 0     Leukocytes, UA Small     Nitrite, UA Negative     Protein, UA Negative mg/dl      Glucose, UA Negative mg/dl      Ketones, UA Negative mg/dl      Urobilinogen, UA 1 0 E U /dl      Bilirubin, UA Negative     Blood, UA Negative    Basic metabolic panel [[id number]] Collected: 08/27/21 1733    Lab Status: Final result Specimen: Blood from Arm, Right Updated: 08/27/21 1806     Sodium 138 mmol/L      Potassium 3 7 mmol/L      Chloride 107 mmol/L      CO2 26 mmol/L      ANION GAP 5 mmol/L      BUN 14 mg/dL      Creatinine 0 85 mg/dL      Glucose 133 mg/dL      Calcium 8 8 mg/dL      eGFR 71 ml/min/1 73sq m     Narrative:      [place] guidelines for Chronic Kidney Disease (CKD):     Stage 1 with normal or high GFR (GFR > 90 mL/min/1 73 square meters)    Stage 2 Mild CKD (GFR = 60-89 mL/min/1 73 square meters)    Stage 3A Moderate CKD (GFR = 45-59 mL/min/1 73 square meters)    Stage 3B Moderate CKD (GFR = 30-44 mL/min/1 73 square meters)    Stage 4 Severe CKD (GFR = 15-29 mL/min/1 73 square meters)    Stage 5 End Stage CKD (GFR <15 mL/min/1 73 square meters)  Note: GFR calculation is accurate only with a steady state creatinine    CBC and differential [[id number]]  (Abnormal) Collected: 08/27/21 1733    Lab Status: Final result Specimen: Blood from Arm, Right Updated: 08/27/21 1752     WBC 8 54 Thousand/uL      RBC 4 14 Million/uL      Hemoglobin 13 6 g/dL      Hematocrit 40 8 %      MCV 99 fL      MCH 32 9 pg      MCHC 33 3 g/dL      RDW 14 5 %      MPV 10 4 fL      Platelets 368 Thousands/uL      nRBC 0 /100 WBCs      Neutrophils Relative 61 %      Immat GRANS % 1 %      Lymphocytes Relative 26 %      Monocytes Relative 9 %      Eosinophils Relative 2 %      Basophils Relative 1 %      Neutrophils Absolute 5 35 Thousands/µL      Immature Grans Absolute 0 04 Thousand/uL      Lymphocytes Absolute 2 22 Thousands/µL      Monocytes Absolute 0 73 Thousand/µL      Eosinophils Absolute 0 16 Thousand/µL      Basophils Absolute 0 04 Thousands/µL                  MRI lumbar spine wo contrast   Final Result by Dayami Byrd DO (08/27 2356)      Degenerative changes as described no evidence of acute spinal injury  Correlation with patient's symptoms recommended  Other findings as above  Workstation performed: [id number]         MRI thoracic spine wo contrast   Final Result by Dayami Byrd DO (08/27 2357)      No evidence of acute spinal injury  Other findings as above        Workstation performed: [id number]         CT cervical spine without contrast   Final Result by Darline Dai MD (08/27 1736)      No cervical spine fracture or traumatic malalignment  Workstation performed: [id number]               Procedures  Procedures      ED Course  ED Course as of Aug 28 1158   Fri Aug 27, 2021   1723 Patient given Toradol 15 mg IV for pain control  Patient denies Prednisone as this causes excessive weight gain for her  Because of patient's history of incontinence and sensation changes, MRI T&L spine ordered  CT cervical spine ordered to examine hardware  1724 If exams are normal, patient can follow up with [facility] for further evaluation  Patient re-evaluated and decision made to admit for pain control and Neurosurgery consult the morning  Identification of Seniors at Risk      Most Recent Value   (ISAR) Identification of Seniors at Risk   Before the illness or injury that brought you to the Emergency, did you need someone to help you on a regular basis? 0 Filed at: 08/27/2021 1411   In the last 24 hours, have you needed more help than usual?  0 Filed at: 08/27/2021 1411   Have you been hospitalized for one or more nights during the past 6 months? 0 Filed at: 08/27/2021 1411   In general, do you see well?  0 Filed at: 08/27/2021 1411   In general, do you have serious problems with your memory? 0 Filed at: 08/27/2021 1411   Do you take more than three different medications every day?  0 Filed at: 08/27/2021 1411   ISAR Score  0 Filed at: 08/27/2021 1411                    SBIRT 20yo+      Most Recent Value   SBIRT (23 yo +)   In order to provide better care to our patients, we are screening all of our patients for alcohol and drug use  Would it be okay to ask you these screening questions?   No Filed at: 08/27/2021 1657                MDM    Disposition  Final diagnoses:   Chronic back pain   Acute exacerbation of chronic low back pain     Time reflects when diagnosis was documented in both MDM as applicable and the Disposition within this note     Time User Action Codes Description Comment 8/27/2021  5:31 PM Cristofer Delgado Add [M54 9,  G89 29] Chronic back pain     8/27/2021  7:38 PM Jona Morenoian Cecille Add [M54 5,  G89 29] Acute exacerbation of chronic low back pain     8/27/2021  8:33 PM Keyur Torres Modify [M54 9,  G89 29] Chronic back pain     8/27/2021  8:33 PM Keyur Torres Modify [M54 5,  G89 29] Acute exacerbation of chronic low back pain       ED Disposition     ED Disposition Condition Date/Time Comment    Admit Stable Fri Aug 27, 2021  7:39 PM Case was discussed with GRECIA and the patient's admission status was agreed to be Admission Status: observation status to the service of Dr Laura Lopez           Follow-up Information    None         Current Discharge Medication List      CONTINUE these medications which have NOT CHANGED    Details   ARIPiprazole (ABILIFY) 15 mg tablet Take 15 mg by mouth daily at bedtime      ascorbic acid (VITAMIN C) 500 mg tablet Take 500 mg by mouth daily      bisacodyl (DULCOLAX) 5 mg EC tablet Take 1 tablet by mouth daily as needed      Calcium 250 MG CAPS Take 500 mg by mouth      Cholecalciferol ([employer]S VITAMIN D) 2000 units CAPS Take by mouth      Diclofenac Sodium (VOLTAREN) 1 % APPLY 2 G TOPICALLY 4 (FOUR) TIMES A DAY AS NEEDED (OSTEOARTHRITIS)  Qty: 300 g, Refills: 1    Associated Diagnoses: Primary osteoarthritis of both hands      DULoxetine (CYMBALTA) 60 mg delayed release capsule Take 1 capsule by mouth daily      loratadine (CLARITIN) 10 mg tablet Take 10 mg by mouth      Turmeric Curcumin 500 MG CAPS Take by mouth      docusate sodium (COLACE) 100 mg capsule Take 100 mg by mouth 2 (two) times a day       Omega-3 Fatty Acids (fish oil) 1,000 mg Take 1,000 mg by mouth daily       predniSONE 10 mg tablet Take 6 tablets today, 5 tablets tomorrow, 4 the next day, 3 the next day, 2 the following and 1 the last day- all with food  Qty: 21 tablet, Refills: 0    Associated Diagnoses: Right hip pain               PDMP Review     None           ED Provider  Attending physically available and evaluated Malini Plasencia I managed the patient along with the ED Attending      Electronically Signed by         Dominick Parker DO  08/28/21 5282

## 2021-08-27 NOTE — PATIENT INSTRUCTIONS
[facility]: [address] [place]     Fall Prevention   AMBULATORY CARE:   Fall prevention  includes ways to make your home and other areas safer  It also includes ways you can move more carefully to prevent a fall  Health conditions that cause changes in your blood pressure, vision, or muscle strength and coordination may increase your risk for falls  Medicines may also increase your risk for falls if they make you dizzy, weak, or sleepy  Call 911 or have someone else call if:   · You have fallen and are unconscious  · You have fallen and cannot move part of your body  Contact your healthcare provider if:   · You have fallen and have pain or a headache  · You have questions or concerns about your condition or care  Fall prevention tips:   · Stand or sit up slowly  This may help you keep your balance and prevent falls  · Use assistive devices as directed  Your healthcare provider may suggest that you use a cane or walker to help you keep your balance  You may need to have grab bars put in your bathroom near the toilet or in the shower  · Wear shoes that fit well and have soles that   Wear shoes both inside and outside  Use slippers with good   Do not wear shoes with high heels  · Wear a personal alarm  This is a device that allows you to call 911 if you fall and need help  Ask your healthcare provider for more information  · Stay active  Exercise can help strengthen your muscles and improve your balance  Your healthcare provider may recommend water aerobics or walking  He or she may also recommend physical therapy to improve your coordination  Never start an exercise program without talking to your healthcare provider first          · Manage your medical conditions  Keep all appointments with your healthcare providers  Visit your eye doctor as directed  Home safety tips:       · Add items to prevent falls in the bathroom    Put nonslip strips on your bath or shower floor to prevent you from slipping  Use a bath mat if you do not have carpet in the bathroom  This will prevent you from falling when you step out of the bath or shower  Use a shower seat so you do not need to stand while you shower  Sit on the toilet or a chair in your bathroom to dry yourself and put on clothing  This will prevent you from losing your balance from drying or dressing yourself while you are standing  · Keep paths clear  Remove books, shoes, and other objects from walkways and stairs  Place cords for telephones and lamps out of the way so that you do not need to walk over them  Tape them down if you cannot move them  Remove small rugs  If you cannot remove a rug, secure it with double-sided tape  This will prevent you from tripping  · Install bright lights in your home  Use night lights to help light paths to the bathroom or kitchen  Always turn on the light before you start walking  · Keep items you use often on shelves within reach  Do not use a step stool to help you reach an item  · Paint or place reflective tape on the edges of your stairs  This will help you see the stairs better  Follow up with your healthcare provider as directed:  Write down your questions so you remember to ask them during your visits  © Copyright [employer] 2021 Information is for End User's use only and may not be sold, redistributed or otherwise used for commercial purposes  All illustrations and images included in CareNotes® are the copyrighted property of [employer]  or [employer]   The above information is an  only  It is not intended as medical advice for individual conditions or treatments  Talk to your doctor, nurse or pharmacist before following any medical regimen to see if it is safe and effective for you

## 2021-08-28 LAB
ALBUMIN SERPL BCP-MCNC: 3 G/DL (ref 3.5–5)
ALP SERPL-CCNC: 66 U/L (ref 46–116)
ALT SERPL W P-5'-P-CCNC: 33 U/L (ref 12–78)
ANION GAP SERPL CALCULATED.3IONS-SCNC: 3 MMOL/L (ref 4–13)
AST SERPL W P-5'-P-CCNC: 21 U/L (ref 5–45)
BASOPHILS # BLD AUTO: 0.04 THOUSANDS/ΜL (ref 0–0.1)
BASOPHILS NFR BLD AUTO: 1 % (ref 0–1)
BILIRUB SERPL-MCNC: 0.42 MG/DL (ref 0.2–1)
BUN SERPL-MCNC: 13 MG/DL (ref 5–25)
CALCIUM ALBUM COR SERPL-MCNC: 9.3 MG/DL (ref 8.3–10.1)
CALCIUM SERPL-MCNC: 8.5 MG/DL (ref 8.3–10.1)
CHLORIDE SERPL-SCNC: 108 MMOL/L (ref 100–108)
CO2 SERPL-SCNC: 28 MMOL/L (ref 21–32)
CREAT SERPL-MCNC: 0.87 MG/DL (ref 0.6–1.3)
EOSINOPHIL # BLD AUTO: 0.22 THOUSAND/ΜL (ref 0–0.61)
EOSINOPHIL NFR BLD AUTO: 4 % (ref 0–6)
ERYTHROCYTE [DISTWIDTH] IN BLOOD BY AUTOMATED COUNT: 14.6 % (ref 11.6–15.1)
GFR SERPL CREATININE-BSD FRML MDRD: 69 ML/MIN/1.73SQ M
GLUCOSE SERPL-MCNC: 134 MG/DL (ref 65–140)
HCT VFR BLD AUTO: 36.4 % (ref 34.8–46.1)
HGB BLD-MCNC: 12.2 G/DL (ref 11.5–15.4)
IMM GRANULOCYTES # BLD AUTO: 0.02 THOUSAND/UL (ref 0–0.2)
IMM GRANULOCYTES NFR BLD AUTO: 0 % (ref 0–2)
LYMPHOCYTES # BLD AUTO: 2.3 THOUSANDS/ΜL (ref 0.6–4.47)
LYMPHOCYTES NFR BLD AUTO: 37 % (ref 14–44)
MAGNESIUM SERPL-MCNC: 2.3 MG/DL (ref 1.6–2.6)
MCH RBC QN AUTO: 33.5 PG (ref 26.8–34.3)
MCHC RBC AUTO-ENTMCNC: 33.5 G/DL (ref 31.4–37.4)
MCV RBC AUTO: 100 FL (ref 82–98)
MONOCYTES # BLD AUTO: 0.84 THOUSAND/ΜL (ref 0.17–1.22)
MONOCYTES NFR BLD AUTO: 13 % (ref 4–12)
NEUTROPHILS # BLD AUTO: 2.87 THOUSANDS/ΜL (ref 1.85–7.62)
NEUTS SEG NFR BLD AUTO: 45 % (ref 43–75)
NRBC BLD AUTO-RTO: 0 /100 WBCS
PHOSPHATE SERPL-MCNC: 4.1 MG/DL (ref 2.3–4.1)
PLATELET # BLD AUTO: 224 THOUSANDS/UL (ref 149–390)
PMV BLD AUTO: 10.5 FL (ref 8.9–12.7)
POTASSIUM SERPL-SCNC: 3.6 MMOL/L (ref 3.5–5.3)
PROT SERPL-MCNC: 6.3 G/DL (ref 6.4–8.2)
RBC # BLD AUTO: 3.64 MILLION/UL (ref 3.81–5.12)
SODIUM SERPL-SCNC: 139 MMOL/L (ref 136–145)
WBC # BLD AUTO: 6.29 THOUSAND/UL (ref 4.31–10.16)

## 2021-08-28 PROCEDURE — 83735 ASSAY OF MAGNESIUM: CPT | Performed by: INTERNAL MEDICINE

## 2021-08-28 PROCEDURE — 85025 COMPLETE CBC W/AUTO DIFF WBC: CPT | Performed by: INTERNAL MEDICINE

## 2021-08-28 PROCEDURE — 99232 SBSQ HOSP IP/OBS MODERATE 35: CPT | Performed by: NURSE PRACTITIONER

## 2021-08-28 PROCEDURE — 80053 COMPREHEN METABOLIC PANEL: CPT | Performed by: INTERNAL MEDICINE

## 2021-08-28 PROCEDURE — 99222 1ST HOSP IP/OBS MODERATE 55: CPT | Performed by: PHYSICIAN ASSISTANT

## 2021-08-28 PROCEDURE — 84100 ASSAY OF PHOSPHORUS: CPT | Performed by: INTERNAL MEDICINE

## 2021-08-28 RX ORDER — HYDROCODONE BITARTRATE AND ACETAMINOPHEN 5; 325 MG/1; MG/1
1 TABLET ORAL EVERY 6 HOURS PRN
Status: DISCONTINUED | OUTPATIENT
Start: 2021-08-28 | End: 2021-08-30 | Stop reason: HOSPADM

## 2021-08-28 RX ORDER — DOCUSATE SODIUM 100 MG/1
100 CAPSULE, LIQUID FILLED ORAL 2 TIMES DAILY
Status: DISCONTINUED | OUTPATIENT
Start: 2021-08-28 | End: 2021-08-30 | Stop reason: HOSPADM

## 2021-08-28 RX ORDER — HYDROMORPHONE HCL IN WATER/PF 6 MG/30 ML
0.2 PATIENT CONTROLLED ANALGESIA SYRINGE INTRAVENOUS EVERY 4 HOURS PRN
Status: DISCONTINUED | OUTPATIENT
Start: 2021-08-28 | End: 2021-08-30 | Stop reason: HOSPADM

## 2021-08-28 RX ORDER — HYDROCODONE BITARTRATE AND ACETAMINOPHEN 5; 325 MG/1; MG/1
2 TABLET ORAL EVERY 6 HOURS PRN
Status: DISCONTINUED | OUTPATIENT
Start: 2021-08-28 | End: 2021-08-30 | Stop reason: HOSPADM

## 2021-08-28 RX ADMIN — ENOXAPARIN SODIUM 40 MG: 40 INJECTION SUBCUTANEOUS at 08:07

## 2021-08-28 RX ADMIN — ARIPIPRAZOLE 15 MG: 15 TABLET ORAL at 22:29

## 2021-08-28 RX ADMIN — LORATADINE 10 MG: 10 TABLET ORAL at 08:07

## 2021-08-28 RX ADMIN — BISACODYL 5 MG: 5 TABLET, COATED ORAL at 12:35

## 2021-08-28 RX ADMIN — IBUPROFEN 400 MG: 400 TABLET ORAL at 05:32

## 2021-08-28 RX ADMIN — IBUPROFEN 400 MG: 400 TABLET ORAL at 12:23

## 2021-08-28 RX ADMIN — DULOXETINE HYDROCHLORIDE 60 MG: 60 CAPSULE, DELAYED RELEASE ORAL at 08:06

## 2021-08-28 RX ADMIN — TIZANIDINE 4 MG: 4 TABLET ORAL at 08:06

## 2021-08-28 RX ADMIN — IBUPROFEN 400 MG: 400 TABLET ORAL at 17:31

## 2021-08-28 RX ADMIN — POLYETHYLENE GLYCOL 3350 17 G: 17 POWDER, FOR SOLUTION ORAL at 08:06

## 2021-08-28 RX ADMIN — TIZANIDINE 4 MG: 4 TABLET ORAL at 16:31

## 2021-08-28 RX ADMIN — IBUPROFEN 400 MG: 400 TABLET ORAL at 22:32

## 2021-08-28 RX ADMIN — TIZANIDINE 4 MG: 4 TABLET ORAL at 22:29

## 2021-08-28 RX ADMIN — DOCUSATE SODIUM 100 MG: 100 CAPSULE ORAL at 17:31

## 2021-08-28 RX ADMIN — BISACODYL 10 MG: 5 TABLET, COATED ORAL at 22:29

## 2021-08-28 RX ADMIN — Medication 1000 UNITS: at 08:07

## 2021-08-28 RX ADMIN — DOCUSATE SODIUM 100 MG: 100 CAPSULE ORAL at 13:09

## 2021-08-28 NOTE — CONSULTS
[address] 1955, 78 y o  female MRN: [id number]  Unit/Bed#: [facility] 734-01 Encounter: [id number]  Primary Care Provider: Анна Morgan   Date and time admitted to hospital: 8/27/2021  3:27 PM    Inpatient consult to Neurosurgery  Consult performed by: Smith Davis PA-C  Consult ordered by: Genesis Quintana MD      Imaging personally reviewed with attending 8/28/21 at 7:30am  Patient examined at bedside 8/28/21 at 11:15am    * Lumbar back pain with radiculopathy affecting right lower extremity  Assessment & Plan  Acute on chronic low back pain with RLE pain from the groin to the knee  · Also chronic but worsening urinary incontinence and saddle anesthesia  · After further discuss this is intermittent since a fall in December, has not happened recently  · Sent here from PCP for evaluation of cauda equina syndrome    Imaging:  · MRI thoracic spine w/o, 8/27/21: Tiny central posterior disc herniation at T6/T7 Small posterior disc herniation which is eccentric to the right at T7/T8 which indents the thecal sac but does not significantly narrow the spinal canal   · MRI lumbar spine w/o, 8/27/21: Mild degenerative changes from L3-S1 but no findings to correlate with cauda equina syndrome  No surgical pathology appreciated on MRI  Plan:  · Imaging reviewed with the patient, showing no findings to explain her UI or RLE symptoms  · Maximize pain control and therapy  · Consider another course of PT in the outpatient setting  · Recommend outpatient appointment with PCP or urologist to rule out other causes of urinary incontinence  · PT/OT evaluation, ok to mobilize as tolerated  · Medical management and pain control per primary team  · No neurosurgical intervention is anticipated at this time    Neurosurgery will sign off at this time as there is no surgical pathology on MRI thoracic or lumbar spine   Patient can follow up in our office on an as needed basis  History of Present Illness     HPI: Raul Koo is a 78y o  year old female with PMH including bipolar 1 disorder, fibromyalgia, hyperlipidemia, GERD, chronic pain syndrome who presents with complaint of worsening back pain and right lower extremity pain  She is also complaining of urinary incontinence and saddle anesthesia  She states that this all began in December 2020 after a fall  She has had urinary incontinence since that time  She states that this is not all the time and is very intermittent  She is attributing it to getting older  She has been able to control her bladder while in the hospital   She also complains of occasional saddle anesthesia  Her main complaint today is pain that shoots from her right groin to her knee  She states that this began several weeks ago in physical therapy when doing glute bridges with a yoga ball  She denies any other bilateral lower extremity pain, numbness, tingling, or weakness  Denies bowel incontinence  She denies any further falls  MRI thoracic and lumbar spine were obtained to rule out cauda equina syndrome  There is no evidence of this on her imaging  This was reviewed in detail with attending physician today  There is no surgical pathology on either MRI  At this time, I recommend maximizing conservative treatment  I discussed this with the patient today and she is in agreement  She can follow-up on an as-needed basis  Review of Systems   Constitutional: Negative for chills and fever  HENT: Negative for ear pain and sore throat  Eyes: Negative for pain and visual disturbance  Respiratory: Negative for cough and shortness of breath  Cardiovascular: Negative for chest pain and palpitations  Gastrointestinal: Negative for abdominal pain and vomiting  Genitourinary: Positive for frequency  Negative for dysuria and hematuria  Musculoskeletal: Positive for arthralgias and back pain     Skin: Negative for color change and rash  Neurological: Negative for seizures and syncope  All other systems reviewed and are negative        Historical Information   Past Medical History:   Diagnosis Date    Anxiety     Arthritis     Basal cell carcinoma (BCC)     Bipolar disorder ([facility] [address] )     Cervical spinal stenosis     last assessed 5/13/14, resolved 10/10/16    Chronic constipation     last assessed 8/28/12, resovled 9/14/15    Chronic fatigue syndrome     last assessed 8/28/12, resolved 9/14/15    Chronic hyperglycemia     resolved 9/14/15    Chronic pain syndrome     last assessed 11/12/13, resolved 10/10/16    Depression     Essential hypertriglyceridemia     resolved 10/10/16    Fibromyalgia     GERD (gastroesophageal reflux disease)     Herpes simplex infection     last assessed 12/12/13, resolved 10/10/16    Hypokalemia     last assessed 9/14/15, resolved 11/23/15    Insomnia     last assessed 3/30/15, resolved 11/23/15    Median neuropathy     last assessed 7/14/15, resolved 10/10/16    Pneumonia     last assessed 7/16/13, resolved 5/10/13    Sacroiliitis ([facility] [address] )     last assessed 10/22/13, resolved 10/27/14     Past Surgical History:   Procedure Laterality Date    CERVICAL FUSION      COLONOSCOPY  10/23/2013    10yrs     DILATION AND CURETTAGE OF UTERUS      HALLUX VALGUS CORRECTION      HEMORROIDECTOMY      NEUROPLASTY / TRANSPOSITION MEDIAN NERVE AT CARPAL TUNNEL      REDUCTION MAMMAPLASTY      TONSILLECTOMY      TOOTH EXTRACTION      TUBAL LIGATION       Social History     Substance and Sexual Activity   Alcohol Use Not Currently     Social History     Substance and Sexual Activity   Drug Use No     Social History     Tobacco Use   Smoking Status Never Smoker   Smokeless Tobacco Never Used     Family History   Problem Relation Age of Onset    Hypertension Mother     Osteoporosis Mother     Arthritis Mother     Sudden death Mother         brain aneurysm    Heart attack Father     Melanoma Father     Coronary artery disease Father     Hyperlipidemia Father     Cancer Father     Diabetes Paternal Grandmother     Coronary artery disease Paternal Grandfather     Heart attack Family     Arthritis Family     Diabetes Family     Hypertension Family     Heart disease Family     Osteoporosis Family     No Known Problems Daughter     No Known Problems Maternal Grandmother     No Known Problems Maternal Grandfather        Meds/Allergies   all current active meds have been reviewed, current meds:   Current Facility-Administered Medications   Medication Dose Route Frequency    acetaminophen (TYLENOL) tablet 650 mg  650 mg Oral Q6H PRN    ARIPiprazole (ABILIFY) tablet 15 mg  15 mg Oral HS    bisacodyl (DULCOLAX) EC tablet 5 mg  5 mg Oral Daily PRN    cholecalciferol (VITAMIN D3) tablet 1,000 Units  1,000 Units Oral Daily    DULoxetine (CYMBALTA) delayed release capsule 60 mg  60 mg Oral Daily    enoxaparin (LOVENOX) subcutaneous injection 40 mg  40 mg Subcutaneous Daily    HYDROcodone-acetaminophen (NORCO) 5-325 mg per tablet 1 tablet  1 tablet Oral Q4H PRN    HYDROcodone-acetaminophen (NORCO) 5-325 mg per tablet 2 tablet  2 tablet Oral Q4H PRN    HYDROmorphone (DILAUDID) injection 0 5 mg  0 5 mg Intravenous Q4H PRN    ibuprofen (MOTRIN) tablet 400 mg  400 mg Oral Q6H LUIS    loratadine (CLARITIN) tablet 10 mg  10 mg Oral Daily    ondansetron (ZOFRAN) injection 4 mg  4 mg Intravenous Q6H PRN    polyethylene glycol (MIRALAX) packet 17 g  17 g Oral Daily    tiZANidine (ZANAFLEX) tablet 4 mg  4 mg Oral TID    and PTA meds:   Prior to Admission Medications   Prescriptions Last Dose Informant Patient Reported? Taking?    ARIPiprazole (ABILIFY) 15 mg tablet 8/27/2021 at Unknown time Self Yes Yes   Sig: Take 15 mg by mouth daily at bedtime   Calcium 250 MG CAPS 8/27/2021 at Unknown time Self Yes Yes   Sig: Take 500 mg by mouth   Cholecalciferol ([employer] VITAMIN D) 2000 units CAPS 8/27/2021 at Unknown time Self Yes Yes   Sig: Take by mouth   DULoxetine (CYMBALTA) 60 mg delayed release capsule 8/27/2021 at Unknown time Self Yes Yes   Sig: Take 1 capsule by mouth daily   Diclofenac Sodium (VOLTAREN) 1 % 8/27/2021 at Unknown time Self No Yes   Sig: APPLY 2 G TOPICALLY 4 (FOUR) TIMES A DAY AS NEEDED (OSTEOARTHRITIS)   Omega-3 Fatty Acids (fish oil) 1,000 mg Not Taking at Unknown time Self Yes No   Sig: Take 1,000 mg by mouth daily    Patient not taking: Reported on 8/27/2021   Turmeric Curcumin 500 MG CAPS 8/27/2021 at Unknown time Self Yes Yes   Sig: Take by mouth   ascorbic acid (VITAMIN C) 500 mg tablet 8/27/2021 at Unknown time Self Yes Yes   Sig: Take 500 mg by mouth daily   bisacodyl (DULCOLAX) 5 mg EC tablet 8/27/2021 at Unknown time Self Yes Yes   Sig: Take 1 tablet by mouth daily as needed   docusate sodium (COLACE) 100 mg capsule Not Taking at Unknown time Self Yes No   Sig: Take 100 mg by mouth 2 (two) times a day    Patient not taking: Reported on 8/27/2021   loratadine (CLARITIN) 10 mg tablet 8/27/2021 at Unknown time Self Yes Yes   Sig: Take 10 mg by mouth   predniSONE 10 mg tablet Not Taking at Unknown time  No No   Sig: Take 6 tablets today, 5 tablets tomorrow, 4 the next day, 3 the next day, 2 the following and 1 the last day- all with food   Patient not taking: Reported on 8/27/2021      Facility-Administered Medications: None     Allergies   Allergen Reactions    Cephalexin Rash    Latex Rash     Reaction Date: 21Mar2012;     Molds & Smuts Allergic Rhinitis    Other Allergic Rhinitis and Cough     Cigarette and bleach    Risperidone Palpitations     Reaction Date: 21Mar2012;     Sertraline Itching     Reaction Date: 21Mar2012;     Soy Isoflavones        Objective   I/O       08/26 0701 - 08/27 0700 08/27 0701 - 08/28 0700 08/28 0701 - 08/29 0700           Unmeasured Urine Occurrence  2 x           Physical Exam  Vitals and nursing note reviewed     Constitutional:       Appearance: Normal appearance  She is well-developed  She is obese  HENT:      Head: Normocephalic and atraumatic  Eyes:      Extraocular Movements: Extraocular movements intact  Pupils: Pupils are equal, round, and reactive to light  Cardiovascular:      Rate and Rhythm: Normal rate  Pulmonary:      Effort: Pulmonary effort is normal  No respiratory distress  Abdominal:      Palpations: Abdomen is soft  Musculoskeletal:         General: Normal range of motion  Cervical back: Normal range of motion  Skin:     General: Skin is warm and dry  Neurological:      General: No focal deficit present  Mental Status: She is alert and oriented to person, place, and time  Deep Tendon Reflexes: Strength normal       Reflex Scores:       Patellar reflexes are 2+ on the right side and 2+ on the left side  Psychiatric:         Mood and Affect: Mood normal          Speech: Speech normal          Behavior: Behavior normal          Thought Content: Thought content normal          Judgment: Judgment normal        Neurologic Exam     Mental Status   Oriented to person, place, and time  Follows 2 step commands  Attention: normal  Concentration: normal    Speech: speech is normal   Level of consciousness: alert  Knowledge: good  Able to repeat  Normal comprehension  Cranial Nerves   Cranial nerves II through XII intact  CN III, IV, VI   Pupils are equal, round, and reactive to light  Motor Exam   Muscle bulk: normal  Overall muscle tone: normal    Strength   Strength 5/5 throughout  Sensory Exam   Light touch normal      Gait, Coordination, and Reflexes     Tremor   Resting tremor: absent    Reflexes   Right patellar: 2+  Left patellar: 2+  Right ankle clonus: absent  Left ankle clonus: absent        Vitals:Blood pressure 98/60, pulse 76, temperature 97 8 °F (36 6 °C), resp  rate 16, height 5' 3 78" (1 62 m), weight 101 kg (223 lb 5 2 oz), SpO2 92 %, not currently breastfeeding  ,Body mass index is 38 6 kg/m²  Lab Results:   Results from last 7 days   Lab Units 08/28/21  0452 08/27/21  1733   WBC Thousand/uL 6 29 8 54   HEMOGLOBIN g/dL 12 2 13 6   HEMATOCRIT % 36 4 40 8   PLATELETS Thousands/uL 224 230   NEUTROS PCT % 45 61   MONOS PCT % 13* 9     Results from last 7 days   Lab Units 08/28/21  0452 08/27/21  1733   POTASSIUM mmol/L 3 6 3 7   CHLORIDE mmol/L 108 107   CO2 mmol/L 28 26   BUN mg/dL 13 14   CREATININE mg/dL 0 87 0 85   CALCIUM mg/dL 8 5 8 8   ALK PHOS U/L 66  --    ALT U/L 33  --    AST U/L 21  --      Results from last 7 days   Lab Units 08/28/21  0452   MAGNESIUM mg/dL 2 3     Results from last 7 days   Lab Units 08/28/21  0452   PHOSPHORUS mg/dL 4 1         No results found for: TROPONINT  ABG:No results found for: PHART, SDB7IDN, PO2ART, FJQ0RXI, T8CVYYOQ, BEART, SOURCE    Imaging Studies: I have personally reviewed pertinent reports  and I have personally reviewed pertinent films in PACS     XR spine lumbar 2 or 3 views injury    Result Date: 8/26/2021  Narrative: LUMBAR SPINE INDICATION:   M54 5: Low back pain  COMPARISON:  12/13/2013 VIEWS:  XR SPINE LUMBAR 2 OR 3 VIEWS INJURY FINDINGS: There are 5 non rib bearing lumbar vertebral bodies  There is no evidence of acute fracture or destructive osseous lesion  Mild dextroscoliosis of the thoracolumbar junction and mild levoscoliosis of the lower lumbar spine unchanged from prior study  There is no subluxation Degenerative disc disease of the lumbar spine noted with greatest degree of narrowing at L4-L5  There is also development of narrowing at L3-L4  There is facet joint osteoarthritis at the lumbosacral junction Soft tissues are unremarkable       Impression: Worsening degenerative disc disease of the lumbar spine There is no compression fracture or subluxation Workstation performed: [id number]     XR hip/pelv 2-3 vws right if performed    Result Date: 8/17/2021  Narrative: RIGHT HIP INDICATION:   M25 551: Pain in right hip  COMPARISON:  CT abdomen and pelvis January 9, 2015 VIEWS:  XR HIP/PELV 2-3 VWS RIGHT W PELVIS IF PERFORMED FINDINGS: There is no acute fracture or dislocation  Mild bilateral hip osteoarthrosis No lytic or blastic osseous lesion  Soft tissues are unremarkable  Degenerative changes visualized lower lumbar spine  Impression: Mild bilateral hip osteoarthrosis without acute osseous abnormality Workstation performed: [id number]     CT cervical spine without contrast    Result Date: 8/27/2021  Narrative: CT CERVICAL SPINE - WITHOUT CONTRAST INDICATION:   neck pain  COMPARISON:  None  TECHNIQUE:  CT examination of the cervical spine was performed without intravenous contrast   Contiguous axial images were obtained  Sagittal and coronal reconstructions were performed  Radiation dose length product (DLP) for this visit:  563 81 mGy-cm   This examination, like all CT scans performed in the [facility], was performed utilizing techniques to minimize radiation dose exposure, including the use of iterative  reconstruction and automated exposure control  IMAGE QUALITY:  Diagnostic  FINDINGS: ALIGNMENT:  Normal alignment of the cervical spine  No subluxation  VERTEBRAL BODIES:  No fracture  DEGENERATIVE CHANGES:  Anterior fusion is noted C5-6  Mild multilevel degenerative change is identified  PREVERTEBRAL AND PARASPINAL SOFT TISSUES:  Unremarkable  THORACIC INLET:  Normal      Impression: No cervical spine fracture or traumatic malalignment  Workstation performed: [id number]     MRI thoracic spine wo contrast    Result Date: 8/27/2021  Narrative: MRI THORACIC SPINE WITHOUT CONTRAST INDICATION: lower back pain with paresthesias, urinary incontinence, and weakness  COMPARISON:  None  TECHNIQUE:  Sagittal T1, sagittal T2, sagittal inversion recovery, axial T2,  axial 2D MERGE  IMAGE QUALITY: Diagnostic  FINDINGS: ALIGNMENT: Normal alignment of the thoracic spine  No compression fracture    No subluxation  No scoliosis  MARROW SIGNAL:  Normal marrow signal is identified within the visualized bony structures  No discrete marrow lesion  THORACIC CORD: Normal signal within the thoracic cord  PARAVERTEBRAL SOFT TISSUES:  Normal  THORACIC DEGENERATIVE CHANGE: Tiny central posterior disc herniation at T6/T7 Small posterior disc herniation which is eccentric to the right at T7/T8 which indents the thecal sac but does not significantly narrow the spinal canal      Impression: No evidence of acute spinal injury  Other findings as above  Workstation performed: [id number]     MRI lumbar spine wo contrast    Result Date: 8/27/2021  Narrative: MRI LUMBAR SPINE WITHOUT CONTRAST INDICATION: thoracic/lower back pain with paresthesias and weakness in lower leg  lower back pain with paresthesias, urinary incontinence, and weakness  COMPARISON:  Radiographs of the lumbar spine dated 8/18/2021 TECHNIQUE:  Sagittal T1, sagittal T2, sagittal inversion recovery, axial T1 and axial T2, coronal T2   IMAGE QUALITY:  Diagnostic FINDINGS: VERTEBRAL BODIES:  There are 5 lumbar type vertebral bodies  Normal alignment of the lumbar spine  No spondylolysis or spondylolisthesis  No scoliosis  No compression fracture  Prominent Modic type II degenerative endplate changes at S1/R1 and L5/S1  No suspicious appearing osseous lesion is seen  SACRUM:  Normal signal within the sacrum  No evidence of insufficiency or stress fracture  DISTAL CORD AND CONUS:  Normal size and signal within the distal cord and conus, conus terminates at the L1/L2 level  PARASPINAL SOFT TISSUES:  Paraspinal soft tissues are unremarkable  LOWER THORACIC DISC SPACES:  Normal disc height and signal   No disc herniation, canal stenosis or foraminal narrowing  LUMBAR DISC SPACES: L1-L2:  Normal  L2-L3:  Normal  L3-L4:  Broad-based left lateral disc herniation  Moderate left neural foraminal narrowing   L4-L5:  Intervertebral disc space narrowing with anterior, marginal, and posterior osteophytosis and facet joint arthropathy  Ligamentum flavum thickening  Mild multifactorial spinal canal narrowing  Posterior disc osteophyte complex, eccentric to the  right  Mild right neural foraminal narrowing  L5-S1:  Broad-based posterior disc herniation  Facet joint arthropathy  Mild right neural foraminal narrowing, mild to moderate left neural foraminal narrowing     Impression: Degenerative changes as described no evidence of acute spinal injury  Correlation with patient's symptoms recommended  Other findings as above  Workstation performed: [id number]     EKG, Pathology, and Other Studies: I have personally reviewed pertinent reports  VTE Prophylaxis: Sequential compression device (Venodyne)  and Enoxaparin (Lovenox)    Code Status: Level 3 - DNAR and DNI  Advance Directive and Living Will: Yes    Power of :    POLST:      Counseling / Coordination of Care  I spent 30 minutes with the patient

## 2021-08-28 NOTE — H&P
[address] [facility]  H&P- Julio Bauman 1955, 78 y o  female MRN: [id number]  Unit/Bed#: ED 24 Encounter: [id number]  Primary Care Provider: KARIN Hugo   Date and time admitted to hospital: 8/27/2021  3:27 PM    * Lumbar back pain with radiculopathy affecting right lower extremity  Assessment & Plan  Patient with worsening back pain and degenerative disc disease of the lumbar region; endorses saddle anesthesia and urinary incontinence  Pain medications; consider consult to APS if acutely worsens  Urinary retention protocol; MRI ordered  Consult to Neurosurgery  PT/OT    Constipation  Assessment & Plan  Daily MiraLax; p r n  Dulcolax    Fibromyalgia  Assessment & Plan  P r n  pain medications; follow-up in the outpatient setting    Bipolar I disorder, single manic episode [facility])  Assessment & Plan  Patient denies any acute symptoms; continue home medications    Allergic rhinitis  Assessment & Plan  Continue Claritin      VTE Prophylaxis: Enoxaparin (Lovenox)  / sequential compression device   Code Status:  DNR/DNI  POLST: POLST form is not discussed and not completed at this time  Discussion with family: Discussed treatment plan with family and patient who agree with current plan; encouraged to ask questions and participate  Anticipated Length of Stay:  Patient will be admitted on an Inpatient basis with an anticipated length of stay of  greater than 2 midnights  Justification for Hospital Stay:  Acute on chronic low back pain    Total Time for Visit, including Counseling / Coordination of Care: 45 minutes  Greater than 50% of this total time spent on direct patient counseling and coordination of care      Chief Complaint:   Acute on chronic back    History of Present Illness:    Julio Bauman is a 78 y o  female with past medical history of bipolar 1, degenerative disc disease, fibromyalgia, hyperlipidemia, GERD, and basal cell carcinoma who presents from her primary care physician's office today with concerns of acute on chronic low back pain with saddle anesthesia and urinary incontinence; rule out cauda equinus syndrome  Patient reports that she has had chronic back pain for years and past imaging demonstrates degenerative disc disease of lumbar region as well as cervical spine disease requiring fusion  In December, patient states that she fell and this acutely exacerbated her low back pain and from that time a, has been having episodes of urinary incontinence  She underwent physical therapy as that fall resulted in leg injury and was discharged at the end of April beginning of May of 2021  Over the last couple weeks, her back pain has worsened to a 9/10 as well as her urinary incontinence  She presented to [facility] on day prior to admission; but left before diagnosis could be made  She presented this morning to her primary care office and, given concerns of urinary incontinence and saddle anesthesia, was directed to go to the ED immediately  Labs and vital stable; patient denies any other acute symptoms such as dysuria, fever/chills, shortness of breath, diarrhea/constipation, chest pain, dizziness  Review of Systems:    Review of Systems   Constitutional: Negative for chills and fever  HENT: Negative for ear pain and sore throat  Eyes: Negative for pain and visual disturbance  Respiratory: Negative for cough and shortness of breath  Cardiovascular: Negative for chest pain and palpitations  Gastrointestinal: Negative for abdominal pain and vomiting  Genitourinary: Negative for dysuria and hematuria  Musculoskeletal: Positive for arthralgias, back pain and neck pain  Skin: Negative for color change and rash  Neurological: Negative for seizures and syncope  Psychiatric/Behavioral: Negative  All other systems reviewed and are negative        Past Medical and Surgical History:     Past Medical History:   Diagnosis Date    Anxiety     Arthritis     Basal cell carcinoma (BCC)     Bipolar disorder (HCC)     Cervical spinal stenosis     last assessed 5/13/14, resolved 10/10/16    Chronic constipation     last assessed 8/28/12, resovled 9/14/15    Chronic fatigue syndrome     last assessed 8/28/12, resolved 9/14/15    Chronic hyperglycemia     resolved 9/14/15    Chronic pain syndrome     last assessed 11/12/13, resolved 10/10/16    Depression     Essential hypertriglyceridemia     resolved 10/10/16    Fibromyalgia     GERD (gastroesophageal reflux disease)     Herpes simplex infection     last assessed 12/12/13, resolved 10/10/16    Hypokalemia     last assessed 9/14/15, resolved 11/23/15    Insomnia     last assessed 3/30/15, resolved 11/23/15    Median neuropathy     last assessed 7/14/15, resolved 10/10/16    Pneumonia     last assessed 7/16/13, resolved 5/10/13    Sacroiliitis ([facility] [address] )     last assessed 10/22/13, resolved 10/27/14       Past Surgical History:   Procedure Laterality Date    CERVICAL FUSION      COLONOSCOPY  10/23/2013    10yrs     DILATION AND CURETTAGE OF UTERUS      HALLUX VALGUS CORRECTION      HEMORROIDECTOMY      NEUROPLASTY / TRANSPOSITION MEDIAN NERVE AT CARPAL TUNNEL      REDUCTION MAMMAPLASTY      TONSILLECTOMY      TOOTH EXTRACTION      TUBAL LIGATION         Meds/Allergies:    Prior to Admission medications    Medication Sig Start Date End Date Taking?  Authorizing Provider   ARIPiprazole (ABILIFY) 15 mg tablet Take 15 mg by mouth daily at bedtime 12/12/20  Yes Historical Provider, MD   ascorbic acid (VITAMIN C) 500 mg tablet Take 500 mg by mouth daily   Yes Historical Provider, MD   bisacodyl (DULCOLAX) 5 mg EC tablet Take 1 tablet by mouth daily as needed   Yes Historical Provider, MD   Calcium 250 MG CAPS Take 500 mg by mouth   Yes Historical Provider, MD   Cholecalciferol ([facility] VITAMIN D) 2000 units CAPS Take by mouth   Yes Historical Provider, MD   Diclofenac Sodium (VOLTAREN) 1 % APPLY 2 G TOPICALLY 4 (FOUR) TIMES A DAY AS NEEDED (OSTEOARTHRITIS) 4/23/21  Yes KARIN Gutierrez   DULoxetine (CYMBALTA) 60 mg delayed release capsule Take 1 capsule by mouth daily 8/28/12  Yes Historical Provider, MD   loratadine (CLARITIN) 10 mg tablet Take 10 mg by mouth   Yes Historical Provider, MD   Turmeric Curcumin 500 MG CAPS Take by mouth   Yes Historical Provider, MD   docusate sodium (COLACE) 100 mg capsule Take 100 mg by mouth 2 (two) times a day   Patient not taking: Reported on 8/27/2021    Historical Provider, MD   Omega-3 Fatty Acids (fish oil) 1,000 mg Take 1,000 mg by mouth daily   Patient not taking: Reported on 8/27/2021    Historical Provider, MD   predniSONE 10 mg tablet Take 6 tablets today, 5 tablets tomorrow, 4 the next day, 3 the next day, 2 the following and 1 the last day- all with food  Patient not taking: Reported on 8/27/2021 8/16/21   Francisco Gardner PA-C     I have reviewed home medications with patient personally  Allergies:    Allergies   Allergen Reactions    Cephalexin Rash    Latex Rash     Reaction Date: 21Mar2012;     Molds & Smuts Allergic Rhinitis    Other Allergic Rhinitis and Cough     Cigarette and bleach    Risperidone Palpitations     Reaction Date: 21Mar2012;     Sertraline Itching     Reaction Date: 21Mar2012;     Soy Isoflavones        Social History:     Marital Status:    Occupation:   Patient Pre-hospital Living Situation:  Independently  Patient Pre-hospital Level of Mobility:  Full  Patient Pre-hospital Diet Restrictions:  None  Substance Use History:   Social History     Substance and Sexual Activity   Alcohol Use No     Social History     Tobacco Use   Smoking Status Never Smoker   Smokeless Tobacco Never Used     Social History     Substance and Sexual Activity   Drug Use No       Family History:    Family History   Problem Relation Age of Onset    Hypertension Mother     Osteoporosis Mother     Arthritis Mother     Sudden death Mother         brain aneurysm    Heart attack Father     Melanoma Father     Coronary artery disease Father     Hyperlipidemia Father     Cancer Father     Diabetes Paternal Grandmother     Coronary artery disease Paternal Grandfather     Heart attack Family     Arthritis Family     Diabetes Family     Hypertension Family     Heart disease Family     Osteoporosis Family     No Known Problems Daughter     No Known Problems Maternal Grandmother     No Known Problems Maternal Grandfather        Physical Exam:     Vitals:   Blood Pressure: 134/71 (08/27/21 2030)  Pulse: 76 (08/27/21 2030)  Temperature: 98 1 °F (36 7 °C) (08/27/21 1409)  Temp Source: Oral (08/27/21 1409)  Respirations: 18 (08/27/21 2030)  Weight - Scale: 102 kg (223 lb 15 8 oz) (08/27/21 1408)  SpO2: 96 % (08/27/21 2030)    Physical Exam  Vitals and nursing note reviewed  Constitutional:       General: She is not in acute distress  Appearance: She is well-developed  HENT:      Head: Normocephalic and atraumatic  Eyes:      Conjunctiva/sclera: Conjunctivae normal    Cardiovascular:      Rate and Rhythm: Normal rate and regular rhythm  Heart sounds: No murmur heard  Pulmonary:      Effort: Pulmonary effort is normal  No respiratory distress  Breath sounds: Normal breath sounds  Abdominal:      Palpations: Abdomen is soft  Tenderness: There is no abdominal tenderness  Musculoskeletal:      Cervical back: Neck supple  Skin:     General: Skin is warm and dry  Neurological:      Mental Status: She is alert and oriented to person, place, and time  Psychiatric:         Mood and Affect: Mood normal          Behavior: Behavior normal          Additional Data:     Lab Results: I have personally reviewed pertinent reports        Results from last 7 days   Lab Units 08/27/21  1733   WBC Thousand/uL 8 54   HEMOGLOBIN g/dL 13 6   HEMATOCRIT % 40 8   PLATELETS Thousands/uL 230   NEUTROS PCT % 61   LYMPHS PCT % 26 MONOS PCT % 9   EOS PCT % 2     Results from last 7 days   Lab Units 08/27/21  1733   SODIUM mmol/L 138   POTASSIUM mmol/L 3 7   CHLORIDE mmol/L 107   CO2 mmol/L 26   BUN mg/dL 14   CREATININE mg/dL 0 85   ANION GAP mmol/L 5   CALCIUM mg/dL 8 8   GLUCOSE RANDOM mg/dL 133                       Imaging: I have personally reviewed pertinent reports  CT cervical spine without contrast   Final Result by Sadia San MD (08/27 1736)      No cervical spine fracture or traumatic malalignment  Workstation performed: [id number]         MRI thoracic spine wo contrast    (Results Pending)   MRI lumbar spine wo contrast    (Results Pending)       EKG, Pathology, and Other Studies Reviewed on Admission:   · EKG:     Allscripts / Epic Records Reviewed: Yes     ** Please Note: This note has been constructed using a voice recognition system   **

## 2021-08-28 NOTE — ASSESSMENT & PLAN NOTE
Acute on chronic low back pain with RLE pain from the groin to the knee  · Also chronic but worsening urinary incontinence and saddle anesthesia  · After further discuss this is intermittent since a fall in December, has not happened recently  · Sent here from PCP for evaluation of cauda equina syndrome    Imaging:  · MRI thoracic spine w/o, 8/27/21: Tiny central posterior disc herniation at T6/T7 Small posterior disc herniation which is eccentric to the right at T7/T8 which indents the thecal sac but does not significantly narrow the spinal canal   · MRI lumbar spine w/o, 8/27/21: Mild degenerative changes from L3-S1 but no findings to correlate with cauda equina syndrome  No surgical pathology appreciated on MRI  Plan:  · Imaging reviewed with the patient, showing no findings to explain her UI or RLE symptoms  · Maximize pain control and therapy  · Consider another course of PT in the outpatient setting  · Recommend outpatient appointment with PCP or urologist to rule out other causes of urinary incontinence  · PT/OT evaluation, ok to mobilize as tolerated  · Medical management and pain control per primary team  · No neurosurgical intervention is anticipated at this time    Neurosurgery will sign off at this time as there is no surgical pathology on MRI thoracic or lumbar spine  Patient can follow up in our office on an as needed basis

## 2021-08-28 NOTE — ASSESSMENT & PLAN NOTE
Patient with worsening back pain and degenerative disc disease of the lumbar region; endorses saddle anesthesia and urinary incontinence    Pain medications; consider consult to APS if acutely worsens  Urinary retention protocol; MRI ordered  Consult to Neurosurgery  PT/OT

## 2021-08-28 NOTE — PLAN OF CARE
Problem: MOBILITY - ADULT  Goal: Maintain or return to baseline ADL function  Description: INTERVENTIONS:  -  Assess patient's ability to carry out ADLs; assess patient's baseline for ADL function and identify physical deficits which impact ability to perform ADLs (bathing, care of mouth/teeth, toileting, grooming, dressing, etc )  - Assess/evaluate cause of self-care deficits   - Assess range of motion  - Assess patient's mobility; develop plan if impaired  - Assess patient's need for assistive devices and provide as appropriate  - Encourage maximum independence but intervene and supervise when necessary  - Involve family in performance of ADLs  - Assess for home care needs following discharge   - Consider OT consult to assist with ADL evaluation and planning for discharge  - Provide patient education as appropriate  Outcome: Progressing  Goal: Maintains/Returns to pre admission functional level  Description: INTERVENTIONS:  - Perform BMAT or MOVE assessment daily    - Set and communicate daily mobility goal to care team and patient/family/caregiver  - Collaborate with rehabilitation services on mobility goals if consulted  - Perform Range of Motion 3 times a day  - Reposition patient every 2 hours    - Dangle patient 3 times a day  - Stand patient 3 times a day  - Ambulate patient 3 times a day  - Out of bed to chair 3 times a day   - Out of bed for meals 3 times a day  - Out of bed for toileting  - Record patient progress and toleration of activity level   Outcome: Progressing     Problem: MUSCULOSKELETAL - ADULT  Goal: Maintain or return mobility to safest level of function  Description: INTERVENTIONS:  - Assess patient's ability to carry out ADLs; assess patient's baseline for ADL function and identify physical deficits which impact ability to perform ADLs (bathing, care of mouth/teeth, toileting, grooming, dressing, etc )  - Assess/evaluate cause of self-care deficits   - Assess range of motion  - Assess patient's mobility  - Assess patient's need for assistive devices and provide as appropriate  - Encourage maximum independence but intervene and supervise when necessary  - Involve family in performance of ADLs  - Assess for home care needs following discharge   - Consider OT consult to assist with ADL evaluation and planning for discharge  - Provide patient education as appropriate  Outcome: Progressing  Goal: Maintain proper alignment of affected body part  Description: INTERVENTIONS:  - Support, maintain and protect limb and body alignment  - Provide patient/ family with appropriate education  Outcome: Progressing     Problem: PAIN - ADULT  Goal: Verbalizes/displays adequate comfort level or baseline comfort level  Description: Interventions:  - Encourage patient to monitor pain and request assistance  - Assess pain using appropriate pain scale  - Administer analgesics based on type and severity of pain and evaluate response  - Implement non-pharmacological measures as appropriate and evaluate response  - Consider cultural and social influences on pain and pain management  - Notify physician/advanced practitioner if interventions unsuccessful or patient reports new pain  Outcome: Progressing     Problem: SAFETY ADULT  Goal: Patient will remain free of falls  Description: INTERVENTIONS:  - Educate patient/family on patient safety including physical limitations  - Instruct patient to call for assistance with activity   - Consult OT/PT to assist with strengthening/mobility   - Keep Call bell within reach  - Keep bed low and locked with side rails adjusted as appropriate  - Keep care items and personal belongings within reach  - Initiate and maintain comfort rounds  - Make Fall Risk Sign visible to staff  - Offer Toileting every 2 Hours, in advance of need  - Initiate/Maintain bed alarm  - Apply yellow socks and bracelet for high fall risk patients  - Consider moving patient to room near nurses station  Outcome: Progressing

## 2021-08-28 NOTE — PHYSICAL THERAPY NOTE
Physical Therapy Cancellation Note     08/28/21 1052   PT Last Visit   PT Visit Date 08/28/21   Note Type   Note type Evaluation   Cancel Reasons Medical status     PT orders received, chart reviewed  Pt admit with LBP associated with saddle anesthesia and urinary incontinence, pending NSx consult and MRI  PT to hold initial evaluation and see pt as appropriate and able      Serafin Healy, PT, DPT

## 2021-08-28 NOTE — ASSESSMENT & PLAN NOTE
· Patient with worsening back pain and degenerative disc disease of the lumbar region; endorses saddle anesthesia and urinary incontinence  · Hst of low back pain after a fall in December 2020  Underwent outpatient physical therapy through May 2021  During last therapy session, she developed pain after squeezing a ball between her legs   · Pain has been persistent but worsened about 1 month ago  · Due to ongoing pain, PCP referred her to ED for further workup   · MRI thoracic spine: "Tiny central posterior disc herniation at T6/T7 Small posterior disc herniation which is eccentric to the right at T7/T8 which indents the thecal sac but does not significantly narrow the spinal canal "  · MRI lumbar spine: "Mild degenerative changes from L3-S1 but no findings to correlate with cauda equina syndrome   No surgical pathology appreciated on MRI "  · Consult neurosurgery   · Continue pain control  · PT/OT eval and treat   · Consider consult to APS if acutely worsens  · Urinary retention protocol

## 2021-08-28 NOTE — PROGRESS NOTES
[facility]  Progress Note - Jos Fan 1955, 78 y o  female MRN: [id number]  Unit/Bed#: [facility] 734-01 Encounter: [id number]  Primary Care Provider: KARIN Morgan   Date and time admitted to hospital: 8/27/2021  3:27 PM    * Lumbar back pain with radiculopathy affecting right lower extremity  Assessment & Plan  · Patient with worsening back pain and degenerative disc disease of the lumbar region; endorses saddle anesthesia and urinary incontinence  · Hst of low back pain after a fall in December 2020  Underwent outpatient physical therapy through May 2021  During last therapy session, she developed pain after squeezing a ball between her legs   · Pain has been persistent but worsened about 1 month ago  · Due to ongoing pain, PCP referred her to ED for further workup   · MRI thoracic spine: "Tiny central posterior disc herniation at T6/T7 Small posterior disc herniation which is eccentric to the right at T7/T8 which indents the thecal sac but does not significantly narrow the spinal canal "  · MRI lumbar spine: "Mild degenerative changes from L3-S1 but no findings to correlate with cauda equina syndrome  No surgical pathology appreciated on MRI "  · Consult neurosurgery   · Continue pain control  · PT/OT eval and treat   · Consider consult to APS if acutely worsens  · Urinary retention protocol    Fibromyalgia  Assessment & Plan  · PRN pain medications; follow-up in the outpatient setting    Constipation  Assessment & Plan  · Daily MiraLax; PRN Dulcolax    Bipolar I disorder, single manic episode ([facility] [address] )  Assessment & Plan  · Patient denies any acute symptoms; continue home medications    Allergic rhinitis  Assessment & Plan  · Continue Claritin      VTE Pharmacologic Prophylaxis:   Pharmacologic: Enoxaparin (Lovenox)  Mechanical VTE Prophylaxis in Place: Yes    Patient Centered Rounds: I have performed bedside rounds with nursing staff today      Discussions with Specialists or Other Care Team Provider: nursing, CM    Education and Discussions with Family / Patient: I have answered all questions to the best of my ability  Time Spent for Care: 30 minutes  More than 50% of total time spent on counseling and coordination of care as described above  Current Length of Stay: 1 day(s)    Current Patient Status: Inpatient   Certification Statement: The patient will continue to require additional inpatient hospital stay due to lumbar pain, gait dysfunction     Discharge Plan: Patient is not medically stable for discharge today, pending neurosurgery evaluation  Code Status: Level 3 - DNAR and DNI      Subjective:   Patient seen and examined at bedside this morning  Resting comfortably after reciving pain medication  Continues with "stabbing" sensation to her inner, right thigh  Denies numbness or tingling to this area  No bladder incontinence today  Feels constipated and would like a stool softener  Denies HA, dizziness, CP or SOB  Objective:     Vitals:   Temp (24hrs), Av °F (36 7 °C), Min:97 8 °F (36 6 °C), Max:98 2 °F (36 8 °C)    Temp:  [97 8 °F (36 6 °C)-98 2 °F (36 8 °C)] 97 8 °F (36 6 °C)  HR:  [76-92] 76  Resp:  [16-18] 16  BP: ()/(60-82) 98/60  SpO2:  [92 %-100 %] 92 %  Body mass index is 38 6 kg/m²  Input and Output Summary (last 24 hours):     No intake or output data in the 24 hours ending 21 1232    Physical Exam:     Physical Exam  Vitals and nursing note reviewed  Constitutional:       General: She is not in acute distress  Appearance: She is well-developed  She is obese  She is not diaphoretic  HENT:      Head: Normocephalic  Cardiovascular:      Rate and Rhythm: Normal rate and regular rhythm  Pulmonary:      Effort: Pulmonary effort is normal  No respiratory distress  Breath sounds: Normal breath sounds  No wheezing, rhonchi or rales  Abdominal:      General: Bowel sounds are normal  There is no distension  Palpations: Abdomen is soft  Tenderness: There is no abdominal tenderness  Musculoskeletal:         General: Normal range of motion  Cervical back: Normal range of motion  Right lower leg: No edema  Left lower leg: No edema  Skin:     General: Skin is warm and dry  Capillary Refill: Capillary refill takes less than 2 seconds  Findings: No rash  Neurological:      Mental Status: She is alert and oriented to person, place, and time  Mental status is at baseline  Motor: Weakness (LLE 4/5 strength ) present  Deep Tendon Reflexes: Reflexes are normal and symmetric  Psychiatric:         Mood and Affect: Mood normal          Behavior: Behavior normal          Thought Content: Thought content normal          Judgment: Judgment normal          Additional Data:     Labs:    Results from last 7 days   Lab Units 08/28/21  0452   WBC Thousand/uL 6 29   HEMOGLOBIN g/dL 12 2   HEMATOCRIT % 36 4   PLATELETS Thousands/uL 224   NEUTROS PCT % 45   LYMPHS PCT % 37   MONOS PCT % 13*   EOS PCT % 4     Results from last 7 days   Lab Units 08/28/21  0452   POTASSIUM mmol/L 3 6   CHLORIDE mmol/L 108   CO2 mmol/L 28   BUN mg/dL 13   CREATININE mg/dL 0 87   CALCIUM mg/dL 8 5   ALK PHOS U/L 66   ALT U/L 33   AST U/L 21           * I Have Reviewed All Lab Data Listed Above  * Additional Pertinent Lab Tests Reviewed:  All Labs Within Last 24 Hours Reviewed    Imaging:    Imaging Reports Reviewed Today Include: MRI thoracic and lumbar spine   Imaging Personally Reviewed by Myself Includes:  None     Recent Cultures (last 7 days):           Last 24 Hours Medication List:   Current Facility-Administered Medications   Medication Dose Route Frequency Provider Last Rate    acetaminophen  650 mg Oral Q6H PRN Nisreen Petersen MD      ARIPiprazole  15 mg Oral HS Nisreen Petersen MD      bisacodyl  5 mg Oral Daily PRN Nisreen Petersen MD      cholecalciferol  1,000 Units Oral Daily MD Shae Black DULoxetine  60 mg Oral Daily Parul Hull MD      enoxaparin  40 mg Subcutaneous Daily Parul Hull MD      HYDROcodone-acetaminophen  1 tablet Oral Q4H PRN Parul Hull MD      HYDROcodone-acetaminophen  2 tablet Oral Q4H PRN Parul Hull MD      HYDROmorphone  0 5 mg Intravenous Q4H PRN Parul Hull MD      ibuprofen  400 mg Oral Q6H [facility] Parul Hull MD      loratadine  10 mg Oral Daily Parul Hull MD      ondansetron  4 mg Intravenous Q6H PRN Parul Hull MD      polyethylene glycol  17 g Oral Daily Parul Hull MD      tiZANidine  4 mg Oral TID Parul Hull MD          Today, Patient Was Seen By: KARIN Chu    ** Please Note: Dictation voice to text software may have been used in the creation of this document   **

## 2021-08-29 ENCOUNTER — APPOINTMENT (INPATIENT)
Dept: RADIOLOGY | Facility: HOSPITAL | Age: 66
DRG: 552 | End: 2021-08-29
Payer: MEDICARE

## 2021-08-29 LAB
ALBUMIN SERPL BCP-MCNC: 3.3 G/DL (ref 3.5–5)
ALP SERPL-CCNC: 68 U/L (ref 46–116)
ALT SERPL W P-5'-P-CCNC: 34 U/L (ref 12–78)
ANION GAP SERPL CALCULATED.3IONS-SCNC: 2 MMOL/L (ref 4–13)
AST SERPL W P-5'-P-CCNC: 23 U/L (ref 5–45)
BASOPHILS # BLD AUTO: 0.04 THOUSANDS/ΜL (ref 0–0.1)
BASOPHILS NFR BLD AUTO: 1 % (ref 0–1)
BILIRUB SERPL-MCNC: 0.53 MG/DL (ref 0.2–1)
BUN SERPL-MCNC: 11 MG/DL (ref 5–25)
CALCIUM ALBUM COR SERPL-MCNC: 9.6 MG/DL (ref 8.3–10.1)
CALCIUM SERPL-MCNC: 9 MG/DL (ref 8.3–10.1)
CHLORIDE SERPL-SCNC: 107 MMOL/L (ref 100–108)
CO2 SERPL-SCNC: 28 MMOL/L (ref 21–32)
CREAT SERPL-MCNC: 0.88 MG/DL (ref 0.6–1.3)
EOSINOPHIL # BLD AUTO: 0.19 THOUSAND/ΜL (ref 0–0.61)
EOSINOPHIL NFR BLD AUTO: 3 % (ref 0–6)
ERYTHROCYTE [DISTWIDTH] IN BLOOD BY AUTOMATED COUNT: 14.5 % (ref 11.6–15.1)
GFR SERPL CREATININE-BSD FRML MDRD: 68 ML/MIN/1.73SQ M
GLUCOSE SERPL-MCNC: 109 MG/DL (ref 65–140)
HCT VFR BLD AUTO: 38.2 % (ref 34.8–46.1)
HGB BLD-MCNC: 12.6 G/DL (ref 11.5–15.4)
IMM GRANULOCYTES # BLD AUTO: 0.03 THOUSAND/UL (ref 0–0.2)
IMM GRANULOCYTES NFR BLD AUTO: 1 % (ref 0–2)
LYMPHOCYTES # BLD AUTO: 2.01 THOUSANDS/ΜL (ref 0.6–4.47)
LYMPHOCYTES NFR BLD AUTO: 35 % (ref 14–44)
MAGNESIUM SERPL-MCNC: 2.2 MG/DL (ref 1.6–2.6)
MCH RBC QN AUTO: 33.1 PG (ref 26.8–34.3)
MCHC RBC AUTO-ENTMCNC: 33 G/DL (ref 31.4–37.4)
MCV RBC AUTO: 100 FL (ref 82–98)
MONOCYTES # BLD AUTO: 0.68 THOUSAND/ΜL (ref 0.17–1.22)
MONOCYTES NFR BLD AUTO: 12 % (ref 4–12)
NEUTROPHILS # BLD AUTO: 2.74 THOUSANDS/ΜL (ref 1.85–7.62)
NEUTS SEG NFR BLD AUTO: 48 % (ref 43–75)
NRBC BLD AUTO-RTO: 0 /100 WBCS
PHOSPHATE SERPL-MCNC: 4.2 MG/DL (ref 2.3–4.1)
PLATELET # BLD AUTO: 211 THOUSANDS/UL (ref 149–390)
PMV BLD AUTO: 10.2 FL (ref 8.9–12.7)
POTASSIUM SERPL-SCNC: 4.3 MMOL/L (ref 3.5–5.3)
PROT SERPL-MCNC: 6.7 G/DL (ref 6.4–8.2)
RBC # BLD AUTO: 3.81 MILLION/UL (ref 3.81–5.12)
SODIUM SERPL-SCNC: 137 MMOL/L (ref 136–145)
WBC # BLD AUTO: 5.69 THOUSAND/UL (ref 4.31–10.16)

## 2021-08-29 PROCEDURE — 84100 ASSAY OF PHOSPHORUS: CPT | Performed by: NURSE PRACTITIONER

## 2021-08-29 PROCEDURE — 73701 CT LOWER EXTREMITY W/DYE: CPT

## 2021-08-29 PROCEDURE — 85025 COMPLETE CBC W/AUTO DIFF WBC: CPT | Performed by: NURSE PRACTITIONER

## 2021-08-29 PROCEDURE — 99232 SBSQ HOSP IP/OBS MODERATE 35: CPT | Performed by: NURSE PRACTITIONER

## 2021-08-29 PROCEDURE — 83735 ASSAY OF MAGNESIUM: CPT | Performed by: NURSE PRACTITIONER

## 2021-08-29 PROCEDURE — 80053 COMPREHEN METABOLIC PANEL: CPT | Performed by: NURSE PRACTITIONER

## 2021-08-29 PROCEDURE — 97163 PT EVAL HIGH COMPLEX 45 MIN: CPT

## 2021-08-29 RX ADMIN — IOHEXOL 100 ML: 350 INJECTION, SOLUTION INTRAVENOUS at 16:30

## 2021-08-29 RX ADMIN — LORATADINE 10 MG: 10 TABLET ORAL at 09:20

## 2021-08-29 RX ADMIN — ENOXAPARIN SODIUM 40 MG: 40 INJECTION SUBCUTANEOUS at 09:20

## 2021-08-29 RX ADMIN — TIZANIDINE 4 MG: 4 TABLET ORAL at 16:43

## 2021-08-29 RX ADMIN — DOCUSATE SODIUM 100 MG: 100 CAPSULE ORAL at 16:43

## 2021-08-29 RX ADMIN — POLYETHYLENE GLYCOL 3350 17 G: 17 POWDER, FOR SOLUTION ORAL at 09:21

## 2021-08-29 RX ADMIN — ARIPIPRAZOLE 15 MG: 15 TABLET ORAL at 21:24

## 2021-08-29 RX ADMIN — Medication 1000 UNITS: at 09:24

## 2021-08-29 RX ADMIN — IBUPROFEN 400 MG: 400 TABLET ORAL at 12:09

## 2021-08-29 RX ADMIN — TIZANIDINE 4 MG: 4 TABLET ORAL at 09:20

## 2021-08-29 RX ADMIN — IBUPROFEN 400 MG: 400 TABLET ORAL at 05:23

## 2021-08-29 RX ADMIN — DOCUSATE SODIUM 100 MG: 100 CAPSULE ORAL at 09:20

## 2021-08-29 RX ADMIN — TIZANIDINE 4 MG: 4 TABLET ORAL at 21:24

## 2021-08-29 RX ADMIN — BISACODYL 10 MG: 5 TABLET, COATED ORAL at 21:24

## 2021-08-29 RX ADMIN — DULOXETINE HYDROCHLORIDE 60 MG: 60 CAPSULE, DELAYED RELEASE ORAL at 09:20

## 2021-08-29 NOTE — PROGRESS NOTES
[facility]  Progress Note - Frank Cotter 1955, 78 y o  female MRN: [id number]  Unit/Bed#: [facility] 734-01 Encounter: [id number]  Primary Care Provider: KARIN Downs   Date and time admitted to hospital: 8/27/2021  3:27 PM    * Lumbar back pain with radiculopathy affecting right lower extremity  Assessment & Plan  · Patient with worsening back pain and degenerative disc disease of the lumbar region; endorses saddle anesthesia and urinary incontinence  · Hst of low back pain after a fall in December 2020  Underwent outpatient physical therapy through May 2021  During last therapy session, she developed pain after squeezing a ball between her legs   Pt reports that she was told to lay flat on her back squeeze ball between her legs and raise pelvis at which point she experienced pain in her right medial leg reaching from groin to knee  · - suspect possible muscle injury since imaging of spine not revealing any acute cause  · - will place order for a CT scan of right medial leg with contrast rule out muscle tear  · - since patient undergoing CT scan will stop ibuprofen, check labs in a m  · Pain has been persistent but worsened about 1 month ago  · Due to ongoing pain, PCP referred her to ED for further workup   · MRI thoracic spine: "Tiny central posterior disc herniation at T6/T7 Small posterior disc herniation which is eccentric to the right at T7/T8 which indents the thecal sac but does not significantly narrow the spinal canal "  · MRI lumbar spine: "Mild degenerative changes from L3-S1 but no findings to correlate with cauda equina syndrome   No surgical pathology appreciated on MRI "  · Appreciate neurosurgery   · Continue pain control  · PT/OT eval and treat - recommend home with outpatient rehab   · Consider consult to APS if acutely worsens- pain in   · Urinary retention protocol- pt with no retention  Reported will have bladder scan ongoing Constipation  Assessment & Plan  · Daily MiraLax; PRN Dulcolax    Fibromyalgia  Assessment & Plan  · PRN pain medications; follow-up in the outpatient setting    Bipolar I disorder, single manic episode ([facility] [address] )  Assessment & Plan  · Patient denies any acute symptoms; continue home medications    Allergic rhinitis  Assessment & Plan  · Continue Claritin        VTE Pharmacologic Prophylaxis:   High Risk (Score >/= 5) - Pharmacological DVT Prophylaxis Ordered: enoxaparin (Lovenox)  Sequential Compression Devices Ordered  Patient Centered Rounds: I performed bedside rounds with nursing staff today  Discussions with Specialists or Other Care Team Provider: nursing / case management    Education and Discussions with Family / Patient: patient   Time Spent for Care: 45 minutes  More than 50% of total time spent on counseling and coordination of care as described above  Current Length of Stay: 2 day(s)  Current Patient Status: Inpatient   Certification Statement: The patient will continue to require additional inpatient hospital stay due to ongoing workup   Discharge Plan: Anticipate discharge in 24-48 hrs to home  with outpt pt once imaging performed     Code Status: Level 3 - DNAR and DNI    Subjective:   Pt feels that she has not gotten to the bottom of why she has this pain in her right medial thigh area from groin to knee  She reports she has told multiple providers and no one has addressed the reason for her pain  Patient is able to lift right leg ever slightly reports injury occurred approximately 3 months ago  Has never changed in intensity or improved with any treatment  Objective:     Vitals:   Temp (24hrs), Av 9 °F (36 6 °C), Min:97 6 °F (36 4 °C), Max:98 3 °F (36 8 °C)    Temp:  [97 6 °F (36 4 °C)-98 3 °F (36 8 °C)] 97 6 °F (36 4 °C)  HR:  [63-80] 80  Resp:  [16-18] 18  BP: (103-137)/(47-78) 137/67  SpO2:  [92 %-97 %] 97 %  Body mass index is 38 6 kg/m²       Input and Output Summary (last 24 hours): Intake/Output Summary (Last 24 hours) at 8/29/2021 1434  Last data filed at 8/29/2021 1201  Gross per 24 hour   Intake 300 ml   Output 1 ml   Net 299 ml       Physical Exam:   Physical Exam  Constitutional:       General: She is not in acute distress  Appearance: She is obese  She is not ill-appearing, toxic-appearing or diaphoretic  HENT:      Head: Normocephalic and atraumatic  Eyes:      General:         Right eye: No discharge  Left eye: No discharge  Conjunctiva/sclera: Conjunctivae normal    Cardiovascular:      Heart sounds: No murmur heard  No friction rub  No gallop  Pulmonary:      Effort: No respiratory distress  Breath sounds: No stridor  No wheezing, rhonchi or rales  Chest:      Chest wall: No tenderness  Abdominal:      General: There is no distension  Palpations: There is no mass  Tenderness: There is no abdominal tenderness  There is no right CVA tenderness, left CVA tenderness, guarding or rebound  Hernia: No hernia is present  Musculoskeletal:         General: No swelling, tenderness, deformity or signs of injury  Right lower leg: No edema  Left lower leg: No edema  Comments: Unable to fully lift right leg off bed   Skin:     Coloration: Skin is not jaundiced or pale  Findings: No bruising, erythema, lesion or rash  Neurological:      Mental Status: She is oriented to person, place, and time     Psychiatric:         Behavior: Behavior normal           Additional Data:     Labs:  Results from last 7 days   Lab Units 08/29/21  0542   WBC Thousand/uL 5 69   HEMOGLOBIN g/dL 12 6   HEMATOCRIT % 38 2   PLATELETS Thousands/uL 211   NEUTROS PCT % 48   LYMPHS PCT % 35   MONOS PCT % 12   EOS PCT % 3     Results from last 7 days   Lab Units 08/29/21  0542   SODIUM mmol/L 137   POTASSIUM mmol/L 4 3   CHLORIDE mmol/L 107   CO2 mmol/L 28   BUN mg/dL 11   CREATININE mg/dL 0 88   ANION GAP mmol/L 2*   CALCIUM mg/dL 9 0   ALBUMIN g/dL 3 3*   TOTAL BILIRUBIN mg/dL 0 53   ALK PHOS U/L 68   ALT U/L 34   AST U/L 23   GLUCOSE RANDOM mg/dL 109                       Lines/Drains:  Invasive Devices     Peripheral Intravenous Line            Peripheral IV 08/27/21 Right Antecubital 1 day                      Imaging: Reviewed radiology reports from this admission including: MRI spine    Recent Cultures (last 7 days):         Last 24 Hours Medication List:   Current Facility-Administered Medications   Medication Dose Route Frequency Provider Last Rate    acetaminophen  650 mg Oral Q6H PRN Elena Pan MD      ARIPiprazole  15 mg Oral HS Elena Pan MD      bisacodyl  10 mg Oral Daily PRN Dang Jung PA-C      cholecalciferol  1,000 Units Oral Daily Elena Pan MD      docusate sodium  100 mg Oral BID Vista Lemme, YONATHANNP      DULoxetine  60 mg Oral Daily Elena Pan MD      enoxaparin  40 mg Subcutaneous Daily Elena Pan MD      HYDROcodone-acetaminophen  1 tablet Oral Q6H PRN Vista Lemme, CRNP      HYDROcodone-acetaminophen  2 tablet Oral Q6H PRN Vista Lemme, CRNP      HYDROmorphone  0 2 mg Intravenous Q4H PRN Vista Lemme, CRNP      loratadine  10 mg Oral Daily Elena Pan MD      ondansetron  4 mg Intravenous Q6H PRN Elena Pan MD      polyethylene glycol  17 g Oral Daily Elena Pan MD      tiZANidine  4 mg Oral TID Elena Pan MD          Today, Patient Was Seen By: KARIN Guzman    **Please Note: This note may have been constructed using a voice recognition system  **

## 2021-08-29 NOTE — PLAN OF CARE
Problem: PHYSICAL THERAPY ADULT  Goal: Performs mobility at highest level of function for planned discharge setting  See evaluation for individualized goals  Description: Treatment/Interventions: Functional transfer training, LE strengthening/ROM, Therapeutic exercise, Endurance training, Patient/family training, Equipment eval/education, Bed mobility, Gait training  Equipment Recommended: Jaycee Coleman (pt owns)       See flowsheet documentation for full assessment, interventions and recommendations  Note: Prognosis: Fair  Problem List: Decreased strength, Impaired balance, Decreased mobility, Decreased safety awareness, Pain  Assessment: Pt is a 78 y o  female seen for PT evaluation s/p admit to [facility] on 8/27/2021  Pt was admitted with a primary dx of: Lumbar radiculopathy affecting R LE  PT now consulted for assessment of mobility and d/c needs  Pt with Ambulate orders  Pts current comorbidities effecting treatment include: Bipolar disorder, Fibromyalgia, Chronic pain, C/S fusion  Pts current clinical presentation is Unstable/ Unpredictable (high complexity) due to Ongoing medical management for primary dx, Increased reliance on more restrictive AD compared to baseline, Decreased activity tolerance compared to baseline, Fall risk  Prior to admission, pt was independent with household mobility  Upon evaluation, pt currently is requiring supervision for bed mobility; supervision for transfers and supervision for ambulation 50 ft w/ no AD  Pt educated on straight cane use for community mobility, required cues for sequencing and extra time  Will benefit from continued PT sessions for carry-over/safety  Pt educated on spinal precautions, use of cart/rolling basket to avoid lifting groceries/laundry, home safety, pt verbalized understanding, states neighbors/family will assist in modifying    Pt presents at PT eval functioning below baseline and currently w/ overall mobility deficits 2* to: impaired balance, gait deviations, pain, decreased activity tolerance compared to baseline, decreased functional mobility tolerance compared to baseline, decreased safety awareness, fall risk  Pt currently at a fall risk 2* to impairments listed above  Pt will continue to benefit from skilled acute PT interventions to address stated impairments; to maximize functional mobility; for ongoing pt/ family training; and DME needs  At conclusion of PT session pt returned back in chair with phone and call bell within reach  Pt denies any further questions at this time  Recommend home with resume OPPT upon hospital D/C  PT Discharge Recommendation: Home with outpatient rehabilitation     PT - OK to Discharge: Yes    See flowsheet documentation for full assessment

## 2021-08-29 NOTE — ASSESSMENT & PLAN NOTE
· Patient with worsening back pain and degenerative disc disease of the lumbar region; endorses saddle anesthesia and urinary incontinence  · Hst of low back pain after a fall in December 2020  Underwent outpatient physical therapy through May 2021  During last therapy session, she developed pain after squeezing a ball between her legs   Pt reports that she was told to lay flat on her back squeeze ball between her legs and raise pelvis at which point she experienced pain in her right medial leg reaching from groin to knee  · - suspect possible muscle injury since imaging of spine not revealing any acute cause  · - will place order for a CT scan of right medial leg with contrast rule out muscle tear  · - since patient undergoing CT scan will stop ibuprofen, check labs in a m  · Pain has been persistent but worsened about 1 month ago  · Due to ongoing pain, PCP referred her to ED for further workup   · MRI thoracic spine: "Tiny central posterior disc herniation at T6/T7 Small posterior disc herniation which is eccentric to the right at T7/T8 which indents the thecal sac but does not significantly narrow the spinal canal "  · MRI lumbar spine: "Mild degenerative changes from L3-S1 but no findings to correlate with cauda equina syndrome   No surgical pathology appreciated on MRI "  · Appreciate neurosurgery   · Continue pain control  · PT/OT eval and treat - recommend home with outpatient rehab   · Consider consult to APS if acutely worsens- pain in   · Urinary retention protocol- pt with no retention  Reported will have bladder scan ongoing

## 2021-08-29 NOTE — PHYSICAL THERAPY NOTE
Physical Therapy Evaluation    Patient's Name: Henrik Peterson    Admitting Diagnosis  Numbness [R20 0]  Chronic back pain [M54 9, G89 29]  Acute exacerbation of chronic low back pain [M54 5, G89 29]    Problem List  Patient Active Problem List   Diagnosis    Allergic rhinitis    Basal cell carcinoma of skin    Bipolar I disorder, single manic episode ([facility] [address] )    Cervical radiculopathy    DDD (degenerative disc disease), cervical    Disc degeneration, lumbar    Fibromyalgia    Acute vasomotor rhinitis    Chest pain syndrome    Chronic neck pain    Chronic bilateral low back pain without sciatica    Chronic joint pain    Chronic bilateral thoracic back pain    Myofascial pain syndrome    Actinic keratosis    Other spondylosis with radiculopathy, lumbar region    Constipation    Lumbar back pain with radiculopathy affecting right lower extremity       Past Medical History  Past Medical History:   Diagnosis Date    Anxiety     Arthritis     Basal cell carcinoma (BCC)     Bipolar disorder ([facility] [address] )     Cervical spinal stenosis     last assessed 5/13/14, resolved 10/10/16    Chronic constipation     last assessed 8/28/12, resovled 9/14/15    Chronic fatigue syndrome     last assessed 8/28/12, resolved 9/14/15    Chronic hyperglycemia     resolved 9/14/15    Chronic pain syndrome     last assessed 11/12/13, resolved 10/10/16    Depression     Essential hypertriglyceridemia     resolved 10/10/16    Fibromyalgia     GERD (gastroesophageal reflux disease)     Herpes simplex infection     last assessed 12/12/13, resolved 10/10/16    Hypokalemia     last assessed 9/14/15, resolved 11/23/15    Insomnia     last assessed 3/30/15, resolved 11/23/15    Median neuropathy     last assessed 7/14/15, resolved 10/10/16    Pneumonia     last assessed 7/16/13, resolved 5/10/13    Sacroiliitis ([facility] [address] )     last assessed 10/22/13, resolved 10/27/14       Past Surgical History  Past Surgical History:   Procedure Laterality Date    CERVICAL FUSION      COLONOSCOPY  10/23/2013    10yrs     DILATION AND CURETTAGE OF UTERUS      HALLUX VALGUS CORRECTION      HEMORROIDECTOMY      NEUROPLASTY / TRANSPOSITION MEDIAN NERVE AT CARPAL TUNNEL      REDUCTION MAMMAPLASTY      TONSILLECTOMY      TOOTH EXTRACTION      TUBAL LIGATION          08/29/21 1140   PT Last Visit   PT Visit Date 08/29/21   Note Type   Note type Evaluation   Pain Assessment   Pain Assessment Tool 0-10   Pain Score 2   Pain Location/Orientation Orientation: Right;Location: Groin   Hospital Pain Intervention(s) Repositioned; Ambulation/increased activity   Home Living   Type of Home Apartment  (2nd floor)   Home Layout One level;Elevator   [address]   Prior Function   Level of [place] Independent with ADLs and functional mobility   Lives With Alone   Falls in the last 6 months 1 to 4  (1 - December 2020)   Comments Pt states no AD at baseline, has been attending OPPT but states exercises give her pain in her back and hip  Restrictions/Precautions   Weight Bearing Precautions Per Order No   Other Precautions Pain   General   Family/Caregiver Present No   Cognition   Overall Cognitive Status WFL   Orientation Level Oriented X4   Following Commands Follows multistep commands with increased time or repetition   Comments Pt requires extra time for processing, fair safety awareness   RLE Assessment   RLE Assessment WFL  (Grossly 4/5)   LLE Assessment   LLE Assessment WFL  (Grossly 4+/5)   Light Touch   RLE Light Touch Grossly intact   LLE Light Touch Grossly intact   Bed Mobility   Supine to Sit 5  Supervision   Additional items HOB elevated; Increased time required   Transfers   Sit to Stand 5  Supervision   Additional items Increased time required   Stand to Sit 5  Supervision   Additional items Increased time required   Additional Comments no AD   Ambulation/Elevation   Gait pattern Decreased foot clearance; Short stride; Excessively slow   Gait Assistance 5  Supervision   Assistive Device Straight cane   Distance 50 ft without AD, slight lateral sway, guarded  Additional 150 ft with straight cane, frequent cues for sequencing, unable to maintain with distractions   Balance   Static Sitting Good   Dynamic Sitting Fair   Static Standing Fair   Dynamic Standing [address] -   Activity Tolerance   Activity Tolerance Patient tolerated treatment well   Nurse Made Aware RN updated   Assessment   Prognosis Fair   Problem List Decreased strength; Impaired balance;Decreased mobility; Decreased safety awareness;Pain   Assessment Pt is a 78 y o  female seen for PT evaluation s/p admit to [facility] on 8/27/2021  Pt was admitted with a primary dx of: Lumbar radiculopathy affecting R LE  PT now consulted for assessment of mobility and d/c needs  Pt with Ambulate orders  Pts current comorbidities effecting treatment include: Bipolar disorder, Fibromyalgia, Chronic pain, C/S fusion  Pts current clinical presentation is Unstable/ Unpredictable (high complexity) due to Ongoing medical management for primary dx, Increased reliance on more restrictive AD compared to baseline, Decreased activity tolerance compared to baseline, Fall risk  Prior to admission, pt was independent with household mobility  Upon evaluation, pt currently is requiring supervision for bed mobility; supervision for transfers and supervision for ambulation 50 ft w/ no AD  Pt educated on straight cane use for community mobility, required cues for sequencing and extra time  Will benefit from continued PT sessions for carry-over/safety  Pt educated on spinal precautions, use of cart/rolling basket to avoid lifting groceries/laundry, home safety, pt verbalized understanding, states neighbors/family will assist in modifying    Pt presents at PT eval functioning below baseline and currently w/ overall mobility deficits 2* to: impaired balance, gait deviations, pain, decreased activity tolerance compared to baseline, decreased functional mobility tolerance compared to baseline, decreased safety awareness, fall risk  Pt currently at a fall risk 2* to impairments listed above  Pt will continue to benefit from skilled acute PT interventions to address stated impairments; to maximize functional mobility; for ongoing pt/ family training; and DME needs  At conclusion of PT session pt returned back in chair with phone and call bell within reach  Pt denies any further questions at this time  Recommend home with resume OPPT upon hospital D/C  Goals   Patient Goals to go home   STG Expiration Date 09/12/21   Short Term Goal #1 In 14 days pt will be able to: 1  Demonstrate ability to perform all aspects of bed mobility independently to increase functional independence  2  Perform functional transfers with LRAD independently to facilitate safe return to previous living environment  3   Ambulate 150 ft with LRAD independently with stable vitals to improve safety with household distances and reduce fall risk  4  Improve LE strength grades by 1 to increase ease of functional mobility with transfers and gait  5  Pt will demonstrate improved balance by one grade in order to decrease risk of falls  PT Treatment Day 0   Plan   Treatment/Interventions Functional transfer training;LE strengthening/ROM; Therapeutic exercise; Endurance training;Patient/family training;Equipment eval/education; Bed mobility;Gait training   PT Frequency 2-3x/wk   Recommendation   PT Discharge Recommendation Home with outpatient rehabilitation   Equipment Recommended Cane  (pt owns)   PT - OK to Discharge Yes   AM-PAC Basic Mobility Inpatient   Turning in Bed Without Bedrails 4   Lying on Back to Sitting on Edge of Flat Bed 4   Moving Bed to Chair 4   Standing Up From Chair 4   Walk in Room 3   Climb 3-5 Stairs 3   Basic Mobility Inpatient Raw Score 22   Basic Mobility Standardized Score 47 4       Juan Luis Collado, PT, DPT

## 2021-08-29 NOTE — PLAN OF CARE
Problem: MOBILITY - ADULT  Goal: Maintain or return to baseline ADL function  Description: INTERVENTIONS:  -  Assess patient's ability to carry out ADLs; assess patient's baseline for ADL function and identify physical deficits which impact ability to perform ADLs (bathing, care of mouth/teeth, toileting, grooming, dressing, etc )  - Assess/evaluate cause of self-care deficits   - Assess range of motion  - Assess patient's mobility; develop plan if impaired  - Assess patient's need for assistive devices and provide as appropriate  - Encourage maximum independence but intervene and supervise when necessary  - Involve family in performance of ADLs  - Assess for home care needs following discharge   - Consider OT consult to assist with ADL evaluation and planning for discharge  - Provide patient education as appropriate  Outcome: Progressing  Goal: Maintains/Returns to pre admission functional level  Description: INTERVENTIONS:  - Perform BMAT or MOVE assessment daily    - Set and communicate daily mobility goal to care team and patient/family/caregiver  - Collaborate with rehabilitation services on mobility goals if consulted  - Perform Range of Motion 3 times a day    - Dangle patient 3 times a day  - Stand patient 3 times a day  - Ambulate patient 3 times a day  - Out of bed to chair 3 times a day   - Out of bed for meals 3 times a day  - Out of bed for toileting  - Record patient progress and toleration of activity level   Outcome: Progressing     Problem: MUSCULOSKELETAL - ADULT  Goal: Maintain or return mobility to safest level of function  Description: INTERVENTIONS:  - Assess patient's ability to carry out ADLs; assess patient's baseline for ADL function and identify physical deficits which impact ability to perform ADLs (bathing, care of mouth/teeth, toileting, grooming, dressing, etc )  - Assess/evaluate cause of self-care deficits   - Assess range of motion  - Assess patient's mobility  - Assess patient's need for assistive devices and provide as appropriate  - Encourage maximum independence but intervene and supervise when necessary  - Involve family in performance of ADLs  - Assess for home care needs following discharge   - Consider OT consult to assist with ADL evaluation and planning for discharge  - Provide patient education as appropriate  Outcome: Progressing  Goal: Maintain proper alignment of affected body part  Description: INTERVENTIONS:  - Support, maintain and protect limb and body alignment  - Provide patient/ family with appropriate education  Outcome: Progressing     Problem: PAIN - ADULT  Goal: Verbalizes/displays adequate comfort level or baseline comfort level  Description: Interventions:  - Encourage patient to monitor pain and request assistance  - Assess pain using appropriate pain scale  - Administer analgesics based on type and severity of pain and evaluate response  - Implement non-pharmacological measures as appropriate and evaluate response  - Consider cultural and social influences on pain and pain management  - Notify physician/advanced practitioner if interventions unsuccessful or patient reports new pain  Outcome: Progressing     Problem: SAFETY ADULT  Goal: Patient will remain free of falls  Description: INTERVENTIONS:  - Educate patient/family on patient safety including physical limitations  - Instruct patient to call for assistance with activity   - Consult OT/PT to assist with strengthening/mobility   - Keep Call bell within reach  - Keep bed low and locked with side rails adjusted as appropriate  - Keep care items and personal belongings within reach  - Initiate and maintain comfort rounds  - Make Fall Risk Sign visible to staff  - Offer Toileting every 2 Hours, in advance of need  - Apply yellow socks and bracelet for high fall risk patients  - Consider moving patient to room near nurses station  Outcome: Progressing     Problem: Potential for Falls  Goal: Patient will remain free of falls  Description: INTERVENTIONS:  - Educate patient/family on patient safety including physical limitations  - Instruct patient to call for assistance with activity   - Consult OT/PT to assist with strengthening/mobility   - Keep Call bell within reach  - Keep bed low and locked with side rails adjusted as appropriate  - Keep care items and personal belongings within reach  - Initiate and maintain comfort rounds  - Make Fall Risk Sign visible to staff  - Apply yellow socks and bracelet for high fall risk patients  - Consider moving patient to room near nurses station  Outcome: Progressing

## 2021-08-30 VITALS
DIASTOLIC BLOOD PRESSURE: 72 MMHG | HEART RATE: 96 BPM | HEIGHT: 64 IN | RESPIRATION RATE: 17 BRPM | TEMPERATURE: 97.3 F | SYSTOLIC BLOOD PRESSURE: 147 MMHG | BODY MASS INDEX: 38.13 KG/M2 | OXYGEN SATURATION: 98 % | WEIGHT: 223.33 LBS

## 2021-08-30 PROBLEM — K59.00 CONSTIPATION: Status: RESOLVED | Noted: 2021-08-27 | Resolved: 2021-08-30

## 2021-08-30 PROCEDURE — 97112 NEUROMUSCULAR REEDUCATION: CPT

## 2021-08-30 PROCEDURE — 99239 HOSP IP/OBS DSCHRG MGMT >30: CPT | Performed by: NURSE PRACTITIONER

## 2021-08-30 PROCEDURE — 97116 GAIT TRAINING THERAPY: CPT

## 2021-08-30 RX ORDER — TIZANIDINE 4 MG/1
4 TABLET ORAL EVERY 8 HOURS PRN
Qty: 15 TABLET | Refills: 0 | Status: SHIPPED | OUTPATIENT
Start: 2021-08-30 | End: 2021-09-13

## 2021-08-30 RX ORDER — ACETAMINOPHEN 325 MG/1
650 TABLET ORAL EVERY 6 HOURS PRN
Refills: 0
Start: 2021-08-30 | End: 2021-10-18 | Stop reason: ALTCHOICE

## 2021-08-30 RX ADMIN — DOCUSATE SODIUM 100 MG: 100 CAPSULE ORAL at 09:40

## 2021-08-30 RX ADMIN — Medication 1000 UNITS: at 09:39

## 2021-08-30 RX ADMIN — DULOXETINE HYDROCHLORIDE 60 MG: 60 CAPSULE, DELAYED RELEASE ORAL at 09:40

## 2021-08-30 RX ADMIN — LORATADINE 10 MG: 10 TABLET ORAL at 09:40

## 2021-08-30 RX ADMIN — TIZANIDINE 4 MG: 4 TABLET ORAL at 09:40

## 2021-08-30 RX ADMIN — ENOXAPARIN SODIUM 40 MG: 40 INJECTION SUBCUTANEOUS at 09:39

## 2021-08-30 NOTE — CASE MANAGEMENT
Pt was cleared for d/c and d/c prior to CM being able to meet  Pt had been receiving OP PT/OT and recommended to follow-up with OP  Pt transported by family

## 2021-08-30 NOTE — RESTORATIVE TECHNICIAN NOTE
Restorative Technician Note      Patient Name: Franchesca Carrington     Note Type: Mobility  Patient Position Upon Consult: Bedside chair  Activity Performed: Ambulated;Dangled;Stood  Assistive Device: Other (Comment) (None)  Education Provided: Yes (Educated/encouraged pt to ambulate with assistance 3-4 x's/day )  Patient Position at End of Consult: Bedside chair; All needs within reach;Bed/Chair alarm activated      Skyler TORRES, Restorative Technician, [employer]

## 2021-08-30 NOTE — DISCHARGE SUMMARY
[facility]  Discharge- Chandler Palencia 1955, 78 y o  female MRN: [id number]  Unit/Bed#: [facility] 734-01 Encounter: [id number]  Primary Care Provider: KARIN Lang   Date and time admitted to hospital: 8/27/2021  3:27 PM    * Lumbar back pain with radiculopathy affecting right lower extremity  Assessment & Plan  · Patient with worsening back pain and degenerative disc disease of the lumbar region; endorses saddle anesthesia and urinary incontinence  · Hst of low back pain after a fall in December 2020  Underwent outpatient physical therapy through May 2021  During last therapy session, she developed pain after squeezing a ball between her legs   Pt reports that she was told to lay flat on her back squeeze ball between her legs and raise pelvis at which point she experienced pain in her right medial leg reaching from groin to knee  · - suspect possible muscle injury since imaging of spine not revealing any acute cause  · - will place order for a CT scan of right medial leg with contrast rule out muscle tear  · - since patient undergoing CT scan will stop ibuprofen, check labs in a m  · Pain has been persistent but worsened about 1 month ago  · Due to ongoing pain, PCP referred her to ED for further workup   · MRI thoracic spine: "Tiny central posterior disc herniation at T6/T7 Small posterior disc herniation which is eccentric to the right at T7/T8 which indents the thecal sac but does not significantly narrow the spinal canal "  · MRI lumbar spine: "Mild degenerative changes from L3-S1 but no findings to correlate with cauda equina syndrome   No surgical pathology appreciated on MRI "  · Appreciate neurosurgery   · Continue pain control  · PT/OT eval and treat - recommend home with outpatient rehab   · Consider consult to APS if acutely worsens- pain in   · Urinary retention protocol- pt with no retention  Reported will have bladder scan ongoing Fibromyalgia  Assessment & Plan  · PRN pain medications; follow-up in the outpatient setting    Bipolar I disorder, single manic episode [facility])  Assessment & Plan  · Patient denies any acute symptoms; continue home medications    Allergic rhinitis  Assessment & Plan  · Continue Claritin    Constipation-resolved as of 8/30/2021  Assessment & Plan  · Daily MiraLax; PRN Dulcolax      Medical Problems     Resolved Problems  Date Reviewed: 8/30/2021        Resolved    Constipation 8/30/2021     Resolved by  Jack Fabian              Discharging Physician / Practitioner: KARIN Fabian  PCP: Jack Morgan  Admission Date:   Admission Orders (From admission, onward)     Ordered        08/27/21 2034  Inpatient Admission  Once                   Discharge Date: 08/30/21    Consultations During Hospital Stay:  · Valdo Cole - neurosx     Procedures Performed:   · X-ray right hip on 08/17/2021:Mild bilateral hip osteoarthrosis without acute osseous abnormality   · X-ray lumbar spine 08/26/2021 ; Worsening degenerative disc disease of the lumbar spine  ·    CT cervical spine without contrast 08/27/2021:  No cervical spine fracture or traumatic malalignment  ·     MRI thoracic spine without contrast 08/27/2021:  Demonstrates no evidence of acute spine knee injury  Tiny central posterior disc herniation at T6/T7 Small posterior disc herniation which is eccentric to the right at T7/T8 which indents the thecal sac but does not significantly narrow the spinal canal    ·     MRI lumbar spine:    Degenerative changes as described no evidence of acute spinal injury   Correlation with patient's symptoms recommended  ·  CT lower extremity with contrast right leg:  Unremarkable CT of the right lower extremity   Please note, CT has limited sensitivity for low grade muscle injuries     Incidentally noted 3 6 cm right ovarian cyst, for which further characterization with nonemergent pelvic ultrasound is recommended  · Labs CMP and CBC stable  · UA with small leukocyte support 10 wbc's    Significant Findings / Test Results:   · See above    Incidental Findings:   · Incidentally noted 3 6 cm right ovarian cyst, for which further characterization with nonemergent pelvic ultrasound is recommended  Test Results Pending at Discharge (will require follow up): · None     Outpatient Tests Requested:  · Call to schedule follow-up with PCP within the next week  · Call to schedule follow-up with ortho if ongoing symptoms      Complications:  None    Reason for Admission:  Right lower extremity pain    Hospital Course:   Yenni Vargehse is a 78 y o  female patient who originally presented to the hospital on 8/27/2021 due to acute on chronic back pain  Patient noted to have past medical history of bipolar, degenerative disc disease, fibromyalgia, hyperlipidemia, GERD, basal cell carcinoma  Patient presents from her primary care physician's office with concerns of acute on chronic back pain with saddle anesthesia and urinary incontinence  Recommendations to rule out cauda equina syndrome  Patient reports that she has chronic back pain for years and past imaging demonstrates degenerative disc disease of lumbar region as well as cervical spine disease in requiring fusion  In December patient stated that she fell and this acutely exacerbated her low back pain and from that time had been having episodes of urinary incontinence  She underwent physical therapy as at fall had resulted in a leg injury and was discharged at the end of April beginning of May 2021  Patient reports that she was at physical therapy she had a ball between her knees and was told to squeeze the ball and raise her pelvis off of the floor as she did so patient sustained severe discomfort from her groin to her knee medial aspect of leg    Patient was admitted and seen by Neurosurgery MRI arm 08/27/2021 of thoracic spine demonstrated no significant findings although chronic findings at best and MRI lumbar spine demonstrated no acute findings  Recommendations were to maximize pain control and therapy consider course of physical therapy in the outpatient setting  Recommended outpatient appointment with PCP or urologist to rule out other causes of urinary incontinence evaluation by PT and OT however they had no plan for surgical intervention  Upon my seeing patient after discussion with patient she did not feel that any explanation had been given for her severe discomfort in right lower extremity  Recommendations have been previously coordinated  Patient gave new explanation to me in terms of neurosurgery signing off  Patient discussed her symptoms with me at my time of exam seemed that patient may have muscle injury  CT scan with IV contrast was performed demonstrating unremarkable CT of the right lower extremity however there was an incidentally noted 3 6 cm right ovarian cyst for which patient would/or should have this characterized with nonemergent pelvic ultrasound as an outpatient  Patient is however recommended to follow-up with PCP within the next week  Call to schedule follow-up with ortho as needed    Please see above list of diagnoses and related plan for additional information  Condition at Discharge: good    Discharge Day Visit / Exam:   Subjective:  Patient still with discomfort in right medial inner thigh  She does report however that had has improved during stay    Recommendations on aftercare in terms of hydration, blood pressure control,  Vitals: Blood Pressure: 147/72 (08/30/21 0834)  Pulse: 96 (08/30/21 0834)  Temperature: (!) 97 3 °F (36 3 °C) (08/30/21 0834)  Temp Source: Oral (08/27/21 1409)  Respirations: 17 (08/29/21 1951)  Height: 5' 3 78" (162 cm) (08/27/21 2330)  Weight - Scale: 101 kg (223 lb 5 2 oz) (08/27/21 2330)  SpO2: 98 % (08/30/21 0834)  Exam:   Physical Exam  Constitutional:       General: She is not in acute distress  Appearance: She is obese  She is not ill-appearing, toxic-appearing or diaphoretic  HENT:      Head: Normocephalic  Nose: No congestion  Mouth/Throat:      Pharynx: Oropharynx is clear  Cardiovascular:      Rate and Rhythm: Normal rate  Heart sounds: No murmur heard  No friction rub  No gallop  Pulmonary:      Effort: No respiratory distress  Breath sounds: No stridor  No wheezing, rhonchi or rales  Chest:      Chest wall: No tenderness  Abdominal:      General: There is no distension  Palpations: There is no mass  Tenderness: There is no abdominal tenderness  There is no right CVA tenderness, left CVA tenderness, guarding or rebound  Hernia: No hernia is present  Musculoskeletal:         General: Tenderness (Right medial aspect of thigh) present  No swelling, deformity or signs of injury  Right lower leg: No edema  Left lower leg: No edema  Skin:     Coloration: Skin is not jaundiced or pale  Findings: No bruising, erythema, lesion or rash  Neurological:      Mental Status: She is alert and oriented to person, place, and time  Psychiatric:         Behavior: Behavior normal           Discussion with Family: Patient  Discharge instructions/Information to patient and family:   See after visit summary for information provided to patient and family  Provisions for Follow-Up Care:  See after visit summary for information related to follow-up care and any pertinent home health orders  Disposition:   Home    Planned Readmission:  No plan for readmission     Discharge Statement:  I spent 40 minutes discharging the patient  This time was spent on the day of discharge  I had direct contact with the patient on the day of discharge   Greater than 50% of the total time was spent examining patient, answering all patient questions, arranging and discussing plan of care with patient as well as directly providing post-discharge instructions  Additional time then spent on discharge activities  Discharge Medications:  See after visit summary for reconciled discharge medications provided to patient and/or family        **Please Note: This note may have been constructed using a voice recognition system**

## 2021-08-30 NOTE — PHYSICAL THERAPY NOTE
Physical Therapy Progress Note     08/30/21 0900   PT Last Visit   PT Visit Date 08/30/21   Note Type   Note Type Treatment   Pain Assessment   Pain Assessment Tool 0-10   Pain Score 8  (no pain meds requested )   Pain Location/Orientation Orientation: Right;Location: Leg;Orientation: Upper   Restrictions/Precautions   Other Precautions Pain   Subjective   Subjective The pt  states that she would like to walk  She reports that she has difficulty sequencing the cane, but that this improved as she progressed  Transfers   Sit to Stand 5  Supervision   Stand to Sit 5  Supervision   Ambulation/Elevation   Gait pattern Excessively slow; Inconsistent sergio   Gait Assistance 5  Supervision   Additional items Verbal cues   Assistive Device SPC;Rolling walker  (Rolling walker for last 150 feet only )   Distance 50 feet with no device, 470 feet with SPC, 150 feet with the RW  Balance   Static Sitting Good   Dynamic Sitting Fair +   Static Standing Fair   Ambulatory Fair -   Activity Tolerance   Activity Tolerance Patient tolerated treatment well   Exercises   Neuro re-ed Static standing and reaching with no device, SPC, and RW  Assessment   Prognosis Fair   Problem List Decreased strength; Impaired balance;Decreased mobility; Decreased safety awareness;Pain   Assessment The pt  has improving balance and activity tolerance today  She was instructed in utilizing the [facility] in her LUE with initial difficulty for sequencing, but this gradually improved throughout the session with repetition  She was able to manuever around obstacles with increased time  She was trialed on a rolling walker with noted improvement in sergio and gait fluidity  She would benefit from a trial of a rollator for community ambulation in order to maximize her balance and activity tolerance  Barriers to Discharge None   Goals   Patient Goals To go home     STG Expiration Date 09/12/21   PT Treatment Day 1   Plan   Treatment/Interventions Functional transfer training;LE strengthening/ROM; Therapeutic exercise; Endurance training;Patient/family training;Bed mobility;Gait training   Progress Progressing toward goals   PT Frequency 2-3x/wk   Recommendation   PT Discharge Recommendation Home with outpatient rehabilitation   Equipment Recommended Cane;Walker  (Pt  provided SPC )   Walker Package Recommended Rollator   PT - OK to Discharge Yes   AM-PAC Basic Mobility Inpatient   Turning in Bed Without Bedrails 4   Lying on Back to Sitting on Edge of Flat Bed 4   Moving Bed to Chair 4   Standing Up From Chair 4   Walk in Room 3   Climb 3-5 Stairs 3   Basic Mobility Inpatient Raw Score 22   Basic Mobility Standardized Score 47 4     An AM-PAC Basic Mobility standardized score less than 42 9 suggests the patient may benefit from discharge to post-acute rehab services      Jean-Claude Ramos, PTA

## 2021-08-30 NOTE — PLAN OF CARE
Problem: PHYSICAL THERAPY ADULT  Goal: Performs mobility at highest level of function for planned discharge setting  See evaluation for individualized goals  Description: Treatment/Interventions: Functional transfer training, LE strengthening/ROM, Therapeutic exercise, Endurance training, Patient/family training, Equipment eval/education, Bed mobility, Gait training  Equipment Recommended: Anselmo Boles (pt owns)       See flowsheet documentation for full assessment, interventions and recommendations  8/30/2021 1754 by Shahriar Smith PTA  Outcome: Adequate for Discharge  Note: Prognosis: Fair  Problem List: Decreased strength, Impaired balance, Decreased mobility, Decreased safety awareness, Pain  Assessment: The pt  has improving balance and activity tolerance today  She was instructed in utilizing the [facility] in her LUE with initial difficulty for sequencing, but this gradually improved throughout the session with repetition  She was able to manuever around obstacles with increased time  She was trialed on a rolling walker with noted improvement in sergio and gait fluidity  She would benefit from a trial of a rollator for community ambulation in order to maximize her balance and activity tolerance  Barriers to Discharge: None        PT Discharge Recommendation: Home with outpatient rehabilitation     PT - OK to Discharge: Yes    See flowsheet documentation for full assessment       8/30/2021 1754 by Shahriar Smith PTA  Reactivated

## 2021-08-30 NOTE — PLAN OF CARE
Problem: MOBILITY - ADULT  Goal: Maintain or return to baseline ADL function  Description: INTERVENTIONS:  -  Assess patient's ability to carry out ADLs; assess patient's baseline for ADL function and identify physical deficits which impact ability to perform ADLs (bathing, care of mouth/teeth, toileting, grooming, dressing, etc )  - Assess/evaluate cause of self-care deficits   - Assess range of motion  - Assess patient's mobility; develop plan if impaired  - Assess patient's need for assistive devices and provide as appropriate  - Encourage maximum independence but intervene and supervise when necessary  - Involve family in performance of ADLs  - Assess for home care needs following discharge   - Consider OT consult to assist with ADL evaluation and planning for discharge  - Provide patient education as appropriate  Outcome: Adequate for Discharge  Goal: Maintains/Returns to pre admission functional level  Description: INTERVENTIONS:  - Perform BMAT or MOVE assessment daily    - Set and communicate daily mobility goal to care team and patient/family/caregiver     - Collaborate with rehabilitation services on mobility goals if consulted  - Ambulate patient 3 times a day  - Out of bed to chair 3 times a day   - Out of bed for meals 3 times a day  - Out of bed for toileting  - Record patient progress and toleration of activity level   Outcome: Adequate for Discharge     Problem: MUSCULOSKELETAL - ADULT  Goal: Maintain or return mobility to safest level of function  Description: INTERVENTIONS:  - Assess patient's ability to carry out ADLs; assess patient's baseline for ADL function and identify physical deficits which impact ability to perform ADLs (bathing, care of mouth/teeth, toileting, grooming, dressing, etc )  - Assess/evaluate cause of self-care deficits   - Assess range of motion  - Assess patient's mobility  - Assess patient's need for assistive devices and provide as appropriate  - Encourage maximum independence but intervene and supervise when necessary  - Involve family in performance of ADLs  - Assess for home care needs following discharge   - Consider OT consult to assist with ADL evaluation and planning for discharge  - Provide patient education as appropriate  Outcome: Adequate for Discharge  Goal: Maintain proper alignment of affected body part  Description: INTERVENTIONS:  - Support, maintain and protect limb and body alignment  - Provide patient/ family with appropriate education  Outcome: Adequate for Discharge     Problem: PAIN - ADULT  Goal: Verbalizes/displays adequate comfort level or baseline comfort level  Description: Interventions:  - Encourage patient to monitor pain and request assistance  - Assess pain using appropriate pain scale  - Administer analgesics based on type and severity of pain and evaluate response  - Implement non-pharmacological measures as appropriate and evaluate response  - Consider cultural and social influences on pain and pain management  - Notify physician/advanced practitioner if interventions unsuccessful or patient reports new pain  Outcome: Adequate for Discharge     Problem: SAFETY ADULT  Goal: Patient will remain free of falls  Description: INTERVENTIONS:  - Educate patient/family on patient safety including physical limitations  - Instruct patient to call for assistance with activity   - Consult OT/PT to assist with strengthening/mobility   - Keep Call bell within reach  - Keep bed low and locked with side rails adjusted as appropriate  - Keep care items and personal belongings within reach  - Initiate and maintain comfort rounds  - Make Fall Risk Sign visible to staff  - Offer Toileting every 2 Hours, in advance of need  - Initiate/Maintain bed alarm  - Obtain necessary fall risk management equipment: alarms  - Apply yellow socks and bracelet for high fall risk patients  - Consider moving patient to room near nurses station  Outcome: Adequate for Discharge Problem: Potential for Falls  Goal: Patient will remain free of falls  Description: INTERVENTIONS:  - Educate patient/family on patient safety including physical limitations  - Instruct patient to call for assistance with activity   - Consult OT/PT to assist with strengthening/mobility   - Keep Call bell within reach  - Keep bed low and locked with side rails adjusted as appropriate  - Keep care items and personal belongings within reach  - Initiate and maintain comfort rounds  - Make Fall Risk Sign visible to staff  - Apply yellow socks and bracelet for high fall risk patients  - Consider moving patient to room near nurses station  Outcome: Adequate for Discharge

## 2021-08-31 ENCOUNTER — OFFICE VISIT (OUTPATIENT)
Dept: PHYSICAL THERAPY | Facility: CLINIC | Age: 66
End: 2021-08-31
Payer: MEDICARE

## 2021-08-31 ENCOUNTER — TRANSITIONAL CARE MANAGEMENT (OUTPATIENT)
Dept: FAMILY MEDICINE CLINIC | Facility: CLINIC | Age: 66
End: 2021-08-31

## 2021-08-31 ENCOUNTER — APPOINTMENT (OUTPATIENT)
Dept: PHYSICAL THERAPY | Facility: CLINIC | Age: 66
End: 2021-08-31
Payer: MEDICARE

## 2021-08-31 DIAGNOSIS — M47.26 OTHER SPONDYLOSIS WITH RADICULOPATHY, LUMBAR REGION: Primary | ICD-10-CM

## 2021-08-31 PROCEDURE — 97110 THERAPEUTIC EXERCISES: CPT | Performed by: PHYSICAL THERAPIST

## 2021-08-31 PROCEDURE — 97140 MANUAL THERAPY 1/> REGIONS: CPT | Performed by: PHYSICAL THERAPIST

## 2021-08-31 NOTE — PROGRESS NOTES
Daily Note     Today's date: 2021  Patient name: Cecille Garcia  : 1955  MRN: [id number]  Referring provider: Carlos Herzog MD  Dx:   Encounter Diagnosis     ICD-10-CM    1  Other spondylosis with radiculopathy, lumbar region  M47 [id number]                   Subjective: Pt reports increased pain with driving and pushing on the gas pedal  Pt reports she spent 2 days in the hospital and did not find out what is going on with her leg  Objective: See treatment diary below      Assessment: Tolerated treatment fair  Patient exhibited good technique with therapeutic exercises  Pt was instructed not to have increased pain with DLS abdominal bracing, and to do gently  Plan: Continue per plan of care   Focus on pain reduction     Dx: lumbar spondylosis  EPOC: 10/5/21  CO-MORBIDITIES:  PERSONAL FACTORS:  Precautions: none      Manuals            R lumbar/ gluteal MFR             R hip adductor MFR  th           R hip PROM  th             25'           Neuro Re-Ed             DLs: abd brac  10x10"           DLS: bent leg fallout  10 B           clamshells             bridge                                                    Ther Ex             LTR  10x5" B           Seated sciatic n glide  10 B            HS stretch             Standing trunk ext                                                                 Ther Activity                                       Gait Training                                       Modalities             TENS in sitting 10'            Cp post tx  10'

## 2021-09-01 ENCOUNTER — TELEPHONE (OUTPATIENT)
Dept: PHYSICAL THERAPY | Facility: OTHER | Age: 66
End: 2021-09-01

## 2021-09-01 NOTE — TELEPHONE ENCOUNTER
Call placed to the patient per [facility] referral     This RN did review in detail the [facility] and what we can provide for their back pain  Patient is already attending PT @ our [facility] site and was evaluated by [facility]  Patient encouraged to call [facility] and schedule a follow up appt as they recommended  Patient provided with ph# for [facility]  Patient appreciative for the call and assistance  Referral Closed

## 2021-09-02 ENCOUNTER — APPOINTMENT (OUTPATIENT)
Dept: PHYSICAL THERAPY | Facility: CLINIC | Age: 66
End: 2021-09-02
Payer: MEDICARE

## 2021-09-02 ENCOUNTER — TELEMEDICINE (OUTPATIENT)
Dept: FAMILY MEDICINE CLINIC | Facility: CLINIC | Age: 66
End: 2021-09-02
Payer: MEDICARE

## 2021-09-02 VITALS — WEIGHT: 230 LBS | BODY MASS INDEX: 39.75 KG/M2

## 2021-09-02 DIAGNOSIS — Z20.822 EXPOSURE TO COVID-19 VIRUS: Primary | ICD-10-CM

## 2021-09-02 DIAGNOSIS — R05.9 COUGH: ICD-10-CM

## 2021-09-02 PROCEDURE — U0005 INFEC AGEN DETEC AMPLI PROBE: HCPCS | Performed by: NURSE PRACTITIONER

## 2021-09-02 PROCEDURE — 99213 OFFICE O/P EST LOW 20 MIN: CPT | Performed by: NURSE PRACTITIONER

## 2021-09-02 PROCEDURE — U0003 INFECTIOUS AGENT DETECTION BY NUCLEIC ACID (DNA OR RNA); SEVERE ACUTE RESPIRATORY SYNDROME CORONAVIRUS 2 (SARS-COV-2) (CORONAVIRUS DISEASE [COVID-19]), AMPLIFIED PROBE TECHNIQUE, MAKING USE OF HIGH THROUGHPUT TECHNOLOGIES AS DESCRIBED BY CMS-2020-01-R: HCPCS | Performed by: NURSE PRACTITIONER

## 2021-09-02 NOTE — PROGRESS NOTES
COVID-19 Outpatient Progress Note    Assessment/Plan:    Pt presents via televideo after being exposed to covid positive friend on Thursday 08/26  She notes she started sore throat, cough, rhinorrhea, fatigue and diarrhea on Tuesday 08/31  Pt was vaccinated with Padron Peter back in May 2021  Covid swab ordered  Patient encouraged to continue symptom management & maintain adequate oral fluid intake at home  Pt notified that our office will contact them once their results are final  In the meantime, pt is to self-isolate & quarantine at home until results final  Pt to call office if symptoms worsen  Pt states they understand & agree with treatment plan  Problem List Items Addressed This Visit     None      Visit Diagnoses     Exposure to COVID-19 virus    -  Primary    Relevant Orders    Novel Coronavirus (COVID-19), PCR [facility] Collected in Office    Cough        Relevant Orders    Novel Coronavirus (COVID-19), PCR [facility] Collected in Office         Disposition:     I recommended self-quarantine for 10 days and to watch for symptoms until 14 days after exposure  If patient were to develop symptoms, they should self isolate and call our office for further guidance  I referred patient to one of our centralized sites for a COVID-19 swab  I have spent 15 minutes directly with the patient  Greater than 50% of this time was spent in counseling/coordination of care regarding: instructions for management and patient and family education          Verification of patient location:    Patient is located in the following state in which I hold an active license [place]    Encounter provider KARIN Edwards    Provider located at [address] [place] [address]  [phone]    Recent Visits  Date Type Provider Dept   08/27/21 Office Visit Abisai Castellon, [address] [place] [address] [address]   08/26/21 Telephone [facility] Kari Valerio [facility]   Showing recent visits within past 7 days and meeting all other requirements  Today's Visits  Date Type Provider Dept   09/02/21 Telemedicine KARIN Moreno P[facility] [address] [facility]   Showing today's visits and meeting all other requirements  Future Appointments  No visits were found meeting these conditions  Showing future appointments within next 150 days and meeting all other requirements     This virtual check-in was done via [employer] and patient was informed that this is a secure, HIPAA-compliant platform  She agrees to proceed  Patient agrees to participate in a virtual check in via telephone or video visit instead of presenting to the office to address urgent/immediate medical needs  Patient is aware this is a billable service  After connecting through [facility], the patient was identified by name and date of birth  Adelita Broderick was informed that this was a telemedicine visit and that the exam was being conducted confidentially over secure lines  My office door was closed  No one else was in the room  Adelita Broderick acknowledged consent and understanding of privacy and security of the telemedicine visit  I informed the patient that I have reviewed her record in Epic and presented the opportunity for her to ask any questions regarding the visit today  The patient agreed to participate  Subjective:   Adelita Broderick is a 78 y o  female who is concerned about COVID-19  Patient's symptoms include chills, fatigue, nasal congestion, rhinorrhea, sore throat, cough and diarrhea  Patient denies fever, nausea and vomiting       COVID-19 vaccination status: Fully vaccinated    Exposure:   Contact with a person who is under investigation (PUI) for or who is positive for COVID-19 within the last 14 days?: Yes    Hospitalized recently for fever and/or lower respiratory symptoms?: No      Currently a healthcare worker that is involved in direct patient care?: No      Works in a special setting where the risk of COVID-19 transmission may be high? (this may include long-term care, correctional and [facility] facilities; homeless shelters; assisted-living facilities and group homes ): No      Resident in a special setting where the risk of COVID-19 transmission may be high? (this may include long-term care, correctional and [facility] facilities; homeless shelters; assisted-living facilities and group homes ): No      Pt presents via televideo after being exposed to covid positive friend on Thursday 08/26  She notes she started sore throat, cough, rhinorrhea, fatigue and diarrhea on Tuesday 08/31  Pt was vaccinated with Vito Lowry back in May 2021      Lab Results   Component Value Date    SARSCOV2 Not Detected 03/21/2020     Past Medical History:   Diagnosis Date    Anxiety     Arthritis     Basal cell carcinoma (BCC)     Bipolar disorder ([facility] [address] )     Cervical spinal stenosis     last assessed 5/13/14, resolved 10/10/16    Chronic constipation     last assessed 8/28/12, resovled 9/14/15    Chronic fatigue syndrome     last assessed 8/28/12, resolved 9/14/15    Chronic hyperglycemia     resolved 9/14/15    Chronic pain syndrome     last assessed 11/12/13, resolved 10/10/16    Depression     Essential hypertriglyceridemia     resolved 10/10/16    Fibromyalgia     GERD (gastroesophageal reflux disease)     Herpes simplex infection     last assessed 12/12/13, resolved 10/10/16    Hypokalemia     last assessed 9/14/15, resolved 11/23/15    Insomnia     last assessed 3/30/15, resolved 11/23/15    Median neuropathy     last assessed 7/14/15, resolved 10/10/16    Pneumonia     last assessed 7/16/13, resolved 5/10/13    Sacroiliitis ([facility] [address] )     last assessed 10/22/13, resolved 10/27/14     Past Surgical History:   Procedure Laterality Date    CERVICAL FUSION      COLONOSCOPY  10/23/2013    10yrs     DILATION AND CURETTAGE OF UTERUS      HALLUX VALGUS CORRECTION      HEMORROIDECTOMY      NEUROPLASTY / TRANSPOSITION MEDIAN NERVE AT CARPAL TUNNEL      REDUCTION MAMMAPLASTY      TONSILLECTOMY      TOOTH EXTRACTION      TUBAL LIGATION       Current Outpatient Medications   Medication Sig Dispense Refill    acetaminophen (TYLENOL) 325 mg tablet Take 2 tablets (650 mg total) by mouth every 6 (six) hours as needed for mild pain  0    ARIPiprazole (ABILIFY) 15 mg tablet Take 15 mg by mouth daily at bedtime      ascorbic acid (VITAMIN C) 500 mg tablet Take 500 mg by mouth daily      bisacodyl (DULCOLAX) 5 mg EC tablet Take 1 tablet by mouth daily as needed      Calcium 250 MG CAPS Take 500 mg by mouth      Cholecalciferol ([employer] VITAMIN D) 2000 units CAPS Take by mouth      Diclofenac Sodium (VOLTAREN) 1 % APPLY 2 G TOPICALLY 4 (FOUR) TIMES A DAY AS NEEDED (OSTEOARTHRITIS) 300 g 1    DULoxetine (CYMBALTA) 60 mg delayed release capsule Take 1 capsule by mouth daily      loratadine (CLARITIN) 10 mg tablet Take 10 mg by mouth      tiZANidine (ZANAFLEX) 4 mg tablet Take 1 tablet (4 mg total) by mouth every 8 (eight) hours as needed for muscle spasms 15 tablet 0    Turmeric Curcumin 500 MG CAPS Take by mouth      docusate sodium (COLACE) 100 mg capsule Take 100 mg by mouth 2 (two) times a day  (Patient not taking: Reported on 9/2/2021)       No current facility-administered medications for this visit  Allergies   Allergen Reactions    Cephalexin Rash    Latex Rash     Reaction Date: 21Mar2012;     Molds & Smuts Allergic Rhinitis    Other Allergic Rhinitis and Cough     Cigarette and bleach    Risperidone Palpitations     Reaction Date: 21Mar2012;     Sertraline Itching     Reaction Date: 21Mar2012;     Soy Isoflavones        Review of Systems   Constitutional: Positive for chills and fatigue  Negative for fever  HENT: Positive for congestion, postnasal drip, rhinorrhea and sore throat  Negative for sinus pressure and sinus pain  Respiratory: Positive for cough  Gastrointestinal: Positive for diarrhea  Negative for nausea and vomiting  Objective:    Vitals:    09/02/21 0908   Weight: 104 kg (230 lb)       Physical Exam  Constitutional:       Appearance: Normal appearance  Pulmonary:      Effort: Pulmonary effort is normal    Neurological:      Mental Status: She is alert  Psychiatric:         Mood and Affect: Mood normal          Behavior: Behavior normal          Thought Content: Thought content normal          Judgment: Judgment normal          VIRTUAL VISIT DISCLAIMER    Cecille Garcia verbally agrees to participate in [employer]  Pt is aware that [employer] could be limited without vital signs or the ability to perform a full hands-on physical Annabella Simran understands she or the provider may request at any time to terminate the video visit and request the patient to seek care or treatment in person

## 2021-09-03 ENCOUNTER — OFFICE VISIT (OUTPATIENT)
Dept: FAMILY MEDICINE CLINIC | Facility: CLINIC | Age: 66
End: 2021-09-03
Payer: MEDICARE

## 2021-09-03 ENCOUNTER — TELEPHONE (OUTPATIENT)
Dept: FAMILY MEDICINE CLINIC | Facility: CLINIC | Age: 66
End: 2021-09-03

## 2021-09-03 VITALS
DIASTOLIC BLOOD PRESSURE: 80 MMHG | OXYGEN SATURATION: 97 % | BODY MASS INDEX: 38.8 KG/M2 | RESPIRATION RATE: 18 BRPM | TEMPERATURE: 97.1 F | HEIGHT: 64 IN | SYSTOLIC BLOOD PRESSURE: 130 MMHG | WEIGHT: 227.25 LBS | HEART RATE: 67 BPM

## 2021-09-03 DIAGNOSIS — M25.551 RIGHT HIP PAIN: ICD-10-CM

## 2021-09-03 DIAGNOSIS — M54.16 LUMBAR BACK PAIN WITH RADICULOPATHY AFFECTING RIGHT LOWER EXTREMITY: Primary | ICD-10-CM

## 2021-09-03 LAB — SARS-COV-2 RNA RESP QL NAA+PROBE: NEGATIVE

## 2021-09-03 PROCEDURE — 99496 TRANSJ CARE MGMT HIGH F2F 7D: CPT | Performed by: NURSE PRACTITIONER

## 2021-09-03 NOTE — TELEPHONE ENCOUNTER
----- Message from [address] [place] [address] sent at 9/3/2021 10:50 AM EDT -----  Please let patient know her covid test was negative  Patient informed

## 2021-09-03 NOTE — PROGRESS NOTES
Assessment/Plan:     Diagnoses and all orders for this visit:    Lumbar back pain with radiculopathy affecting right lower extremity    Pt to continue PT sessions  She is to see Dr Gianluca Ridley office on 09/13/21 as scheduled  She also has f/u appt with Dr Isabela Phan on 09/29/21  She may continue Tylenol PRN  She is encouraged to contact our office for any questions/concerns, persistent or worsening symptoms  Patient states they understand and agree with treatment plan  Right hip pain  -     Ambulatory referral to Physical Therapy; Future      PT referral placed to help patient  She may utilize Tylenol PRN as well as alternating heat/ice applications  Pt to contact our office for any persistent or worsening symptoms  Patient states they understand and agree with treatment plan  Pt to f/u PRN  Subjective:      Patient ID: Alphonso Raymundo is a 78 y o  female  Pt presents for a TCM visit after being sent to the ER per my recommendations after her appt with me on 08/27  Pt noted worsening urinary incontinence and saddle anesthesia along with back pain so she was recommended to go to ER ASAP  MRI showed Degenerative changes as described no evidence of acute spinal injury "  She goes to PT for her back pain and is a patient of Dr Gianluca Ridley office  Pt also sees Dr Isabela Phan with Orthopedics  Today she feels well  She does note from right hip pain  She had xrays of right hip done on 08/16 which showed mild bilateral hip osteoarthrosis without acute osseous abnormality  Pt notes her back pain has improved some but she would like to stay off any narcotics  She notes she had some pain medication in the hospital that made her very constipated        The following portions of the patient's history were reviewed and updated as appropriate: allergies, current medications, past family history, past medical history, past social history, past surgical history and problem list     Review of Systems   Constitutional: Negative for chills and fever  HENT: Negative for ear pain and sore throat  Eyes: Negative for pain and visual disturbance  Respiratory: Negative for cough and shortness of breath  Cardiovascular: Negative for chest pain and palpitations  Gastrointestinal: Negative for abdominal pain and vomiting  Genitourinary: Negative for dysuria and hematuria  Musculoskeletal: Positive for back pain (chronic)  Negative for arthralgias, gait problem and joint swelling  Skin: Negative for color change and rash  Neurological: Negative for seizures and syncope  All other systems reviewed and are negative  Objective:      /80   Pulse 67   Temp (!) 97 1 °F (36 2 °C)   Resp 18   Ht 5' 3 78" (1 62 m)   Wt 103 kg (227 lb 4 oz)   SpO2 97%   BMI 39 28 kg/m²          Physical Exam  Vitals reviewed  Constitutional:       Appearance: Normal appearance  HENT:      Head: Normocephalic  Cardiovascular:      Rate and Rhythm: Normal rate and regular rhythm  Pulses: Normal pulses  Heart sounds: Normal heart sounds  Pulmonary:      Effort: Pulmonary effort is normal       Breath sounds: Normal breath sounds  Musculoskeletal:         General: No swelling  Normal range of motion  Right lower leg: No edema  Left lower leg: No edema  Comments: Spinal flexion 60 degrees, normal rafi rotation and lateral flexion,  Normal heel and toe walk  Rafi plantar flexion and dorsiflexion 5/5   Skin:     General: Skin is warm and dry  Neurological:      Mental Status: She is alert and oriented to person, place, and time  Mental status is at baseline     Psychiatric:         Mood and Affect: Mood normal          Behavior: Behavior normal

## 2021-09-07 ENCOUNTER — APPOINTMENT (OUTPATIENT)
Dept: PHYSICAL THERAPY | Facility: CLINIC | Age: 66
End: 2021-09-07
Payer: MEDICARE

## 2021-09-07 ENCOUNTER — OFFICE VISIT (OUTPATIENT)
Dept: PHYSICAL THERAPY | Facility: CLINIC | Age: 66
End: 2021-09-07
Payer: MEDICARE

## 2021-09-07 DIAGNOSIS — M47.26 OTHER SPONDYLOSIS WITH RADICULOPATHY, LUMBAR REGION: Primary | ICD-10-CM

## 2021-09-07 PROCEDURE — 97110 THERAPEUTIC EXERCISES: CPT

## 2021-09-07 PROCEDURE — 97112 NEUROMUSCULAR REEDUCATION: CPT

## 2021-09-07 PROCEDURE — 97014 ELECTRIC STIMULATION THERAPY: CPT

## 2021-09-07 PROCEDURE — 97140 MANUAL THERAPY 1/> REGIONS: CPT

## 2021-09-07 NOTE — PROGRESS NOTES
Daily Note     Today's date: 2021  Patient name: Bridger Churchill  : 1955  MRN: [id number]  Referring provider: Feliciano Barber MD  Dx:   Encounter Diagnosis     ICD-10-CM    1  Other spondylosis with radiculopathy, lumbar region  M47 26                   Subjective: Pt reports she spends a lot of time in bed due to the pain on the inside of her thigh  Pt reports she feels it's coming from her hip  C/o pain 2/10 at rest and 9 75/10 pain level w/ mov't  Objective: See treatment diary below      Assessment: Tolerated treatment fair charan for activity due to c/o's pain    Patient w/ palpable tightness in the adductors  Pt needs cont'd work on pain relief  Pt able to charan min hip PROM  Pt reports pain down to 2/10 post Rx  Pt may benefit from a home TENS unit  Plan: Continue per plan of care  Progress treatment as tolerated         Dx: lumbar spondylosis  EPOC: 10/5/21  CO-MORBIDITIES:  PERSONAL FACTORS:  Precautions: none      Manuals           R lumbar/ gluteal MFR             R hip adductor MFR  th JK          R hip PROM  th JK            25' 25'          Neuro Re-Ed             DLs: abd brac  10x10"           DLS: bent leg fallout  10 B           clamshells             bridge                                                    Ther Ex             LTR  10x5" B 10x5"B          Seated sciatic n glide  10 B            HS stretch             Standing trunk ext   10          Seated adductor stretch   20"x3                                                 Ther Activity                                       Gait Training                                       Modalities             TENS in sitting 10'  10'          Cp post tx  10' 10'

## 2021-09-08 ENCOUNTER — SOCIAL WORK (OUTPATIENT)
Dept: BEHAVIORAL/MENTAL HEALTH CLINIC | Facility: CLINIC | Age: 66
End: 2021-09-08
Payer: MEDICARE

## 2021-09-08 DIAGNOSIS — F31.81 BIPOLAR II DISORDER, MOST RECENT EPISODE MAJOR DEPRESSIVE (HCC): Primary | ICD-10-CM

## 2021-09-08 DIAGNOSIS — Z86.59 HISTORY OF POSTTRAUMATIC STRESS DISORDER (PTSD): ICD-10-CM

## 2021-09-08 PROCEDURE — 90834 PSYTX W PT 45 MINUTES: CPT | Performed by: COUNSELOR

## 2021-09-08 NOTE — PSYCH
Psychotherapy Provided: Individual Psychotherapy 45 minutes     Length of time in session: 47 minutes, follow up in 1 week    Encounter Diagnosis     ICD-10-CM    1  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, most recent episode major depressive ([address] )  F31 81    2  History of posttraumatic stress disorder (PTSD)  Z86 59        Goals addressed in session: Goal 1     Pain:      none    0     Current suicide risk : Low     Time: 12:59pm - 1:46pm    D: Reports she went to the ER past Thursday due to her pain in her leg  She reports that she has an appointment for physical therapy for her leg, after being admitted for three days  She says that she will continue to work on her leg  She reports she wasn't able to drink her shakes due to her food going bad once she had gone to the hospital  Focused today with Robb Flores on understanding the homework of CPT, understanding specifically how ABC worksheets work  She reports a better understanding of the homework and agrees to complete these ABC worksheets for the next session  A: Joni Neves appeared to be in a euthymic mood with congruent affect, seemed to be experiencing mild symptoms of PTSD through intrusive memories, sleep disturbances, lower mood at times, insomnia at times, appeared to be kempt, no SI/HI nor WANG risk apparent or reported, seemed to respond well to CPT work  P: CONTINUED: Check in with drinking shakes, going to PT  Begin at session 3 of CPT work, reviewing her ABC worksheet homework  Discuss ABC worksheet examples and stuck points again if she had trouble with filling out worksheets  Behavioral Health Treatment Plan [facility]: Diagnosis and Treatment Plan explained to Lenny Oates relates understanding diagnosis and is agreeable to Treatment Plan   Yes

## 2021-09-09 ENCOUNTER — OFFICE VISIT (OUTPATIENT)
Dept: PHYSICAL THERAPY | Facility: CLINIC | Age: 66
End: 2021-09-09
Payer: MEDICARE

## 2021-09-09 ENCOUNTER — APPOINTMENT (OUTPATIENT)
Dept: PHYSICAL THERAPY | Facility: CLINIC | Age: 66
End: 2021-09-09
Payer: MEDICARE

## 2021-09-09 DIAGNOSIS — M25.551 RIGHT HIP PAIN: ICD-10-CM

## 2021-09-09 DIAGNOSIS — M47.26 OTHER SPONDYLOSIS WITH RADICULOPATHY, LUMBAR REGION: Primary | ICD-10-CM

## 2021-09-09 PROCEDURE — 97014 ELECTRIC STIMULATION THERAPY: CPT

## 2021-09-09 PROCEDURE — 97110 THERAPEUTIC EXERCISES: CPT

## 2021-09-09 PROCEDURE — 97140 MANUAL THERAPY 1/> REGIONS: CPT

## 2021-09-09 PROCEDURE — 97112 NEUROMUSCULAR REEDUCATION: CPT

## 2021-09-09 NOTE — PROGRESS NOTES
Daily Note     Today's date: 2021  Patient name: Moni Montejo  : 1955  MRN: [id number]  Referring provider: David Guerrero MD  Dx:   Encounter Diagnosis     ICD-10-CM    1  Other spondylosis with radiculopathy, lumbar region  M47 26    2  Right hip pain  M25 551                   Subjective: Pt reports leaving PT LV and walking around the store 3x  States she is able to get in and out of bed w/ better ease  Reports she still has trouble getting pants and shoes donned putting R LE into IR at the hip  Pain levels 7 75/10  Reports able to walk her dog w/o increased pain  Objective: See treatment diary below      Assessment: Tolerated treatment well  Patient exhibited good technique with therapeutic exercises and would benefit from continued PT for cont'd work on pain relief  Pt w/ limited hip ROM pre PT, improved actively post manuals  Pt w/ better bed mobility and gait today  Pt had long pants today and deferred TENS  Plan: Continue per plan of care  Progress treatment as tolerated         Dx: lumbar spondylosis  EPOC: 10/5/21  CO-MORBIDITIES:  PERSONAL FACTORS:  Precautions: none      Manuals          R lumbar/ gluteal MFR             R hip adductor MFR  th JK JKJ         R hip PROM  th JK JK           25' 25' 15'         Neuro Re-Ed             DLs: abd brac  10x10"  10"x10         DLS: bent leg fallout  10 B  10 B         clamshells             bridge    10x5"                                                Ther Ex             LTR  10x5" B 10x5"B 10x5" B         Seated sciatic n glide  10 B            HS stretch    20"x3         Standing trunk ext   10 10x3"         Seated adductor stretch   20"x3 20"x3                                                Ther Activity                                       Gait Training                                       Modalities             TENS in sitting 10'  10' np         Cp post tx  10' 10' 10'

## 2021-09-13 ENCOUNTER — OFFICE VISIT (OUTPATIENT)
Dept: PAIN MEDICINE | Facility: CLINIC | Age: 66
End: 2021-09-13
Payer: MEDICARE

## 2021-09-13 VITALS
HEIGHT: 64 IN | SYSTOLIC BLOOD PRESSURE: 130 MMHG | HEART RATE: 79 BPM | WEIGHT: 226 LBS | DIASTOLIC BLOOD PRESSURE: 82 MMHG | TEMPERATURE: 97.9 F | BODY MASS INDEX: 38.58 KG/M2

## 2021-09-13 DIAGNOSIS — M47.26 OTHER SPONDYLOSIS WITH RADICULOPATHY, LUMBAR REGION: ICD-10-CM

## 2021-09-13 DIAGNOSIS — M51.26 HERNIATED NUCLEUS PULPOSUS, L4-5: ICD-10-CM

## 2021-09-13 DIAGNOSIS — M54.16 LUMBAR RADICULOPATHY: ICD-10-CM

## 2021-09-13 DIAGNOSIS — M54.50 CHRONIC BILATERAL LOW BACK PAIN WITHOUT SCIATICA: Primary | ICD-10-CM

## 2021-09-13 DIAGNOSIS — M48.061 LUMBAR FORAMINAL STENOSIS: ICD-10-CM

## 2021-09-13 DIAGNOSIS — G89.29 CHRONIC BILATERAL LOW BACK PAIN WITHOUT SCIATICA: Primary | ICD-10-CM

## 2021-09-13 PROCEDURE — 99214 OFFICE O/P EST MOD 30 MIN: CPT | Performed by: NURSE PRACTITIONER

## 2021-09-13 NOTE — PROGRESS NOTES
Assessment:  1  Chronic bilateral low back pain without sciatica    2  Lumbar radiculopathy    3  Herniated nucleus pulposus, L4-5    4  Lumbar foraminal stenosis    5  Other spondylosis with radiculopathy, lumbar region        Plan:  While the patient was in the office today, I discussed with the patient with her current symptoms, exam findings, and most recent MRI findings, to address inflammatory component, I do feel would be beneficial proceed with an L5-S1 interlaminar lumbar epidural steroid injection directed towards the right with Dr Steffanie Willingham and depending on the injection, it may need to be repeated and we may need to utilize a transforaminal approach and even possibly a intra-articular right hip joint injection in the future depending on the results of the injection  However, we will see how she does and proceed from there as appropriate  The patient was agreeable and verbalized an understanding  Complete risks and benefits including bleeding, infection, tissue reaction, nerve injury and allergic reaction were discussed  The approach was demonstrated using models and literature was provided  Verbal and written consent was obtained  The patient is schedule follow-up office visit 2-3 weeks after her injections and at that point time we will re-evaluate her pain symptoms and discuss her treatment plan options  The patient was agreeable and verbalized an understanding  History of Present Illness: The patient is a 78 y o  female last seen on 4/27/2021 who presents for a follow up office visit in regards to Chronic low back pain with radiculopathy secondary to herniated lumbar discs with foraminal stenosis and spondylosis  The patient currently reports that since her last office visit her low back and right lower extremity radicular symptoms in an L3-L4 and L4-L5 dermatome distribution has worsened, despite continuing physical therapy there has been minimal overall improvement    The patient has been referred back to our office by orthopedics to discuss the possibility of a repeat epidural steroid injection to see if that would be helpful as she has had epidural steroid injections in the past with significant and long-lasting relief  The patient denies any significant trauma or injury as well as any signs or symptoms of cauda equina syndrome that brought on the pain symptoms and had recently gone to the emergency room for evaluation because the pain was so severe and it was difficult to stand or walk  She did have updated MRIs of the thoracic and lumbar spine which did show herniated disc at L4-L5 with some foraminal stenosis that could possibly explain some of her pain symptoms  She also continues with right groin pain with mild arthritis on her most recent hip x-rays  She presents today for re-evaluation and to discuss her treatment plan options  I have personally reviewed and/or updated the patient's past medical history, past surgical history, family history, social history, current medications, allergies, and vital signs today  Review of Systems:    Review of Systems   Respiratory: Negative for shortness of breath  Cardiovascular: Negative for chest pain  Gastrointestinal: Negative for constipation, diarrhea, nausea and vomiting  Musculoskeletal: Positive for gait problem  Negative for arthralgias, joint swelling (joint stiffness) and myalgias  Skin: Negative for rash  Neurological: Negative for dizziness, seizures and weakness  All other systems reviewed and are negative          Past Medical History:   Diagnosis Date    Anxiety     Arthritis     Basal cell carcinoma (BCC)     Bipolar disorder ([facility] [address] )     Cervical spinal stenosis     last assessed 5/13/14, resolved 10/10/16    Chronic constipation     last assessed 8/28/12, resovled 9/14/15    Chronic fatigue syndrome     last assessed 8/28/12, resolved 9/14/15    Chronic hyperglycemia     resolved 9/14/15  Chronic pain syndrome     last assessed 11/12/13, resolved 10/10/16    Depression     Essential hypertriglyceridemia     resolved 10/10/16    Fibromyalgia     GERD (gastroesophageal reflux disease)     Herpes simplex infection     last assessed 12/12/13, resolved 10/10/16    Hypokalemia     last assessed 9/14/15, resolved 11/23/15    Insomnia     last assessed 3/30/15, resolved 11/23/15    Median neuropathy     last assessed 7/14/15, resolved 10/10/16    Pneumonia     last assessed 7/16/13, resolved 5/10/13    Sacroiliitis ([address] )     last assessed 10/22/13, resolved 10/27/14       Past Surgical History:   Procedure Laterality Date    CERVICAL FUSION      COLONOSCOPY  10/23/2013    10yrs     DILATION AND CURETTAGE OF UTERUS      HALLUX VALGUS CORRECTION      HEMORROIDECTOMY      NEUROPLASTY / TRANSPOSITION MEDIAN NERVE AT CARPAL TUNNEL      REDUCTION MAMMAPLASTY      TONSILLECTOMY      TOOTH EXTRACTION      TUBAL LIGATION         Family History   Problem Relation Age of Onset    Hypertension Mother     Osteoporosis Mother     Arthritis Mother     Sudden death Mother         brain aneurysm    Heart attack Father     Melanoma Father     Coronary artery disease Father     Hyperlipidemia Father    Olivia Solis Cancer Father     Diabetes Paternal Grandmother     Coronary artery disease Paternal Grandfather     Heart attack Family     Arthritis Family     Diabetes Family     Hypertension Family     Heart disease Family     Osteoporosis Family     No Known Problems Daughter     No Known Problems Maternal Grandmother     No Known Problems Maternal Grandfather        Social History     Occupational History    Not on file   Tobacco Use    Smoking status: Never Smoker    Smokeless tobacco: Never Used   Vaping Use    Vaping Use: Never used   Substance and Sexual Activity    Alcohol use: Not Currently    Drug use: No    Sexual activity: Not on file         Current Outpatient Medications:    acetaminophen (TYLENOL) 325 mg tablet, Take 2 tablets (650 mg total) by mouth every 6 (six) hours as needed for mild pain, Disp: , Rfl: 0    ARIPiprazole (ABILIFY) 15 mg tablet, Take 15 mg by mouth daily at bedtime, Disp: , Rfl:     ascorbic acid (VITAMIN C) 500 mg tablet, Take 500 mg by mouth daily, Disp: , Rfl:     bisacodyl (DULCOLAX) 5 mg EC tablet, Take 1 tablet by mouth daily as needed, Disp: , Rfl:     Calcium 250 MG CAPS, Take 500 mg by mouth, Disp: , Rfl:     Cholecalciferol (CVS VITAMIN D) 2000 units CAPS, Take by mouth, Disp: , Rfl:     DULoxetine (CYMBALTA) 60 mg delayed release capsule, Take 1 capsule by mouth daily, Disp: , Rfl:     loratadine (CLARITIN) 10 mg tablet, Take 10 mg by mouth, Disp: , Rfl:     Turmeric Curcumin 500 MG CAPS, Take by mouth, Disp: , Rfl:     Allergies   Allergen Reactions    Cephalexin Rash    Latex Rash     Reaction Date: 21Mar2012;     Molds & Smuts Allergic Rhinitis    Other Allergic Rhinitis and Cough     Cigarette and bleach    Risperidone Palpitations     Reaction Date: 21Mar2012;     Sertraline Itching     Reaction Date: 21Mar2012;     Soy Isoflavones        Physical Exam:    /82 (BP Location: Left arm, Patient Position: Sitting, Cuff Size: Standard)   Pulse 79   Temp 97 9 °F (36 6 °C)   Ht 5' 3 75" (1 619 m)   Wt 103 kg (226 lb)   BMI 39 10 kg/m²     Constitutional:normal, well developed, well nourished, alert, in no distress and non-toxic and no overt pain behavior  and overweight  Eyes:anicteric  HEENT:grossly intact  Neck:supple, symmetric, trachea midline and no masses   Pulmonary:even and unlabored  Cardiovascular:No edema or pitting edema present  Skin:Normal without rashes or lesions and well hydrated  Psychiatric:Mood and affect appropriate  Neurologic:Cranial Nerves II-XII grossly intact  Musculoskeletal:The patient's gait is slightly antalgic, painful, but steady without the use of any assistive devices      Imaging  FL spine and pain procedure    (Results Pending)   FL spine and pain procedure    (Results Pending)         Orders Placed This Encounter   Procedures    FL spine and pain procedure    FL spine and pain procedure

## 2021-09-13 NOTE — PATIENT INSTRUCTIONS
Epidural Steroid Injection   AMBULATORY CARE:   What you need to know about an epidural steroid injection (CANDIDA):  An CANDIDA is a procedure to inject steroid medicine into the epidural space  The epidural space is between your spinal cord and vertebrae  Steroids reduce inflammation and fluid buildup in your spine that may be causing pain  You may be given pain medicine along with the steroids  How to prepare for an CANDIDA:  Your healthcare provider will talk to you about how to prepare for your procedure  He or she will tell you what medicines to take or not take on the day of your procedure  You may need to stop taking blood thinners or other medicines several days before your procedure  You may need to adjust any diabetes medicine you take on the day of your procedure  Steroid medicine can increase your blood sugar level  Arrange for someone to drive you home when you are discharged  What will happen during an CANDIDA:   · You will be given medicine to numb the procedure area  You will be awake for the procedure, but you will not feel pain  You may also be given medicine to help you relax  Contrast liquid will be used to help your healthcare provider see the area better  Tell the healthcare provider if you have ever had an allergic reaction to contrast liquid  · Your healthcare provider may place the needle into your neck area, middle of your back, or tailbone area  He may inject the medicine next to the nerves that are causing your pain  He may instead inject the medicine into a larger area of the epidural space  This helps the medicine spread to more nerves  Your healthcare provider will use a fluoroscope to help guide the needle to the right place  A fluoroscope is a type of x-ray  After the procedure, a bandage will be placed over the injection site to prevent infection  What will happen after an CANDIDA:  You will have a bandage over the injection site to prevent infection   Your healthcare provider will tell you when you can bathe and any activity guidelines  You will be able to go home  Risks of an CANDIDA:  You may have temporary or permanent nerve damage or paralysis  You may have bleeding or develop a serious infection, such as meningitis (swelling of the brain coverings)  An abscess may also develop  An abscess is a pus-filled area under the skin  You may need surgery to fix the abscess  You may have a seizure, anxiety, or trouble sleeping  If you are a man, you may have temporary erectile dysfunction (not able to have an erection)  Call your local emergency number (911 in the [address] [place] [address],[address]) if:   · You have a seizure  · You have trouble moving your legs  Seek care immediately if:   · Blood soaks through your bandage  · You have a fever or chills, severe back pain, and the procedure area is sensitive to the touch  · You cannot control when you urinate or have a bowel movement  Call your doctor if:   · You have weakness or numbness in your legs  · Your wound is red, swollen, or draining pus  · You have nausea or are vomiting  · Your face or neck is red and you feel warm  · You have more pain than you had before the procedure  · You have swelling in your hands or feet  · You have questions or concerns about your condition or care  Care for your wound as directed: You may remove the bandage before you go to bed the day of your procedure  You may take a shower, but do not take a bath for at least 24 hours  Self-care:   · Do not drive,  use machines, or do strenuous activity for 24 hours after your procedure or as directed  · Continue other treatments  as directed  Steroid injections alone will not control your pain  The injections are meant to be used with other treatments, such as physical therapy  Follow up with your doctor as directed:  Write down your questions so you remember to ask them during your visits     © Copyright [employer] 2021 Information is for End User's use only and may not be sold, redistributed or otherwise used for commercial purposes  All illustrations and images included in CareNotes® are the copyrighted property of A D A M [employer]  or [employer]  The above information is an  only  It is not intended as medical advice for individual conditions or treatments  Talk to your doctor, nurse or pharmacist before following any medical regimen to see if it is safe and effective for you

## 2021-09-14 ENCOUNTER — APPOINTMENT (OUTPATIENT)
Dept: PHYSICAL THERAPY | Facility: CLINIC | Age: 66
End: 2021-09-14
Payer: MEDICARE

## 2021-09-14 ENCOUNTER — OFFICE VISIT (OUTPATIENT)
Dept: PHYSICAL THERAPY | Facility: CLINIC | Age: 66
End: 2021-09-14
Payer: MEDICARE

## 2021-09-14 DIAGNOSIS — M47.26 OTHER SPONDYLOSIS WITH RADICULOPATHY, LUMBAR REGION: Primary | ICD-10-CM

## 2021-09-14 PROCEDURE — 97110 THERAPEUTIC EXERCISES: CPT | Performed by: PHYSICAL THERAPIST

## 2021-09-14 PROCEDURE — 97112 NEUROMUSCULAR REEDUCATION: CPT | Performed by: PHYSICAL THERAPIST

## 2021-09-14 PROCEDURE — 97140 MANUAL THERAPY 1/> REGIONS: CPT | Performed by: PHYSICAL THERAPIST

## 2021-09-14 NOTE — PROGRESS NOTES
Daily Note     Today's date: 2021  Patient name: Moni Montejo  : 1955  MRN: [id number]  Referring provider: David Guerrero MD  Dx:   Encounter Diagnosis     ICD-10-CM    1  Other spondylosis with radiculopathy, lumbar region  M47 26                   Subjective: Pt reports increased back and leg pain when she moves  Pain rated 8/10  Pt has less pain after therapy  Objective: See treatment diary below      Assessment: Tolerated treatment well  Patient had increased tenderness in R hip adductor region  Pt had cramping with R clamshell after 10 reps      Plan: Continue per plan of care  Continue progress DLS and focus on decreasing pain       Dx: lumbar spondylosis  EPOC: 10/5/21  CO-MORBIDITIES:  PERSONAL FACTORS:  Precautions: none      Manuals         R lumbar/ gluteal MFR             R hip adductor MFR  th Mitzy Atwater th        R hip PROM  th Josehp Pollen th          25' 25' 15' 15'        Neuro Re-Ed             DLs: abd brac  10x10"  10"x10 10x10"        DLS: bent leg fallout  10 B  10 B 10 B        clamshells     10        bridge    10x5" 10                                               Ther Ex             LTR  10x5" B 10x5"B 10x5" B 10 x5"        Seated sciatic n glide  10 B    10 B         HS stretch    20"x3 20"x3        Standing trunk ext   10 10x3" 10x3"        Seated adductor stretch   20"x3 20"x3 20"x3                                               Ther Activity                                       Gait Training                                       Modalities             TENS in sitting 10'  10' np         Cp post tx  10' 10' 10' 10'

## 2021-09-16 ENCOUNTER — APPOINTMENT (OUTPATIENT)
Dept: PHYSICAL THERAPY | Facility: CLINIC | Age: 66
End: 2021-09-16
Payer: MEDICARE

## 2021-09-16 ENCOUNTER — OFFICE VISIT (OUTPATIENT)
Dept: PHYSICAL THERAPY | Facility: CLINIC | Age: 66
End: 2021-09-16
Payer: MEDICARE

## 2021-09-16 ENCOUNTER — OFFICE VISIT (OUTPATIENT)
Dept: PSYCHIATRY | Facility: CLINIC | Age: 66
End: 2021-09-16
Payer: MEDICARE

## 2021-09-16 DIAGNOSIS — F43.10 PTSD (POST-TRAUMATIC STRESS DISORDER): ICD-10-CM

## 2021-09-16 DIAGNOSIS — M47.26 OTHER SPONDYLOSIS WITH RADICULOPATHY, LUMBAR REGION: Primary | ICD-10-CM

## 2021-09-16 DIAGNOSIS — M25.551 RIGHT HIP PAIN: ICD-10-CM

## 2021-09-16 DIAGNOSIS — F39 MOOD DISORDER (HCC): Primary | ICD-10-CM

## 2021-09-16 DIAGNOSIS — F39 MOOD DISORDER (HCC): ICD-10-CM

## 2021-09-16 PROCEDURE — 97140 MANUAL THERAPY 1/> REGIONS: CPT

## 2021-09-16 PROCEDURE — 90792 PSYCH DIAG EVAL W/MED SRVCS: CPT | Performed by: PSYCHIATRY & NEUROLOGY

## 2021-09-16 PROCEDURE — 97112 NEUROMUSCULAR REEDUCATION: CPT

## 2021-09-16 PROCEDURE — 97110 THERAPEUTIC EXERCISES: CPT

## 2021-09-16 RX ORDER — DULOXETIN HYDROCHLORIDE 30 MG/1
90 CAPSULE, DELAYED RELEASE ORAL DAILY
Qty: 90 CAPSULE | Refills: 1 | Status: SHIPPED | OUTPATIENT
Start: 2021-09-16 | End: 2021-09-16

## 2021-09-16 RX ORDER — DULOXETIN HYDROCHLORIDE 30 MG/1
CAPSULE, DELAYED RELEASE ORAL
Qty: 30 CAPSULE | Refills: 1 | Status: SHIPPED | OUTPATIENT
Start: 2021-09-16 | End: 2021-10-14 | Stop reason: DRUGHIGH

## 2021-09-16 RX ORDER — DULOXETIN HYDROCHLORIDE 60 MG/1
CAPSULE, DELAYED RELEASE ORAL
Qty: 30 CAPSULE | Refills: 1 | Status: SHIPPED | OUTPATIENT
Start: 2021-09-16 | End: 2021-10-14

## 2021-09-16 RX ORDER — ARIPIPRAZOLE 15 MG/1
15 TABLET ORAL
Qty: 30 TABLET | Refills: 1 | Status: SHIPPED | OUTPATIENT
Start: 2021-09-16 | End: 2021-10-14 | Stop reason: SDUPTHER

## 2021-09-16 NOTE — PROGRESS NOTES
Daily Note     Today's date: 2021  Patient name: Елена Barnard  : 1955  MRN: [id number]  Referring provider: Mike Gerard MD  Dx:   Encounter Diagnosis     ICD-10-CM    1  Other spondylosis with radiculopathy, lumbar region  M47 26    2  Right hip pain  M25 551                   Subjective: Pt reports she worked so hard LV she wasn't able to do much in between visits  Pt reports she is able to don her pants and get in the car much easier  Objective: See treatment diary below      Assessment: Tolerated treatment well w/ addition of ex's in standing  Patient slowly making gains in pain relief  Plan: Continue per plan of care  Progress treatment as tolerated         Dx: lumbar spondylosis  EPOC: 10/5/21  CO-MORBIDITIES:  PERSONAL FACTORS:  Precautions: none      Manuals        R lumbar/ gluteal MFR             R hip adductor MFR  th JK JKJ th JK       R hip PROM  th [place]  JK         25' 25' 15' 15' 10'       Neuro Re-Ed             DLs: abd brac  10x10"  10"x10 10x10" 10"x10       DLS: bent leg fallout  10 B  10 B 10 B 10 B       clamshells     10 10x5"       bridge    10x5" 10 10x5"                                              Ther Ex             LTR  10x5" B 10x5"B 10x5" B 10 x5" 10 x5"       Seated sciatic n glide  10 B    10 B  10 B       HS stretch    20"x3 20"x3 20"x3       Standing trunk ext   10 10x3" 10x3" 10x3"       Seated adductor stretch   20"x3 20"x3 20"x3 20"x3       TB pulldowns/ rows      OTB 10ea stag                                 Ther Activity                                       Gait Training                                       Modalities             TENS in sitting 10'  10' np         Cp post tx  10' 10' 10' 10' 10'
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
## 2021-09-21 ENCOUNTER — OFFICE VISIT (OUTPATIENT)
Dept: PHYSICAL THERAPY | Facility: CLINIC | Age: 66
End: 2021-09-21
Payer: MEDICARE

## 2021-09-21 DIAGNOSIS — M47.26 OTHER SPONDYLOSIS WITH RADICULOPATHY, LUMBAR REGION: Primary | ICD-10-CM

## 2021-09-21 DIAGNOSIS — M25.551 RIGHT HIP PAIN: ICD-10-CM

## 2021-09-21 PROCEDURE — 97140 MANUAL THERAPY 1/> REGIONS: CPT

## 2021-09-21 PROCEDURE — 97110 THERAPEUTIC EXERCISES: CPT

## 2021-09-21 NOTE — PROGRESS NOTES
Daily Note     Today's date: 2021  Patient name: Adelita Broderick  : 1955  MRN: [id number]  Referring provider: Debrah Ahumada, MD  Dx:   Encounter Diagnosis     ICD-10-CM    1  Other spondylosis with radiculopathy, lumbar region  M47 26    2  Right hip pain  M25 551                   Subjective: Pt reports she is very sore and can barely move and thinks it started on   Reports walking the dog often over the weekend  Pt states her R LE is sore in the adductors, ITB and gastroc  Pt reports compliance w/ ex's only when she has pain  Objective: See treatment diary below  Pt given updated HEP and told to do ex's daily  Assessment: Tolerated treatment fair  Patient with tightness in adductors and in the gastroc responding well to TrP rls  Pt sensitive to gentle STM  Pt reported some releases w/ manual HS stretch, piriformis, long leg distraction  Pt reported some relief post RX and CP  Attempted ex's, pt was had too much pain  Plan: Continue per plan of care  Progress treatment as tolerated         Dx: lumbar spondylosis  EPOC: 10/5/21  CO-MORBIDITIES:  PERSONAL FACTORS:  Precautions: none      Manuals       R lumbar/ gluteal MFR       Long leg distr JK      R hip adductor MFR  th Judene Close  JK JK      R hip PROM  th Taylor Alcala  JK JK        25' 25' 15' 15' 10' 30'      Neuro Re-Ed             DLs: abd brac  10x10"  10"x10 10x10" 10"x10       DLS: bent leg fallout  10 B  10 B 10 B 10 B       clamshells     10 10x5"       bridge    10x5" 10 10x5" x5                                             Ther Ex             LTR  10x5" B 10x5"B 10x5" B 10 x5" 10 x5"       Seated sciatic n glide  10 B    10 B  10 B 5x      HS stretch    20"x3 20"x3 20"x3       Standing trunk ext   10 10x3" 10x3" 10x3" 10x3"      Seated adductor stretch   20"x3 20"x3 20"x3 20"x3       TB pulldowns/ rows      OTB 10ea stag                                 Ther Activity Gait Training                                       Modalities             TENS in sitting 10'  10' np         Cp post tx  10' 10' 10' 10' 10' 10'

## 2021-09-22 ENCOUNTER — SOCIAL WORK (OUTPATIENT)
Dept: BEHAVIORAL/MENTAL HEALTH CLINIC | Facility: CLINIC | Age: 66
End: 2021-09-22
Payer: MEDICARE

## 2021-09-22 DIAGNOSIS — F31.81 BIPOLAR II DISORDER, MOST RECENT EPISODE MAJOR DEPRESSIVE (HCC): ICD-10-CM

## 2021-09-22 DIAGNOSIS — Z86.59 HISTORY OF POSTTRAUMATIC STRESS DISORDER (PTSD): Primary | ICD-10-CM

## 2021-09-22 PROCEDURE — 90834 PSYTX W PT 45 MINUTES: CPT | Performed by: COUNSELOR

## 2021-09-22 NOTE — PSYCH
Psychotherapy Provided: Individual Psychotherapy 45 minutes     Length of time in session: 45 minutes, follow up in 1 week    Encounter Diagnosis     ICD-10-CM    1  History of posttraumatic stress disorder (PTSD)  Z86 59    2  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, most [facility] ([facility] )  F31 81        Goals addressed in session: Goal 1     Pain:      none    0    Current suicide risk : Low     Time: 1:00pm - 1:45pm  Previous session plan: CONTINUED: Check in with drinking shakes, going to PT  Begin at session 3 of CPT work, reviewing her ABC worksheet homework  Discuss ABC worksheet examples and stuck points again if she had trouble with filling out worksheets    D: Reviewed her ABC worksheets, encouraged her to continue writing as well as she has been, reviewed what stuck points are through CPT intervention of psychoeducation provided, along with empathic listening, validation of feelings, reflection of content and feelings  Reviewed her stuck point log  Explored her feelings regarding certain stuck points  Elaborated on ideas and found more stuck points through [employer]  A: Alonso Briscoe appeared to be in a mildly euthymic mood with congruent affect, seemed to be experiencing moderate symptoms of PTSD through intrusive thoughts and memories, excessive guilt, lower mood at times, excessive worry and racing thoughts, appeared to be kempt, no SI/HI nor WANG risk apparent or reported, seemed to respond well to CPT work, engaged in her homework  P: Begin at session 4 and review her trauma-related ABC worksheet completion  Continue with discussing how she utilizes sulema to heal        [address]: Diagnosis and Treatment Plan explained to Nathalybyron Britton relates understanding diagnosis and is agreeable to Treatment Plan   Yes

## 2021-09-23 ENCOUNTER — APPOINTMENT (OUTPATIENT)
Dept: PHYSICAL THERAPY | Facility: CLINIC | Age: 66
End: 2021-09-23
Payer: MEDICARE

## 2021-09-24 ENCOUNTER — HOSPITAL ENCOUNTER (OUTPATIENT)
Dept: RADIOLOGY | Facility: CLINIC | Age: 66
Discharge: HOME | End: 2021-09-24
Attending: ANESTHESIOLOGY | Admitting: ANESTHESIOLOGY
Payer: MEDICARE

## 2021-09-24 VITALS
TEMPERATURE: 98.4 F | OXYGEN SATURATION: 96 % | SYSTOLIC BLOOD PRESSURE: 129 MMHG | HEART RATE: 87 BPM | RESPIRATION RATE: 20 BRPM | DIASTOLIC BLOOD PRESSURE: 80 MMHG

## 2021-09-24 DIAGNOSIS — M48.061 LUMBAR FORAMINAL STENOSIS: ICD-10-CM

## 2021-09-24 DIAGNOSIS — M54.16 LUMBAR RADICULOPATHY: ICD-10-CM

## 2021-09-24 DIAGNOSIS — M54.50 CHRONIC BILATERAL LOW BACK PAIN WITHOUT SCIATICA: ICD-10-CM

## 2021-09-24 DIAGNOSIS — G89.29 CHRONIC BILATERAL LOW BACK PAIN WITHOUT SCIATICA: ICD-10-CM

## 2021-09-24 DIAGNOSIS — M51.26 HERNIATED NUCLEUS PULPOSUS, L4-5: ICD-10-CM

## 2021-09-24 PROCEDURE — 62323 NJX INTERLAMINAR LMBR/SAC: CPT | Performed by: ANESTHESIOLOGY

## 2021-09-24 RX ORDER — METHYLPREDNISOLONE ACETATE 80 MG/ML
80 INJECTION, SUSPENSION INTRA-ARTICULAR; INTRALESIONAL; INTRAMUSCULAR; PARENTERAL; SOFT TISSUE ONCE
Status: COMPLETED | OUTPATIENT
Start: 2021-09-24 | End: 2021-09-24

## 2021-09-24 RX ADMIN — IOHEXOL 1 ML: 300 INJECTION, SOLUTION INTRAVENOUS at 13:40

## 2021-09-24 RX ADMIN — METHYLPREDNISOLONE ACETATE 80 MG: 80 INJECTION, SUSPENSION INTRA-ARTICULAR; INTRALESIONAL; INTRAMUSCULAR; SOFT TISSUE at 13:40

## 2021-09-24 NOTE — DISCHARGE INSTRUCTIONS
Epidural Steroid Injection   WHAT YOU NEED TO KNOW:   An epidural steroid injection (CANDIDA) is a procedure to inject steroid medicine into the epidural space  The epidural space is between your spinal cord and vertebrae  Steroids reduce inflammation and fluid buildup in your spine that may be causing pain  You may be given pain medicine along with the steroids  ACTIVITY  · Do not drive or operate machinery today  · No strenuous activity today - bending, lifting, etc   · You may resume normal activites starting tomorrow - start slowly and as tolerated  · You may shower today, but no tub baths or hot tubs  · You may have numbness for several hours from the local anesthetic  Please use caution and common sense, especially with weight-bearing activities  CARE OF THE INJECTION SITE  · If you have soreness or pain, apply ice to the area today (20 minutes on/20 minutes off)  · Starting tomorrow, you may use warm, moist heat or ice if needed  · You may have an increase or change in your discomfort for 36-48 hours after your treatment  · Apply ice and continue with any pain medication you have been prescribed  · Notify [facility] if you have any of the following: redness, drainage, swelling, headache, stiff neck or fever above 100°F     SPECIAL INSTRUCTIONS  · Our office will contact you in approximately 7 days for a progress report  MEDICATIONS  · Continue to take all routine medications  · Our office may have instructed you to hold some medications  As no general anesthesia was used in today's procedure, you should not experience any side effects related to anesthesia  If you have a problem specifically related to your procedure, please call our office at [phone]  Problems not related to your procedure should be directed to your primary care physician

## 2021-09-24 NOTE — H&P
History of Present Illness:  The patient is a 78 y o  female who presents with complaints of low back and leg pain    Patient Active Problem List   Diagnosis    Allergic rhinitis    Basal cell carcinoma of skin    Bipolar I disorder, single manic episode ([facility] )    Cervical radiculopathy    DDD (degenerative disc disease), cervical    Disc degeneration, lumbar    Fibromyalgia    Acute vasomotor rhinitis    Chest pain syndrome    Chronic neck pain    Chronic bilateral low back pain without sciatica    Chronic joint pain    Chronic bilateral thoracic back pain    Myofascial pain syndrome    Actinic keratosis    Other spondylosis with radiculopathy, lumbar region    Lumbar radiculopathy    Herniated nucleus pulposus, L4-5    Lumbar foraminal stenosis       Past Medical History:   Diagnosis Date    Anxiety     Arthritis     Basal cell carcinoma (BCC)     Bipolar disorder ([facility] )     Cervical spinal stenosis     last assessed 5/13/14, resolved 10/10/16    Chronic constipation     last assessed 8/28/12, resovled 9/14/15    Chronic fatigue syndrome     last assessed 8/28/12, resolved 9/14/15    Chronic hyperglycemia     resolved 9/14/15    Chronic pain syndrome     last assessed 11/12/13, resolved 10/10/16    Depression     Essential hypertriglyceridemia     resolved 10/10/16    Fibromyalgia     GERD (gastroesophageal reflux disease)     Herpes simplex infection     last assessed 12/12/13, resolved 10/10/16    Hypokalemia     last assessed 9/14/15, resolved 11/23/15    Insomnia     last assessed 3/30/15, resolved 11/23/15    Median neuropathy     last assessed 7/14/15, resolved 10/10/16    Pneumonia     last assessed 7/16/13, resolved 5/10/13    Sacroiliitis ([facility] )     last assessed 10/22/13, resolved 10/27/14       Past Surgical History:   Procedure Laterality Date    CERVICAL FUSION      COLONOSCOPY  10/23/2013    10yrs     DILATION AND CURETTAGE OF UTERUS      HALLUX VALGUS CORRECTION  HEMORROIDECTOMY      NEUROPLASTY / TRANSPOSITION MEDIAN NERVE AT CARPAL TUNNEL      REDUCTION MAMMAPLASTY      TONSILLECTOMY      TOOTH EXTRACTION      TUBAL LIGATION           Current Outpatient Medications:     acetaminophen (TYLENOL) 325 mg tablet, Take 2 tablets (650 mg total) by mouth every 6 (six) hours as needed for mild pain (Patient not taking: Reported on 9/16/2021), Disp: , Rfl: 0    ARIPiprazole (ABILIFY) 15 mg tablet, Take 1 tablet (15 mg total) by mouth daily at bedtime, Disp: 30 tablet, Rfl: 1    ascorbic acid (VITAMIN C) 500 mg tablet, Take 500 mg by mouth daily, Disp: , Rfl:     bisacodyl (DULCOLAX) 5 mg EC tablet, Take 1 tablet by mouth daily as needed, Disp: , Rfl:     Calcium 250 MG CAPS, Take 500 mg by mouth, Disp: , Rfl:     Cholecalciferol ([employer] VITAMIN D) 2000 units CAPS, Take by mouth, Disp: , Rfl:     DULoxetine (CYMBALTA) 30 mg delayed release capsule, Take one 60 mg capsule with one 30 mg capsule (total 90 mg) daily at bedtime, Disp: 30 capsule, Rfl: 1    DULoxetine (CYMBALTA) 60 mg delayed release capsule, Take one 60 mg capsule with one 30 mg capsule (total 90 mg) daily at bedtime, Disp: 30 capsule, Rfl: 1    loratadine (CLARITIN) 10 mg tablet, Take 10 mg by mouth, Disp: , Rfl:     Turmeric Curcumin 500 MG CAPS, Take by mouth, Disp: , Rfl:     Current Facility-Administered Medications:     iohexol (OMNIPAQUE) 300 mg/mL injection 50 mL, 50 mL, Epidural, Once, Luis Harris DO    methylPREDNISolone acetate (DEPO-MEDROL) injection 80 mg, 80 mg, Epidural, Once, Luis Harris DO    Allergies   Allergen Reactions    Cephalexin Rash    Latex Rash     Reaction Date: 21Mar2012;     Molds & Smuts Allergic Rhinitis    Other Allergic Rhinitis and Cough     Cigarette and bleach    Risperidone Palpitations     Reaction Date: 21Mar2012;     Sertraline Itching     Reaction Date: 21Mar2012;     Soy Isoflavones        Physical Exam:   Vitals:    09/24/21 1324   BP: 118/75 Pulse: 65   Resp: 20   Temp: 98 4 °F (36 9 °C)   SpO2: 97%     General: Awake, Alert, Oriented x 3, Mood and affect appropriate  Respiratory: Respirations even and unlabored  Cardiovascular: Peripheral pulses intact; no edema  Musculoskeletal Exam:  Decreased range of motion lumbar spine    ASA Score: III    Patient/Chart Verification  Patient ID Verified: Verbal  ID Band Applied: No  Consents Confirmed: Procedural  H&P( within 30 days) Verified: To be obtained in the Pre-Procedure area  Interval H&P(within 24 hr) Complete (required for Outpatients and Surgery Admit only): To be obtained in the Pre-Procedure area  Allergies Reviewed: Yes  Anticoag/NSAID held?: No  Currently on antibiotics?: No  Pre-op Lab/Test Results Available: N/A  Pregnancy Lab Collected: N/A comment    Assessment:   1  Chronic bilateral low back pain without sciatica    2  Lumbar radiculopathy    3  Herniated nucleus pulposus, L4-5    4   Lumbar foraminal stenosis        Plan: L5-S1 LESI to the Right

## 2021-09-28 ENCOUNTER — OFFICE VISIT (OUTPATIENT)
Dept: PHYSICAL THERAPY | Facility: CLINIC | Age: 66
End: 2021-09-28
Payer: MEDICARE

## 2021-09-28 DIAGNOSIS — M47.26 OTHER SPONDYLOSIS WITH RADICULOPATHY, LUMBAR REGION: Primary | ICD-10-CM

## 2021-09-28 DIAGNOSIS — M25.551 RIGHT HIP PAIN: ICD-10-CM

## 2021-09-28 PROCEDURE — 97112 NEUROMUSCULAR REEDUCATION: CPT

## 2021-09-28 PROCEDURE — 97140 MANUAL THERAPY 1/> REGIONS: CPT

## 2021-09-28 PROCEDURE — 97110 THERAPEUTIC EXERCISES: CPT

## 2021-09-28 NOTE — PROGRESS NOTES
Daily Note     Today's date: 2021  Patient name: Franchesca Carrington  : 1955     MRN: [id number]  Referring provider: Bambi Fox MD  Dx:   Encounter Diagnosis     ICD-10-CM    1  Other spondylosis with radiculopathy, lumbar region  M47 26    2  Right hip pain  M25 551                   Subjective: Pt reports she received an injection and having a lot less pain  States "I can move"  Objective: See treatment diary below      Assessment: Tolerated treatment well  Patient able to complete ex's w/o an increase in pain  Min palpable tightness in adductors noted  Plan: Continue per plan of care  Progress treatment as tolerated         Dx: lumbar spondylosis  EPOC: 10/5/21  CO-MORBIDITIES:  PERSONAL FACTORS:  Precautions: none      Manuals      R lumbar/ gluteal MFR       Long leg distr JK      R hip adductor MFR  th Laural Roup th JK JK JK     R hip PROM  th Lia Davy  JK JK JK       25' 25' 15' 15' 10' 30' 10'     Neuro Re-Ed             DLs: abd brac  10x10"  10"x10 10x10" 10"x10  10x5"     DLS: bent leg fallout  10 B  10 B 10 B 10 B  10 B     clamshells     10 10x5"  10     bridge    10x5" 10 10x5" x5 10x5"                                            Ther Ex             LTR  10x5" B 10x5"B 10x5" B 10 x5" 10 x5"  10x5"     Seated sciatic n glide  10 B    10 B  10 B 5x 10x5" B     HS stretch    20"x3 20"x3 20"x3  20"x3     Standing trunk ext   10 10x3" 10x3" 10x3" 10x3" 10x3"     Seated adductor stretch   20"x3 20"x3 20"x3 20"x3  20"x3     TB pulldowns/ rows      OTB 10ea stag  OTB 15ea     bike        5'                  Ther Activity                                       Gait Training                                       Modalities             TENS in sitting 10'  10' np         Cp post tx  10' 10' 10' 10' 10' 10' defer

## 2021-09-29 ENCOUNTER — OFFICE VISIT (OUTPATIENT)
Dept: OBGYN CLINIC | Facility: CLINIC | Age: 66
End: 2021-09-29
Payer: MEDICARE

## 2021-09-29 VITALS
DIASTOLIC BLOOD PRESSURE: 72 MMHG | HEIGHT: 64 IN | WEIGHT: 226 LBS | SYSTOLIC BLOOD PRESSURE: 116 MMHG | BODY MASS INDEX: 38.58 KG/M2

## 2021-09-29 DIAGNOSIS — M54.10 BACK PAIN WITH RIGHT-SIDED RADICULOPATHY: ICD-10-CM

## 2021-09-29 DIAGNOSIS — M47.26 OTHER SPONDYLOSIS WITH RADICULOPATHY, LUMBAR REGION: Primary | ICD-10-CM

## 2021-09-29 PROCEDURE — 99213 OFFICE O/P EST LOW 20 MIN: CPT | Performed by: ORTHOPAEDIC SURGERY

## 2021-09-29 NOTE — PROGRESS NOTES
Assessment:     1  Other spondylosis with radiculopathy, lumbar region    2  Back pain with right-sided radiculopathy        Plan:     Problem List Items Addressed This Visit        Nervous and Auditory    Other spondylosis with radiculopathy, lumbar region - Primary      Other Visit Diagnoses     Back pain with right-sided radiculopathy            Formal physical therapy notes were reviewed with the patient at today's visit  I am pleased with her progress  She recently underwent an injection by Dr Chayito Huerta and is reporting pain relief  I discussed with the patient that any further questions about lower back injection should be discussed with Dr Anthony Diaz  I discussed with the patient she should continue with a physician directed home exercise program to work on stretching  I will follow-up with her as needed  All patient's questions were answered to her satisfaction  This note is created using dictation transcription  It may contain typographical errors, grammatical errors, improperly dictated words, background noise and other errors  Subjective:     Patient ID: Joni Neves is a 78 y o  female  Chief Complaint:  Patient is a 78year old who presents to the office today for a follow-up evaluation of her lumbar spine degenerative disc disease with right leg radiculopathy  She has been complaint with attending formal physical therapy and notes improvement  She recently seen Dr Chayito Huerta with Pain Management and underwent an lower back injection and notes pain relief  She states that she will experience a daily constant dull aching pain over the medial aspect of her thigh  She rates her pain as a 2/10 which has improved since her initial visit      Allergy:  Allergies   Allergen Reactions    Cephalexin Rash    Latex Rash     Reaction Date: 21Mar2012;     Molds & Smuts Allergic Rhinitis    Other Allergic Rhinitis and Cough     Cigarette and bleach    Risperidone Palpitations     Reaction Date: 21Mar2012;     Sertraline Itching     Reaction Date: 21Mar2012;     Soy Isoflavones      Medications:  all current active meds have been reviewed  Past Medical History:  Past Medical History:   Diagnosis Date    Anxiety     Arthritis     Basal cell carcinoma (BCC)     [facility]     Cervical spinal stenosis     last assessed 5/13/14, resolved 10/10/16    Chronic constipation     last assessed 8/28/12, resovled 9/14/15    Chronic fatigue syndrome     last assessed 8/28/12, resolved 9/14/15    Chronic hyperglycemia     resolved 9/14/15    Chronic pain syndrome     last assessed 11/12/13, resolved 10/10/16    Depression     Essential hypertriglyceridemia     resolved 10/10/16    Fibromyalgia     GERD (gastroesophageal reflux disease)     Herpes simplex infection     last assessed 12/12/13, resolved 10/10/16    Hypokalemia     last assessed 9/14/15, resolved 11/23/15    Insomnia     last assessed 3/30/15, resolved 11/23/15    Median neuropathy     last assessed 7/14/15, resolved 10/10/16    Pneumonia     last assessed 7/16/13, resolved 5/10/13    [facility]     last assessed 10/22/13, resolved 10/27/14     Past Surgical History:  Past Surgical History:   Procedure Laterality Date    CERVICAL FUSION      COLONOSCOPY  10/23/2013    10yrs     DILATION AND CURETTAGE OF UTERUS      HALLUX VALGUS CORRECTION      HEMORROIDECTOMY      NEUROPLASTY / TRANSPOSITION MEDIAN NERVE AT CARPAL TUNNEL      REDUCTION MAMMAPLASTY      TONSILLECTOMY      TOOTH EXTRACTION      TUBAL LIGATION       Family History:  Family History   Problem Relation Age of Onset    Hypertension Mother     Osteoporosis Mother     Arthritis Mother     Sudden death Mother         brain aneurysm    Heart attack Father     Melanoma Father     Coronary artery disease Father     Hyperlipidemia Father     Cancer Father     Diabetes Paternal Grandmother     Coronary artery disease Paternal Grandfather     Heart attack Family     Arthritis Family     Diabetes Family     Hypertension Family     Heart disease Family     Osteoporosis Family     No Known Problems Daughter     No Known Problems Maternal Grandmother     No Known Problems Maternal Grandfather      Social History:  Social History     Substance and Sexual Activity   Alcohol Use Not Currently     Social History     Substance and Sexual Activity   Drug Use No     Social History     Tobacco Use   Smoking Status Never Smoker   Smokeless Tobacco Never Used     Review of Systems   Constitutional: Positive for activity change  Negative for chills, fever and unexpected weight change  HENT: Negative for hearing loss, nosebleeds and sore throat  Eyes: Negative for pain, redness and visual disturbance  Respiratory: Negative for cough, shortness of breath and wheezing  Cardiovascular: Negative for chest pain, palpitations and leg swelling  Gastrointestinal: Negative for abdominal pain, nausea and vomiting  Endocrine: Negative for polyphagia and polyuria  Genitourinary: Negative for dysuria and hematuria  Musculoskeletal: Positive for arthralgias (right leg) and back pain  Negative for gait problem and joint swelling  Skin: Negative for rash and wound  Neurological: Negative for dizziness, numbness and headaches  Psychiatric/Behavioral: Negative for decreased concentration and suicidal ideas  The patient is not nervous/anxious  Objective:  BP Readings from Last 1 Encounters:   09/29/21 116/72      Wt Readings from Last 1 Encounters:   09/29/21 103 kg (226 lb)      BMI:   Estimated body mass index is 39 1 kg/m² as calculated from the following:    Height as of this encounter: 5' 3 75" (1 619 m)  Weight as of this encounter: 103 kg (226 lb)  BSA:   Estimated body surface area is 2 06 meters squared as calculated from the following:    Height as of this encounter: 5' 3 75" (1 619 m)  Weight as of this encounter: 103 kg (226 lb)     Physical Exam  Vitals and nursing note reviewed  Constitutional:       Appearance: Normal appearance  She is well-developed  HENT:      Head: Normocephalic and atraumatic  Right Ear: External ear normal       Left Ear: External ear normal    Eyes:      Extraocular Movements: Extraocular movements intact  Conjunctiva/sclera: Conjunctivae normal    Pulmonary:      Effort: Pulmonary effort is normal    Musculoskeletal:      Cervical back: Neck supple  Skin:     General: Skin is warm and dry  Neurological:      Mental Status: She is alert and oriented to person, place, and time  Deep Tendon Reflexes: Reflexes are normal and symmetric  Psychiatric:         Mood and Affect: Mood normal          Behavior: Behavior normal        Right Hip Exam     Tenderness   The patient is experiencing no tenderness  Range of Motion   Abduction: normal   Extension: normal   Flexion: normal   External rotation: normal   Internal rotation: normal     Muscle Strength   The patient has normal right hip strength  Tests   CAROL: negative (mild)  Jesus: negative    Other   Erythema: absent  Sensation: normal  Pulse: present      Back Exam     Tenderness   The patient is experiencing no tenderness  Range of Motion   Back flexion: pain  Muscle Strength   The patient has normal back strength  Other   Sensation: normal  Gait: normal             No new images reviewed today      Scribe Attestation    I,:  Shaheen Bailey am acting as a scribe while in the presence of the attending physician :       I,:  Radha Choe MD personally performed the services described in this documentation    as scribed in my presence :

## 2021-09-30 ENCOUNTER — OFFICE VISIT (OUTPATIENT)
Dept: PHYSICAL THERAPY | Facility: CLINIC | Age: 66
End: 2021-09-30
Payer: MEDICARE

## 2021-09-30 DIAGNOSIS — M25.551 RIGHT HIP PAIN: ICD-10-CM

## 2021-09-30 DIAGNOSIS — M47.26 OTHER SPONDYLOSIS WITH RADICULOPATHY, LUMBAR REGION: Primary | ICD-10-CM

## 2021-09-30 PROCEDURE — 97112 NEUROMUSCULAR REEDUCATION: CPT | Performed by: PHYSICAL THERAPIST

## 2021-09-30 PROCEDURE — 97140 MANUAL THERAPY 1/> REGIONS: CPT | Performed by: PHYSICAL THERAPIST

## 2021-09-30 PROCEDURE — 97110 THERAPEUTIC EXERCISES: CPT | Performed by: PHYSICAL THERAPIST

## 2021-09-30 NOTE — PROGRESS NOTES
PT Re-Evaluation     Today's date: 2021  Patient name: Julio Bauman  : 1955  MRN: [id number]  Referring provider: Bogdan Syed MD  Dx:   Encounter Diagnosis     ICD-10-CM    1  Other spondylosis with radiculopathy, lumbar region  M47 26    2  Right hip pain  M25 551                   Assessment  Assessment details: Since starting skilled PT, pain levels are decreased, lumbar ROM has improved, R LE strength has improved, HS flexibility improved with good functional progress  Recommend pt continue skilled PT for 4-6 more weeks with transition to HEP  Impairments: abnormal muscle tone, abnormal or restricted ROM, activity intolerance and pain with function  Understanding of Dx/Px/POC: good   Prognosis: good    Goals  STG's ( 3-4 weeks)  1  Pt will be independent in HEP-met  2  Improve lumbar ROM to max ability-met  LTG's ( 6- 8 weeks)  1  Improve FOTO score by 8-10 points-met  2  Improve R LE strength by 1/2 grade-met  3  Reduce R LE radicular sx-partial met  4  Pt will have less LBP and R LE pain with dressing and self care activities-partial met  5  Pt will have improve tolerance for standing activities  -partial met    Plan  Patient would benefit from: skilled physical therapy  Planned modality interventions: TENS  Planned therapy interventions: joint mobilization, manual therapy, neuromuscular re-education, functional ROM exercises, flexibility, home exercise program, strengthening, stretching, therapeutic activities and therapeutic exercise  Frequency: 2x week  Duration in weeks: 6  Plan of Care beginning date: 2021  Plan of Care expiration date: 2021  Treatment plan discussed with: patient        Subjective Evaluation    History of Present Illness  Mechanism of injury: I E: Pt reports increased LBP onset about 3 months ago when she was moving  Pt has a history of 2 herniated discs and has had CANDIDA in the past with good relief  Pt reports new X-rays show bone spurs and OA     Pt has increased pain when getting dressed, getting a shower, standing activities when cooking  Mild pain with walking  Pt has less pain with sitting  Pt is not sleeping well at night  Pt reports pain radiates from her R groin and goes down her leg  Pt is avoiding lifting activities  21: Pt reports good relief after her injection  Pt is somewhat compliant in HEP and performs standing trunk extension on a regular basis  Pt is able to wash the bottom of her feet, and put on her underwear, but still has jeans  Pt is able to lift her leg to get into the shower with greater ease  Pt has less pain when sleeping at night  Pt has less groin pain that radiates to her knee ( prior leg pain radiated down to foot)  Pt is able to perform vacuuming activities and wiping off her table, and can stand on a stool      Work: retired  Hobbies: crafts, calligraphy  Gait: slow speed  Pain  At best pain ratin  At worst pain ratin  Location: lumbar spine; numbness in R LE  Quality: radiating and needle-like  Relieving factors: rest  Aggravating factors: standing    Social Support  Steps to enter house: no  Stairs in house: no   Lives in: apartment  Lives with: alone    Employment status: not working    Diagnostic Tests  X-ray: abnormal  Treatments  Previous treatment: medication (Prednisone)  Patient Goals  Patient goals for therapy: decreased pain and independence with ADLs/IADLs  Patient goal: to be able to function; to strengthen my core to prevent future problems; to loose weight        Objective     Neurological Testing     Sensation     Lumbar   Left   Intact: light touch    Right   Intact: light touch    Reflexes   Left   Patellar (L4): trace (1+)  Achilles (S1): normal (2+)    Right   Patellar (L4): trace (1+)  Achilles (S1): normal (2+)    Active Range of Motion     Lumbar   Flexion: 75 degrees   Extension: 30 degrees   Left lateral flexion: 20 degrees       Right lateral flexion: 20 degrees   Left rotation:  Restriction level: moderate  Right rotation:  Restriction level: moderate    Additional Active Range of Motion Details  9/30/21: HS flexibility: R: 80* with opposite knee flexed    I E: In standing: symmetrical iliac crest and PSIS levels  (+) TTP R lumbosacral psp; increased R sided muscle tone  HS flexibility: R: 65*  L:75* with opposite knee flexed   Pt is unable to perform R piriformis test  Pain with R [facility]      Strength/Myotome Testing     Lumbar   Left   Normal strength    Left Hip   Planes of Motion   Flexion: 4    Right Hip   Planes of Motion   Flexion: 4 (pain mild)  Abduction: 4+ (pain)  External rotation: 5 (mild pain)  Internal rotation: 5 (pain mild)    Right Knee   Flexion: 5  Extension: 5    Right Ankle/Foot   Dorsiflexion: 5          Dx: lumbar spondylosis  EPOC: 10/5/21  CO-MORBIDITIES:  PERSONAL FACTORS:  Precautions: none      Manuals 8/24 8/31 9/7 9/9 9/14 9/16 9/21 9/28 9/30    Progress note         th    R lumbar/ gluteal MFR       Long leg distr JK      R hip adductor MFR  th Millie [place]utat th JK JK JK th    R hip PROM  [facility]       25' 25' 15' 15' 10' 30' 10' 15'    Neuro Re-Ed             DLs: abd brac  10x10"  10"x10 10x10" 10"x10  10x5" t/o    DLS: bent leg fallout  10 B  10 B 10 B 10 B  10 B 10 B    clamshells     10 10x5"  10 10    bridge    10x5" 10 10x5" x5 10x5" 10x5"                                           Ther Ex             LTR  10x5" B 10x5"B 10x5" B 10 x5" 10 x5"  10x5"     Seated sciatic n glide  10 B    10 B  10 B 5x 10x5" B 10x5"    HS stretch    20"x3 20"x3 20"x3  20"x3 20"x3    Standing trunk ext   10 10x3" 10x3" 10x3" 10x3" 10x3" 10"x3    Seated adductor stretch   20"x3 20"x3 20"x3 20"x3  20"x3     TB pulldowns/ rows      OTB 10ea stag  OTB 15ea OTB x15 ea    bike        5' 5'                 Ther Activity                                       Gait Training                                       Modalities             TENS in sitting 10'  10' np         Cp post tx  10' 10' 10' 10' 10' 10' defer

## 2021-10-05 ENCOUNTER — OFFICE VISIT (OUTPATIENT)
Dept: PHYSICAL THERAPY | Facility: CLINIC | Age: 66
End: 2021-10-05
Payer: MEDICARE

## 2021-10-05 DIAGNOSIS — M47.26 OTHER SPONDYLOSIS WITH RADICULOPATHY, LUMBAR REGION: Primary | ICD-10-CM

## 2021-10-05 DIAGNOSIS — M25.551 RIGHT HIP PAIN: ICD-10-CM

## 2021-10-05 PROCEDURE — 97140 MANUAL THERAPY 1/> REGIONS: CPT

## 2021-10-05 PROCEDURE — 97112 NEUROMUSCULAR REEDUCATION: CPT

## 2021-10-05 PROCEDURE — 97110 THERAPEUTIC EXERCISES: CPT

## 2021-10-06 ENCOUNTER — SOCIAL WORK (OUTPATIENT)
Dept: BEHAVIORAL/MENTAL HEALTH CLINIC | Facility: CLINIC | Age: 66
End: 2021-10-06
Payer: MEDICARE

## 2021-10-06 DIAGNOSIS — F31.81 BIPOLAR II DISORDER, MOST RECENT EPISODE MAJOR DEPRESSIVE (HCC): ICD-10-CM

## 2021-10-06 DIAGNOSIS — Z86.59 HISTORY OF POSTTRAUMATIC STRESS DISORDER (PTSD): Primary | ICD-10-CM

## 2021-10-06 PROCEDURE — 90834 PSYTX W PT 45 MINUTES: CPT | Performed by: COUNSELOR

## 2021-10-07 ENCOUNTER — APPOINTMENT (OUTPATIENT)
Dept: PHYSICAL THERAPY | Facility: CLINIC | Age: 66
End: 2021-10-07
Payer: MEDICARE

## 2021-10-08 ENCOUNTER — HOSPITAL ENCOUNTER (OUTPATIENT)
Dept: RADIOLOGY | Facility: CLINIC | Age: 66
Discharge: HOME | End: 2021-10-08
Attending: ANESTHESIOLOGY
Payer: MEDICARE

## 2021-10-08 VITALS
OXYGEN SATURATION: 98 % | RESPIRATION RATE: 20 BRPM | SYSTOLIC BLOOD PRESSURE: 119 MMHG | HEART RATE: 70 BPM | TEMPERATURE: 97 F | DIASTOLIC BLOOD PRESSURE: 82 MMHG

## 2021-10-08 DIAGNOSIS — M54.50 CHRONIC BILATERAL LOW BACK PAIN WITHOUT SCIATICA: ICD-10-CM

## 2021-10-08 DIAGNOSIS — M51.26 HERNIATED NUCLEUS PULPOSUS, L4-5: ICD-10-CM

## 2021-10-08 DIAGNOSIS — M48.061 LUMBAR FORAMINAL STENOSIS: ICD-10-CM

## 2021-10-08 DIAGNOSIS — M54.16 LUMBAR RADICULOPATHY: ICD-10-CM

## 2021-10-08 DIAGNOSIS — G89.29 CHRONIC BILATERAL LOW BACK PAIN WITHOUT SCIATICA: ICD-10-CM

## 2021-10-08 PROCEDURE — 62323 NJX INTERLAMINAR LMBR/SAC: CPT | Performed by: ANESTHESIOLOGY

## 2021-10-08 RX ORDER — METHYLPREDNISOLONE ACETATE 80 MG/ML
80 INJECTION, SUSPENSION INTRA-ARTICULAR; INTRALESIONAL; INTRAMUSCULAR; PARENTERAL; SOFT TISSUE ONCE
Status: COMPLETED | OUTPATIENT
Start: 2021-10-08 | End: 2021-10-08

## 2021-10-08 RX ADMIN — IOHEXOL 1 ML: 300 INJECTION, SOLUTION INTRAVENOUS at 13:45

## 2021-10-08 RX ADMIN — METHYLPREDNISOLONE ACETATE 80 MG: 80 INJECTION, SUSPENSION INTRA-ARTICULAR; INTRALESIONAL; INTRAMUSCULAR; SOFT TISSUE at 13:45

## 2021-10-12 ENCOUNTER — APPOINTMENT (OUTPATIENT)
Dept: PHYSICAL THERAPY | Facility: CLINIC | Age: 66
End: 2021-10-12
Payer: MEDICARE

## 2021-10-14 ENCOUNTER — TELEMEDICINE (OUTPATIENT)
Dept: PSYCHIATRY | Facility: CLINIC | Age: 66
End: 2021-10-14
Payer: MEDICARE

## 2021-10-14 ENCOUNTER — APPOINTMENT (OUTPATIENT)
Dept: PHYSICAL THERAPY | Facility: CLINIC | Age: 66
End: 2021-10-14
Payer: MEDICARE

## 2021-10-14 DIAGNOSIS — F43.10 PTSD (POST-TRAUMATIC STRESS DISORDER): ICD-10-CM

## 2021-10-14 DIAGNOSIS — F31.81 BIPOLAR II DISORDER (HCC): Primary | ICD-10-CM

## 2021-10-14 PROCEDURE — 99213 OFFICE O/P EST LOW 20 MIN: CPT | Performed by: PSYCHIATRY & NEUROLOGY

## 2021-10-14 RX ORDER — ARIPIPRAZOLE 15 MG/1
15 TABLET ORAL
Qty: 30 TABLET | Refills: 1 | Status: SHIPPED | OUTPATIENT
Start: 2021-10-14 | End: 2021-12-16 | Stop reason: SDUPTHER

## 2021-10-14 RX ORDER — DULOXETIN HYDROCHLORIDE 60 MG/1
60 CAPSULE, DELAYED RELEASE ORAL
Qty: 30 CAPSULE | Refills: 1 | Status: SHIPPED | OUTPATIENT
Start: 2021-10-14 | End: 2021-12-16 | Stop reason: SDUPTHER

## 2021-10-15 ENCOUNTER — TELEPHONE (OUTPATIENT)
Dept: PAIN MEDICINE | Facility: CLINIC | Age: 66
End: 2021-10-15

## 2021-10-18 ENCOUNTER — OFFICE VISIT (OUTPATIENT)
Dept: FAMILY MEDICINE CLINIC | Facility: CLINIC | Age: 66
End: 2021-10-18
Payer: MEDICARE

## 2021-10-18 VITALS
SYSTOLIC BLOOD PRESSURE: 120 MMHG | DIASTOLIC BLOOD PRESSURE: 82 MMHG | WEIGHT: 224 LBS | HEIGHT: 65 IN | HEART RATE: 82 BPM | RESPIRATION RATE: 16 BRPM | BODY MASS INDEX: 37.32 KG/M2

## 2021-10-18 DIAGNOSIS — R42 VERTIGO: Primary | ICD-10-CM

## 2021-10-18 DIAGNOSIS — R51.9 ACUTE NONINTRACTABLE HEADACHE, UNSPECIFIED HEADACHE TYPE: ICD-10-CM

## 2021-10-18 PROCEDURE — 99213 OFFICE O/P EST LOW 20 MIN: CPT | Performed by: NURSE PRACTITIONER

## 2021-10-19 ENCOUNTER — APPOINTMENT (OUTPATIENT)
Dept: PHYSICAL THERAPY | Facility: CLINIC | Age: 66
End: 2021-10-19
Payer: MEDICARE

## 2021-10-20 ENCOUNTER — TELEMEDICINE (OUTPATIENT)
Dept: BEHAVIORAL/MENTAL HEALTH CLINIC | Facility: CLINIC | Age: 66
End: 2021-10-20

## 2021-10-20 ENCOUNTER — CLINICAL SUPPORT (OUTPATIENT)
Dept: FAMILY MEDICINE CLINIC | Facility: CLINIC | Age: 66
End: 2021-10-20

## 2021-10-20 DIAGNOSIS — B34.9 VIRAL ILLNESS: Primary | ICD-10-CM

## 2021-10-20 DIAGNOSIS — F31.81 BIPOLAR II DISORDER, MOST RECENT EPISODE MAJOR DEPRESSIVE (HCC): Primary | ICD-10-CM

## 2021-10-20 DIAGNOSIS — Z86.59 HISTORY OF POSTTRAUMATIC STRESS DISORDER (PTSD): ICD-10-CM

## 2021-10-20 PROCEDURE — U0005 INFEC AGEN DETEC AMPLI PROBE: HCPCS | Performed by: NURSE PRACTITIONER

## 2021-10-20 PROCEDURE — U0003 INFECTIOUS AGENT DETECTION BY NUCLEIC ACID (DNA OR RNA); SEVERE ACUTE RESPIRATORY SYNDROME CORONAVIRUS 2 (SARS-COV-2) (CORONAVIRUS DISEASE [COVID-19]), AMPLIFIED PROBE TECHNIQUE, MAKING USE OF HIGH THROUGHPUT TECHNOLOGIES AS DESCRIBED BY CMS-2020-01-R: HCPCS | Performed by: NURSE PRACTITIONER

## 2021-10-21 ENCOUNTER — APPOINTMENT (OUTPATIENT)
Dept: PHYSICAL THERAPY | Facility: CLINIC | Age: 66
End: 2021-10-21
Payer: MEDICARE

## 2021-10-21 ENCOUNTER — TELEPHONE (OUTPATIENT)
Dept: FAMILY MEDICINE CLINIC | Facility: CLINIC | Age: 66
End: 2021-10-21

## 2021-10-21 LAB — SARS-COV-2 RNA RESP QL NAA+PROBE: NEGATIVE

## 2021-10-22 ENCOUNTER — TELEPHONE (OUTPATIENT)
Dept: FAMILY MEDICINE CLINIC | Facility: CLINIC | Age: 66
End: 2021-10-22

## 2021-10-22 DIAGNOSIS — R05.9 COUGH: Primary | ICD-10-CM

## 2021-10-22 RX ORDER — AZITHROMYCIN 250 MG/1
TABLET, FILM COATED ORAL
Qty: 6 TABLET | Refills: 0 | Status: SHIPPED | OUTPATIENT
Start: 2021-10-22 | End: 2021-10-27

## 2021-10-22 RX ORDER — BENZONATATE 100 MG/1
100 CAPSULE ORAL 3 TIMES DAILY PRN
Qty: 20 CAPSULE | Refills: 0 | Status: SHIPPED | OUTPATIENT
Start: 2021-10-22 | End: 2022-02-15 | Stop reason: ALTCHOICE

## 2021-10-26 ENCOUNTER — APPOINTMENT (OUTPATIENT)
Dept: PHYSICAL THERAPY | Facility: CLINIC | Age: 66
End: 2021-10-26
Payer: MEDICARE

## 2021-10-28 ENCOUNTER — APPOINTMENT (OUTPATIENT)
Dept: PHYSICAL THERAPY | Facility: CLINIC | Age: 66
End: 2021-10-28
Payer: MEDICARE

## 2021-11-03 ENCOUNTER — SOCIAL WORK (OUTPATIENT)
Dept: BEHAVIORAL/MENTAL HEALTH CLINIC | Facility: CLINIC | Age: 66
End: 2021-11-03

## 2021-11-03 DIAGNOSIS — Z86.59 HISTORY OF POSTTRAUMATIC STRESS DISORDER (PTSD): ICD-10-CM

## 2021-11-03 DIAGNOSIS — F31.81 BIPOLAR II DISORDER, MOST RECENT EPISODE MAJOR DEPRESSIVE (HCC): Primary | ICD-10-CM

## 2021-11-05 ENCOUNTER — TELEPHONE (OUTPATIENT)
Dept: PAIN MEDICINE | Facility: CLINIC | Age: 66
End: 2021-11-05

## 2021-12-01 ENCOUNTER — SOCIAL WORK (OUTPATIENT)
Dept: BEHAVIORAL/MENTAL HEALTH CLINIC | Facility: CLINIC | Age: 66
End: 2021-12-01

## 2021-12-01 DIAGNOSIS — Z86.59 HISTORY OF POSTTRAUMATIC STRESS DISORDER (PTSD): ICD-10-CM

## 2021-12-01 DIAGNOSIS — F31.81 BIPOLAR II DISORDER, MOST RECENT EPISODE MAJOR DEPRESSIVE (HCC): Primary | ICD-10-CM

## 2021-12-07 ENCOUNTER — DOCUMENTATION (OUTPATIENT)
Dept: PSYCHIATRY | Facility: CLINIC | Age: 66
End: 2021-12-07

## 2021-12-07 DIAGNOSIS — F43.10 PTSD (POST-TRAUMATIC STRESS DISORDER): Primary | ICD-10-CM

## 2021-12-09 ENCOUNTER — TELEPHONE (OUTPATIENT)
Dept: BEHAVIORAL/MENTAL HEALTH CLINIC | Facility: CLINIC | Age: 66
End: 2021-12-09

## 2021-12-16 ENCOUNTER — TELEPHONE (OUTPATIENT)
Dept: FAMILY MEDICINE CLINIC | Facility: CLINIC | Age: 66
End: 2021-12-16

## 2021-12-16 DIAGNOSIS — F31.81 BIPOLAR II DISORDER (HCC): ICD-10-CM

## 2021-12-16 DIAGNOSIS — F43.10 PTSD (POST-TRAUMATIC STRESS DISORDER): ICD-10-CM

## 2021-12-16 RX ORDER — ARIPIPRAZOLE 15 MG/1
15 TABLET ORAL
Qty: 30 TABLET | Refills: 1 | Status: SHIPPED | OUTPATIENT
Start: 2021-12-16 | End: 2022-02-15 | Stop reason: SDUPTHER

## 2021-12-16 RX ORDER — DULOXETIN HYDROCHLORIDE 60 MG/1
60 CAPSULE, DELAYED RELEASE ORAL
Qty: 30 CAPSULE | Refills: 1 | Status: SHIPPED | OUTPATIENT
Start: 2021-12-16 | End: 2022-02-15 | Stop reason: SDUPTHER

## 2021-12-21 ENCOUNTER — TELEPHONE (OUTPATIENT)
Dept: PSYCHIATRY | Facility: CLINIC | Age: 66
End: 2021-12-21

## 2021-12-22 ENCOUNTER — DOCUMENTATION (OUTPATIENT)
Dept: BEHAVIORAL/MENTAL HEALTH CLINIC | Facility: CLINIC | Age: 66
End: 2021-12-22

## 2021-12-23 ENCOUNTER — TELEPHONE (OUTPATIENT)
Dept: PSYCHIATRY | Facility: CLINIC | Age: 66
End: 2021-12-23

## 2022-01-03 ENCOUNTER — CLINICAL SUPPORT (OUTPATIENT)
Dept: FAMILY MEDICINE CLINIC | Facility: CLINIC | Age: 67
End: 2022-01-03

## 2022-01-03 DIAGNOSIS — B34.9 VIRAL ILLNESS: Primary | ICD-10-CM

## 2022-01-03 PROCEDURE — 87636 SARSCOV2 & INF A&B AMP PRB: CPT | Performed by: NURSE PRACTITIONER

## 2022-01-07 LAB
FLUAV RNA RESP QL NAA+PROBE: NEGATIVE
FLUBV RNA RESP QL NAA+PROBE: NEGATIVE
SARS-COV-2 RNA RESP QL NAA+PROBE: NEGATIVE

## 2022-01-08 ENCOUNTER — TELEPHONE (OUTPATIENT)
Dept: OTHER | Facility: OTHER | Age: 67
End: 2022-01-08

## 2022-01-08 NOTE — TELEPHONE ENCOUNTER
Your test for COVID-19, also known as novel coronavirus, came back negative  You do not have COVID-19  If you have any additional questions, we can schedule a virtual visit for you with a provider or call [facility] [phone] Option 7 for care advice  For additional information , please visit the Coronavirus FAQ on the [address]  ([facility])

## 2022-01-10 ENCOUNTER — OFFICE VISIT (OUTPATIENT)
Dept: FAMILY MEDICINE CLINIC | Facility: CLINIC | Age: 67
End: 2022-01-10
Payer: MEDICARE

## 2022-01-10 VITALS
HEIGHT: 66 IN | RESPIRATION RATE: 16 BRPM | OXYGEN SATURATION: 99 % | WEIGHT: 220.9 LBS | TEMPERATURE: 98.8 F | BODY MASS INDEX: 35.5 KG/M2 | SYSTOLIC BLOOD PRESSURE: 132 MMHG | DIASTOLIC BLOOD PRESSURE: 80 MMHG | HEART RATE: 90 BPM

## 2022-01-10 DIAGNOSIS — J06.9 UPPER RESPIRATORY TRACT INFECTION, UNSPECIFIED TYPE: Primary | ICD-10-CM

## 2022-01-10 DIAGNOSIS — Z23 NEED FOR INFLUENZA VACCINATION: ICD-10-CM

## 2022-01-10 DIAGNOSIS — R05.9 COUGH: ICD-10-CM

## 2022-01-10 PROCEDURE — 90662 IIV NO PRSV INCREASED AG IM: CPT

## 2022-01-10 PROCEDURE — 99214 OFFICE O/P EST MOD 30 MIN: CPT | Performed by: NURSE PRACTITIONER

## 2022-01-10 PROCEDURE — G0008 ADMIN INFLUENZA VIRUS VAC: HCPCS

## 2022-01-10 RX ORDER — PREDNISONE 10 MG/1
TABLET ORAL
Qty: 20 TABLET | Refills: 0 | Status: SHIPPED | OUTPATIENT
Start: 2022-01-10 | End: 2022-02-15 | Stop reason: ALTCHOICE

## 2022-01-10 NOTE — PROGRESS NOTES
Assessment/Plan:     Diagnoses and all orders for this visit:    Upper respiratory tract infection, unspecified type    Patient is to continue to keep well hydrated and rest  She may utilize OTC medications for her symptoms  Patient is encouraged to call our office for any questions/concerns, persistent or worsening symptoms  Patient states they understand and agree with treatment plan  Need for influenza vaccination  -     influenza vaccine, high-dose, PF 0 7 mL (FLUZONE HIGH-DOSE)    Cough  -     predniSONE 10 mg tablet; Take 4 tablets with food on days 1 & 2  Then take 3 tablets with food on days 3 &4  Then take 2 tablets with food on days 5 & 6  Then take 1 tablet with food on days 7 & 8  Patient may start Prednisone to help open her up and help with the cough  This will also hopefully help decrease some of her sinus pressure  Patient is encouraged to call our office for any questions/concerns, persistent or worsening symptoms  Patient states they understand and agree with treatment plan  Pt to f/u PRN or as scheduled  Subjective:      Patient ID: Albin Reyes is a 78 y o  female  Patient presents today for headaches, congestion since around January 01, 2022  She does note she is coughing up clear drainage  She also notes rhinorrhea, sinus pressure & congestion & fatigue  She does admit to some body aches, but denies fever, chills  Patient was covid tested negative on 01/03/22  She has not been taking any medication OTC for her symptoms  The following portions of the patient's history were reviewed and updated as appropriate: allergies, current medications, past family history, past medical history, past social history, past surgical history and problem list     Review of Systems   Constitutional: Negative  Negative for chills, fatigue and fever  HENT: Positive for congestion, postnasal drip, rhinorrhea and sinus pressure  Negative for ear discharge and ear pain      Respiratory: Positive for cough (productive w clear drainage)  Negative for shortness of breath  Cardiovascular: Negative  Negative for chest pain  Gastrointestinal: Negative  Genitourinary: Negative  Musculoskeletal: Negative  Negative for myalgias  Neurological: Positive for headaches  Objective:      /80 (BP Location: Left arm, Patient Position: Sitting, Cuff Size: Standard)   Pulse 90   Temp 98 8 °F (37 1 °C) (Oral)   Resp 16   Ht 5' 6" (1 676 m)   Wt 100 kg (220 lb 14 4 oz)   SpO2 99%   Breastfeeding No   BMI 35 65 kg/m²          Physical Exam  Vitals reviewed  Constitutional:       General: She is not in acute distress  Appearance: Normal appearance  She is not ill-appearing  HENT:      Head: Normocephalic  Right Ear: Tympanic membrane, ear canal and external ear normal       Left Ear: Tympanic membrane, ear canal and external ear normal       Nose: Rhinorrhea present  No congestion  Mouth/Throat:      Pharynx: No oropharyngeal exudate or posterior oropharyngeal erythema  Cardiovascular:      Rate and Rhythm: Normal rate and regular rhythm  Pulses: Normal pulses  Heart sounds: Normal heart sounds  No murmur heard  Pulmonary:      Effort: Pulmonary effort is normal  No respiratory distress  Breath sounds: Normal breath sounds  No wheezing  Abdominal:      General: Bowel sounds are normal       Palpations: Abdomen is soft  Musculoskeletal:         General: Normal range of motion  Skin:     General: Skin is warm and dry  Neurological:      Mental Status: She is alert and oriented to person, place, and time  Mental status is at baseline  Psychiatric:         Mood and Affect: Mood normal          Behavior: Behavior normal          Thought Content:  Thought content normal          Judgment: Judgment normal

## 2022-01-26 ENCOUNTER — OFFICE VISIT (OUTPATIENT)
Dept: FAMILY MEDICINE CLINIC | Facility: CLINIC | Age: 67
End: 2022-01-26
Payer: MEDICARE

## 2022-01-26 VITALS
HEART RATE: 90 BPM | BODY MASS INDEX: 36.34 KG/M2 | WEIGHT: 226.1 LBS | DIASTOLIC BLOOD PRESSURE: 70 MMHG | SYSTOLIC BLOOD PRESSURE: 130 MMHG | HEIGHT: 66 IN | RESPIRATION RATE: 18 BRPM

## 2022-01-26 DIAGNOSIS — Z79.899 ENCOUNTER FOR LONG-TERM (CURRENT) USE OF MEDICATIONS: ICD-10-CM

## 2022-01-26 DIAGNOSIS — Z13.89 SCREENING FOR BLOOD OR PROTEIN IN URINE: ICD-10-CM

## 2022-01-26 DIAGNOSIS — G89.29 CHRONIC RIGHT SHOULDER PAIN: Primary | ICD-10-CM

## 2022-01-26 DIAGNOSIS — J30.1 SEASONAL ALLERGIC RHINITIS DUE TO POLLEN: ICD-10-CM

## 2022-01-26 DIAGNOSIS — Z13.29 SCREENING FOR THYROID DISORDER: ICD-10-CM

## 2022-01-26 DIAGNOSIS — Z13.6 SCREENING FOR CARDIOVASCULAR CONDITION: ICD-10-CM

## 2022-01-26 DIAGNOSIS — M25.511 CHRONIC RIGHT SHOULDER PAIN: Primary | ICD-10-CM

## 2022-01-26 DIAGNOSIS — E55.9 VITAMIN D DEFICIENCY: ICD-10-CM

## 2022-01-26 DIAGNOSIS — Z13.1 SCREENING FOR DIABETES MELLITUS: ICD-10-CM

## 2022-01-26 DIAGNOSIS — Z13.228 SCREENING FOR METABOLIC DISORDER: ICD-10-CM

## 2022-01-26 DIAGNOSIS — Z13.220 SCREENING FOR LIPID DISORDERS: ICD-10-CM

## 2022-01-26 PROCEDURE — 99214 OFFICE O/P EST MOD 30 MIN: CPT | Performed by: NURSE PRACTITIONER

## 2022-01-26 RX ORDER — FLUTICASONE PROPIONATE 50 MCG
1 SPRAY, SUSPENSION (ML) NASAL DAILY
Qty: 9.9 ML | Refills: 2 | Status: SHIPPED | OUTPATIENT
Start: 2022-01-26

## 2022-01-26 NOTE — PROGRESS NOTES
Assessment/Plan:     Diagnoses and all orders for this visit:    Chronic right shoulder pain    Patient to continue Tylenol OTC PRN  She is to avoid any strenuous exercise or heavy lifting for the next 2 weeks  She is to alternate between heat and cool applications in the evenings for 20 minutes on/off  She is to keep her appointment with Dr June Adame  Patient is encouraged to call our office for any questions/concerns, persistent or worsening symptoms  Patient states they understand and agree with treatment plan  Vitamin D deficiency  -     Vitamin D 25 hydroxy; Future    Screening for metabolic disorder  -     Comprehensive metabolic panel; Future    Screening for lipid disorders  -     Lipid panel; Future    Screening for thyroid disorder  -     TSH, 3rd generation with Free T4 reflex; Future    Screening for diabetes mellitus    Screening for blood or protein in urine  -     UA (URINE) with reflex to Scope    Screening for cardiovascular condition  -     CBC and differential; Future    Encounter for long-term (current) use of medications  -     CBC and differential; Future  -     Comprehensive metabolic panel; Future  -     Lipid panel; Future  -     TSH, 3rd generation with Free T4 reflex; Future  -     UA (URINE) with reflex to Scope  -     Vitamin D 25 hydroxy; Future    BMI 36 0-36 9,adult  -     Ambulatory Referral to Weight Management; Future    Seasonal allergic rhinitis due to pollen  -     fluticasone (FLONASE) 50 mcg/act nasal spray; 1 spray into each nostril daily      Pt to f/u PRN or as scheduled  Subjective:      Patient ID: Crys Hazel is a 78 y o  female  Patient presents today for chronic posterior right shoulder pain that has been ongoing  She notes the pain feels like a sharp stabbing pain to her right scapula  Patient denies any injury  Patient denies any alleviating factors  Patient notes movement aggravates the pain as well as palpation    She has tried Tylenol without much relief  Patient has not tried heat or ice applications  She does note that she has been more active over the last 1-2 weeks with moving boxes and cleaning  Patient has previously seen pain management in the past for chronic pain syndrome and has also been seen by Dr Krista Piper for steroidal injections  She notes she made an appointment on 02/11/22 with Dr Krista Piper  The following portions of the patient's history were reviewed and updated as appropriate: allergies, current medications, past family history, past medical history, past social history, past surgical history and problem list     Review of Systems   Constitutional: Negative  Negative for chills and fatigue  HENT: Negative  Respiratory: Negative  Negative for cough and shortness of breath  Cardiovascular: Negative  Negative for chest pain  Gastrointestinal: Negative  Genitourinary: Negative  Musculoskeletal: Negative  Negative for myalgias  Neurological: Negative  Objective:      /70 (BP Location: Left arm, Patient Position: Sitting, Cuff Size: Large)   Pulse 90   Resp 18   Ht 5' 6" (1 676 m)   Wt 103 kg (226 lb 1 6 oz)   Breastfeeding No   BMI 36 49 kg/m²          Physical Exam  Vitals reviewed  Constitutional:       General: She is not in acute distress  Appearance: Normal appearance  She is not ill-appearing  HENT:      Head: Normocephalic  Pulmonary:      Effort: Pulmonary effort is normal    Musculoskeletal:         General: Tenderness (tenderness to right scapula w/ deep palpation) present  No swelling  Normal range of motion  Comments: Normal R shoulder ROM including flexion, extension, abduction, adduction   Skin:     General: Skin is warm and dry  Neurological:      Mental Status: She is alert and oriented to person, place, and time  Mental status is at baseline     Psychiatric:         Mood and Affect: Mood normal          Behavior: Behavior normal          Thought Content: Thought content normal          Judgment: Judgment normal

## 2022-01-31 ENCOUNTER — LAB (OUTPATIENT)
Dept: LAB | Facility: HOSPITAL | Age: 67
End: 2022-01-31
Payer: MEDICARE

## 2022-01-31 DIAGNOSIS — Z13.29 SCREENING FOR THYROID DISORDER: ICD-10-CM

## 2022-01-31 DIAGNOSIS — E55.9 VITAMIN D DEFICIENCY: ICD-10-CM

## 2022-01-31 DIAGNOSIS — Z13.220 SCREENING FOR LIPID DISORDERS: ICD-10-CM

## 2022-01-31 DIAGNOSIS — Z13.6 SCREENING FOR CARDIOVASCULAR CONDITION: ICD-10-CM

## 2022-01-31 DIAGNOSIS — Z13.228 SCREENING FOR METABOLIC DISORDER: ICD-10-CM

## 2022-01-31 DIAGNOSIS — Z79.899 ENCOUNTER FOR LONG-TERM (CURRENT) USE OF MEDICATIONS: ICD-10-CM

## 2022-01-31 LAB
25(OH)D3 SERPL-MCNC: 63.8 NG/ML (ref 30–100)
ALBUMIN SERPL BCP-MCNC: 4 G/DL (ref 3.5–5)
ALP SERPL-CCNC: 68 U/L (ref 46–116)
ALT SERPL W P-5'-P-CCNC: 37 U/L (ref 12–78)
ANION GAP SERPL CALCULATED.3IONS-SCNC: 6 MMOL/L (ref 4–13)
AST SERPL W P-5'-P-CCNC: 31 U/L (ref 5–45)
BACTERIA UR QL AUTO: ABNORMAL /HPF
BASOPHILS # BLD AUTO: 0.04 THOUSANDS/ΜL (ref 0–0.1)
BASOPHILS NFR BLD AUTO: 1 % (ref 0–1)
BILIRUB SERPL-MCNC: 0.83 MG/DL (ref 0.2–1)
BILIRUB UR QL STRIP: NEGATIVE
BUN SERPL-MCNC: 10 MG/DL (ref 5–25)
CALCIUM SERPL-MCNC: 9.2 MG/DL (ref 8.3–10.1)
CHLORIDE SERPL-SCNC: 109 MMOL/L (ref 100–108)
CHOLEST SERPL-MCNC: 158 MG/DL
CLARITY UR: ABNORMAL
CO2 SERPL-SCNC: 27 MMOL/L (ref 21–32)
COLOR UR: YELLOW
CREAT SERPL-MCNC: 0.86 MG/DL (ref 0.6–1.3)
EOSINOPHIL # BLD AUTO: 0.15 THOUSAND/ΜL (ref 0–0.61)
EOSINOPHIL NFR BLD AUTO: 3 % (ref 0–6)
ERYTHROCYTE [DISTWIDTH] IN BLOOD BY AUTOMATED COUNT: 14.1 % (ref 11.6–15.1)
GFR SERPL CREATININE-BSD FRML MDRD: 70 ML/MIN/1.73SQ M
GLUCOSE P FAST SERPL-MCNC: 109 MG/DL (ref 65–99)
GLUCOSE UR STRIP-MCNC: NEGATIVE MG/DL
HCT VFR BLD AUTO: 40.3 % (ref 34.8–46.1)
HDLC SERPL-MCNC: 41 MG/DL
HGB BLD-MCNC: 13.5 G/DL (ref 11.5–15.4)
HGB UR QL STRIP.AUTO: NEGATIVE
HYALINE CASTS #/AREA URNS LPF: ABNORMAL /LPF
IMM GRANULOCYTES # BLD AUTO: 0.02 THOUSAND/UL (ref 0–0.2)
IMM GRANULOCYTES NFR BLD AUTO: 0 % (ref 0–2)
KETONES UR STRIP-MCNC: NEGATIVE MG/DL
LDLC SERPL CALC-MCNC: 100 MG/DL (ref 0–100)
LEUKOCYTE ESTERASE UR QL STRIP: ABNORMAL
LYMPHOCYTES # BLD AUTO: 1.51 THOUSANDS/ΜL (ref 0.6–4.47)
LYMPHOCYTES NFR BLD AUTO: 30 % (ref 14–44)
MCH RBC QN AUTO: 33.3 PG (ref 26.8–34.3)
MCHC RBC AUTO-ENTMCNC: 33.5 G/DL (ref 31.4–37.4)
MCV RBC AUTO: 99 FL (ref 82–98)
MONOCYTES # BLD AUTO: 0.49 THOUSAND/ΜL (ref 0.17–1.22)
MONOCYTES NFR BLD AUTO: 10 % (ref 4–12)
NEUTROPHILS # BLD AUTO: 2.86 THOUSANDS/ΜL (ref 1.85–7.62)
NEUTS SEG NFR BLD AUTO: 56 % (ref 43–75)
NITRITE UR QL STRIP: NEGATIVE
NON-SQ EPI CELLS URNS QL MICRO: ABNORMAL /HPF
NONHDLC SERPL-MCNC: 117 MG/DL
NRBC BLD AUTO-RTO: 0 /100 WBCS
PH UR STRIP.AUTO: 6 [PH]
PLATELET # BLD AUTO: 246 THOUSANDS/UL (ref 149–390)
PMV BLD AUTO: 11 FL (ref 8.9–12.7)
POTASSIUM SERPL-SCNC: 4 MMOL/L (ref 3.5–5.3)
PROT SERPL-MCNC: 7.3 G/DL (ref 6.4–8.2)
PROT UR STRIP-MCNC: NEGATIVE MG/DL
RBC # BLD AUTO: 4.06 MILLION/UL (ref 3.81–5.12)
RBC #/AREA URNS AUTO: ABNORMAL /HPF
SODIUM SERPL-SCNC: 142 MMOL/L (ref 136–145)
SP GR UR STRIP.AUTO: >=1.03 (ref 1–1.03)
TRIGL SERPL-MCNC: 87 MG/DL
TSH SERPL DL<=0.05 MIU/L-ACNC: 2.34 UIU/ML (ref 0.36–3.74)
UROBILINOGEN UR QL STRIP.AUTO: 0.2 E.U./DL
WBC # BLD AUTO: 5.07 THOUSAND/UL (ref 4.31–10.16)
WBC #/AREA URNS AUTO: ABNORMAL /HPF

## 2022-01-31 PROCEDURE — 80061 LIPID PANEL: CPT

## 2022-01-31 PROCEDURE — 81001 URINALYSIS AUTO W/SCOPE: CPT | Performed by: NURSE PRACTITIONER

## 2022-01-31 PROCEDURE — 84443 ASSAY THYROID STIM HORMONE: CPT

## 2022-01-31 PROCEDURE — 80053 COMPREHEN METABOLIC PANEL: CPT

## 2022-01-31 PROCEDURE — 85025 COMPLETE CBC W/AUTO DIFF WBC: CPT

## 2022-01-31 PROCEDURE — 36415 COLL VENOUS BLD VENIPUNCTURE: CPT

## 2022-01-31 PROCEDURE — 82306 VITAMIN D 25 HYDROXY: CPT

## 2022-02-11 ENCOUNTER — OFFICE VISIT (OUTPATIENT)
Dept: OBGYN CLINIC | Facility: CLINIC | Age: 67
End: 2022-02-11
Payer: MEDICARE

## 2022-02-11 VITALS
WEIGHT: 226 LBS | HEIGHT: 66 IN | BODY MASS INDEX: 36.32 KG/M2 | DIASTOLIC BLOOD PRESSURE: 82 MMHG | SYSTOLIC BLOOD PRESSURE: 130 MMHG

## 2022-02-11 DIAGNOSIS — M54.10 BACK PAIN WITH RIGHT-SIDED RADICULOPATHY: Primary | ICD-10-CM

## 2022-02-11 PROCEDURE — 99213 OFFICE O/P EST LOW 20 MIN: CPT | Performed by: ORTHOPAEDIC SURGERY

## 2022-02-11 NOTE — PROGRESS NOTES
Ortho Sports Medicine Shoulder Follow Up Visit     Assesment:   78 y o  female right upper back strain vs radiculopathy    Plan:    Conservative treatment:    Heat  Referral to Dr Yamila Morelos  Patient decline PT at this time     Imaging: All imaging from today was reviewed by myself and explained to the patient  Injection:    No Injection planned at this time  Can do an injection in the shoulder if pain persists or returns in the shoulder    Surgery:     No surgery is recommended at this point, continue with conservative treatment plan as noted  Follow up:    No follow-ups on file  Chief Complaint   Patient presents with    Right Shoulder - Pain     History of Present Illness: The patient is returns for follow up of right shoulder tendonitis  Since the prior visit, She reports no improvement  Patient states that after [place] she slept in her chair him woke up with extreme pain in her upper back and shoulder repair of the pain starts at her neck and will radiate across occasionally  She was put on prednisone for something else and had a lot of relief from that  Pain is improved by rest, ice and NSAIDS  Patient has most difficulty with pulling up her pants or pushing to get out of a chair  The patient denies weakness  The patient has tried rest, ice, NSAIDS and physical therapy          Shoulder Surgical History:  None    Past Medical, Social and Family History:  Past Medical History:   Diagnosis Date    Anxiety     Arthritis     Basal cell carcinoma (BCC)     Bipolar disorder ([facility] [address] )     Cervical spinal stenosis     last assessed 5/13/14, resolved 10/10/16    Chronic constipation     last assessed 8/28/12, resovled 9/14/15    Chronic fatigue syndrome     last assessed 8/28/12, resolved 9/14/15    Chronic hyperglycemia     resolved 9/14/15    Chronic pain syndrome     last assessed 11/12/13, resolved 10/10/16    Depression     Essential hypertriglyceridemia     resolved 10/10/16  Fibromyalgia     GERD (gastroesophageal reflux disease)     Herpes simplex infection     last assessed 12/12/13, resolved 10/10/16    Hypokalemia     last assessed 9/14/15, resolved 11/23/15    Insomnia     last assessed 3/30/15, resolved 11/23/15    Median neuropathy     last assessed 7/14/15, resolved 10/10/16    Pneumonia     last assessed 7/16/13, resolved 5/10/13    Sacroiliitis ([address] )     last assessed 10/22/13, resolved 10/27/14     Past Surgical History:   Procedure Laterality Date    CERVICAL FUSION      COLONOSCOPY  10/23/2013    10yrs     DILATION AND CURETTAGE OF UTERUS      HALLUX VALGUS CORRECTION      HEMORROIDECTOMY      NEUROPLASTY / TRANSPOSITION MEDIAN NERVE AT CARPAL TUNNEL      REDUCTION MAMMAPLASTY      TONSILLECTOMY      TOOTH EXTRACTION      TUBAL LIGATION       Allergies   Allergen Reactions    Other Shortness Of Breath, Allergic Rhinitis and Cough     Cigarette and bleach    Cephalexin Rash    Latex Rash     Reaction Date: 21Mar2012;     Molds & Smuts Allergic Rhinitis    Risperidone Palpitations     Reaction Date: 21Mar2012;     Sertraline Itching     Reaction Date: 21Mar2012;     Soy Isoflavones      Current Outpatient Medications on File Prior to Visit   Medication Sig Dispense Refill    ARIPiprazole (ABILIFY) 15 mg tablet Take 1 tablet (15 mg total) by mouth daily at bedtime 30 tablet 1    ascorbic acid (VITAMIN C) 500 mg tablet Take 500 mg by mouth daily      benzonatate (TESSALON PERLES) 100 mg capsule Take 1 capsule (100 mg total) by mouth 3 (three) times a day as needed for cough 20 capsule 0    bisacodyl (DULCOLAX) 5 mg EC tablet Take 1 tablet by mouth daily as needed      Calcium 250 MG CAPS Take 500 mg by mouth        Cholecalciferol ([employer] VITAMIN D) 2000 units CAPS Take by mouth      DULoxetine (CYMBALTA) 60 mg delayed release capsule Take 1 capsule (60 mg total) by mouth daily at bedtime 30 capsule 1    fluticasone (FLONASE) 50 mcg/act nasal spray 1 spray into each nostril daily 9 9 mL 2    loratadine (CLARITIN) 10 mg tablet Take 10 mg by mouth      predniSONE 10 mg tablet Take 4 tablets with food on days 1 & 2  Then take 3 tablets with food on days 3 &4  Then take 2 tablets with food on days 5 & 6  Then take 1 tablet with food on days 7 & 8  20 tablet 0    Turmeric Curcumin 500 MG CAPS Take by mouth       No current facility-administered medications on file prior to visit  Social History     Socioeconomic History    Marital status:      Spouse name: Not on file    Number of children: Not on file    Years of education: Not on file    Highest education level: Not on file   Occupational History    Not on file   Tobacco Use    Smoking status: Never Smoker    Smokeless tobacco: Never Used   Vaping Use    Vaping Use: Never used   Substance and Sexual Activity    Alcohol use: Not Currently    Drug use: No    Sexual activity: Not on file   Other Topics Concern    Not on file   Social History Narrative    Always uses seat belt    Daily caffeine consumption, 1 serv/day    Has smoke detectors    Lives independently      Social Determinants of Health     Financial Resource Strain: Not on file   Food Insecurity: Not on file   Transportation Needs: Not on file   Physical Activity: Not on file   Stress: Not on file   Social Connections: Not on file   Intimate Partner Violence: Not At Risk    Fear of Current or Ex-Partner: No    Emotionally Abused: No    Physically Abused: No    Sexually Abused: No   Housing Stability: Not on file       I have reviewed the past medical, surgical, social and family history, medications and allergies as documented in the EMR  Review of systems: ROS is negative other than that noted in the HPI  Constitutional: Negative for fatigue and fever  Physical Exam:    Blood pressure 130/82, height 5' 6" (1 676 m), weight 103 kg (226 lb), not currently breastfeeding      General/Constitutional: NAD, well developed, well nourished  HENT: Normocephalic, atraumatic  CV: Intact distal pulses, regular rate  Resp: No respiratory distress or labored breathing  Lymphatic: No lymphadenopathy palpated  Neuro: Alert and Oriented x 3, no focal deficits  Psych: Normal mood, normal affect, normal judgement, normal behavior  Skin: Warm, dry, no rashes, no erythema      Shoulder focused exam:       RIGHT LEFT    Scapula Atrophy Negative Negative     Winging Negative Negative     Protraction Negative Negative    Rotator cuff SS 5/5 5/5     IS 5/5 5/5     SubS 5/5 5/5    ROM     170     ER0 60 60     ER90 90    90     IR90 T6    T6     IRb T6    T6    TTP: AC Negative Negative     Biceps Negative Negative     Coracoid Negative Negative    Special Tests: O'Briens Negative Negative     Mathews-shear Negative Negative     Cross body Adduction Negative Negative     Speeds  Negative Negative     Soledad's Negative Negative     Whipple Negative Negative       Neer Negative Negative     Granados Negative Negative    Instability: Apprehension & relocation not tested not tested     Load & shift not tested not tested    Other: Crank Negative Negative               UE NV Exam: +2 Radial pulses bilaterally  Sensation intact to light touch C5-T1 bilaterally, Radial/median/ulnar nerve motor intact    Cervical ROM is full without pain, numbness or tingling      Shoulder Imaging    No imaging was performed today      Scribe Attestation    I,:  Brandon Beth am acting as a scribe while in the presence of the attending physician :       I,:  Luis Ellis DO personally performed the services described in this documentation    as scribed in my presence :

## 2022-02-15 ENCOUNTER — OFFICE VISIT (OUTPATIENT)
Dept: FAMILY MEDICINE CLINIC | Facility: CLINIC | Age: 67
End: 2022-02-15
Payer: MEDICARE

## 2022-02-15 VITALS
RESPIRATION RATE: 16 BRPM | SYSTOLIC BLOOD PRESSURE: 120 MMHG | DIASTOLIC BLOOD PRESSURE: 80 MMHG | BODY MASS INDEX: 36.05 KG/M2 | HEIGHT: 66 IN | WEIGHT: 224.3 LBS | HEART RATE: 88 BPM

## 2022-02-15 DIAGNOSIS — F43.10 PTSD (POST-TRAUMATIC STRESS DISORDER): ICD-10-CM

## 2022-02-15 DIAGNOSIS — Z00.00 MEDICARE ANNUAL WELLNESS VISIT, SUBSEQUENT: Primary | ICD-10-CM

## 2022-02-15 DIAGNOSIS — R82.71 BACTERIA IN URINE: ICD-10-CM

## 2022-02-15 DIAGNOSIS — Z12.31 ENCOUNTER FOR SCREENING MAMMOGRAM FOR MALIGNANT NEOPLASM OF BREAST: ICD-10-CM

## 2022-02-15 DIAGNOSIS — Z78.0 MENOPAUSE: ICD-10-CM

## 2022-02-15 DIAGNOSIS — F31.81 BIPOLAR II DISORDER (HCC): ICD-10-CM

## 2022-02-15 PROBLEM — L82.1 OTHER SEBORRHEIC KERATOSIS: Status: ACTIVE | Noted: 2020-02-13

## 2022-02-15 LAB
BILIRUB UR QL STRIP: NEGATIVE
CLARITY UR: CLEAR
COLOR UR: YELLOW
GLUCOSE UR STRIP-MCNC: NEGATIVE MG/DL
HGB UR QL STRIP.AUTO: NEGATIVE
KETONES UR STRIP-MCNC: NEGATIVE MG/DL
LEUKOCYTE ESTERASE UR QL STRIP: NEGATIVE
NITRITE UR QL STRIP: NEGATIVE
PH UR STRIP.AUTO: 6 [PH]
PROT UR STRIP-MCNC: NEGATIVE MG/DL
SP GR UR STRIP.AUTO: 1.01 (ref 1–1.03)
UROBILINOGEN UR QL STRIP.AUTO: 0.2 E.U./DL

## 2022-02-15 PROCEDURE — G0438 PPPS, INITIAL VISIT: HCPCS | Performed by: NURSE PRACTITIONER

## 2022-02-15 PROCEDURE — 81003 URINALYSIS AUTO W/O SCOPE: CPT | Performed by: NURSE PRACTITIONER

## 2022-02-15 PROCEDURE — 99214 OFFICE O/P EST MOD 30 MIN: CPT | Performed by: NURSE PRACTITIONER

## 2022-02-15 PROCEDURE — 1123F ACP DISCUSS/DSCN MKR DOCD: CPT | Performed by: NURSE PRACTITIONER

## 2022-02-15 RX ORDER — DULOXETIN HYDROCHLORIDE 60 MG/1
60 CAPSULE, DELAYED RELEASE ORAL
Qty: 30 CAPSULE | Refills: 2 | Status: SHIPPED | OUTPATIENT
Start: 2022-02-15 | End: 2022-05-25

## 2022-02-15 RX ORDER — ARIPIPRAZOLE 15 MG/1
15 TABLET ORAL
Qty: 30 TABLET | Refills: 2 | Status: SHIPPED | OUTPATIENT
Start: 2022-02-15 | End: 2022-05-26

## 2022-02-15 NOTE — PROGRESS NOTES
Assessment and Plan:  1  Patient due for Covid booster and is encouraged to get this when able  2  Colonoscopy due 2023   3  Mammo ordered  4  Dexa ordered  5  Labs UTD  6  Pt to f/u in 6 months for recheck of sooner PRN  Problem List Items Addressed This Visit        Other    PTSD (post-traumatic stress disorder)    Relevant Medications    DULoxetine (CYMBALTA) 60 mg delayed release capsule    ARIPiprazole (ABILIFY) 15 mg tablet    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 (HCC)    Relevant Medications    DULoxetine (CYMBALTA) 60 mg delayed release capsule    ARIPiprazole (ABILIFY) 15 mg tablet      Other Visit Diagnoses     Medicare annual wellness visit, subsequent    -  Primary    Encounter for screening mammogram for malignant neoplasm of breast        Relevant Orders    Mammo screening bilateral w 3d & cad    Menopause        Relevant Orders    DXA bone density spine hip and pelvis    Bacteria in urine        Relevant Orders    UA (URINE) with reflex to Scope        BMI Counseling: Body mass index is 36 2 kg/m²  The BMI is above normal  Nutrition recommendations include decreasing portion sizes, encouraging healthy choices of fruits and vegetables, decreasing fast food intake, consuming healthier snacks, limiting drinks that contain sugar, moderation in carbohydrate intake, increasing intake of lean protein, reducing intake of saturated and trans fat and reducing intake of cholesterol  Exercise recommendations include moderate physical activity 150 minutes/week  No pharmacotherapy was ordered  Rationale for BMI follow-up plan is due to patient being overweight or obese  Falls Plan of Care: balance, strength, and gait training instructions were provided  Preventive health issues were discussed with patient, and age appropriate screening tests were ordered as noted in patient's After Visit Summary    Personalized health advice and appropriate referrals for health education or preventive services given if needed, as noted in patient's After Visit Summary       History of Present Illness:     Patient presents for Medicare Annual Wellness visit    Patient Care Team:  Zhane Benedict as PCP - General (Medical Surgical)  Dajuan Powell DO     Problem List:     Patient Active Problem List   Diagnosis    Allergic rhinitis    Basal cell carcinoma of skin    Cervical radiculopathy    DDD (degenerative disc disease), cervical    Disc degeneration, lumbar    Fibromyalgia    Acute vasomotor rhinitis    Chest pain syndrome    Chronic neck pain    Chronic bilateral low back pain without sciatica    Chronic joint pain    Chronic bilateral thoracic back pain    Myofascial pain syndrome    Other seborrheic keratosis    Other spondylosis with radiculopathy, lumbar region    Lumbar radiculopathy    Herniated nucleus pulposus, L4-5    Lumbar foraminal stenosis    PTSD (post-traumatic stress disorder)    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 ([facility] [address] )      Past Medical and Surgical History:     Past Medical History:   Diagnosis Date    Anxiety     Arthritis     Basal cell carcinoma (BCC)     Bipolar disorder ([facility] [address] )     Cervical spinal stenosis     last assessed 5/13/14, resolved 10/10/16    Chronic constipation     last assessed 8/28/12, resovled 9/14/15    Chronic fatigue syndrome     last assessed 8/28/12, resolved 9/14/15    Chronic hyperglycemia     resolved 9/14/15    Chronic pain syndrome     last assessed 11/12/13, resolved 10/10/16    Depression     Essential hypertriglyceridemia     resolved 10/10/16    Fibromyalgia     GERD (gastroesophageal reflux disease)     Herpes simplex infection     last assessed 12/12/13, resolved 10/10/16    Hypokalemia     last assessed 9/14/15, resolved 11/23/15    Insomnia     last assessed 3/30/15, resolved 11/23/15    Median neuropathy     last assessed 7/14/15, resolved 10/10/16    Pneumonia     last assessed 7/16/13, resolved 5/10/13    Sacroiliitis ([facility] [address] )     last assessed 10/22/13, resolved 10/27/14     Past Surgical History:   Procedure Laterality Date    CERVICAL FUSION      COLONOSCOPY  10/23/2013    10yrs     DILATION AND CURETTAGE OF UTERUS      HALLUX VALGUS CORRECTION      HEMORROIDECTOMY      NEUROPLASTY / TRANSPOSITION MEDIAN NERVE AT CARPAL TUNNEL      REDUCTION MAMMAPLASTY      TONSILLECTOMY      TOOTH EXTRACTION      TUBAL LIGATION        Family History:     Family History   Problem Relation Age of Onset    Hypertension Mother     Osteoporosis Mother     Arthritis Mother    Atif Ross Sudden death Mother         brain aneurysm    Heart attack Father     Melanoma Father     Coronary artery disease Father     Hyperlipidemia Father    Atif Vandana Cancer Father     Diabetes Paternal Grandmother     Coronary artery disease Paternal Grandfather     Heart attack Family     Arthritis Family     Diabetes Family     Hypertension Family     Heart disease Family     Osteoporosis Family     No Known Problems Daughter     No Known Problems Maternal Grandmother     No Known Problems Maternal Grandfather       Social History:     Social History     Socioeconomic History    Marital status:      Spouse name: None    Number of children: None    Years of education: None    Highest education level: None   Occupational History    None   Tobacco Use    Smoking status: Never Smoker    Smokeless tobacco: Never Used   Vaping Use    Vaping Use: Never used   Substance and Sexual Activity    Alcohol use: Not Currently    Drug use: No    Sexual activity: None   Other Topics Concern    None   Social History Narrative    Always uses seat belt    Daily caffeine consumption, 1 serv/day    Has smoke detectors    Lives independently      Social Determinants of Health     Financial Resource Strain: Not on file   Food Insecurity: Not on file   Transportation Needs: Not on file   Physical Activity: Not on file   Stress: Not on file   Social Connections: Not on file   Intimate Partner Violence: Not At Risk    Fear of Current or Ex-Partner: No    Emotionally Abused: No    Physically Abused: No    Sexually Abused: No   Housing Stability: Not on file      Medications and Allergies:     Current Outpatient Medications   Medication Sig Dispense Refill    ARIPiprazole (ABILIFY) 15 mg tablet Take 1 tablet (15 mg total) by mouth daily at bedtime 30 tablet 2    ascorbic acid (VITAMIN C) 500 mg tablet Take 500 mg by mouth daily      bisacodyl (DULCOLAX) 5 mg EC tablet Take 1 tablet by mouth daily as needed      Calcium 250 MG CAPS Take 500 mg by mouth        Cholecalciferol ([employer] VITAMIN D) 2000 units CAPS Take by mouth      DULoxetine (CYMBALTA) 60 mg delayed release capsule Take 1 capsule (60 mg total) by mouth daily at bedtime 30 capsule 2    fluticasone (FLONASE) 50 mcg/act nasal spray 1 spray into each nostril daily 9 9 mL 2    loratadine (CLARITIN) 10 mg tablet Take 10 mg by mouth      Turmeric Curcumin 500 MG CAPS Take by mouth       No current facility-administered medications for this visit       Allergies   Allergen Reactions    Other Shortness Of Breath, Allergic Rhinitis and Cough     Cigarette and bleach  [place]     Cephalexin Rash    Latex Rash     Reaction Date: 21Mar2012;     Molds & Smuts Allergic Rhinitis    Risperidone Palpitations     Reaction Date: 21Mar2012;     Sertraline Itching     Reaction Date: 21Mar2012;     Soy Isoflavones       Immunizations:     Immunization History   Administered Date(s) Administered    COVID-19 PFIZER VACCINE 0 3 ML IM 05/01/2021, 05/26/2021    INFLUENZA 10/12/2015, 10/31/2016    Influenza Quadrivalent, 6-35 Months IM 10/13/2014, 09/14/2015, 10/10/2016, 11/13/2017    Influenza, high dose seasonal 0 7 mL 01/18/2021, 01/10/2022    Influenza, recombinant, quadrivalent,injectable, preservative free 11/27/2018    Influenza, seasonal, injectable 11/12/2013    Pneumococcal Conjugate 13-Valent 11/27/2018    Pneumococcal Polysaccharide PPV23 11/16/2015    Tdap 11/23/2015    Zoster 10/07/2015, 11/12/2015      Health Maintenance:         Topic Date Due    Breast Cancer Screening: Mammogram  02/16/2022    Hepatitis C Screening  11/18/2023 (Originally 1955)    DXA SCAN  02/26/2023    Colorectal Cancer Screening  10/23/2023         Topic Date Due    COVID-19 Vaccine (3 - Booster for Padron Peter series) 10/26/2021      Medicare Health Risk Assessment:     /80   Pulse 88   Resp 16   Ht 5' 6" (1 676 m)   Wt 102 kg (224 lb 4 8 oz)   BMI 36 20 kg/m²      Nguyen Diaz is here for her Subsequent Wellness visit  Health Risk Assessment:   Patient rates overall health as good  Patient feels that their physical health rating is much worse  Patient is satisfied with their life  Eyesight was rated as slightly worse  Hearing was rated as same  Patient feels that their emotional and mental health rating is much better  Patients states they are sometimes angry  Patient states they are often unusually tired/fatigued  Pain experienced in the last 7 days has been a lot  Patient's pain rating has been 8/10  Patient states that she has experienced no weight loss or gain in last 6 months  Depression Screening:   PHQ-2 Score: 2      Fall Risk Screening: In the past year, patient has experienced: no history of falling in past year      Urinary Incontinence Screening:   Patient has leaked urine accidently in the last six months  On occasion    Home Safety:  Patient has trouble with stairs inside or outside of their home  Patient has working smoke alarms and has no working carbon monoxide detector  Home safety hazards include: medications that cause fatigue  Nutrition:   Current diet is Regular  Medications:   Patient is currently taking over-the-counter supplements  OTC medications include: In my chart  Patient is able to manage medications       Activities of Daily Living (ADLs)/Instrumental Activities of Daily Living (IADLs): Walk and transfer into and out of bed and chair?: Yes  Dress and groom yourself?: Yes    Bathe or shower yourself?: Yes    Feed yourself? Yes  Do your laundry/housekeeping?: Yes  Manage your money, pay your bills and track your expenses?: Yes  Make your own meals?: Yes    Do your own shopping?: Yes    Previous Hospitalizations:   Any hospitalizations or ED visits within the last 12 months?: Yes    How many hospitalizations have you had in the last year?: 1-2    Hospitalization Comments: Related to back pain, seeing Dr Lady Spring office  Advance Care Planning:   Living will: Yes    Durable POA for healthcare: No    Advanced directive: No      PREVENTIVE SCREENINGS      Cardiovascular Screening:    General: Screening Current      Diabetes Screening:     General: Screening Current      Colorectal Cancer Screening:     General: Screening Current      Breast Cancer Screening:     General: Screening Current      Cervical Cancer Screening:    General: Screening Not Indicated      Osteoporosis Screening:    General: Risks and Benefits Discussed    Due for: DXA Axial      Abdominal Aortic Aneurysm (AAA) Screening:        General: Screening Not Indicated      Lung Cancer Screening:     General: Screening Not Indicated      Hepatitis C Screening:    General: Screening Current    Screening, Brief Intervention, and Referral to Treatment (SBIRT)    Screening  Typical number of drinks in a day: 0  Typical number of drinks in a week: 0  Interpretation: Low risk drinking behavior      AUDIT-C Screenin) How often did you have a drink containing alcohol in the past year? never  2) How many drinks did you have on a typical day when you were drinking in the past year? 0  3) How often did you have 6 or more drinks on one occasion in the past year? never    AUDIT-C Score: 0  Interpretation: Score 0-2 (female): Negative screen for alcohol misuse    Single Item Drug Screening:  How often have you used an illegal drug (including marijuana) or a prescription medication for non-medical reasons in the past year? never    Single Item Drug Screen Score: 0  Interpretation: Negative screen for possible drug use disorder    Other Counseling Topics:   Car/seat belt/driving safety and calcium and vitamin D intake         Reggy Means

## 2022-02-15 NOTE — PROGRESS NOTES
Assessment/Plan:     Diagnoses and all orders for this visit:    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 ([facility] [address] )  -     DULoxetine (CYMBALTA) 60 mg delayed release capsule; Take 1 capsule (60 mg total) by mouth daily at bedtime  -     ARIPiprazole (ABILIFY) 15 mg tablet; Take 1 tablet (15 mg total) by mouth daily at bedtime    Refills prescribed  Patient currently on wait-list for [facility]  She is no longer seeing her therapist and notes she is feeling well  Patient is encouraged to call our office for any questions/concerns, persistent or worsening symptoms  Patient states they understand and agree with treatment plan  PTSD (post-traumatic stress disorder)  -     DULoxetine (CYMBALTA) 60 mg delayed release capsule; Take 1 capsule (60 mg total) by mouth daily at bedtime      Refills prescribed  Patient currently on wait list for [facility]  She is no longer seeing her therapist and notes she is feeling well  Patient is encouraged to call our office for any questions/concerns, persistent or worsening symptoms  Patient states they understand and agree with treatment plan  Encounter for screening mammogram for malignant neoplasm of breast  -     Mammo screening bilateral w 3d & cad; Future    Menopause  -     DXA bone density spine hip and pelvis; Future    Bacteria in urine  -     UA (URINE) with reflex to Scope      Bacteria noted on urine sample after patient noted it was not a clean catch  We will repeat UA  We will contact her with results once available  Patient is encouraged to call our office for any questions/concerns, persistent or worsening symptoms  Patient states they understand and agree with treatment plan  Pt to f/u in 6 months for recheck or sooner PRN  Subjective:      Patient ID: Johnny Sharpe is a 78 y o  female  Patient presents today for her AWV  Overall, she feels well  Patient notes she is seeing Dr Mg Hancock office tomorrow for right sided thoracic back pain    She does have hx of bipolar and was previously seeing Dr Shanon Xavier as well as a therapist   She notes that she is no longer seeing a therapist because she feels well  Patient is on waiting list w/ [facility] at this time  On other note, patient was noted to have bacteria in urine sample  She denies any urinary symptoms and notes she did not utilize sanitary wipe when providing her previous urine sampel  The following portions of the patient's history were reviewed and updated as appropriate: allergies, current medications, past family history, past medical history, past social history, past surgical history and problem list     Review of Systems   Constitutional: Negative  Negative for chills and fatigue  HENT: Negative  Respiratory: Negative  Negative for cough and shortness of breath  Cardiovascular: Negative  Negative for chest pain  Gastrointestinal: Negative  Genitourinary: Negative  Musculoskeletal: Negative  Negative for myalgias  Neurological: Negative  Objective:      /80   Pulse 88   Resp 16   Ht 5' 6" (1 676 m)   Wt 102 kg (224 lb 4 8 oz)   BMI 36 20 kg/m²          Physical Exam  Vitals reviewed  Constitutional:       General: She is not in acute distress  Appearance: Normal appearance  She is not ill-appearing  Cardiovascular:      Rate and Rhythm: Normal rate and regular rhythm  Pulses: Normal pulses  Heart sounds: Normal heart sounds  No murmur heard  Pulmonary:      Effort: Pulmonary effort is normal  No respiratory distress  Breath sounds: Normal breath sounds  No wheezing  Musculoskeletal:         General: Normal range of motion  Right lower leg: No edema  Left lower leg: No edema  Skin:     General: Skin is warm  Capillary Refill: Capillary refill takes less than 2 seconds  Neurological:      General: No focal deficit present  Mental Status: She is alert and oriented to person, place, and time        Motor: No weakness  Gait: Gait normal    Psychiatric:         Mood and Affect: Mood normal          Behavior: Behavior normal          Thought Content:  Thought content normal          Judgment: Judgment normal

## 2022-02-15 NOTE — PATIENT INSTRUCTIONS
Medicare Preventive Visit Patient Instructions  Thank you for completing your Welcome to Medicare Visit or Medicare Annual Wellness Visit today  Your next wellness visit will be due in one year (2/16/2023)  The screening/preventive services that you may require over the next 5-10 years are detailed below  Some tests may not apply to you based off risk factors and/or age  Screening tests ordered at today's visit but not completed yet may show as past due  Also, please note that scanned in results may not display below  Preventive Screenings:  Service Recommendations Previous Testing/Comments   Colorectal Cancer Screening  * Colonoscopy    * Fecal Occult Blood Test (FOBT)/Fecal Immunochemical Test (FIT)  * Fecal DNA/Cologuard Test  * Flexible Sigmoidoscopy Age: 54-65 years old   Colonoscopy: every 10 years (may be performed more frequently if at higher risk)  OR  FOBT/FIT: every 1 year  OR  Cologuard: every 3 years  OR  Sigmoidoscopy: every 5 years  Screening may be recommended earlier than age 48 if at higher risk for colorectal cancer  Also, an individualized decision between you and your healthcare provider will decide whether screening between the ages of 74-80 would be appropriate  Colonoscopy: 10/23/2013  FOBT/FIT: Not on file  Cologuard: Not on file  Sigmoidoscopy: Not on file    Screening Current     Breast Cancer Screening Age: 36 years old  Frequency: every 1-2 years  Not required if history of left and right mastectomy Mammogram: 02/16/2021    Screening Current   Cervical Cancer Screening Between the ages of 21-29, pap smear recommended once every 3 years  Between the ages of 33-67, can perform pap smear with HPV co-testing every 5 years     Recommendations may differ for women with a history of total hysterectomy, cervical cancer, or abnormal pap smears in past  Pap Smear: 11/13/2017    Screening Not Indicated   Hepatitis C Screening Once for adults born between 1945 and 1965  More frequently in patients at high risk for Hepatitis C Hep C Antibody: Not on file    Screening Current   Diabetes Screening 1-2 times per year if you're at risk for diabetes or have pre-diabetes Fasting glucose: 109 mg/dL   A1C: No results in last 5 years    Screening Current   Cholesterol Screening Once every 5 years if you don't have a lipid disorder  May order more often based on risk factors  Lipid panel: 01/31/2022    Screening Current     Other Preventive Screenings Covered by Medicare:  1  Abdominal Aortic Aneurysm (AAA) Screening: covered once if your at risk  You're considered to be at risk if you have a family history of AAA  2  Lung Cancer Screening: covers low dose CT scan once per year if you meet all of the following conditions: (1) Age 50-69; (2) No signs or symptoms of lung cancer; (3) Current smoker or have quit smoking within the last 15 years; (4) You have a tobacco smoking history of at least 30 pack years (packs per day multiplied by number of years you smoked); (5) You get a written order from a healthcare provider  3  Glaucoma Screening: covered annually if you're considered high risk: (1) You have diabetes OR (2) Family history of glaucoma OR (3)  aged 48 and older OR (3)  American aged 72 and older  3  Osteoporosis Screening: covered every 2 years if you meet one of the following conditions: (1) You're estrogen deficient and at risk for osteoporosis based off medical history and other findings; (2) Have a vertebral abnormality; (3) On glucocorticoid therapy for more than 3 months; (4) Have primary hyperparathyroidism; (5) On osteoporosis medications and need to assess response to drug therapy  · Last bone density test (DXA Scan): 02/26/2020   5  HIV Screening: covered annually if you're between the age of 15-65  Also covered annually if you are younger than 13 and older than 72 with risk factors for HIV infection   For pregnant patients, it is covered up to 3 times per pregnancy  Immunizations:  Immunization Recommendations   Influenza Vaccine Annual influenza vaccination during flu season is recommended for all persons aged >= 6 months who do not have contraindications   Pneumococcal Vaccine (Prevnar and Pneumovax)  * Prevnar = PCV13  * Pneumovax = PPSV23   Adults 25-60 years old: 1-3 doses may be recommended based on certain risk factors  Adults 72 years old: Prevnar (PCV13) vaccine recommended followed by Pneumovax (PPSV23) vaccine  If already received PPSV23 since turning 65, then PCV13 recommended at least one year after PPSV23 dose  Hepatitis B Vaccine 3 dose series if at intermediate or high risk (ex: diabetes, end stage renal disease, liver disease)   Tetanus (Td) Vaccine - COST NOT COVERED BY MEDICARE PART B Following completion of primary series, a booster dose should be given every 10 years to maintain immunity against tetanus  Td may also be given as tetanus wound prophylaxis  Tdap Vaccine - COST NOT COVERED BY MEDICARE PART B Recommended at least once for all adults  For pregnant patients, recommended with each pregnancy  Shingles Vaccine (Shingrix) - COST NOT COVERED BY MEDICARE PART B  2 shot series recommended in those aged 48 and above     Health Maintenance Due:      Topic Date Due    Breast Cancer Screening: Mammogram  02/16/2022    Hepatitis C Screening  11/18/2023 (Originally 1955)    DXA SCAN  02/26/2023    Colorectal Cancer Screening  10/23/2023     Immunizations Due:      Topic Date Due    Pneumococcal Vaccine: 65+ Years (2 of 2 - PPSV23) 11/16/2020    COVID-19 Vaccine (3 - Booster for Padron Peter series) 10/26/2021     Advance Directives   What are advance directives? Advance directives are legal documents that state your wishes and plans for medical care  These plans are made ahead of time in case you lose your ability to make decisions for yourself   Advance directives can apply to any medical decision, such as the treatments you want, and if you want to donate organs  What are the types of advance directives? There are many types of advance directives, and each state has rules about how to use them  You may choose a combination of any of the following:  · Living will: This is a written record of the treatment you want  You can also choose which treatments you do not want, which to limit, and which to stop at a certain time  This includes surgery, medicine, IV fluid, and tube feedings  · Durable power of  for healthcare [facility]): This is a written record that states who you want to make healthcare choices for you when you are unable to make them for yourself  This person, called a proxy, is usually a family member or a friend  You may choose more than 1 proxy  · Do not resuscitate (DNR) order:  A DNR order is used in case your heart stops beating or you stop breathing  It is a request not to have certain forms of treatment, such as CPR  A DNR order may be included in other types of advance directives  · Medical directive: This covers the care that you want if you are in a coma, near death, or unable to make decisions for yourself  You can list the treatments you want for each condition  Treatment may include pain medicine, surgery, blood transfusions, dialysis, IV or tube feedings, and a ventilator (breathing machine)  · Values history: This document has questions about your views, beliefs, and how you feel and think about life  This information can help others choose the care that you would choose  Why are advance directives important? An advance directive helps you control your care  Although spoken wishes may be used, it is better to have your wishes written down  Spoken wishes can be misunderstood, or not followed  Treatments may be given even if you do not want them  An advance directive may make it easier for your family to make difficult choices about your care     Urinary Incontinence   Urinary incontinence (UI)  is when you lose control of your bladder  UI develops because your bladder cannot store or empty urine properly  The 3 most common types of UI are stress incontinence, urge incontinence, or both  Medicines:   · May be given to help strengthen your bladder control  Report any side effects of medication to your healthcare provider  Do pelvic muscle exercises often:  Your pelvic muscles help you stop urinating  Squeeze these muscles tight for 5 seconds, then relax for 5 seconds  Gradually work up to squeezing for 10 seconds  Do 3 sets of 15 repetitions a day, or as directed  This will help strengthen your pelvic muscles and improve bladder control  Train your bladder:  Go to the bathroom at set times, such as every 2 hours, even if you do not feel the urge to go  You can also try to hold your urine when you feel the urge to go  For example, hold your urine for 5 minutes when you feel the urge to go  As that becomes easier, hold your urine for 10 minutes  Self-care:   · Keep a UI record  Write down how often you leak urine and how much you leak  Make a note of what you were doing when you leaked urine  · Drink liquids as directed  You may need to limit the amount of liquid you drink to help control your urine leakage  Do not drink any liquid right before you go to bed  Limit or do not have drinks that contain caffeine or alcohol  · Prevent constipation  Eat a variety of high-fiber foods  Good examples are high-fiber cereals, beans, vegetables, and whole-grain breads  Walking is the best way to trigger your intestines to have a bowel movement  · Exercise regularly and maintain a healthy weight  Weight loss and exercise will decrease pressure on your bladder and help you control your leakage  · Use a catheter as directed  to help empty your bladder  A catheter is a tiny, plastic tube that is put into your bladder to drain your urine  · Go to behavior therapy as directed    Behavior therapy may be used to help you learn to control your urge to urinate  Weight Management   Why it is important to manage your weight:  Being overweight increases your risk of health conditions such as heart disease, high blood pressure, type 2 diabetes, and certain types of cancer  It can also increase your risk for osteoarthritis, sleep apnea, and other respiratory problems  Aim for a slow, steady weight loss  Even a small amount of weight loss can lower your risk of health problems  How to lose weight safely:  A safe and healthy way to lose weight is to eat fewer calories and get regular exercise  You can lose up about 1 pound a week by decreasing the number of calories you eat by 500 calories each day  Healthy meal plan for weight management:  A healthy meal plan includes a variety of foods, contains fewer calories, and helps you stay healthy  A healthy meal plan includes the following:  · Eat whole-grain foods more often  A healthy meal plan should contain fiber  Fiber is the part of grains, fruits, and vegetables that is not broken down by your body  Whole-grain foods are healthy and provide extra fiber in your diet  Some examples of whole-grain foods are whole-wheat breads and pastas, oatmeal, brown rice, and bulgur  · Eat a variety of vegetables every day  Include dark, leafy greens such as spinach, kale, amanda greens, and mustard greens  Eat yellow and orange vegetables such as carrots, sweet potatoes, and winter squash  · Eat a variety of fruits every day  Choose fresh or canned fruit (canned in its own juice or light syrup) instead of juice  Fruit juice has very little or no fiber  · Eat low-fat dairy foods  Drink fat-free (skim) milk or 1% milk  Eat fat-free yogurt and low-fat cottage cheese  Try low-fat cheeses such as mozzarella and other reduced-fat cheeses  · Choose meat and other protein foods that are low in fat  Choose beans or other legumes such as split peas or lentils   Choose fish, skinless poultry (chicken or turkey), or lean cuts of red meat (beef or pork)  Before you cook meat or poultry, cut off any visible fat  · Use less fat and oil  Try baking foods instead of frying them  Add less fat, such as margarine, sour cream, regular salad dressing and mayonnaise to foods  Eat fewer high-fat foods  Some examples of high-fat foods include french fries, doughnuts, ice cream, and cakes  · Eat fewer sweets  Limit foods and drinks that are high in sugar  This includes candy, cookies, regular soda, and sweetened drinks  Exercise:  Exercise at least 30 minutes per day on most days of the week  Some examples of exercise include walking, biking, dancing, and swimming  You can also fit in more physical activity by taking the stairs instead of the elevator or parking farther away from stores  Ask your healthcare provider about the best exercise plan for you  © Copyright [employer] 2018 Information is for End User's use only and may not be sold, redistributed or otherwise used for commercial purposes   All illustrations and images included in CareNotes® are the copyrighted property of [employer] M [employer]  or [address] [place] [address]

## 2022-02-16 ENCOUNTER — APPOINTMENT (OUTPATIENT)
Dept: RADIOLOGY | Facility: CLINIC | Age: 67
End: 2022-02-16
Payer: MEDICARE

## 2022-02-16 ENCOUNTER — OFFICE VISIT (OUTPATIENT)
Dept: PAIN MEDICINE | Facility: CLINIC | Age: 67
End: 2022-02-16
Payer: MEDICARE

## 2022-02-16 VITALS
DIASTOLIC BLOOD PRESSURE: 78 MMHG | BODY MASS INDEX: 36 KG/M2 | TEMPERATURE: 97.6 F | HEIGHT: 66 IN | SYSTOLIC BLOOD PRESSURE: 120 MMHG | WEIGHT: 224 LBS | HEART RATE: 78 BPM

## 2022-02-16 DIAGNOSIS — G89.29 CHRONIC NECK PAIN: ICD-10-CM

## 2022-02-16 DIAGNOSIS — M50.30 DDD (DEGENERATIVE DISC DISEASE), CERVICAL: ICD-10-CM

## 2022-02-16 DIAGNOSIS — M79.18 MYOFASCIAL PAIN SYNDROME: ICD-10-CM

## 2022-02-16 DIAGNOSIS — G89.29 CHRONIC BILATERAL THORACIC BACK PAIN: Primary | ICD-10-CM

## 2022-02-16 DIAGNOSIS — M54.12 CERVICAL RADICULOPATHY: ICD-10-CM

## 2022-02-16 DIAGNOSIS — M54.6 CHRONIC BILATERAL THORACIC BACK PAIN: Primary | ICD-10-CM

## 2022-02-16 DIAGNOSIS — M54.2 CHRONIC NECK PAIN: ICD-10-CM

## 2022-02-16 DIAGNOSIS — M51.24 THORACIC DISC HERNIATION: ICD-10-CM

## 2022-02-16 PROCEDURE — 72050 X-RAY EXAM NECK SPINE 4/5VWS: CPT

## 2022-02-16 PROCEDURE — 99214 OFFICE O/P EST MOD 30 MIN: CPT | Performed by: NURSE PRACTITIONER

## 2022-02-16 NOTE — PATIENT INSTRUCTIONS
Epidural Steroid Injection   AMBULATORY CARE:   What you need to know about an epidural steroid injection (CANDIDA):  An CANDIDA is a procedure to inject steroid medicine into the epidural space  The epidural space is between your spinal cord and vertebrae  Steroids reduce inflammation and fluid buildup in your spine that may be causing pain  You may be given pain medicine along with the steroids  How to prepare for an CANDIDA:  Your healthcare provider will talk to you about how to prepare for your procedure  He or she will tell you what medicines to take or not take on the day of your procedure  You may need to stop taking blood thinners or other medicines several days before your procedure  You may need to adjust any diabetes medicine you take on the day of your procedure  Steroid medicine can increase your blood sugar level  Arrange for someone to drive you home when you are discharged  What will happen during an CANDIDA:   · You will be given medicine to numb the procedure area  You will be awake for the procedure, but you will not feel pain  You may also be given medicine to help you relax  Contrast liquid will be used to help your healthcare provider see the area better  Tell the healthcare provider if you have ever had an allergic reaction to contrast liquid  · Your healthcare provider may place the needle into your neck area, middle of your back, or tailbone area  He may inject the medicine next to the nerves that are causing your pain  He may instead inject the medicine into a larger area of the epidural space  This helps the medicine spread to more nerves  Your healthcare provider will use a fluoroscope to help guide the needle to the right place  A fluoroscope is a type of x-ray  After the procedure, a bandage will be placed over the injection site to prevent infection  What will happen after an CANDIDA:  You will have a bandage over the injection site to prevent infection   Your healthcare provider will tell you when you can bathe and any activity guidelines  You will be able to go home  Risks of an CANDIDA:  You may have temporary or permanent nerve damage or paralysis  You may have bleeding or develop a serious infection, such as meningitis (swelling of the brain coverings)  An abscess may also develop  An abscess is a pus-filled area under the skin  You may need surgery to fix the abscess  You may have a seizure, anxiety, or trouble sleeping  If you are a man, you may have temporary erectile dysfunction (not able to have an erection)  Call your local emergency number (911 in the [address] [place] [address],[address]) if:   · You have a seizure  · You have trouble moving your legs  Seek care immediately if:   · Blood soaks through your bandage  · You have a fever or chills, severe back pain, and the procedure area is sensitive to the touch  · You cannot control when you urinate or have a bowel movement  Call your doctor if:   · You have weakness or numbness in your legs  · Your wound is red, swollen, or draining pus  · You have nausea or are vomiting  · Your face or neck is red and you feel warm  · You have more pain than you had before the procedure  · You have swelling in your hands or feet  · You have questions or concerns about your condition or care  Care for your wound as directed: You may remove the bandage before you go to bed the day of your procedure  You may take a shower, but do not take a bath for at least 24 hours  Self-care:   · Do not drive,  use machines, or do strenuous activity for 24 hours after your procedure or as directed  · Continue other treatments  as directed  Steroid injections alone will not control your pain  The injections are meant to be used with other treatments, such as physical therapy  Follow up with your doctor as directed:  Write down your questions so you remember to ask them during your visits     © Copyright [employer] 2021 Information is for End User's use only and may not be sold, redistributed or otherwise used for commercial purposes  All illustrations and images included in CareNotes® are the copyrighted property of [employer]  or Alfred Duncan  The above information is an  only  It is not intended as medical advice for individual conditions or treatments  Talk to your doctor, nurse or pharmacist before following any medical regimen to see if it is safe and effective for you

## 2022-02-16 NOTE — PROGRESS NOTES
Assessment:  1  Chronic bilateral thoracic back pain    2  Chronic neck pain    3  Cervical radiculopathy    4  Myofascial pain syndrome    5  DDD (degenerative disc disease), cervical    6  Thoracic disc herniation        Plan:  While the patient was in the office today, I did discuss with the patient at this point time I do feel there is definitely a significant inflammatory neuropathic component and I am suspicious that her thoracic pain is actually being caused by her right-sided T6-T7 disc herniation which was noted in her previous MRI  I explained the patient that at this point time I feel would be beneficial to proceed with a thoracic epidural steroid injection at the T7-T8 level directed towards the right  I did explain to the patient that depending on the results of the injection, it may need to be repeated  The patient was agreeable and verbalized an understanding  Complete risks and benefits including bleeding, infection, tissue reaction, nerve injury and allergic reaction were discussed  The approach was demonstrated using models and literature was provided  Verbal and written consent was obtained  With regards to the cervical pain, at this point time I feel would be beneficial to at least proceed with updated x-rays and imaging of the cervical spine as there is not any good or recent imaging of the cervical spine to evaluate for any other etiology that could be causing her pain even though I do feel it is significantly myofascial in nature and most likely a result of the current thoracic pain flare-up  I advised the patient once we have the results of the x-rays, our office will give her a call to review results and discuss any other treatment plan recommendations  The patient was agreeable and verbalized an understanding           The patient is schedule follow-up office visit 3-4 weeks after her injection and at that point time we will re-evaluate her pain symptoms and discuss her treatment plan options  The patient was agreeable and verbalized an understanding  History of Present Illness: The patient is a 78 y o  female last seen on 10/8/2021 who presents for a follow up office visit in regards to worsening right-sided greater than left chronic thoracic pain with chronic cervical pain and myofascial pain secondary to a herniated thoracic disc and cervical degenerative discs  The patient currently reports that since her last office visit her low back and leg pain symptoms have remained significantly improved and stable as a result of the previous to L5-S1 interlaminar lumbar epidural steroid injections directed towards the right  However, over the past few weeks her chronic right-sided greater than left thoracic and cervical pain has started to worsen although a few weeks ago she was on prednisone for her sinuses and then the pain completely resolved but then after a week or 2 it started to return and at this point it is getting worse  The patient presents today for re-evaluation to see there is any kind of injections or procedures we could recommend as she reports in July she has an upcoming vacation and with the kind of pain she is experiencing currently she does not feel she would be able to enjoy it  I have personally reviewed and/or updated the patient's past medical history, past surgical history, family history, social history, current medications, allergies, and vital signs today  Review of Systems:    Review of Systems   Respiratory: Negative for shortness of breath  Cardiovascular: Positive for chest pain  Gastrointestinal: Positive for constipation  Negative for diarrhea, nausea and vomiting  Musculoskeletal: Positive for joint swelling (joint stiffness)  Negative for arthralgias, gait problem and myalgias  Skin: Negative for rash  Neurological: Positive for dizziness  Negative for seizures and weakness     All other systems reviewed and are negative          Past Medical History:   Diagnosis Date    Anxiety     Arthritis     Basal cell carcinoma (BCC)     Bipolar disorder ([facility] [address] [facility]     Cervical spinal stenosis     last assessed 5/13/14, resolved 10/10/16    Chronic constipation     last assessed 8/28/12, resovled 9/14/15    Chronic fatigue syndrome     last assessed 8/28/12, resolved 9/14/15    Chronic hyperglycemia     resolved 9/14/15    Chronic pain syndrome     last assessed 11/12/13, resolved 10/10/16    Depression     Essential hypertriglyceridemia     resolved 10/10/16    Fibromyalgia     GERD (gastroesophageal reflux disease)     Herpes simplex infection     last assessed 12/12/13, resolved 10/10/16    Hypokalemia     last assessed 9/14/15, resolved 11/23/15    Insomnia     last assessed 3/30/15, resolved 11/23/15    Median neuropathy     last assessed 7/14/15, resolved 10/10/16    Pneumonia     last assessed 7/16/13, resolved 5/10/13    Sacroiliitis ([facility] [address] )     last assessed 10/22/13, resolved 10/27/14       Past Surgical History:   Procedure Laterality Date    CERVICAL FUSION      COLONOSCOPY  10/23/2013    10yrs     DILATION AND CURETTAGE OF UTERUS      HALLUX VALGUS CORRECTION      HEMORROIDECTOMY      NEUROPLASTY / TRANSPOSITION MEDIAN NERVE AT CARPAL TUNNEL      REDUCTION MAMMAPLASTY      TONSILLECTOMY      TOOTH EXTRACTION      TUBAL LIGATION         Family History   Problem Relation Age of Onset    Hypertension Mother     Osteoporosis Mother     Arthritis Mother     Sudden death Mother         brain aneurysm    Heart attack Father     Melanoma Father     Coronary artery disease Father     Hyperlipidemia Father     Cancer Father     Diabetes Paternal Grandmother     Coronary artery disease Paternal Grandfather     Heart attack Family     Arthritis Family     Diabetes Family     Hypertension Family     Heart disease Family     Osteoporosis Family     No Known Problems Daughter     No Known Problems Maternal Grandmother     No Known Problems Maternal Grandfather        Social History     Occupational History    Not on file   Tobacco Use    Smoking status: Never Smoker    Smokeless tobacco: Never Used   Vaping Use    Vaping Use: Never used   Substance and Sexual Activity    Alcohol use: Not Currently    Drug use: No    Sexual activity: Not on file         Current Outpatient Medications:     ARIPiprazole (ABILIFY) 15 mg tablet, Take 1 tablet (15 mg total) by mouth daily at bedtime, Disp: 30 tablet, Rfl: 2    ascorbic acid (VITAMIN C) 500 mg tablet, Take 500 mg by mouth daily, Disp: , Rfl:     bisacodyl (DULCOLAX) 5 mg EC tablet, Take 1 tablet by mouth daily as needed, Disp: , Rfl:     Calcium 250 MG CAPS, Take 500 mg by mouth  , Disp: , Rfl:     Cholecalciferol (CVS VITAMIN D) 2000 units CAPS, Take by mouth, Disp: , Rfl:     DULoxetine (CYMBALTA) 60 mg delayed release capsule, Take 1 capsule (60 mg total) by mouth daily at bedtime, Disp: 30 capsule, Rfl: 2    fluticasone (FLONASE) 50 mcg/act nasal spray, 1 spray into each nostril daily, Disp: 9 9 mL, Rfl: 2    loratadine (CLARITIN) 10 mg tablet, Take 10 mg by mouth, Disp: , Rfl:     Turmeric Curcumin 500 MG CAPS, Take by mouth, Disp: , Rfl:     Allergies   Allergen Reactions    Other Shortness Of Breath, Allergic Rhinitis and Cough     Cigarette and bleach  [place]     Cephalexin Rash    Latex Rash     Reaction Date: 21Mar2012;     Molds & Smuts Allergic Rhinitis    Risperidone Palpitations     Reaction Date: 21Mar2012;     Sertraline Itching     Reaction Date: 21Mar2012;     Soy Isoflavones        Physical Exam:    /78 (BP Location: Left arm, Patient Position: Sitting, Cuff Size: Standard)   Pulse 78   Temp 97 6 °F (36 4 °C)   Ht 5' 6" (1 676 m)   Wt 102 kg (224 lb)   BMI 36 15 kg/m²     Constitutional:normal, well developed, well nourished, alert, in no distress and non-toxic and no overt pain behavior  Eyes:anicteric  HEENT:grossly intact  Neck:supple, symmetric, trachea midline and no masses   Pulmonary:even and unlabored  Cardiovascular:No edema or pitting edema present  Skin:Normal without rashes or lesions and well hydrated  Psychiatric:Mood and affect appropriate  Neurologic:Cranial Nerves II-XII grossly intact  Musculoskeletal:The patient's gait is slightly antalgic, but steady without the use of any assistive devices  Moderate to significant tenderness is noted upon exam of the right side greater than left thoracic spine and paravertebral musculature around the T6-T7 level and pain noted around her right scapular ridge with spasms and trigger points noted upon exam   Cervical spine and paravertebral musculature tenderness noted upon exam with spasms and trigger points noted as well        Imaging  FL spine and pain procedure    (Results Pending)         Orders Placed This Encounter   Procedures    FL spine and pain procedure    X-ray cervical spine complete 4 or 5 vw

## 2022-02-21 ENCOUNTER — TELEPHONE (OUTPATIENT)
Dept: PAIN MEDICINE | Facility: CLINIC | Age: 67
End: 2022-02-21

## 2022-02-21 NOTE — TELEPHONE ENCOUNTER
Can you please call the patient to let her know that her cervical spine x-rays showed moderate to severe degenerative changes/osteoarthritis with the worst being on the left at the C3-C4 level and the C4-C5 level with evidence of moderate to severe stenosis and mild stenosis on the right-sided those levels as well  Let see how she does from the thoracic epidural steroid injection and then we will proceed from there as appropriate  The patient was agreeable and verbalized an understanding

## 2022-02-23 ENCOUNTER — HOSPITAL ENCOUNTER (OUTPATIENT)
Dept: RADIOLOGY | Facility: CLINIC | Age: 67
Discharge: HOME | End: 2022-02-23
Attending: ANESTHESIOLOGY | Admitting: ANESTHESIOLOGY
Payer: MEDICARE

## 2022-02-23 VITALS
HEART RATE: 88 BPM | OXYGEN SATURATION: 95 % | TEMPERATURE: 97.3 F | RESPIRATION RATE: 18 BRPM | SYSTOLIC BLOOD PRESSURE: 135 MMHG | DIASTOLIC BLOOD PRESSURE: 86 MMHG

## 2022-02-23 DIAGNOSIS — M51.24 THORACIC DISC HERNIATION: ICD-10-CM

## 2022-02-23 DIAGNOSIS — M54.6 CHRONIC BILATERAL THORACIC BACK PAIN: ICD-10-CM

## 2022-02-23 DIAGNOSIS — G89.29 CHRONIC BILATERAL THORACIC BACK PAIN: ICD-10-CM

## 2022-02-23 PROCEDURE — 62321 NJX INTERLAMINAR CRV/THRC: CPT | Performed by: ANESTHESIOLOGY

## 2022-02-23 RX ORDER — PAPAVERINE HCL 150 MG
10 CAPSULE, EXTENDED RELEASE ORAL ONCE
Status: COMPLETED | OUTPATIENT
Start: 2022-02-23 | End: 2022-02-23

## 2022-02-23 RX ADMIN — DEXAMETHASONE SODIUM PHOSPHATE 10 MG: 10 INJECTION, SOLUTION INTRAMUSCULAR; INTRAVENOUS at 10:22

## 2022-02-23 RX ADMIN — IOHEXOL 1 ML: 300 INJECTION, SOLUTION INTRAVENOUS at 10:22

## 2022-02-23 NOTE — H&P
History of Present Illness: The patient is a 78 y o  female who presents with complaints of thoracic pain      Patient Active Problem List   Diagnosis    Allergic rhinitis    Basal cell carcinoma of skin    Cervical radiculopathy    DDD (degenerative disc disease), cervical    Disc degeneration, lumbar    Fibromyalgia    Acute vasomotor rhinitis    Chest pain syndrome    Chronic neck pain    Chronic bilateral low back pain without sciatica    Chronic joint pain    Chronic bilateral thoracic back pain    Myofascial pain syndrome    Other seborrheic keratosis    Other spondylosis with radiculopathy, lumbar region    Lumbar radiculopathy    Herniated nucleus pulposus, L4-5    Lumbar foraminal stenosis    PTSD (post-traumatic stress disorder)    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 ([address] )    Thoracic disc herniation       Past Medical History:   Diagnosis Date    Anxiety     Arthritis     Basal cell carcinoma (BCC)     [facility] disorder ([facility] [address]     Cervical spinal stenosis     last assessed 5/13/14, resolved 10/10/16    Chronic constipation     last assessed 8/28/12, resovled 9/14/15    Chronic fatigue syndrome     last assessed 8/28/12, resolved 9/14/15    Chronic hyperglycemia     resolved 9/14/15    Chronic pain syndrome     last assessed 11/12/13, resolved 10/10/16    Depression     Essential hypertriglyceridemia     resolved 10/10/16    Fibromyalgia     GERD (gastroesophageal reflux disease)     Herpes simplex infection     last assessed 12/12/13, resolved 10/10/16    Hypokalemia     last assessed 9/14/15, resolved 11/23/15    Insomnia     last assessed 3/30/15, resolved 11/23/15    Median neuropathy     last assessed 7/14/15, resolved 10/10/16    Pneumonia     last assessed 7/16/13, resolved 5/10/13    Sacroiliitis ([facility] [address] )     last assessed 10/22/13, resolved 10/27/14       Past Surgical History:   Procedure Laterality Date    CERVICAL FUSION      COLONOSCOPY  10/23/2013    10yrs     DILATION AND CURETTAGE OF UTERUS      HALLUX VALGUS CORRECTION      HEMORROIDECTOMY      NEUROPLASTY / TRANSPOSITION MEDIAN NERVE AT CARPAL TUNNEL      REDUCTION MAMMAPLASTY      TONSILLECTOMY      TOOTH EXTRACTION      TUBAL LIGATION           Current Outpatient Medications:     ARIPiprazole (ABILIFY) 15 mg tablet, Take 1 tablet (15 mg total) by mouth daily at bedtime, Disp: 30 tablet, Rfl: 2    ascorbic acid (VITAMIN C) 500 mg tablet, Take 500 mg by mouth daily, Disp: , Rfl:     bisacodyl (DULCOLAX) 5 mg EC tablet, Take 1 tablet by mouth daily as needed, Disp: , Rfl:     Calcium 250 MG CAPS, Take 500 mg by mouth  , Disp: , Rfl:     Cholecalciferol (CVS VITAMIN D) 2000 units CAPS, Take by mouth, Disp: , Rfl:     DULoxetine (CYMBALTA) 60 mg delayed release capsule, Take 1 capsule (60 mg total) by mouth daily at bedtime, Disp: 30 capsule, Rfl: 2    fluticasone (FLONASE) 50 mcg/act nasal spray, 1 spray into each nostril daily, Disp: 9 9 mL, Rfl: 2    loratadine (CLARITIN) 10 mg tablet, Take 10 mg by mouth, Disp: , Rfl:     Turmeric Curcumin 500 MG CAPS, Take by mouth, Disp: , Rfl:     Current Facility-Administered Medications:     dexamethasone (PF) (DECADRON) injection 10 mg, 10 mg, Epidural, Once, Luis Harris DO    iohexol (OMNIPAQUE) 300 mg/mL injection 50 mL, 50 mL, Epidural, Once, Luis Harris DO    Allergies   Allergen Reactions    Other Shortness Of Breath, Allergic Rhinitis and Cough     Cigarette and bleach  [place]     Cephalexin Rash    Latex Rash     Reaction Date: 21Mar2012;     Molds & Smuts Allergic Rhinitis    Risperidone Palpitations     Reaction Date: 21Mar2012;     Sertraline Itching     Reaction Date: 21Mar2012;     Soy Isoflavones        Physical Exam:   General: Awake, Alert, Oriented x 3, Mood and affect appropriate  Respiratory: Respirations even and unlabored  Cardiovascular: Peripheral pulses intact; no edema  Musculoskeletal Exam:  Slow antalgic gait    ASA Score: III         Assessment:   1  Chronic bilateral thoracic back pain    2   Thoracic disc herniation        Plan: Thoracic CANDIDA at T7-8 to the Right

## 2022-02-23 NOTE — DISCHARGE INSTR - LAB
Epidural Steroid Injection   WHAT YOU NEED TO KNOW:   An epidural steroid injection (CANDIDA) is a procedure to inject steroid medicine into the epidural space  The epidural space is between your spinal cord and vertebrae  Steroids reduce inflammation and fluid buildup in your spine that may be causing pain  You may be given pain medicine along with the steroids  ACTIVITY  Do not drive or operate machinery today  No strenuous activity today - bending, lifting, etc   You may resume normal activites starting tomorrow - start slowly and as tolerated  You may shower today, but no tub baths or hot tubs  You may have numbness for several hours from the local anesthetic  Please use caution and common sense, especially with weight-bearing activities  CARE OF THE INJECTION SITE  If you have soreness or pain, apply ice to the area today (20 minutes on/20 minutes off)  Starting tomorrow, you may use warm, moist heat or ice if needed  You may have an increase or change in your discomfort for 36-48 hours after your treatment  Apply ice and continue with any pain medication you have been prescribed  Notify the [facility] if you have any of the following: redness, drainage, swelling, headache, stiff neck or fever above 100°F     SPECIAL INSTRUCTIONS  Our office will contact you in approximately 7 days for a progress report  MEDICATIONS  Continue to take all routine medications  Our office may have instructed you to hold some medications  As no general anesthesia was used in today's procedure, you should not experience any side effects related to anesthesia  If you have a problem specifically related to your procedure, please call our office at [phone]  Problems not related to your procedure should be directed to your primary care physician

## 2022-03-07 ENCOUNTER — TELEPHONE (OUTPATIENT)
Dept: PAIN MEDICINE | Facility: MEDICAL CENTER | Age: 67
End: 2022-03-07

## 2022-03-07 NOTE — TELEPHONE ENCOUNTER
Pt called stating that she is starting to get numbness in her left arm and would like to know what she can do     Pt can be reached at [phone]

## 2022-03-07 NOTE — TELEPHONE ENCOUNTER
S/w pt, stated that she did have some improvement with her thoracic symptoms after TESI however, now she is having shooting pains across her back into her shoulder blade  Pt stated that she noticed that her L arm went numb once earlier but it happened again over the weekend  Pt stated that she continues with a feeling of heaviness in her head  Pt confirmed that the numbness in her L arm feels the same as it did prior to her 2003 fusion  Per pt, these symptoms are all a result of an injury prior to 2003 and they are just "coming back" now - no obvious cause  Pt questioned if she should fu w/ [facility] or Dr Irena Perez as she is concerned that there is something wrong with her fusion  Advised pt, will d/w [facility] and cb to advise  Pt verbalized understanding and appreciation

## 2022-03-08 ENCOUNTER — TELEPHONE (OUTPATIENT)
Dept: OBGYN CLINIC | Facility: CLINIC | Age: 67
End: 2022-03-08

## 2022-03-08 DIAGNOSIS — M54.12 CERVICAL RADICULOPATHY: Primary | ICD-10-CM

## 2022-03-08 DIAGNOSIS — G89.29 CHRONIC NECK PAIN: ICD-10-CM

## 2022-03-08 DIAGNOSIS — M96.1 CERVICAL POST-LAMINECTOMY SYNDROME: ICD-10-CM

## 2022-03-08 DIAGNOSIS — M54.2 CHRONIC NECK PAIN: ICD-10-CM

## 2022-03-08 NOTE — TELEPHONE ENCOUNTER
At this point time can follow up with her as scheduled in April, but in the meantime I did put in an order for an updated MRI of the cervical spine to evaluate for any new or worsening etiology and we will call her once we have the results of that MRI and discuss her treatment plan options at her upcoming visit

## 2022-03-08 NOTE — TELEPHONE ENCOUNTER
Rigoberto,    Pt scheduled with Dr Richi Gutierrez today but he does not see backs  He doesn't think he can help her  Yesterday in a message thread you recommended she see her surgeon but her surgeon is in [place] and surgery was 20 years ago  She has been seeing you and Dr Laura Shelton  I spoke with Landon Primrose and she said the pt is scheduled for a follow up with [facility] on 4/1/22   Landon Primrose thinks she should just continue to follow up with [facility] but said I should run it by you first

## 2022-03-08 NOTE — TELEPHONE ENCOUNTER
S/w pt, Advised of above  Provided contact info for central scheduling  Advised pt to proceed with her surg cons appt today as scheduled  This office will cb when mri results are available  Pt verbalized understanding and appreciation

## 2022-03-09 ENCOUNTER — HOSPITAL ENCOUNTER (OUTPATIENT)
Dept: MAMMOGRAPHY | Facility: CLINIC | Age: 67
Discharge: HOME | End: 2022-03-09
Payer: MEDICARE

## 2022-03-09 ENCOUNTER — HOSPITAL ENCOUNTER (OUTPATIENT)
Dept: BONE DENSITY | Facility: CLINIC | Age: 67
Discharge: HOME | End: 2022-03-09
Payer: MEDICARE

## 2022-03-09 VITALS — BODY MASS INDEX: 37.52 KG/M2 | HEIGHT: 65 IN | WEIGHT: 225.2 LBS

## 2022-03-09 DIAGNOSIS — Z78.0 MENOPAUSE: ICD-10-CM

## 2022-03-09 DIAGNOSIS — Z12.31 ENCOUNTER FOR SCREENING MAMMOGRAM FOR MALIGNANT NEOPLASM OF BREAST: ICD-10-CM

## 2022-03-09 PROCEDURE — 77067 SCR MAMMO BI INCL CAD: CPT

## 2022-03-09 PROCEDURE — 77063 BREAST TOMOSYNTHESIS BI: CPT

## 2022-03-09 PROCEDURE — 77080 DXA BONE DENSITY AXIAL: CPT

## 2022-03-21 ENCOUNTER — TELEPHONE (OUTPATIENT)
Dept: PAIN MEDICINE | Facility: CLINIC | Age: 67
End: 2022-03-21

## 2022-03-21 NOTE — TELEPHONE ENCOUNTER
Pt is c/o numbness and tingling in her L arm also having difficulty swallowing  Questioning if she goes to the ER, will an MRI be done before her appt on 3/31? Advised pt, it is unlikely - but possible  Advised pt to call central scheduling re: any cancellations or locations with an earlier opening  The writer will d/w DG and cb if there is any different / additional info  Pt verbalized understanding and appreciation, stated that she has already called central scheduling as suggested

## 2022-03-21 NOTE — TELEPHONE ENCOUNTER
Patient is asking if she goes to ER will they do her MRI sooner rather than waiting until 3/31?  # [phone]

## 2022-03-25 ENCOUNTER — TELEPHONE (OUTPATIENT)
Dept: FAMILY MEDICINE CLINIC | Facility: CLINIC | Age: 67
End: 2022-03-25

## 2022-03-25 ENCOUNTER — CONSULT (OUTPATIENT)
Dept: BARIATRICS | Facility: CLINIC | Age: 67
End: 2022-03-25
Payer: MEDICARE

## 2022-03-25 VITALS
TEMPERATURE: 97.6 F | HEIGHT: 66 IN | SYSTOLIC BLOOD PRESSURE: 128 MMHG | WEIGHT: 222.8 LBS | RESPIRATION RATE: 16 BRPM | BODY MASS INDEX: 35.81 KG/M2 | HEART RATE: 99 BPM | DIASTOLIC BLOOD PRESSURE: 84 MMHG

## 2022-03-25 DIAGNOSIS — F43.10 PTSD (POST-TRAUMATIC STRESS DISORDER): ICD-10-CM

## 2022-03-25 DIAGNOSIS — R73.09 ABNORMAL GLUCOSE: ICD-10-CM

## 2022-03-25 PROCEDURE — 99204 OFFICE O/P NEW MOD 45 MIN: CPT | Performed by: PHYSICIAN ASSISTANT

## 2022-03-25 NOTE — TELEPHONE ENCOUNTER
Pt called regarding having a sleep study done  Weight management is concerned that she has sleep apnea and put in a referral for a sleep study  However, she could not get in until July 19th and they told her that maybe if she called her PCP you could do something to get her in sooner? Is there some other referral you can put in to get a sleep study done?  (She wasn't really sure what they wanted her to ask for)

## 2022-03-25 NOTE — ASSESSMENT & PLAN NOTE
-Discussed options of HealthyCORE-Intensive Lifestyle Intervention Program, Very Low Calorie Diet-VLCD and Conservative Program and the role of weight loss medications   -Initial weight loss goal of 5-10% weight loss for improved health  -Screening labs  Recommend checking lab coverage before having labs drawn    -NEEDS LABS A1C and fasting insulin or Labs reviewed from 1/31/22 all within acceptable limits except glucose  - STOP BANG-5/8-referred to sleep medicine  -Patient is interested in pursuing healthy core    Discussed food logging and she was given paper log and info about myfitness pal  She is planning to bring in food intake to dietician meeting  Discussed trying nonsugar substitute in tea

## 2022-03-25 NOTE — ASSESSMENT & PLAN NOTE
Continue abilify and duloxetine  She is interested in speaking with social work regarding stress/emotional eating

## 2022-03-25 NOTE — PROGRESS NOTES
Assessment/Plan:    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 (HCC)  Currently on abilify and duloxetine      BMI 36 0-36 9,adult  -Discussed options of HealthyCORE-Intensive Lifestyle Intervention Program, Very Low Calorie Diet-VLCD and Conservative Program and the role of weight loss medications   -Initial weight loss goal of 5-10% weight loss for improved health  -Screening labs  Recommend checking lab coverage before having labs drawn  -NEEDS LABS A1C and fasting insulin or Labs reviewed from 1/31/22 all within acceptable limits except glucose  - STOP BANG-5/8-referred to sleep medicine  -Patient is interested in pursuing healthy core    Discussed food logging and she was given paper log and info about VIO[employer] pal  She is planning to bring in food intake to dietician meeting  Discussed trying nonsugar substitute in tea    PTSD (post-traumatic stress disorder)  Continue abilify and duloxetine  She is interested in speaking with social work regarding stress/emotional eating      Follow up in approximately 2 weeks with Non-Surgical Dietician to start healthy core  Goals:  Food log (ie ) www myfitnesspal com,sparkpeople  com,loseit com,calorieking  com,etc  baritastic  No sugary beverages  At least 64oz of water daily  Increase physical activity by 10 minutes daily  Gradually increase physical activity to a goal of 5 days per week for 30 minutes of MODERATE intensity PLUS 2 days per week of FULL BODY resistance training      Diagnoses and all orders for this visit:    BMI 36 0-36 9,adult  -     Hemoglobin A1C; Future  -     Ambulatory Referral to Weight Management  -     Ambulatory Referral to Sleep Medicine; Future  -     Insulin, fasting; Future    Abnormal glucose  -     Hemoglobin A1C; Future  -     Ambulatory Referral to Sleep Medicine; Future  -     Insulin, fasting;  Future    PTSD (post-traumatic stress disorder)          Subjective:   Chief Complaint   Patient presents with    Consult     MWM Consult       Patient ID: Darshan Mckeon  is a 78 y o  female with excess weight/obesity here to pursue weight management  She is being treated for anxiety/PTSD  She does sometimes eats seconds or emotional eat  She is not seeing a therapist currently   She does go to [employer] 2 times a week and draws her support from that    Past Medical History:   Diagnosis Date    Anxiety     Arthritis     Basal cell carcinoma (BCC)     [facility] disorder ([facility] [address] )     Cervical spinal stenosis     last assessed 5/13/14, resolved 10/10/16    Chronic constipation     last assessed 8/28/12, resovled 9/14/15    Chronic fatigue syndrome     last assessed 8/28/12, resolved 9/14/15    Chronic hyperglycemia     resolved 9/14/15    Chronic pain syndrome     last assessed 11/12/13, resolved 10/10/16    Depression     Essential hypertriglyceridemia     resolved 10/10/16    Fibromyalgia     GERD (gastroesophageal reflux disease)     Herpes simplex infection     last assessed 12/12/13, resolved 10/10/16    Hypokalemia     last assessed 9/14/15, resolved 11/23/15    Insomnia     last assessed 3/30/15, resolved 11/23/15    Median neuropathy     last assessed 7/14/15, resolved 10/10/16    Pneumonia     last assessed 7/16/13, resolved 5/10/13    Sacroiliitis ([facility] [address] )     last assessed 10/22/13, resolved 10/27/14       HPI:  Obesity/Excess Weight:  Severity: class II  Onset:  About 20 years ago    Modifiers: Diet and Exercise  Contributing factors: Poor Food Choices and Stress/Emotional Eating, menopausea  Associated symptoms: comorbid conditions, fatigue, increased joint pain and increased shortness of breath    Goals:175  Hydration:about 1 galloon day of water and ice tea total(homemade adds sugar)  Alcohol:  None current  Exercise:sedentary  Occupation:retired    Colonoscopy-Completed    The following portions of the patient's history were reviewed and updated as appropriate:   She  has a past medical history of Anxiety, Arthritis, Basal cell carcinoma ([address]  [facility]), Bipolar disorder ([facility] [address] ), Cervical spinal stenosis, Chronic constipation, Chronic fatigue syndrome, Chronic hyperglycemia, Chronic pain syndrome, Depression, Essential hypertriglyceridemia, Fibromyalgia, GERD (gastroesophageal reflux disease), Herpes simplex infection, Hypokalemia, Insomnia, Median neuropathy, Pneumonia, and Sacroiliitis ([facility] [address] )  She   Patient Active Problem List    Diagnosis Date Noted    BMI 36 0-36 9,adult 03/25/2022    Thoracic disc herniation     PTSD (post-traumatic stress disorder) 10/14/2021    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 ([facility] [address] ) 10/14/2021    Lumbar radiculopathy 09/13/2021    Herniated nucleus pulposus, L4-5 09/13/2021    Lumbar foraminal stenosis 09/13/2021    Other spondylosis with radiculopathy, lumbar region 08/18/2021    Chronic neck pain 04/28/2021    Chronic bilateral low back pain without sciatica 04/28/2021    Chronic joint pain 04/28/2021    Chronic bilateral thoracic back pain 04/28/2021    Myofascial pain syndrome 04/28/2021    Chest pain syndrome 12/30/2020    Other seborrheic keratosis 02/13/2020    Acute vasomotor rhinitis 03/15/2018    Basal cell carcinoma of skin 12/01/2014    Cervical radiculopathy 05/09/2014    DDD (degenerative disc disease), cervical 10/22/2013    Disc degeneration, lumbar 10/22/2013    Allergic rhinitis 09/04/2012    Fibromyalgia 08/28/2012     She  has a past surgical history that includes Cervical fusion; Colonoscopy (10/23/2013); Dilation and curettage of uterus; Hallux valgus correction; Hemorroidectomy; Neuroplasty / transposition median nerve at carpal tunnel; Tooth extraction; Tonsillectomy; Tubal ligation; and Reduction mammaplasty (Bilateral, 1991)    Her family history includes Arthritis in her family and mother; Cancer (age of onset: 78) in her father; Coronary artery disease in her father and paternal grandfather; Diabetes in her family and paternal grandmother; Heart attack in her family and father; Heart disease in her family; Hyperlipidemia in her father; Hypertension in her family and mother; Melanoma (age of onset: 48) in her father; No Known Problems in her brother, daughter, maternal grandfather, and maternal grandmother; Osteoporosis in her family and mother; Sudden death in her mother  She  reports that she has never smoked  She has never used smokeless tobacco  She reports previous alcohol use  She reports that she does not use drugs  Current Outpatient Medications   Medication Sig Dispense Refill    ARIPiprazole (ABILIFY) 15 mg tablet Take 1 tablet (15 mg total) by mouth daily at bedtime 30 tablet 2    ascorbic acid (VITAMIN C) 500 mg tablet Take 500 mg by mouth daily      bisacodyl (DULCOLAX) 5 mg EC tablet Take 1 tablet by mouth daily as needed      Calcium 250 MG CAPS Take 500 mg by mouth        Cholecalciferol (CVS VITAMIN D) 2000 units CAPS Take by mouth      DULoxetine (CYMBALTA) 60 mg delayed release capsule Take 1 capsule (60 mg total) by mouth daily at bedtime 30 capsule 2    fluticasone (FLONASE) 50 mcg/act nasal spray 1 spray into each nostril daily 9 9 mL 2    loratadine (CLARITIN) 10 mg tablet Take 10 mg by mouth      Turmeric Curcumin 500 MG CAPS Take by mouth       No current facility-administered medications for this visit       Current Outpatient Medications on File Prior to Visit   Medication Sig    ARIPiprazole (ABILIFY) 15 mg tablet Take 1 tablet (15 mg total) by mouth daily at bedtime    ascorbic acid (VITAMIN C) 500 mg tablet Take 500 mg by mouth daily    bisacodyl (DULCOLAX) 5 mg EC tablet Take 1 tablet by mouth daily as needed    Calcium 250 MG CAPS Take 500 mg by mouth      Cholecalciferol (CVS VITAMIN D) 2000 units CAPS Take by mouth    DULoxetine (CYMBALTA) 60 mg delayed release capsule Take 1 capsule (60 mg total) by mouth daily at bedtime    fluticasone (FLONASE) 50 mcg/act nasal spray 1 spray into each nostril daily    loratadine (CLARITIN) 10 mg tablet Take 10 mg by mouth  Turmeric Curcumin 500 MG CAPS Take by mouth     No current facility-administered medications on file prior to visit  She is allergic to other, cephalexin, latex, molds & smuts, risperidone, sertraline, and soy isoflavones       Review of Systems   Constitutional: Negative for chills and fever  HENT: Negative for sore throat  Respiratory: Positive for shortness of breath (with exertion)  Cardiovascular: Positive for chest pain (has been cleared by PCP/cardiology)  Negative for palpitations  Gastrointestinal: Positive for constipation (taking laxative for)  Negative for abdominal pain, diarrhea, nausea and vomiting  +GERD-diet controlled   Genitourinary: Negative for difficulty urinating  Musculoskeletal: Positive for arthralgias, back pain and neck pain  Skin: Negative for rash  Neurological: Positive for numbness (has MRI scheduled for neck pain and arm numbnes)  Negative for headaches  Psychiatric/Behavioral: Positive for sleep disturbance  Negative for dysphoric mood  The patient is not nervous/anxious  Objective:    /84   Pulse 99   Temp 97 6 °F (36 4 °C) (Tympanic)   Resp 16   Ht 5' 5 5" (1 664 m)   Wt 101 kg (222 lb 12 8 oz)   BMI 36 51 kg/m²     Physical Exam  Vitals and nursing note reviewed  Constitutional:       General: She is not in acute distress  Appearance: She is well-developed  She is obese  HENT:      Head: Normocephalic and atraumatic  Eyes:      Conjunctiva/sclera: Conjunctivae normal    Neck:      Thyroid: No thyromegaly  Pulmonary:      Effort: Pulmonary effort is normal  No respiratory distress  Skin:     Findings: No rash (visible)  Neurological:      Mental Status: She is alert and oriented to person, place, and time     Psychiatric:         Mood and Affect: Mood normal          Behavior: Behavior normal

## 2022-03-31 ENCOUNTER — HOSPITAL ENCOUNTER (OUTPATIENT)
Dept: MRI IMAGING | Facility: HOSPITAL | Age: 67
Discharge: HOME | End: 2022-03-31
Payer: MEDICARE

## 2022-03-31 DIAGNOSIS — M54.12 CERVICAL RADICULOPATHY: ICD-10-CM

## 2022-03-31 DIAGNOSIS — M54.2 CHRONIC NECK PAIN: ICD-10-CM

## 2022-03-31 DIAGNOSIS — M96.1 CERVICAL POST-LAMINECTOMY SYNDROME: ICD-10-CM

## 2022-03-31 DIAGNOSIS — G89.29 CHRONIC NECK PAIN: ICD-10-CM

## 2022-03-31 PROCEDURE — A9585 GADOBUTROL INJECTION: HCPCS | Performed by: NURSE PRACTITIONER

## 2022-03-31 PROCEDURE — 72156 MRI NECK SPINE W/O & W/DYE: CPT

## 2022-03-31 PROCEDURE — G1004 CDSM NDSC: HCPCS

## 2022-03-31 RX ADMIN — GADOBUTROL 10 ML: 604.72 INJECTION INTRAVENOUS at 19:13

## 2022-04-01 ENCOUNTER — OFFICE VISIT (OUTPATIENT)
Dept: PAIN MEDICINE | Facility: CLINIC | Age: 67
End: 2022-04-01
Payer: MEDICARE

## 2022-04-01 ENCOUNTER — TELEPHONE (OUTPATIENT)
Dept: PAIN MEDICINE | Facility: CLINIC | Age: 67
End: 2022-04-01

## 2022-04-01 VITALS
HEART RATE: 82 BPM | WEIGHT: 224 LBS | SYSTOLIC BLOOD PRESSURE: 124 MMHG | HEIGHT: 66 IN | TEMPERATURE: 97.6 F | DIASTOLIC BLOOD PRESSURE: 80 MMHG | BODY MASS INDEX: 36 KG/M2

## 2022-04-01 DIAGNOSIS — M79.7 FIBROMYALGIA: ICD-10-CM

## 2022-04-01 DIAGNOSIS — M54.12 CERVICAL RADICULOPATHY: Primary | ICD-10-CM

## 2022-04-01 DIAGNOSIS — G89.4 CHRONIC PAIN SYNDROME: Primary | ICD-10-CM

## 2022-04-01 DIAGNOSIS — G89.29 CHRONIC NECK PAIN: ICD-10-CM

## 2022-04-01 DIAGNOSIS — M54.2 CHRONIC NECK PAIN: ICD-10-CM

## 2022-04-01 DIAGNOSIS — M96.1 CERVICAL POST-LAMINECTOMY SYNDROME: ICD-10-CM

## 2022-04-01 DIAGNOSIS — M48.02 CERVICAL SPINAL STENOSIS: ICD-10-CM

## 2022-04-01 DIAGNOSIS — M51.24 THORACIC DISC HERNIATION: ICD-10-CM

## 2022-04-01 DIAGNOSIS — Z98.1 S/P CERVICAL SPINAL FUSION: ICD-10-CM

## 2022-04-01 DIAGNOSIS — M54.6 CHRONIC BILATERAL THORACIC BACK PAIN: ICD-10-CM

## 2022-04-01 DIAGNOSIS — M50.30 DDD (DEGENERATIVE DISC DISEASE), CERVICAL: ICD-10-CM

## 2022-04-01 DIAGNOSIS — M79.18 MYOFASCIAL PAIN SYNDROME: ICD-10-CM

## 2022-04-01 DIAGNOSIS — G89.29 CHRONIC BILATERAL THORACIC BACK PAIN: ICD-10-CM

## 2022-04-01 PROCEDURE — 99213 OFFICE O/P EST LOW 20 MIN: CPT | Performed by: NURSE PRACTITIONER

## 2022-04-01 NOTE — TELEPHONE ENCOUNTER
Can you please call the patient and let her know that her cervical spine MRI showed moderate but stable degenerative changes/osteoarthritis with her previous C5-C6 anterior fusion site remaining stable, however, the C4-C5 level which is the level above her surgery site has definitely progressed and there is now moderate to severe central stenosis and left greater than right severe foraminal stenosis at that level as well  I actually feel that at this point time it would be more beneficial proceed with a surgical consultation then try an injection  If she would like at can make a recommendation for her to see Dr Gerhard Wood for a surgical opinion?

## 2022-04-01 NOTE — TELEPHONE ENCOUNTER
S/w pt, advised of above  Pt verbalized agreement, provided Dr Tavo Lowe contact info  Advised pt that she will need to have the mri burned on to a disc for Dr Denise Katz to review  Pt verbalized understanding and appreciation  Will proceed as directed

## 2022-04-01 NOTE — PROGRESS NOTES
Assessment:  1  Chronic pain syndrome    2  Chronic neck pain    3  Chronic bilateral thoracic back pain    4  DDD (degenerative disc disease), cervical    5  Myofascial pain syndrome    6  Fibromyalgia    7  Thoracic disc herniation    8  Cervical post-laminectomy syndrome        Plan:  While the patient was in the office today, I did have a thorough conversation with the patient regarding their chronic pain syndrome, symptoms, medication regimen, and treatment plan  I discussed with the patient at this point time once we have the results of her cervical spine MRI, our office will give her a call to review results and discuss the next step in her treatment plan which may include an epidural steroid injection with Dr Anitra caraballo a surgical consultation  The patient was agreeable and verbalized an understanding  With regards to the thoracic spine pain, since the thoracic injection was not helpful and she did have a lumbar injection not that long ago, for now, we will hold off any repeat injections because of the overall steroid load and because I am not fully convinced that her upper thoracic pain is fully coming all from her thoracic spine and could be coming from her cervical spine and thus the reason a I want to wait for the results of the cervical spine MRI  The patient was agreeable and verbalized an understanding  I did encourage the patient to follow up with the weight management consultation as I agree could be in her best interest for her overall health and her chronic pain  The patient was agreeable and verbalized an understanding  I discussed with the patient that at this point time she can followup with our office on an as-needed basis  I did review the patient that if her pain symptoms should change, worsen, and/or if she would experience any new symptoms as she would like to be evaluated for, she should give our office a call  The patient was agreeable and verbalized an understanding  History of Present Illness: The patient is a 78 y o  female last seen on 2/23/2022 who presents for a follow up office visit in regards to chronic pain syndrome, as the patient's pain has been ongoing for greater than a year, secondary to diffuse myofascial pain, with a thoracic disc herniation as well as cervical degenerative discs  The patient currently reports that since her last office visit as she is status post a right-sided T7-T8 thoracic epidural steroid injection on February 23, 2022 with Dr Morel Section that unfortunately she does not feel any difference in her thoracic pain and also continues with the worsening cervical pain and upper extremity radicular symptoms  The patient reports that she did have her cervical spine MRI completed yesterday and unfortunately the report is not available for review  Patient also reports that since her last office visit she has discussed with her primary care provider who has referred her to the weight management center as she is hopeful that weight loss will be helpful for overall health as well as her chronic pain  The patient presents today for regular postprocedure follow-up visit  I have personally reviewed and/or updated the patient's past medical history, past surgical history, family history, social history, current medications, allergies, and vital signs today  Review of Systems:    Review of Systems   Respiratory: Positive for shortness of breath  Cardiovascular: Positive for chest pain  Gastrointestinal: Positive for constipation  Negative for diarrhea, nausea and vomiting  Musculoskeletal: Negative for arthralgias, gait problem, joint swelling (joint stiffness) and myalgias  Skin: Negative for rash  Neurological: Positive for dizziness and weakness  Negative for seizures  All other systems reviewed and are negative          Past Medical History:   Diagnosis Date    Anxiety     Arthritis     Basal cell carcinoma ([address]  [employer] [place])     Bipolar disorder ([facility] [address] )     Cervical spinal stenosis     last assessed 5/13/14, resolved 10/10/16    Chronic constipation     last assessed 8/28/12, resovled 9/14/15    Chronic fatigue syndrome     last assessed 8/28/12, resolved 9/14/15    Chronic hyperglycemia     resolved 9/14/15    Chronic pain syndrome     last assessed 11/12/13, resolved 10/10/16    Depression     Essential hypertriglyceridemia     resolved 10/10/16    Fibromyalgia     GERD (gastroesophageal reflux disease)     Herpes simplex infection     last assessed 12/12/13, resolved 10/10/16    Hypokalemia     last assessed 9/14/15, resolved 11/23/15    Insomnia     last assessed 3/30/15, resolved 11/23/15    Median neuropathy     last assessed 7/14/15, resolved 10/10/16    Pneumonia     last assessed 7/16/13, resolved 5/10/13    Sacroiliitis ([facility] [address] )     last assessed 10/22/13, resolved 10/27/14       Past Surgical History:   Procedure Laterality Date    CERVICAL FUSION      COLONOSCOPY  10/23/2013    10yrs     DILATION AND CURETTAGE OF UTERUS      HALLUX VALGUS CORRECTION      HEMORROIDECTOMY      NEUROPLASTY / TRANSPOSITION MEDIAN NERVE AT CARPAL TUNNEL      REDUCTION MAMMAPLASTY Bilateral 1991    TONSILLECTOMY      TOOTH EXTRACTION      TUBAL LIGATION         Family History   Problem Relation Age of Onset    Hypertension Mother     Osteoporosis Mother     Arthritis Mother     Sudden death Mother         brain aneurysm    Heart attack Father     Melanoma Father 48    Coronary artery disease Father     Hyperlipidemia Father     Cancer Father 78        brain cancer metasized from skin    Diabetes Paternal Grandmother     Coronary artery disease Paternal Grandfather     Heart attack Family     Arthritis Family     Diabetes Family     Hypertension Family     Heart disease Family     Osteoporosis Family     No Known Problems Daughter     No Known Problems Maternal Grandmother     No Known Problems Maternal Grandfather     No Known Problems Brother        Social History     Occupational History    Not on file   Tobacco Use    Smoking status: Never Smoker    Smokeless tobacco: Never Used   Vaping Use    Vaping Use: Never used   Substance and Sexual Activity    Alcohol use: Not Currently    Drug use: No    Sexual activity: Not on file         Current Outpatient Medications:     ARIPiprazole (ABILIFY) 15 mg tablet, Take 1 tablet (15 mg total) by mouth daily at bedtime, Disp: 30 tablet, Rfl: 2    ascorbic acid (VITAMIN C) 500 mg tablet, Take 500 mg by mouth daily, Disp: , Rfl:     bisacodyl (DULCOLAX) 5 mg EC tablet, Take 1 tablet by mouth daily as needed, Disp: , Rfl:     Calcium 250 MG CAPS, Take 500 mg by mouth  , Disp: , Rfl:     Cholecalciferol (CVS VITAMIN D) 2000 units CAPS, Take by mouth, Disp: , Rfl:     DULoxetine (CYMBALTA) 60 mg delayed release capsule, Take 1 capsule (60 mg total) by mouth daily at bedtime, Disp: 30 capsule, Rfl: 2    fluticasone (FLONASE) 50 mcg/act nasal spray, 1 spray into each nostril daily, Disp: 9 9 mL, Rfl: 2    loratadine (CLARITIN) 10 mg tablet, Take 10 mg by mouth, Disp: , Rfl:     Turmeric Curcumin 500 MG CAPS, Take by mouth, Disp: , Rfl:   No current facility-administered medications for this visit      Allergies   Allergen Reactions    Other Shortness Of Breath, Allergic Rhinitis and Cough     Cigarette and bleach  [place]     Cephalexin Rash    Latex Rash     Reaction Date: 21Mar2012;     Molds & Smuts Allergic Rhinitis    Risperidone Palpitations     Reaction Date: 21Mar2012;     Sertraline Itching     Reaction Date: 21Mar2012;     Soy Isoflavones        Physical Exam:    /80 (BP Location: Left arm, Patient Position: Sitting, Cuff Size: Standard)   Pulse 82   Temp 97 6 °F (36 4 °C)   Ht 5' 5 5" (1 664 m)   Wt 102 kg (224 lb)   BMI 36 71 kg/m²     Constitutional:normal, well developed, well nourished, alert, in no distress and non-toxic and no overt pain behavior  and overweight  Eyes:anicteric  HEENT:grossly intact  Neck:supple, symmetric, trachea midline and no masses   Pulmonary:even and unlabored  Cardiovascular:No edema or pitting edema present  Skin:Normal without rashes or lesions and well hydrated  Psychiatric:Mood and affect appropriate  Neurologic:Cranial Nerves II-XII grossly intact  Musculoskeletal:normal      Imaging  No orders to display         No orders of the defined types were placed in this encounter

## 2022-04-06 NOTE — PROGRESS NOTES
Weight Management Medical Nutrition Assessment  Nisreen Wilde is here for initial RD session for Healthy Core  Seen by PA 2 1/2 wks ago  Current wt: 219 6 lbs  Of note she is on abilify for bipolar  Questioning if she needs this as she doesn't feel it helps  Discussed that she should speak to psych regarding this  Reports she does not eat vegetables, thinks it is a mental issue from when she was younger  Also reports that she has trouble not having seconds but since her consult has been doing better with this  Reports eating quickly, not sitting to eat, etc  Strategies for managing this reviewed  She is on food stamps but denies food insecurity  Very willing to incorporate all suggestions provided  Meal plan as well as other resources provided  She has already started food logging with goal of 1400 calories  Discussed we can reduce this slightly if she desires       Dislikes fish, gluten, avoid lactose    Patient seen by Medical Provider in past 6 months:  yes  Requested to schedule appointment with Medical Provider: Yes    Anthropometric Measurements  Start Weight (#):  219 6 lbs 4/13/2022  Current Weight (#): -  TBW % Change from start weight: -  Ideal Body Weight (#):151 9 lbs BMI 25  (65 5")  Goal Weight (#): 175 lbs  Highest: 230 lbs   Lowest: 125 lbs in 1977    Weight Loss History  Previous weight loss attempts: Exercise  Self Created Diets (Portion Control, Healthy Food Choices, etc )    Food and Nutrition Related History  Wake up:  5:00  Bed Time: 8:30    Food Recall  Breakfast: 5:30: egg, smith, 1/2 gluten free bagel with cream cheese     Snack: 9:00: SF jello  Lunch: 11:00: ?1 cup rice, 1/2 beef/broccoli pf waters frozen meal OR string cheese, gluten free crackers OR elbow macaroni salad  Snack: skip   Dinner: 4:00: 3-6 oz chicken/beef, 1/2-1 cup carb, no veggies OR casserole  Snack: oatmeal     Beverages: water and sugar free beverages  Volume of beverage intake: less water more unsweetened tea    Weekends: Same  Cravings:  Ice cream,   Trouble area of day: 3:00 p m  Frequency of Eating out: irregularly  Food restrictions: avoids gluten and lactose, soy    Cooking: self   Food Shopping: self    Physical Activity Intake  Activity:walking dog, walking tapes   Frequency:intermittently  Physical limitations/barriers to exercise: neck/back pain    Estimated Needs  Energy  SECA: BMR: 1563      X 1 3 -1000 =1032  [address] Energy Needs: BMR : 4815   1-2# loss weekly sedentary:  848-1348             1-2# loss weekly lightly active: 3281-4650  Maintenance calories for sedentary activity level:  1848  Protein: 70-87 gm      (1 2-1 5g/kg IBW)  Fluid: 68 oz      (35mL/kg IBW)    Nutrition Diagnosis  Yes; Overweight/obesity  related to Excess energy intake as evidenced by  BMI more than normative standard for age and sex (obesity-grade II 35-39  9)       Nutrition Intervention    Nutrition Prescription  Calories: 1064-6868  Protein:  gm    Meal Plan (Jordy/Pro/Carb)  Breakfast: 250-300, 15-20  Snack: 100-150, 5-10  Lunch: 300-350, 15-25  Snack: 100-150, 5-10  Dinner: 300-350, 20-30  Snack: 100-150, 5-10    Nutrition Education:    Healthy Core Manual  Calorie controlled menu  Lean protein food choices  Healthy snack options  Food journaling tips      Nutrition Counseling:  Strategies: meal planning, portion sizes, healthy snack choices, hydration, fiber intake, protein intake, exercise, food journal      Monitoring and Evaluation:  Evaluation criteria:  Energy Intake  Meet protein needs  Maintain adequate hydration  Monitor weekly weight  Meal planning/preparation  Food journal   Decreased portions at mealtimes and snacks  Physical activity     Barriers to learning:none  Readiness to change: Action:  (Changing behavior)  Comprehension: good  Expected Compliance: good

## 2022-04-13 ENCOUNTER — OFFICE VISIT (OUTPATIENT)
Dept: BARIATRICS | Facility: CLINIC | Age: 67
End: 2022-04-13

## 2022-04-13 VITALS — HEIGHT: 66 IN | WEIGHT: 219.58 LBS | BODY MASS INDEX: 35.29 KG/M2

## 2022-04-13 DIAGNOSIS — R63.5 ABNORMAL WEIGHT GAIN: ICD-10-CM

## 2022-04-13 PROCEDURE — WMPRO12

## 2022-04-13 PROCEDURE — RECHECK

## 2022-04-14 ENCOUNTER — RA CDI HCC (OUTPATIENT)
Dept: OTHER | Facility: HOSPITAL | Age: 67
End: 2022-04-14

## 2022-04-14 NOTE — PROGRESS NOTES
[id number]  [facility]  coding opportunities          Chart Reviewed number of suggestions sent to Provider: 1     Patients Insurance     Medicare Insurance: [employer]

## 2022-04-21 ENCOUNTER — CLINICAL SUPPORT (OUTPATIENT)
Dept: BARIATRICS | Facility: CLINIC | Age: 67
End: 2022-04-21

## 2022-04-21 VITALS — WEIGHT: 218.6 LBS | BODY MASS INDEX: 35.13 KG/M2 | HEIGHT: 66 IN

## 2022-04-21 DIAGNOSIS — R63.5 ABNORMAL WEIGHT GAIN: Primary | ICD-10-CM

## 2022-04-21 PROCEDURE — RECHECK

## 2022-04-28 ENCOUNTER — CLINICAL SUPPORT (OUTPATIENT)
Dept: BARIATRICS | Facility: CLINIC | Age: 67
End: 2022-04-28

## 2022-04-28 VITALS — WEIGHT: 218.8 LBS | HEIGHT: 66 IN | BODY MASS INDEX: 35.17 KG/M2

## 2022-04-28 DIAGNOSIS — R63.5 ABNORMAL WEIGHT GAIN: Primary | ICD-10-CM

## 2022-04-28 PROCEDURE — RECHECK

## 2022-05-03 ENCOUNTER — OFFICE VISIT (OUTPATIENT)
Dept: FAMILY MEDICINE CLINIC | Facility: CLINIC | Age: 67
End: 2022-05-03
Payer: MEDICARE

## 2022-05-03 VITALS
HEART RATE: 82 BPM | HEIGHT: 66 IN | RESPIRATION RATE: 16 BRPM | WEIGHT: 215.2 LBS | BODY MASS INDEX: 34.58 KG/M2 | DIASTOLIC BLOOD PRESSURE: 80 MMHG | SYSTOLIC BLOOD PRESSURE: 126 MMHG

## 2022-05-03 DIAGNOSIS — Z01.818 PREOP EXAMINATION: Primary | ICD-10-CM

## 2022-05-03 PROCEDURE — 99214 OFFICE O/P EST MOD 30 MIN: CPT | Performed by: NURSE PRACTITIONER

## 2022-05-03 NOTE — PROGRESS NOTES
[facility] PRE-OPERATIVE EVALUATION  [facility] PHYSICIAN GROUP - [facility] PRACTICE    NAME: Aliya Pleitez  AGE: 75 y o  SEX: female  : 1955     DATE: 5/3/2022        Family [facility] Pre-Operative Evaluation      Chief Complaint: Pre-operative Evaluation     Surgery: C4-5 ACDG with removal of plate H8-0 surgery  Anticipated Date of Surgery: 2022  Referring Provider: Dr Kimberli Boudreaux     History of Present Illness:     Planned anesthesia is general   Patient has a bleeding risk of: no recent abnormal bleeding  Current anti-platelet/anti-coagulation medications that the patient is prescribed includes: None        Assessment of Chronic Conditions:   - Bipolar Disorder: Managed with Abilify and Cymbalta   -Fibromyalgia: Managed with Cymbalta  -Chronic pain syndrome: Established patient with Dr Claudia Rivera of Cardiac Risk:  · Denies unstable or severe angina or MI in the last 6 weeks or history of stent placement in the last year   · Denies decompensated heart failure (e g  New onset heart failure, NYHA functional class IV heart failure, or worsening existing heart failure)  · Denies significant arrhythmias such as high grade AV block, symptomatic ventricular arrhythmia, newly recognized ventricular tachycardia, supraventricular tachycardia with resting heart rate >100, or symptomatic bradycardia  · Denies severe heart valve disease including aortic stenosis or symptomatic mitral stenosis     Exercise Capacity/shortness of breath symptoms/chest pain symptoms:   (this is only relevant for patients NOT having lower extremity joint replacement, therefore cannot walk the distance)  · Able to walk 4 blocks without symptoms?: Yes  · Able to walk 2 flights without symptoms?: Yes    Prior Anesthesia Reactions: No     Personal history of venous thromboembolic disease? No       Review of Systems:       Constitutional  No fever chills no fatigue no weight loss no weight gain    Mental status  No anxiety or depression and no sleep disturbances no changes in personality or emotional problems no suicidal or homicidal ideations    Eyes  No eye pain no red eyes no visual disturbances no discharge no eye itch    ENT  No earache no hearing loss nasal discharge no sore throat no hoarseness no postnasal drip     Cardio  No chest pain no palpitations no leg edema no claudication no dyspnea on exertion no nocturnal dyspnea    Respiratory  No shortness of breath or wheeze no cough no orthopnea no dyspnea on exertion no hemoptysis no sputum production    GI  No abdominal pain no nausea no vomiting no diarrhea or constipation no bloody stools no change in bowel habits no change in weight      no dysuria hematuria no pyuria no incontinence no pelvic pain    Musculoskeletal  No myalgias arthralgias no joint swelling or stiffness no limb pain or swelling    Skin  No rashes or lesions no itchiness and no wounds    Neuro  No headache dizziness lightheadedness syncope numbness paresthesias or confusion    Heme  No swollen glands no easy bruising            Current Problem List:           Allergies:      Allergies   Allergen Reactions    Other Shortness Of Breath, Allergic Rhinitis and Cough     Cigarette and bleach  [place]     Cephalexin Rash    Latex Rash     Reaction Date: 21Mar2012;     Molds & Smuts Allergic Rhinitis    Risperidone Palpitations     Reaction Date: 21Mar2012;     Sertraline Itching     Reaction Date: 21Mar2012;     Soy Isoflavones        Current Medications:       Current Outpatient Medications:     ARIPiprazole (ABILIFY) 15 mg tablet, Take 1 tablet (15 mg total) by mouth daily at bedtime, Disp: 30 tablet, Rfl: 2    ascorbic acid (VITAMIN C) 500 mg tablet, Take 500 mg by mouth daily, Disp: , Rfl:     bisacodyl (DULCOLAX) 5 mg EC tablet, Take 1 tablet by mouth daily as needed, Disp: , Rfl:     Calcium 250 MG CAPS, Take 500 mg by mouth  , Disp: , Rfl:     Cholecalciferol (CVS VITAMIN D) 2000 units CAPS, Take by mouth, Disp: , Rfl:     DULoxetine (CYMBALTA) 60 mg delayed release capsule, Take 1 capsule (60 mg total) by mouth daily at bedtime, Disp: 30 capsule, Rfl: 2    fluticasone (FLONASE) 50 mcg/act nasal spray, 1 spray into each nostril daily, Disp: 9 9 mL, Rfl: 2    loratadine (CLARITIN) 10 mg tablet, Take 10 mg by mouth, Disp: , Rfl:     Turmeric Curcumin 500 MG CAPS, Take by mouth, Disp: , Rfl:     Past Medical History:       Past Medical History:   Diagnosis Date    Anxiety     Arthritis     Basal cell carcinoma (BCC)     Bipolar disorder ([facility] [address] )     Cervical spinal stenosis     last assessed 5/13/14, resolved 10/10/16    Chronic constipation     last assessed 8/28/12, resovled 9/14/15    Chronic fatigue syndrome     last assessed 8/28/12, resolved 9/14/15    Chronic hyperglycemia     resolved 9/14/15    Chronic pain syndrome     last assessed 11/12/13, resolved 10/10/16    Depression     Essential hypertriglyceridemia     resolved 10/10/16    Fibromyalgia     GERD (gastroesophageal reflux disease)     Herpes simplex infection     last assessed 12/12/13, resolved 10/10/16    Hypokalemia     last assessed 9/14/15, resolved 11/23/15    Insomnia     last assessed 3/30/15, resolved 11/23/15    Median neuropathy     last assessed 7/14/15, resolved 10/10/16    Pneumonia     last assessed 7/16/13, resolved 5/10/13    Sacroiliitis ([facility] [address] )     last assessed 10/22/13, resolved 10/27/14        Past Surgical History:   Procedure Laterality Date    CERVICAL FUSION      COLONOSCOPY  10/23/2013    10yrs     DILATION AND CURETTAGE OF UTERUS      HALLUX VALGUS CORRECTION      HEMORROIDECTOMY      NEUROPLASTY / TRANSPOSITION MEDIAN NERVE AT CARPAL TUNNEL      REDUCTION MAMMAPLASTY Bilateral 1991    TONSILLECTOMY      TOOTH EXTRACTION      TUBAL LIGATION          Family History   Problem Relation Age of Onset    Hypertension Mother     Osteoporosis Mother     Arthritis Mother    [de-identified] Sudden death Mother         brain aneurysm    Heart attack Father     Melanoma Father 48    Coronary artery disease Father    [de-identified] Hyperlipidemia Father    [de-identified] Cancer Father 78        brain cancer metasized from skin    Diabetes Paternal Grandmother     Coronary artery disease Paternal Grandfather     Heart attack Family     Arthritis Family     Diabetes Family     Hypertension Family     Heart disease Family     Osteoporosis Family     No Known Problems Daughter     No Known Problems Maternal Grandmother     No Known Problems Maternal Grandfather     No Known Problems Brother         Social History     Socioeconomic History    Marital status:      Spouse name: Not on file    Number of children: Not on file    Years of education: Not on file    Highest education level: Not on file   Occupational History    Not on file   Tobacco Use    Smoking status: Never Smoker    Smokeless tobacco: Never Used   Vaping Use    Vaping Use: Never used   Substance and Sexual Activity    Alcohol use: Not Currently    Drug use: No    Sexual activity: Not on file   Other Topics Concern    Not on file   Social History Narrative    Always uses seat belt    Daily caffeine consumption, 1 serv/day    Has smoke detectors    Lives independently      Social Determinants of Health     Financial Resource Strain: Not on file   Food Insecurity: Not on file   Transportation Needs: Not on file   Physical Activity: Not on file   Stress: Not on file   Social Connections: Not on file   Intimate Partner Violence: Not on file   Housing Stability: Not on file        Physical Exam:     /80   Pulse 82   Resp 16   Ht 5' 5 75" (1 67 m)   Wt 97 6 kg (215 lb 3 2 oz)   Breastfeeding No   BMI 35 00 kg/m²     Constitutional  Appears healthy, Looks well, Appearance consistent with age    Mental Status  Alert, Oriented, Cooperative, Memory function normal , clean, and reasonable    Neck  No neck mass, No thyromegaly, Good carotid upstrokes bilaterally, trachea midline positive click    Respiratory  Breath sounds normal, No rales, No rhonchi, No wheezing, normal palpation    Cardiac   Regular rhythm without ectopy or murmur no S3-S4, no heave lift or thrill to palpation    Vascular  No leg edema, No pedal edema    Muscular skeletal  No clubbing cyanosis , muscle tone normal    Skin  No appreciable rashes or abnormal appearing lesions        Data:     Pre-operative work-up    Laboratory Results: I have personally reviewed the pertinent laboratory results/reports      EKG: I have personally reviewed pertinent reports  Assessment & Recommendations:     1  Preop examination             Pre-Op Evaluation Plan  1  Further preoperative workup as follows:   - None; no further preoperative work-up is required    2  Medication Management/Recommendations:   - None, continue medication regimen including morning of surgery, with sip of water    3  Patient requires further consultation with: None    Clearance  Patient is CLEARED for surgery without any additional cardiac testing       Mic King, [address] [facility]  [address] [address] [address] \[facility] [place] [facility]\"" [address]  Phone#  [phone]  Fax#  [phone]

## 2022-05-04 ENCOUNTER — OFFICE VISIT (OUTPATIENT)
Dept: BARIATRICS | Facility: CLINIC | Age: 67
End: 2022-05-04

## 2022-05-04 VITALS — BODY MASS INDEX: 34.44 KG/M2 | WEIGHT: 214.3 LBS | HEIGHT: 66 IN

## 2022-05-04 DIAGNOSIS — R63.5 ABNORMAL WEIGHT GAIN: Primary | ICD-10-CM

## 2022-05-04 PROCEDURE — RECHECK

## 2022-05-04 NOTE — PROGRESS NOTES
Weight Management Medical Nutrition Assessment  Eriberto Tamez is here for f/u RD session for Healthy Core  Current wt: 214 3  Lbs  She has lost 5 3 lbs x 3 wks  She reports she has been planning her meals  Recommend increase protein at breakfast  Snack options reviewed  She also has increased vegetables in her diet  Assisted her with using myfitness pal      Dislikes fish, gluten, avoid lactose    Patient seen by Medical Provider in past 6 months:  yes  Requested to schedule appointment with Medical Provider: No    Anthropometric Measurements  Start Weight (#):  219 6 lbs 4/13/2022  Current Weight (#): 214 3  TBW % Change from start weight:  2 4%  Ideal Body Weight (#):151 9 lbs BMI 25  (65 5")  Goal Weight (#): 175 lbs  Highest: 230 lbs   Lowest: 125 lbs in 1977    Weight Loss History  Previous weight loss attempts: Exercise  Self Created Diets (Portion Control, Healthy Food Choices, etc )    Food and Nutrition Related History  Wake up:  5:00  Bed Time: 8:30    Food Recall  Breakfast: 5:30: 1 serving egg beaters, smith, 1/2 banana     Snack: 9:00:  Skip or hard boiled egg   Lunch: 11:00: 3 swedish meatballs, small dinner roll, bag of chips OR Healthy Choice steamables OR chicken casear salad    Snack: skip   Dinner: 4:00: 3-6 oz chicken/beef, 1/2-1 cup carb, veggies OR tuna fish/hard boiled egg/onion/pickle/ospina on 1/2 bagel or gluten free bread    Snack: oatmeal     Beverages: water and sugar free beverages  Volume of beverage intake: less water more unsweetened tea    Weekends: Same  Cravings:  Ice cream,   Trouble area of day: 3:00 p m  Frequency of Eating out: irregularly  Food restrictions: avoids gluten and lactose, soy    Cooking: self   Food Shopping: self    Physical Activity Intake  Activity:walking dog, walking tapes   Frequency:intermittently  Physical limitations/barriers to exercise: neck/back pain    Estimated Needs  Energy  B[employer] Energy Needs:  BMR : 1876   1-2# loss weekly sedentary: [id number]             1-2# loss weekly lightly active: 1087-1643  Maintenance calories for sedentary activity level:  1813  Protein: 70-87 gm      (1 2-1 5g/kg IBW)  Fluid: 68 oz      (35mL/kg IBW)    Nutrition Diagnosis  Yes; Overweight/obesity  related to Excess energy intake as evidenced by  BMI more than normative standard for age and sex (obesity-grade II 35-39  9)       Nutrition Intervention    Nutrition Prescription  Calories: 8764-6328  Protein:  gm    Meal Plan (Jordy/Pro/Carb)  Breakfast: 250-300, 15-20  Snack: 100-150, 5-10  Lunch: 300-350, 15-25  Snack: 100-150, 5-10  Dinner: 300-350, 20-30  Snack: 100-150, 5-10    Nutrition Education:    Healthy Core Manual  Calorie controlled menu  Lean protein food choices  Healthy snack options  Food journaling tips      Nutrition Counseling:  Strategies: meal planning, portion sizes, healthy snack choices, hydration, fiber intake, protein intake, exercise, food journal      Monitoring and Evaluation:  Evaluation criteria:  Energy Intake  Meet protein needs  Maintain adequate hydration  Monitor weekly weight  Meal planning/preparation  Food journal   Decreased portions at mealtimes and snacks  Physical activity     Barriers to learning:none  Readiness to change: Action:  (Changing behavior)  Comprehension: good  Expected Compliance: good

## 2022-05-09 ENCOUNTER — OFFICE VISIT (OUTPATIENT)
Dept: BARIATRICS | Facility: CLINIC | Age: 67
End: 2022-05-09

## 2022-05-09 VITALS — BODY MASS INDEX: 35.07 KG/M2 | WEIGHT: 214 LBS

## 2022-05-09 DIAGNOSIS — R63.5 ABNORMAL WEIGHT GAIN: Primary | ICD-10-CM

## 2022-05-09 PROCEDURE — RECHECK

## 2022-05-09 NOTE — PROGRESS NOTES
Patient presents for [address] Evaluation as a part of her Healthy Core program, current weight 214lbs  Eating behaviors/food choices: Patient reports improved eating habits since joining [facility], focusing on portions and food choices  She worries about bored and stress eating, especially since she's going to be having back surgery in a week and will be somewhat immobile while she heals  Discussed possible triggers and contributing factors to emotional eating but patient feels there really aren't any since she doesn't have unhealthy foods in the home  She does seem to go for larger portions at times, not as mindful as she could be and is not tracking meals  She doesn't see benefit of tracking but when discussing types of foods and portions she may consider starting  Encouraged patient to keep area clear of unhealthy foods, continue mindful eating, taking her time and avoiding seconds  Activity/Exercise:  Patient takes her dog out for a walk but worries that she will be limited in doing so when she gets her back surgery  patient aligned with plan to ask a neighbor to help with walking dog so she isn't pulled by the dog but can still walk with them    Sleep/Rest:  Sleep can be poor but she is going for a sleep study to help  Mental Health/Wellness:  Patient reports a history of Bipolar Disorder but feels she has anxiety and depression  Current medication regiment is effective and doesn't see a need to change it  Patient feels she has a good understanding of habits and the sequence of breaking them, she also has effective non-food coping skills of reading and walking her dog to replace eating  Goals:    - be mindful of food choices, portions and consider tracking  - seek support from neighbors to walk dog and keep patient active  - be aware of mindset and its impact on eating behaviors      Next Appointment:  With PA on 8/1

## 2022-05-12 ENCOUNTER — CLINICAL SUPPORT (OUTPATIENT)
Dept: BARIATRICS | Facility: CLINIC | Age: 67
End: 2022-05-12

## 2022-05-12 VITALS — WEIGHT: 215 LBS | HEIGHT: 66 IN | BODY MASS INDEX: 34.55 KG/M2

## 2022-05-12 DIAGNOSIS — R63.5 ABNORMAL WEIGHT GAIN: Primary | ICD-10-CM

## 2022-05-12 PROCEDURE — RECHECK

## 2022-05-25 DIAGNOSIS — F43.10 PTSD (POST-TRAUMATIC STRESS DISORDER): ICD-10-CM

## 2022-05-25 DIAGNOSIS — F31.81 BIPOLAR II DISORDER (HCC): ICD-10-CM

## 2022-05-25 RX ORDER — DULOXETIN HYDROCHLORIDE 60 MG/1
CAPSULE, DELAYED RELEASE ORAL
Qty: 30 CAPSULE | Refills: 0 | Status: SHIPPED | OUTPATIENT
Start: 2022-05-25 | End: 2022-06-27 | Stop reason: SDUPTHER

## 2022-05-26 RX ORDER — ARIPIPRAZOLE 15 MG/1
TABLET ORAL
Qty: 30 TABLET | Refills: 0 | Status: SHIPPED | OUTPATIENT
Start: 2022-05-26 | End: 2022-06-27 | Stop reason: SDUPTHER

## 2022-06-02 ENCOUNTER — CLINICAL SUPPORT (OUTPATIENT)
Dept: BARIATRICS | Facility: CLINIC | Age: 67
End: 2022-06-02

## 2022-06-02 VITALS — HEIGHT: 66 IN | BODY MASS INDEX: 33.97 KG/M2 | WEIGHT: 211.4 LBS

## 2022-06-02 DIAGNOSIS — R63.5 ABNORMAL WEIGHT GAIN: Primary | ICD-10-CM

## 2022-06-02 PROCEDURE — RECHECK

## 2022-06-09 ENCOUNTER — CLINICAL SUPPORT (OUTPATIENT)
Dept: BARIATRICS | Facility: CLINIC | Age: 67
End: 2022-06-09

## 2022-06-09 VITALS — HEIGHT: 66 IN | WEIGHT: 210.4 LBS | BODY MASS INDEX: 33.82 KG/M2

## 2022-06-09 DIAGNOSIS — R63.5 ABNORMAL WEIGHT GAIN: Primary | ICD-10-CM

## 2022-06-09 PROCEDURE — RECHECK

## 2022-06-16 ENCOUNTER — CLINICAL SUPPORT (OUTPATIENT)
Dept: BARIATRICS | Facility: CLINIC | Age: 67
End: 2022-06-16

## 2022-06-16 VITALS — BODY MASS INDEX: 33.59 KG/M2 | HEIGHT: 66 IN | WEIGHT: 209 LBS

## 2022-06-16 DIAGNOSIS — R63.5 ABNORMAL WEIGHT GAIN: Primary | ICD-10-CM

## 2022-06-16 PROCEDURE — RECHECK

## 2022-06-23 ENCOUNTER — CLINICAL SUPPORT (OUTPATIENT)
Dept: BARIATRICS | Facility: CLINIC | Age: 67
End: 2022-06-23

## 2022-06-23 VITALS — BODY MASS INDEX: 33.33 KG/M2 | HEIGHT: 66 IN | WEIGHT: 207.4 LBS

## 2022-06-23 DIAGNOSIS — R63.5 ABNORMAL WEIGHT GAIN: Primary | ICD-10-CM

## 2022-06-23 PROCEDURE — RECHECK

## 2022-06-27 DIAGNOSIS — F31.81 BIPOLAR II DISORDER (HCC): ICD-10-CM

## 2022-06-27 DIAGNOSIS — F43.10 PTSD (POST-TRAUMATIC STRESS DISORDER): ICD-10-CM

## 2022-06-27 RX ORDER — DULOXETIN HYDROCHLORIDE 60 MG/1
60 CAPSULE, DELAYED RELEASE ORAL
Qty: 30 CAPSULE | Refills: 3 | Status: SHIPPED | OUTPATIENT
Start: 2022-06-27 | End: 2022-07-25 | Stop reason: SDUPTHER

## 2022-06-27 RX ORDER — ARIPIPRAZOLE 15 MG/1
15 TABLET ORAL
Qty: 30 TABLET | Refills: 0 | Status: SHIPPED | OUTPATIENT
Start: 2022-06-27 | End: 2022-07-25 | Stop reason: SDUPTHER

## 2022-06-27 NOTE — TELEPHONE ENCOUNTER
Pt called because she was also supposed to get a refill on the Cymbalta (only the ability was send today)

## 2022-07-04 NOTE — TELEPHONE ENCOUNTER
S/w pt, advised of above  Pt verbalized understanding and appreciation  Will proceed as directed and cb if questions / concerns arise  rapid HR

## 2022-07-07 ENCOUNTER — CLINICAL SUPPORT (OUTPATIENT)
Dept: BARIATRICS | Facility: CLINIC | Age: 67
End: 2022-07-07

## 2022-07-07 VITALS — WEIGHT: 209.2 LBS | BODY MASS INDEX: 33.62 KG/M2 | HEIGHT: 66 IN

## 2022-07-07 DIAGNOSIS — R63.5 ABNORMAL WEIGHT GAIN: Primary | ICD-10-CM

## 2022-07-07 PROCEDURE — RECHECK

## 2022-07-14 ENCOUNTER — CLINICAL SUPPORT (OUTPATIENT)
Dept: BARIATRICS | Facility: CLINIC | Age: 67
End: 2022-07-14

## 2022-07-14 VITALS — BODY MASS INDEX: 33.75 KG/M2 | WEIGHT: 210 LBS | HEIGHT: 66 IN

## 2022-07-14 DIAGNOSIS — R63.5 ABNORMAL WEIGHT GAIN: Primary | ICD-10-CM

## 2022-07-14 PROCEDURE — RECHECK

## 2022-07-18 NOTE — PROGRESS NOTES
Weight Management Medical Nutrition Assessment  Vishnu Rhodes is here for f/u RD session for Healthy Core  Current wt:  209 Lbs  She has lost  10 6 lbs x just over 3 months  Was having some issues with constipation and once that resolved was feeling "empty" and then binged on 6 doughnuts  Also had a lot of stress with running recent vacation [employer] and had some other stress with friends  Now has been trying to eat more fiber and drink more water (typically via unsweetened tea)  Struggles with knowing what to make for dinner  Easy ways to meal plan reviewed  We also found via recall that she is consuming more bread than is recommended  She had been following the food label instead of her individual recommendations  Dislikes fish, gluten, avoid lactose    Patient seen by Medical Provider in past 6 months:  yes  Requested to schedule appointment with Medical Provider: No    Anthropometric Measurements  Start Weight (#):  219 6 lbs 4/13/2022  Current Weight (#): 209  TBW % Change from start weight:  4 8%  Ideal Body Weight (#):151 9 lbs BMI 25  (65 5")  Goal Weight (#): 175 lbs  Highest: 233 lbs   Lowest: 125 lbs in 1977    Weight Loss History  Previous weight loss attempts: Exercise  Self Created Diets (Portion Control, Healthy Food Choices, etc )    Food and Nutrition Related History  Wake up:  2-5:00  Bed Time: 8:30    Food Recall  Breakfast: 2:30-5:30: 1 egg, 1 serving egg beaters, 1 smith, 2 gluten free bread     Snack: 8-9:00:  Dairy free yogurt, granola OR popcorn OR veggies, dressing  Lunch: 11:00: non dairy yogurt (190), granola OR egg salad s/w on 2 gluten free bread, olives  Snack: skip   Dinner: 4:30-5:00: egg salad s/w OR 3-6 oz chicken/beef, 1/2-1 cup carb, veggies OR tuna fish/hard boiled egg/onion/pickle/ospina on 1/2 bagel or gluten free bread    Snack: oatmeal     Beverages: water and sugar free beverages  Volume of beverage intake: less water more unsweetened tea    Weekends: Same  Cravings:   Ice cream,   Trouble area of day: 3:00 p m  Frequency of Eating out: irregularly  Food restrictions: avoids gluten and lactose, soy    Cooking: self   Food Shopping: self    Physical Activity Intake  Activity:walking dog, walking tapes   Frequency:intermittently  Physical limitations/barriers to exercise: neck/back pain    Estimated Needs  Energy  B[employer] Energy Needs: BMR : 4145   1-2# loss weekly sedentary:  527-2785             1-2# loss weekly lightly active: 5227-5993  Maintenance calories for sedentary activity level:  1784  Protein: 70-87 gm      (1 2-1 5g/kg IBW)  Fluid: 68 oz      (35mL/kg IBW)    Nutrition Diagnosis  Yes; Overweight/obesity  related to Excess energy intake as evidenced by  BMI more than normative standard for age and sex (obesity-grade I 26-30  9)       Nutrition Intervention    Nutrition Prescription  Calories: 5980-7752  Protein:  gm    Meal Plan (Jordy/Pro/Carb)  Breakfast: 250-300, 15-20  Snack: 100-150, 5-10  Lunch: 300-350, 15-25  Snack: 100-150, 5-10  Dinner: 300-350, 20-30  Snack: 100-150, 5-10    Nutrition Education:    Healthy Core Manual  Calorie controlled menu  Lean protein food choices  Healthy snack options  Food journaling tips      Nutrition Counseling:  Strategies: meal planning, portion sizes, healthy snack choices, hydration, fiber intake, protein intake, exercise, food journal      Monitoring and Evaluation:  Evaluation criteria:  Energy Intake  Meet protein needs  Maintain adequate hydration  Monitor weekly weight  Meal planning/preparation  Food journal   Decreased portions at mealtimes and snacks  Physical activity     Barriers to learning:none  Readiness to change: Action:  (Changing behavior)  Comprehension: good  Expected Compliance: good

## 2022-07-19 ENCOUNTER — OFFICE VISIT (OUTPATIENT)
Dept: SLEEP CENTER | Facility: HOSPITAL | Age: 67
End: 2022-07-19
Payer: MEDICARE

## 2022-07-19 VITALS
SYSTOLIC BLOOD PRESSURE: 126 MMHG | WEIGHT: 212 LBS | DIASTOLIC BLOOD PRESSURE: 82 MMHG | HEIGHT: 66 IN | BODY MASS INDEX: 34.07 KG/M2

## 2022-07-19 DIAGNOSIS — G47.09 OTHER INSOMNIA: ICD-10-CM

## 2022-07-19 DIAGNOSIS — G47.33 OSA (OBSTRUCTIVE SLEEP APNEA): Primary | ICD-10-CM

## 2022-07-19 DIAGNOSIS — M79.7 FIBROMYALGIA: ICD-10-CM

## 2022-07-19 DIAGNOSIS — G25.81 RLS (RESTLESS LEGS SYNDROME): ICD-10-CM

## 2022-07-19 DIAGNOSIS — F45.8 BRUXISM: ICD-10-CM

## 2022-07-19 DIAGNOSIS — M79.18 MYOFASCIAL PAIN SYNDROME: ICD-10-CM

## 2022-07-19 DIAGNOSIS — R73.09 ABNORMAL GLUCOSE: ICD-10-CM

## 2022-07-19 DIAGNOSIS — J31.0 CHRONIC RHINITIS: ICD-10-CM

## 2022-07-19 DIAGNOSIS — G89.4 CHRONIC PAIN SYNDROME: ICD-10-CM

## 2022-07-19 DIAGNOSIS — G47.19 EXCESSIVE DAYTIME SLEEPINESS: ICD-10-CM

## 2022-07-19 DIAGNOSIS — F43.10 PTSD (POST-TRAUMATIC STRESS DISORDER): ICD-10-CM

## 2022-07-19 DIAGNOSIS — M96.1 CERVICAL POST-LAMINECTOMY SYNDROME: ICD-10-CM

## 2022-07-19 DIAGNOSIS — F31.81 BIPOLAR II DISORDER (HCC): ICD-10-CM

## 2022-07-19 PROCEDURE — 99204 OFFICE O/P NEW MOD 45 MIN: CPT | Performed by: INTERNAL MEDICINE

## 2022-07-19 RX ORDER — ARIPIPRAZOLE 10 MG/1
10 TABLET ORAL DAILY
COMMUNITY
Start: 2022-07-18

## 2022-07-19 NOTE — PATIENT INSTRUCTIONS
What is DAVIS? Obstructive sleep apnea is a common and serious sleep disorder that causes you to stop breathing during sleep  The airway repeatedly becomes blocked, limiting the amount of air that reaches your lungs  When this happens, you may snore loudly or making choking noises as you try to breathe  Your brain and body becomes oxygen deprived and you may wake up  This may happen a few times a night, or in more severe cases, several hundred times a night  Sleep apnea can make you wake up in the morning feeling tired or unrefreshed even though you have had a full night of sleep  During the day, you may feel fatigued, have difficulty concentrating or you may even unintentionally fall asleep  This is because your body is waking up numerous times throughout the night, even though you might not be conscious of each awakening  The lack of oxygen your body receives can have negative long-term consequences for your health  This includes:  High blood pressure  Heart disease  Irregular heart rhythms  Stroke  Pre-diabetes and diabetes  Depression    Testing  An objective evaluation of your sleep may be needed before your board certified sleep physician can make a diagnosis  Options include:   In-lab overnight sleep study  This type of sleep study requires you to stay overnight at a sleep center, in a bed that may resemble a hotel room  You will sleep with sensors hooked up to various parts of your body  These sensors record your brain waves, heartbeat, breathing and movement  An overnight sleep study also provides your doctor with the most complete information about your sleep  Learn more about an overnight sleep study  Home sleep apnea test  Some patients with high risk factors for obstructive sleep apnea and no other medical disorders may be candidates for a home sleep apnea test  The testing equipment differs in that it is less complicated than what is used in an overnight sleep study   As such, does not give all the data an in-lab will and if negative, may not mean you do not have the problem  Treatment for sleep apnea  includes using a continuous positive airway pressure (CPAP) machine to keep your airway open during sleep  A mask is placed over your nose and mouth, or just your nose  The mask is hooked to the CPAP machine that blows a gentle stream of air into the mask when you breathe  This helps keep your airway open so you can breathe more regularly  Extra oxygen may be given to you through the machine  You may be given a mouth device  It looks like a mouth guard or dental retainer and stops your tongue and mouth tissues from blocking your throat while you sleep  Surgery may be needed to remove extra tissues that block your mouth, throat, or nose  Manage sleep apnea:   Do not smoke  Nicotine and other chemicals in cigarettes and cigars can cause lung damage  Ask your healthcare provider for information if you currently smoke and need help to quit  E-cigarettes or smokeless tobacco still contain nicotine  Talk to your healthcare provider before you use these products  Do not drink alcohol or take sedative medicine before you go to sleep  Alcohol and sedatives can relax the muscles and tissues around your throat  This can block the airflow to your lungs  Maintain a healthy weight  Excess tissue around your throat may restrict your breathing  Ask your healthcare provider for information if you need to lose weight  Sleep on your side or use pillows designed to prevent sleep apnea  This prevents your tongue or other tissues from blocking your throat  You can also raise the head of your bed  Driving Safety  Refrain from driving when drowsy  Follow up with your healthcare provider as directed:  Write down your questions so you remember to ask them during your visits  Go to AASM website for more information: Sleepeducation  org     What is DAVIS?    Obstructive sleep apnea is a common and serious sleep disorder that causes you to stop breathing during sleep  The airway repeatedly becomes blocked, limiting the amount of air that reaches your lungs  When this happens, you may snore loudly or making choking noises as you try to breathe  Your brain and body becomes oxygen deprived and you may wake up  This may happen a few times a night, or in more severe cases, several hundred times a night  Sleep apnea can make you wake up in the morning feeling tired or unrefreshed even though you have had a full night of sleep  During the day, you may feel fatigued, have difficulty concentrating or you may even unintentionally fall asleep  This is because your body is waking up numerous times throughout the night, even though you might not be conscious of each awakening  The lack of oxygen your body receives can have negative long-term consequences for your health  This includes:  High blood pressure  Heart disease  Irregular heart rhythms  Stroke  Pre-diabetes and diabetes  Depression    Testing  An objective evaluation of your sleep may be needed before your board certified sleep physician can make a diagnosis  Options include:   In-lab overnight sleep study  This type of sleep study requires you to stay overnight at a sleep center, in a bed that may resemble a hotel room  You will sleep with sensors hooked up to various parts of your body  These sensors record your brain waves, heartbeat, breathing and movement  An overnight sleep study also provides your doctor with the most complete information about your sleep  Learn more about an overnight sleep study  Home sleep apnea test  Some patients with high risk factors for obstructive sleep apnea and no other medical disorders may be candidates for a home sleep apnea test  The testing equipment differs in that it is less complicated than what is used in an overnight sleep study   As such, does not give all the data an in-lab will and if negative, may not mean you do not have the problem  Treatment for sleep apnea  includes using a continuous positive airway pressure (CPAP) machine to keep your airway open during sleep  A mask is placed over your nose and mouth, or just your nose  The mask is hooked to the CPAP machine that blows a gentle stream of air into the mask when you breathe  This helps keep your airway open so you can breathe more regularly  Extra oxygen may be given to you through the machine  You may be given a mouth device  It looks like a mouth guard or dental retainer and stops your tongue and mouth tissues from blocking your throat while you sleep  Surgery may be needed to remove extra tissues that block your mouth, throat, or nose  Manage sleep apnea:   Do not smoke  Nicotine and other chemicals in cigarettes and cigars can cause lung damage  Ask your healthcare provider for information if you currently smoke and need help to quit  E-cigarettes or smokeless tobacco still contain nicotine  Talk to your healthcare provider before you use these products  Do not drink alcohol or take sedative medicine before you go to sleep  Alcohol and sedatives can relax the muscles and tissues around your throat  This can block the airflow to your lungs  Maintain a healthy weight  Excess tissue around your throat may restrict your breathing  Ask your healthcare provider for information if you need to lose weight  Sleep on your side or use pillows designed to prevent sleep apnea  This prevents your tongue or other tissues from blocking your throat  You can also raise the head of your bed  Driving Safety  Refrain from driving when drowsy  Follow up with your healthcare provider as directed:  Write down your questions so you remember to ask them during your visits  Go to AASM website for more information: Sleepeducation  org       What you can do to improve your sleep: (Sleep Hygiene) Basic rules for a good night's sleep    Create a regular sleep schedule    This will help you form a sleep routine  Keep a record of your sleep patterns, and any sleeping problems you have  Bring the record to follow-up visits with healthcare providers  Avoid prolonged use of light-emitting screens before bedtime or watching TV in bed  Avoid forcing sleep  Do not take naps  Naps could make it hard for you to fall asleep at bedtime  Deal with your worries before bedtime  Keep your bedroom cool, quiet, and dark  Turn on white noise, such as a fan, to help you relax  Do not use your bed for any activity that will keep you awake  Do not read, exercise, eat, or watch TV in your bedroom  Get up if you do not fall asleep within 20 minutes  Move to another room and do something relaxing until you become sleepy  Limit caffeine, alcohol, nicotine and food to earlier in the day  Only drink caffeine in the morning  Do not drink alcohol within 6 hours of bedtime  Do not eat a heavy meal right before you go to bed  Avoid smoking, especially in the evening  Exercise regularly  Daily exercise will help you sleep better  Do not exercise within 4 hours of bedtime  Stimulus control therapy rules  1  Go to bed only when sleepy  2  Do not watch television, read, eat, or worry while in bed  Use bed only for sleep and sex  3  Get out of bed if unable to fall asleep within 20 minutes and go to another room  Return to bed only when sleepy  Repeat this step as many times as necessary throughout the night  4  Set an alarm clock to wake up at a fixed time each morning, including weekends  5  Do not take a nap during the day  Data from: [address] [place] [address], [employer] Nonpharmacologic treatments of insomnia  J Clin Psychiatry 1826; 53:37  Go to AASM website for more information: Sleepeducation  org     Recommended Reading:  Book by authors [address]   No More sleepless nights          Nursing Support:  When: Monday through Friday 7A-5PM except holidays  Where: Our direct line is [phone]  If you are having a true emergency please call 911  In the event that the line is busy or it is after hours please leave a voice message and we will return your call  Please speak clearly, leaving your full name, birth date, best number to reach you and the reason for your call  Medication refills: We will need the name of the medication, the dosage, the ordering provider, whether you get a 30 or 90 day refill, and the pharmacy name and address  Medications will be ordered by the provider only  Nurses cannot call in prescriptions  Please allow 7 days for medication refills  Physician requested updates: If your provider requested that you call with an update after starting medication, please be ready to provide us the medication and dosage, what time you take your medication, the time you attempt to fall asleep, time you fall asleep, when you wake up, and what time you get out of bed  Sleep Study Results: We will contact you with sleep study results and/or next steps after the physician has reviewed your testing

## 2022-07-19 NOTE — PROGRESS NOTES
Consultation - [address] [place] [address] Dr Villaseñor  75 y o  female  YJE:[id number]  DQB:[id number]  DOS:7/19/2022    Physician Requesting Consult: Kym Smalls PA-C             Reason for Consult : At your kind request I saw Sal Robertson for initial sleep evaluation today  The patient is here to evaluate for suspected Obstructive Sleep Apnea  PFSH, Problem List, Medications & Allergies were reviewed in EMR  Mo Bagley  has a past medical history of Anxiety, Arthritis, Basal cell carcinoma (BCC), Bipolar disorder ([facility] [facility] ), Cervical spinal stenosis, Chronic constipation, Chronic fatigue syndrome, Chronic hyperglycemia, Chronic pain syndrome, Depression, Essential hypertriglyceridemia, Fibromyalgia, GERD (gastroesophageal reflux disease), Herpes simplex infection, Hypokalemia, Insomnia, Median neuropathy, Pneumonia, and Sacroiliitis ([facility] [address] )  She has a current medication list which includes the following prescription(s): aripiprazole, aripiprazole, ascorbic acid, bisacodyl, calcium, cholecalciferol, duloxetine, fluticasone, loratadine, and turmeric curcumin  HPI:  She presents with complaint of not sleeping throughout the night, snoring and possible sleep apnea   She sleeps alone but others have reported snoring  She is not aware of snoring or breathing difficulties during sleep  She has frequent interruptions of sleep was experiencing chronic neck pain for which she had recent cervical fusion and although she continues to have pain, feels she is sleeping a little better  Other Complaints: none  Restless Leg Syndrome: has typical symptoms but occurs infrequently;  Parasomnia: dentist reports teeth grinding during sleep and episodic enuresis; Sleep Routine (on average):   Typical Bedtime:  Between 8 and 9:00 p m  Gets OOB:  7:00 a m  TIB:>10 hrs  Sleep latency:<  30 minutes Sleep Interruptions:1/nite but struggles to fall back asleep and is up for more than an hour   Awakens: spontaneously  Upon awakening: is not always refreshed   She estimates getting 5-7 hrs sleep  Rach reports Excessive Daytime Sleepiness  takes planned naps around 3 times a week for up to an hour  She rated herself at Total score: 9 /24 on the [place] Sleepiness Scale  Habits:  reports that she has never smoked  She has never used smokeless tobacco  ;   E-Cigarette/Vaping    E-Cigarette Use Never User     ;  reports no history of drug use ;  reports previous alcohol use  ; Caffeine use:limited ; Exercise routine: none except for physical therapy  Family History: Negative for sleep disturbance  ROS - reviewed and as attached  Significant for approximately 25 lb intentional weight loss but regained around 5 lb in the past month  She has nasal symptoms due to environmental allergies  She also reports chest tightness, shortness of breath  She reported no cardiac symptoms  She has musculoskeletal aches and pains  S feelings of anxiety and depression for which she is on medication  EXAM:  /82 (BP Location: Left arm, Patient Position: Sitting, Cuff Size: Large)   Ht 5' 5 5" (1 664 m)   Wt 96 2 kg (212 lb)   BMI 34 74 kg/m²    General: Well groomed female, well appearing, in no apparent distress  Neurological: Alert and orientated;  cooperative; Cranial nerves intact;    Psychiatric: Speech:  Pressured;  appears anxious and has a constricted affect   Skin: warm and dry; Color& Hydration good; no facial rashes or lesions   HEENT:  Craniofacial anatomy: normal Sinuses: non- tender  TMJ: Normal    Eyes: EOM's intact;  conjunctiva/corneas clear   Ears: Externallyappear normal     Nasal Airway: is patent Septum:intact; Mucosa: normal; Turbinates: normal; Rhinorrhea: None   Mouth: Lips: normal posture; Dentition: normal  and worn down   Mucosa:moist  ; Hard Palate:prominent maxillary torus   Oropharryx: crowded and AP narrowing Tongue: Mallampati:Class IV, Mobile and ScallopedSoft Palate:  redundant  Tonsils: absent  Neck:is thick, excess fatty tissue, short and healed surgical scar; Neck Circumference: 17 25 "; Supple; no abnormal masses; Thyroid:normal  Trachea:central     Lymph: No Cervical or Submandibular Lymhadenopathy  Heart: S1,S2 normal; RRR; no gallop; no murmurs   Lungs: Respiratory Effort:normal  Air entry good bilaterally  No wheezes  No rales  Abdomen: Obese, Soft & non-tender    Extremities: No pedal edema  No clubbing or cyanosis  Musculoskeletal:  Motor normal; Gait:normal        IMPRESSION: Primary/Secondary Sleep Diagnoses (to Medical or Psych conditions) & Comorbidities   1  DAVIS (obstructive sleep apnea)  Diagnostic Sleep Study    CPAP Study   2  Other insomnia     3  RLS (restless legs syndrome)     4  Excessive daytime sleepiness     5  Bruxism     6  Fibromyalgia     7  Myofascial pain syndrome     8  Chronic pain syndrome     9  Cervical post-laminectomy syndrome     10  Chronic rhinitis     11  BMI 36 0-36 9,adult  Ambulatory Referral to Sleep Medicine   12  Abnormal glucose  Ambulatory Referral to Sleep Medicine   13  PTSD (post-traumatic stress disorder)     14  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 ([facility] )          PLAN:   With respect to above conditions, comprehensive counseling provided on pathophysiology; effects on symptoms and comorbidities, diagnostic strategies & limitations; treatment options; risks or no treament; risks & benefits of the various therapeutic options; costs and insurance aspects  I advised weight reduction, avoiding sleeping supine, using alcohol or sedating medications close to bed time and on safe driving practices  Nocturnal polysomnography is indicated and a diagnostic study will be scheduled  Patient is willing to try Positive airway pressure therapy and will be scheduled for a titration study if indicated  Multi component Cognitive behavioral therapy for Insomnia undertaken - Sleep Restriction, Stimulus control, Relaxation techniques and Sleep hygiene were discussed  Follow-up to be scheduled after the studies to review results, further details of treatment options and to initiate/adjust therapy  Sincerely,     Authenticated electronically by Rich Benedict MD   on 47/55/54   Board Certified Specialist     Portions of the record may have been created with voice recognition software  Occasional wrong word or "sound a like" substitutions may have occurred due to the inherent limitations of voice recognition software  There may also be notations and random deletions of words or characters from malfunctioning software  Read the chart carefully and recognize, using context, where substitutions/deletions have occurred

## 2022-07-19 NOTE — PROGRESS NOTES
Review of Systems      Genitourinary post menopausal (no peroids)   Cardiology Frequent chest pain or angina,  and ankle/leg swelling   Gastrointestinal abdominal pain or cramping that disturb sleep    Neurology frequent headaches, muscle weakness, numbness/tingling of an extremity and balance problems   Constitutional fatigue and weight change   Integumentary rash or dry skin and itching   Psychiatry anxiety and depression   Musculoskeletal joint pain, muscle aches, back pain, legs twitching/jerking, sciatica and leg cramps   Pulmonary shortness of breath with activity, chest tightness, snoring and difficulty breathing when lying flat    ENT throat clearing   Endocrine frequent urination   Hematological none

## 2022-07-20 ENCOUNTER — OFFICE VISIT (OUTPATIENT)
Dept: BARIATRICS | Facility: CLINIC | Age: 67
End: 2022-07-20

## 2022-07-20 VITALS — HEIGHT: 66 IN | BODY MASS INDEX: 33.59 KG/M2 | WEIGHT: 209 LBS

## 2022-07-20 DIAGNOSIS — R63.5 ABNORMAL WEIGHT GAIN: ICD-10-CM

## 2022-07-20 PROCEDURE — WMMONFE

## 2022-07-20 PROCEDURE — RECHECK

## 2022-07-21 ENCOUNTER — CLINICAL SUPPORT (OUTPATIENT)
Dept: BARIATRICS | Facility: CLINIC | Age: 67
End: 2022-07-21

## 2022-07-21 VITALS — BODY MASS INDEX: 33.68 KG/M2 | HEIGHT: 66 IN | WEIGHT: 209.6 LBS

## 2022-07-21 DIAGNOSIS — R63.5 ABNORMAL WEIGHT GAIN: Primary | ICD-10-CM

## 2022-07-21 PROCEDURE — RECHECK

## 2022-07-25 DIAGNOSIS — F31.81 BIPOLAR II DISORDER (HCC): ICD-10-CM

## 2022-07-25 DIAGNOSIS — F43.10 PTSD (POST-TRAUMATIC STRESS DISORDER): ICD-10-CM

## 2022-07-25 LAB — HBA1C MFR BLD HPLC: 5 %

## 2022-07-25 RX ORDER — DULOXETIN HYDROCHLORIDE 60 MG/1
60 CAPSULE, DELAYED RELEASE ORAL
Qty: 30 CAPSULE | Refills: 3 | Status: SHIPPED | OUTPATIENT
Start: 2022-07-25

## 2022-07-25 RX ORDER — ARIPIPRAZOLE 15 MG/1
15 TABLET ORAL
Qty: 30 TABLET | Refills: 0 | Status: SHIPPED | OUTPATIENT
Start: 2022-07-25

## 2022-07-25 NOTE — TELEPHONE ENCOUNTER
Pt is having difficulty getting her scripts changed over to [employer] providers  She is requesting one month to cover her until she can resolve this issue with the doctors there

## 2022-07-25 NOTE — TELEPHONE ENCOUNTER
Pt was able to get through to [facility] so please HOLD off on sending in any scripts at this time  She believes she no longer needs you to refill

## 2022-07-28 ENCOUNTER — CLINICAL SUPPORT (OUTPATIENT)
Dept: BARIATRICS | Facility: CLINIC | Age: 67
End: 2022-07-28

## 2022-07-28 VITALS — HEIGHT: 66 IN | BODY MASS INDEX: 33.49 KG/M2 | WEIGHT: 208.4 LBS

## 2022-07-28 DIAGNOSIS — R63.5 ABNORMAL WEIGHT GAIN: Primary | ICD-10-CM

## 2022-07-28 PROCEDURE — RECHECK

## 2022-08-01 ENCOUNTER — OFFICE VISIT (OUTPATIENT)
Dept: BARIATRICS | Facility: CLINIC | Age: 67
End: 2022-08-01
Payer: MEDICARE

## 2022-08-01 VITALS
WEIGHT: 209.9 LBS | BODY MASS INDEX: 33.73 KG/M2 | HEIGHT: 66 IN | RESPIRATION RATE: 16 BRPM | DIASTOLIC BLOOD PRESSURE: 70 MMHG | HEART RATE: 60 BPM | SYSTOLIC BLOOD PRESSURE: 122 MMHG

## 2022-08-01 DIAGNOSIS — E66.01 SEVERE OBESITY (BMI 35.0-35.9 WITH COMORBIDITY) (HCC): ICD-10-CM

## 2022-08-01 DIAGNOSIS — E16.1 HYPERINSULINEMIA: ICD-10-CM

## 2022-08-01 DIAGNOSIS — E66.9 OBESITY, CLASS I, BMI 30-34.9: Primary | ICD-10-CM

## 2022-08-01 PROCEDURE — 99214 OFFICE O/P EST MOD 30 MIN: CPT | Performed by: PHYSICIAN ASSISTANT

## 2022-08-01 RX ORDER — METFORMIN HYDROCHLORIDE 750 MG/1
750 TABLET, EXTENDED RELEASE ORAL DAILY
Qty: 30 TABLET | Refills: 1 | Status: SHIPPED | OUTPATIENT
Start: 2022-08-01 | End: 2022-09-06 | Stop reason: SDUPTHER

## 2022-08-01 NOTE — PROGRESS NOTES
Assessment/Plan:    Obesity, Class I, BMI 30-34 9  -Patient is pursuing [facility] Lifestyle Intervention Program  -Initial weight loss goal of 5-10% weight loss for improved health  -Screening labs 7/25/22    Initial:222 8  Current:209 9  Change:-12 9  Goal:    Doing well with diet changes  She is still learning more about healthy options and is trying to increase fruit/veggie intake  Plans on starting to walk  Will be starting with [facility] program for 20 minutes with a goal to build up to 1 hour program   Recommend starting with 2 times a week     To start metformin 750 XR once daily( weight 209 9 on 8/1/22), side effects discussed    Hyperinsulinemia  To start metformin once daily  Side effects discussed        Follow up in approximately 2 months with Non-Surgical Physician/Advanced Practitioner  Diagnoses and all orders for this visit:    Obesity, Class I, BMI 30-34 9  -     metFORMIN (GLUCOPHAGE-XR) 750 mg 24 hr tablet; Take 1 tablet (750 mg total) by mouth in the morning    Hyperinsulinemia  -     metFORMIN (GLUCOPHAGE-XR) 750 mg 24 hr tablet; Take 1 tablet (750 mg total) by mouth in the morning    Severe obesity (BMI 35 0-35 9 with comorbidity) ([facility] [address] )          Subjective:   Chief Complaint   Patient presents with    Follow-up     MWM 4mth f/u; waist        Patient ID: Deedee Talavera  is a 75 y o  female with excess weight/obesity here to pursue weight managment  Patient is pursuing [facility] Lifestyle Intervention Program      HPI   Here for follow up at the end of healthy[facility]  She has made changes to her diet and is now looking at food labels  She just bought new shoes and plans on walking  She has struggled with this process due to a death of her friend and neck surgery but she has continued and is now feeling better  Her weight was down to 207 but she had constipation and then ended up having an overeating episode    She has gone back to trying to eat healthier and is eating more fiber now  Wt Readings from Last 10 Encounters:   08/01/22 95 2 kg (209 lb 14 4 oz)   07/28/22 94 5 kg (208 lb 6 4 oz)   07/21/22 95 1 kg (209 lb 9 6 oz)   07/20/22 94 8 kg (209 lb)   07/19/22 96 2 kg (212 lb)   07/14/22 95 3 kg (210 lb)   07/07/22 94 9 kg (209 lb 3 2 oz)   06/23/22 94 1 kg (207 lb 6 4 oz)   06/16/22 94 8 kg (209 lb)   06/09/22 95 4 kg (210 lb 6 4 oz)       Food logging:no  Increased appetite/cravings:sometimes around 3PM  Fruit/Vegetable servings:  Exercise:some walking  Hydration: usually sugar free iced tea-has had problems finding the 5 calorie one used to use    Breakfast: 2:30-5:30: 1 egg, 1 serving egg beaters,  2 gluten free bread                 Snack: 8-9:00:  skips  Lunch: 11:00: non dairy yogurt (190), almond milk and frozen fruit  Snack: sometimes eats  Dinner: 4:30-5:00: egg salad s/w OR 3-6 oz chicken/beef, 1/2-1 cup carb, veggies OR tuna fish/hard boiled egg/onion/pickle/ospina on 1/2 bagel or gluten free bread      Colonoscopy-Completed    The following portions of the patient's history were reviewed and updated as appropriate:   She  has a past medical history of Anxiety, Arthritis, Basal cell carcinoma (BCC), Bipolar disorder (HCC), Cervical spinal stenosis, Chronic constipation, Chronic fatigue syndrome, Chronic hyperglycemia, Chronic pain syndrome, Depression, Essential hypertriglyceridemia, Fibromyalgia, GERD (gastroesophageal reflux disease), Herpes simplex infection, Hypokalemia, Insomnia, Median neuropathy, Pneumonia, and Sacroiliitis ([facility] [address] )    She   Patient Active Problem List    Diagnosis Date Noted    Severe obesity (BMI 35 0-35 9 with comorbidity) ([facility] [address] ) 08/01/2022    Hyperinsulinemia 08/01/2022    Cervical post-laminectomy syndrome 04/01/2022    Chronic pain syndrome     Obesity, Class I, BMI 30-34 9 03/25/2022    Thoracic disc herniation     PTSD (post-traumatic stress disorder) 10/14/2021    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 10/14/2021    Lumbar radiculopathy 09/13/2021    Herniated nucleus pulposus, L4-5 09/13/2021    Lumbar foraminal stenosis 09/13/2021    Other spondylosis with radiculopathy, lumbar region 08/18/2021    Chronic neck pain 04/28/2021    Chronic bilateral low back pain without sciatica 04/28/2021    Chronic joint pain 04/28/2021    Chronic bilateral thoracic back pain 04/28/2021    Myofascial pain syndrome 04/28/2021    Chest pain syndrome 12/30/2020    Other seborrheic keratosis 02/13/2020    Acute vasomotor rhinitis 03/15/2018    Basal cell carcinoma of skin 12/01/2014    Cervical radiculopathy 05/09/2014    DDD (degenerative disc disease), cervical 10/22/2013    Disc degeneration, lumbar 10/22/2013    Allergic rhinitis 09/04/2012    Fibromyalgia 08/28/2012     She  has a past surgical history that includes Cervical fusion; Colonoscopy (10/23/2013); Dilation and curettage of uterus; Hallux valgus correction; Hemorroidectomy; Neuroplasty / transposition median nerve at carpal tunnel; Tooth extraction; Tonsillectomy; Tubal ligation; and Reduction mammaplasty (Bilateral, 1991)  Her family history includes Arthritis in her family and mother; Cancer (age of onset: 78) in her father; Coronary artery disease in her father and paternal grandfather; Diabetes in her family and paternal grandmother; Heart attack in her family and father; Heart disease in her family; Hyperlipidemia in her father; Hypertension in her family and mother; Melanoma (age of onset: 48) in her father; No Known Problems in her brother, daughter, maternal grandfather, and maternal grandmother; Osteoporosis in her family and mother; Sudden death in her mother  She  reports that she has never smoked  She has never used smokeless tobacco  She reports previous alcohol use  She reports that she does not use drugs    Current Outpatient Medications   Medication Sig Dispense Refill    ARIPiprazole (ABILIFY) 10 mg tablet Take 10 mg by mouth in the morning      ascorbic acid (VITAMIN C) 500 mg tablet Take 500 mg by mouth daily      bisacodyl (DULCOLAX) 5 mg EC tablet Take 1 tablet by mouth daily as needed      Calcium 250 MG CAPS Take 500 mg by mouth        Cholecalciferol 50 MCG (2000 UT) CAPS Take by mouth      DULoxetine (CYMBALTA) 60 mg delayed release capsule Take 1 capsule (60 mg total) by mouth daily at bedtime 30 capsule 3    fluticasone (FLONASE) 50 mcg/act nasal spray 1 spray into each nostril daily 9 9 mL 2    loratadine (CLARITIN) 10 mg tablet Take 10 mg by mouth      metFORMIN (GLUCOPHAGE-XR) 750 mg 24 hr tablet Take 1 tablet (750 mg total) by mouth in the morning 30 tablet 1    Turmeric Curcumin 500 MG CAPS Take by mouth      ARIPiprazole (ABILIFY) 15 mg tablet Take 1 tablet (15 mg total) by mouth daily at bedtime (Patient not taking: Reported on 8/1/2022) 30 tablet 0     No current facility-administered medications for this visit  Current Outpatient Medications on File Prior to Visit   Medication Sig    ARIPiprazole (ABILIFY) 10 mg tablet Take 10 mg by mouth in the morning    ascorbic acid (VITAMIN C) 500 mg tablet Take 500 mg by mouth daily    bisacodyl (DULCOLAX) 5 mg EC tablet Take 1 tablet by mouth daily as needed    Calcium 250 MG CAPS Take 500 mg by mouth      Cholecalciferol 50 MCG (2000 UT) CAPS Take by mouth    DULoxetine (CYMBALTA) 60 mg delayed release capsule Take 1 capsule (60 mg total) by mouth daily at bedtime    fluticasone (FLONASE) 50 mcg/act nasal spray 1 spray into each nostril daily    loratadine (CLARITIN) 10 mg tablet Take 10 mg by mouth    Turmeric Curcumin 500 MG CAPS Take by mouth    ARIPiprazole (ABILIFY) 15 mg tablet Take 1 tablet (15 mg total) by mouth daily at bedtime (Patient not taking: Reported on 8/1/2022)     No current facility-administered medications on file prior to visit  She is allergic to other, cephalexin, latex, molds & smuts, risperidone, sertraline, and soy isoflavones       Review of Systems   Constitutional: Negative for chills and fever  Respiratory: Negative for shortness of breath  Cardiovascular: Negative for chest pain and palpitations  Gastrointestinal: Positive for constipation (improved with more fiber in the diet)  Negative for abdominal pain, diarrhea and vomiting  Genitourinary: Negative for difficulty urinating  Musculoskeletal: Positive for myalgias (sometime)  Negative for arthralgias and back pain  Skin: Negative for rash  Neurological: Negative for headaches  Psychiatric/Behavioral: Negative for dysphoric mood  The patient is not nervous/anxious  Objective:    /70   Pulse 60   Resp 16   Ht 5' 5 5" (1 664 m)   Wt 95 2 kg (209 lb 14 4 oz)   Breastfeeding No   BMI 34 40 kg/m²      Physical Exam  Vitals and nursing note reviewed  Constitutional:       General: She is not in acute distress  Appearance: She is well-developed  She is obese  HENT:      Head: Normocephalic and atraumatic  Eyes:      Conjunctiva/sclera: Conjunctivae normal    Neck:      Thyroid: No thyromegaly  Pulmonary:      Effort: Pulmonary effort is normal  No respiratory distress  Skin:     Findings: No rash (visible)  Neurological:      Mental Status: She is alert and oriented to person, place, and time     Psychiatric:         Behavior: Behavior normal

## 2022-08-01 NOTE — ASSESSMENT & PLAN NOTE
-Patient is pursuing [facility]-Intensive Lifestyle Intervention Program  -Initial weight loss goal of 5-10% weight loss for improved health  -Screening labs 7/25/22    Initial:222 8  Current:209 9  Change:-12 9  Goal:    Doing well with diet changes  She is still learning more about healthy options and is trying to increase fruit/veggie intake  Plans on starting to walk    Will be starting with [facility] walking program for 20 minutes with a goal to build up to 1 hour program   Recommend starting with 2 times a week     To start metformin 750 XR once daily( weight 209 9 on 8/1/22), side effects discussed

## 2022-08-03 ENCOUNTER — TELEPHONE (OUTPATIENT)
Dept: OTHER | Facility: OTHER | Age: 67
End: 2022-08-03

## 2022-08-03 NOTE — TELEPHONE ENCOUNTER
Pt called in stating she was prescribed Metformin and would like to know if she has diabetes  She is requesting a call back

## 2022-08-04 ENCOUNTER — CLINICAL SUPPORT (OUTPATIENT)
Dept: BARIATRICS | Facility: CLINIC | Age: 67
End: 2022-08-04

## 2022-08-04 VITALS — HEIGHT: 66 IN | WEIGHT: 209 LBS | BODY MASS INDEX: 33.59 KG/M2

## 2022-08-04 DIAGNOSIS — R63.5 ABNORMAL WEIGHT GAIN: Primary | ICD-10-CM

## 2022-08-04 PROCEDURE — RECHECK

## 2022-08-04 NOTE — TELEPHONE ENCOUNTER
Spoke with patient and explained metformin was for hyperinsulinemia   It is helping her go to the bathroom more but is not causing any significant GI side effects

## 2022-08-07 NOTE — PROGRESS NOTES
Weight Management Medical Nutrition Assessment  Izaiah Moctezuma is here for f/u RD session for [facility] with Seca and REE  Current wt: 206 6  Lbs  She has lost 13  lbs x just under 4 months  Seca shows fat loss of 8 lbs (61%)  REE WNL  Now that vacation bible school is over things have been better  No binging episodes  Planning dinners has been better  Questions answered re: lunch options  Options for f/u reviewed  She will f/u in ~6 wks for a bundle  Dislikes fish, gluten, avoid lactose    Patient seen by Medical Provider in past 6 months:  yes  Requested to schedule appointment with Medical Provider: No    Anthropometric Measurements  Start Weight (#):  219 6 lbs 4/13/2022  Current Weight (#): 206 5  TBW % Change from start weight:  5 9%  Ideal Body Weight (#):151 9 lbs BMI 25  (65 5")  Goal Weight (#): 175 lbs  Highest: 233 lbs   Lowest: 125 lbs in 1977    Weight Loss History  Previous weight loss attempts: Exercise  Self Created Diets (Portion Control, Healthy Food Choices, etc )    Food and Nutrition Related History  Wake up:  2-5:00  Bed Time: 8:30    Food Recall  Breakfast: 2:30-5:30: 1 egg, 1 serving egg beaters, 1 smith, 2 gluten free bread     Snack: 8-9:00:  Dairy free yogurt, granola OR popcorn OR veggies, dressing  Lunch: 11:00:  oatmeal, fruit, almond milk   Snack: skip   Dinner: 4:30-5:00: egg salad s/w OR 3-6 oz chicken/beef, 1/2-1 cup carb, veggies OR tuna fish/hard boiled egg/onion/pickle/ospina on 1/2 bagel or gluten free bread    Snack: oatmeal     Beverages: water and sugar free beverages  Volume of beverage intake: less water more unsweetened tea    Weekends: Same  Cravings:  Ice cream,   Trouble area of day: 3:00 p m       Frequency of Eating out: irregularly  Food restrictions: avoids gluten and lactose, soy    Cooking: self   Food Shopping: self    Physical Activity Intake  Activity:walking dog    Frequency:intermittently  Physical limitations/barriers to exercise: neck/back pain    Estimated Needs  Energy  [facility] REE 1509x1  [phone]  Re[facility] REE 1598, wt loss w/o exercise 1219-4428, wt loss w/exercise 2027-3349  [facility] Energy Needs: BMR : 1941   1-2# loss weekly sedentary:  190-5355             1-2# loss weekly lightly active: 4483-0059  Maintenance calories for sedentary activity level:  1771  Protein: 70-87 gm      (1 2-1 5g/kg IBW)  Fluid: 68 oz      (35mL/kg IBW)    Nutrition Diagnosis  Yes; Overweight/obesity  related to Excess energy intake as evidenced by  BMI more than normative standard for age and sex (obesity-grade I 26-30  9)       Nutrition Intervention    Nutrition Prescription  Calories: 0688-0909  Protein:  gm    Meal Plan (Jordy/Pro/Carb)  Breakfast: 250-300, 15-20  Snack: 100-150, 5-10  Lunch: 300-350, 15-25  Snack: 100-150, 5-10  Dinner: 300-350, 20-30  Snack: 100-150, 5-10    Nutrition Education:    Healthy Core Manual  Calorie controlled menu  Lean protein food choices  Healthy snack options  Food journaling tips      Nutrition Counseling:  Strategies: meal planning, portion sizes, healthy snack choices, hydration, fiber intake, protein intake, exercise, food journal      Monitoring and Evaluation:  Evaluation criteria:  Energy Intake  Meet protein needs  Maintain adequate hydration  Monitor weekly weight  Meal planning/preparation  Food journal   Decreased portions at mealtimes and snacks  Physical activity     Barriers to learning:none  Readiness to change: Action:  (Changing behavior)  Comprehension: good  Expected Compliance: good

## 2022-08-09 ENCOUNTER — OFFICE VISIT (OUTPATIENT)
Dept: BARIATRICS | Facility: CLINIC | Age: 67
End: 2022-08-09

## 2022-08-09 VITALS — BODY MASS INDEX: 33.2 KG/M2 | WEIGHT: 206.57 LBS | HEIGHT: 66 IN

## 2022-08-09 DIAGNOSIS — R63.5 ABNORMAL WEIGHT GAIN: Primary | ICD-10-CM

## 2022-08-09 PROCEDURE — RECHECK

## 2022-08-10 ENCOUNTER — CLINICAL SUPPORT (OUTPATIENT)
Dept: FAMILY MEDICINE CLINIC | Facility: CLINIC | Age: 67
End: 2022-08-10

## 2022-08-10 DIAGNOSIS — Z20.822 SUSPECTED COVID-19 VIRUS INFECTION: Primary | ICD-10-CM

## 2022-08-10 PROCEDURE — U0005 INFEC AGEN DETEC AMPLI PROBE: HCPCS | Performed by: NURSE PRACTITIONER

## 2022-08-10 PROCEDURE — U0003 INFECTIOUS AGENT DETECTION BY NUCLEIC ACID (DNA OR RNA); SEVERE ACUTE RESPIRATORY SYNDROME CORONAVIRUS 2 (SARS-COV-2) (CORONAVIRUS DISEASE [COVID-19]), AMPLIFIED PROBE TECHNIQUE, MAKING USE OF HIGH THROUGHPUT TECHNOLOGIES AS DESCRIBED BY CMS-2020-01-R: HCPCS | Performed by: NURSE PRACTITIONER

## 2022-08-11 LAB — SARS-COV-2 RNA RESP QL NAA+PROBE: NEGATIVE

## 2022-08-22 DIAGNOSIS — F33.1 MAJOR DEPRESSIVE DISORDER, RECURRENT, MODERATE (HCC): Primary | ICD-10-CM

## 2022-08-22 DIAGNOSIS — Z79.899 ENCOUNTER FOR LONG-TERM (CURRENT) USE OF OTHER MEDICATIONS: ICD-10-CM

## 2022-08-24 ENCOUNTER — OFFICE VISIT (OUTPATIENT)
Dept: FAMILY MEDICINE CLINIC | Facility: CLINIC | Age: 67
End: 2022-08-24
Payer: MEDICARE

## 2022-08-24 VITALS
HEART RATE: 74 BPM | OXYGEN SATURATION: 98 % | BODY MASS INDEX: 33.51 KG/M2 | DIASTOLIC BLOOD PRESSURE: 80 MMHG | RESPIRATION RATE: 15 BRPM | SYSTOLIC BLOOD PRESSURE: 122 MMHG | HEIGHT: 66 IN | WEIGHT: 208.5 LBS | TEMPERATURE: 99.2 F

## 2022-08-24 DIAGNOSIS — R53.83 OTHER FATIGUE: ICD-10-CM

## 2022-08-24 DIAGNOSIS — J30.1 SEASONAL ALLERGIC RHINITIS DUE TO POLLEN: ICD-10-CM

## 2022-08-24 DIAGNOSIS — R05.9 COUGH: Primary | ICD-10-CM

## 2022-08-24 PROCEDURE — 99214 OFFICE O/P EST MOD 30 MIN: CPT | Performed by: NURSE PRACTITIONER

## 2022-08-24 RX ORDER — FLUTICASONE PROPIONATE 50 MCG
2 SPRAY, SUSPENSION (ML) NASAL DAILY
Qty: 9.9 ML | Refills: 2 | Status: SHIPPED | OUTPATIENT
Start: 2022-08-24

## 2022-08-24 RX ORDER — BENZONATATE 100 MG/1
100 CAPSULE ORAL 3 TIMES DAILY PRN
Qty: 20 CAPSULE | Refills: 0 | Status: SHIPPED | OUTPATIENT
Start: 2022-08-24

## 2022-08-24 NOTE — PROGRESS NOTES
Assessment/Plan:     Diagnoses and all orders for this visit:    Cough  -     benzonatate (TESSALON PERLES) 100 mg capsule; Take 1 capsule (100 mg total) by mouth 3 (three) times a day as needed for cough    Sounds like this is aggravated by PND  Pt encouraged to switch to Allegra instead of Claritin HS for better allergy control    She may also utilize [employer]  If her symptoms persist over the next 72 hours, or worsen, she is encouraged to contact our office  Patient is encouraged to call our office for any questions/concerns, persistent or worsening symptoms  Patient states they understand and agree with treatment plan  Other fatigue  -     Lyme Antibody Profile with reflex to WB; Future    Pt having labs done through Psych  Will add Lyme to further investigate any etiology to her fatigue  Seasonal allergic rhinitis due to pollen  -     fluticasone (FLONASE) 50 mcg/act nasal spray; 2 sprays into each nostril daily      Refills prescribed  Pt will switch from Claritin to Allegra daily  Patient is encouraged to call our office for any questions/concerns, persistent or worsening symptoms  Patient states they understand and agree with treatment plan  Pt to f/u PRN  Subjective:      Patient ID: Mickie Cheema is a 75 y o  female  Pt presents today for a unproductive cough, but cannot tell me how long it has been going on  She also notes some rhinorrhea with clear drainage  She denies fever, chills, body aches, ear pain/pressure, sinus pain/pressure  She does admit to occasional headaches  She has taken 2 negative covid tests already  Patient admits she has not taken anything for the cough  She does admit she has allergies and takes Claritin and Flonase  Pt also notes she feels fatigued, but admits she may be doing too much  She is having labs drawn by her Psych doctor including TSH & CBC          The following portions of the patient's history were reviewed and updated as appropriate: allergies, current medications, past family history, past medical history, past social history, past surgical history and problem list     Review of Systems    As noted per HPI  Objective:      /80   Pulse 74   Temp 99 2 °F (37 3 °C) (Oral)   Resp 15   Ht 5' 5 5" (1 664 m)   Wt 94 6 kg (208 lb 8 oz)   SpO2 98%   BMI 34 17 kg/m²          Physical Exam  Vitals reviewed  Constitutional:       General: She is not in acute distress  Appearance: Normal appearance  She is not ill-appearing  HENT:      Head: Normocephalic  Right Ear: Tympanic membrane, ear canal and external ear normal       Left Ear: Tympanic membrane, ear canal and external ear normal       Nose: No congestion or rhinorrhea  Mouth/Throat:      Pharynx: No oropharyngeal exudate or posterior oropharyngeal erythema  Cardiovascular:      Rate and Rhythm: Normal rate and regular rhythm  Pulses: Normal pulses  Heart sounds: Normal heart sounds  No murmur heard  Pulmonary:      Effort: Pulmonary effort is normal  No respiratory distress  Breath sounds: Normal breath sounds  No wheezing  Skin:     General: Skin is warm and dry  Neurological:      Mental Status: She is alert and oriented to person, place, and time  Mental status is at baseline  Psychiatric:         Mood and Affect: Mood normal          Behavior: Behavior normal          Thought Content:  Thought content normal          Judgment: Judgment normal

## 2022-08-29 ENCOUNTER — TELEPHONE (OUTPATIENT)
Dept: FAMILY MEDICINE CLINIC | Facility: CLINIC | Age: 67
End: 2022-08-29

## 2022-08-29 NOTE — TELEPHONE ENCOUNTER
Pt saw that her lab results are in and would like to know what they mean  Also, she wants you to know that she thinks she is allergic to her dog so she is going to an allergist to get tested

## 2022-08-30 NOTE — TELEPHONE ENCOUNTER
Ely Vila will be out of the office for the next two weeks  Looks like the only test she ordered was the lyme test which was negative  The rest of the test were ordered by psychiatry and she should follow up with this doctor to discuss the results

## 2022-09-06 DIAGNOSIS — E66.9 OBESITY, CLASS I, BMI 30-34.9: ICD-10-CM

## 2022-09-06 DIAGNOSIS — E16.1 HYPERINSULINEMIA: ICD-10-CM

## 2022-09-06 RX ORDER — METFORMIN HYDROCHLORIDE 750 MG/1
750 TABLET, EXTENDED RELEASE ORAL DAILY
Qty: 30 TABLET | Refills: 0 | Status: SHIPPED | OUTPATIENT
Start: 2022-09-06

## 2022-09-21 ENCOUNTER — TELEPHONE (OUTPATIENT)
Dept: BARIATRICS | Facility: CLINIC | Age: 67
End: 2022-09-21

## 2022-09-21 NOTE — TELEPHONE ENCOUNTER
Patient is calling stating that she stopped the Metformin due to her experiencing coughing, sore throat, chest pain, hunger and weight gain  She said she stopped taking it 2 days ago and now she feels better  Please advise

## 2022-09-21 NOTE — TELEPHONE ENCOUNTER
I am glad she is feeling better   However that is not common side effects for metformin, especially if she has been on it since august  If the symptoms return I would recommend going to family doctor or if severe ED

## 2022-12-25 NOTE — PATIENT INSTRUCTIONS
Rosendo Alegre [id number]   Thanks for presenting to today's Appointment at [facility]   Your medications were not changed   Also labs were ordered

## 2022-12-25 NOTE — PSYCH
[facility]/Hospital: [address]   [address] [facility]    Psychiatric Progress Note  MRN#: [id number]  Angleique Juarez 75 y o  female    This note was not shared with the patient due to reasonable likelihood of causing patient harm   This Patient was seen in the office today at [place] [address] location  Subjective:   Sabiha Doom , slightly down during holiday season, had URI now asymptomatic around [place], although psychologically felt sad, slept more yesterday , not finishing tasks  Denied SI, HI, Intermittent apetite, typically stronger physically compared to this yr  Currently stable mood  Medication compliance:Linh reported compliance to Duloxetine ; Abilify   Medication side effects:none  Although reported higher dose of Duloxetine- sedation  Prior Trial - Tegretol - brain was not function right, other also - defer to PF charting for details      Mental Status Evaluation:  General Appearance:  Angelique Juarez is a 75 y o   female casually dressed, adequate hygiene and grooming, looks stated age   Behavior:  pleasant, cooperative, calm, adequate eye contact   Speech:  normal for rate, rhythm,latency, amount   Loud tone and volume    Mood:  sad   Affect:  mood-congruent slightly    Thought Process:  circumstantial and logical   Thought Content:  no overt delusions, normal   Perceptual Disturbances: no auditory hallucinations, no visual hallucinations, Does not appear responding or preoccupied   Delusions  No   Risk Potential: Suicidal Ideations No  Homicidal Ideations No  Potential for Aggression No     Sensorium:  Oriented to person, place, time/date and situation   Memory:  recent and remote memory grossly intact   Consciousness:  alert and awake   Attention: attention span and concentration are age appropriate   Insight:  fair   Judgment: fair   Gait/Station: normal   Motor Activity: no abnormal movements     There were no vitals filed for this visit  Medications:   Current Outpatient Medications on File Prior to Visit   Medication Sig Dispense Refill   • ARIPiprazole (ABILIFY) 10 mg tablet Take 10 mg by mouth in the morning     • ARIPiprazole (ABILIFY) 15 mg tablet Take 1 tablet (15 mg total) by mouth daily at bedtime (Patient not taking: No sig reported) 30 tablet 0   • ascorbic acid (VITAMIN C) 500 mg tablet Take 500 mg by mouth daily     • benzonatate (TESSALON PERLES) 100 mg capsule Take 1 capsule (100 mg total) by mouth 3 (three) times a day as needed for cough 20 capsule 0   • bisacodyl (DULCOLAX) 5 mg EC tablet Take 1 tablet by mouth daily as needed     • Calcium 250 MG CAPS Take 500 mg by mouth       • Cholecalciferol 50 MCG (2000 UT) CAPS Take by mouth     • DULoxetine (CYMBALTA) 60 mg delayed release capsule Take 1 capsule (60 mg total) by mouth daily at bedtime 30 capsule 3   • fluticasone (FLONASE) 50 mcg/act nasal spray 2 sprays into each nostril daily 9 9 mL 2   • loratadine (CLARITIN) 10 mg tablet Take 10 mg by mouth     • metFORMIN (GLUCOPHAGE-XR) 750 mg 24 hr tablet Take 1 tablet (750 mg total) by mouth in the morning 30 tablet 0   • Turmeric Curcumin 500 MG CAPS Take by mouth       No current facility-administered medications on file prior to visit  Labs: I have personally reviewed all pertinent laboratory/tests results     Most Recent Labs:   Lab Results   Component Value Date    WBC 5 07 01/31/2022    RBC 4 06 01/31/2022    HGB 13 5 01/31/2022    HCT 40 3 01/31/2022     01/31/2022    RDW 14 1 01/31/2022    NEUTROABS 2 86 01/31/2022    SODIUM 142 01/31/2022    K 4 0 01/31/2022     (H) 01/31/2022    CO2 27 01/31/2022    BUN 10 01/31/2022    CREATININE 0 86 01/31/2022    GLUC 109 08/29/2021    GLUF 109 (H) 01/31/2022    CALCIUM 9 2 01/31/2022    AST 31 01/31/2022    ALT 37 01/31/2022    ALKPHOS 68 01/31/2022    TP 7 3 01/31/2022    ALB 4 0 01/31/2022 TBILI 0 83 01/31/2022    CHOLESTEROL 158 01/31/2022    HDL 41 (L) 01/31/2022    TRIG 87 01/31/2022    LDLCALC 100 01/31/2022    NONHDLC 117 01/31/2022    WGF3ANBXAXQA 2 340 01/31/2022    HGBA1C 5 0 07/25/2022     10/27/2014   _____________________________________________________________________    A/P  Bailey Mates, 61 yr old  female, h/o hospitalization for bipolar II disorder ( prior trail of Lithium; Depakote), PTSD, presented for MDD on Abilify + Duloxetine  Presented with manic like symptoms ( elated, distracted, energetic)  Today , with reports of depression seasonal linked; linked symptoms reported on    DSM 5  Major Depressive Disorder , recurrent, moderate without psychotic feature  Otherwise specified trauma and related stress disorder- 1/2 symptoms reactivity  Other specified Bipolar Disorder,      PLANS:  Discussed diagnotic impression based on clinical findings and external review  Duloxetine 60mg  Abilify 10mg  Orders Placed This Encounter   • Lipid panel   • CBC and differential   • Comprehensive metabolic panel   • TSH, 3rd generation with Free T4 reflex   • Hemoglobin A1C       Risks, benefits, and possible side effects of medications explained to patient and patient verbalizes understanding  Next Appointment: ~ 2 months      Today's Appointment@ [facility]  Face To Face:  11:23 AM -11:59 AM;Documentation Time:  6:56 PM- 7:09 PM    Encounter Duration: Time Spent 36 minutes with Patient  Greater than 50% of total time was spent with the patient     MDM  Number of Diagnoses or Management Options  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 ([facility] [address] ): established, improving  PTSD (post-traumatic stress disorder): established, improving     Amount and/or Complexity of Data Reviewed  Clinical lab tests: ordered (CBC, CMP, Lipids, TSH, HgA1C)  Review and summarize past medical records: yes    Risk of Complications, Morbidity, and/or Mortality  Presenting problems: moderate  Management options: moderate

## 2022-12-27 ENCOUNTER — OFFICE VISIT (OUTPATIENT)
Dept: PSYCHIATRY | Facility: CLINIC | Age: 67
End: 2022-12-27

## 2022-12-27 DIAGNOSIS — Z79.899 ENCOUNTER FOR LONG-TERM (CURRENT) USE OF OTHER MEDICATIONS: Primary | ICD-10-CM

## 2022-12-27 DIAGNOSIS — F43.10 PTSD (POST-TRAUMATIC STRESS DISORDER): ICD-10-CM

## 2022-12-27 DIAGNOSIS — F31.81 BIPOLAR II DISORDER (HCC): ICD-10-CM

## 2022-12-27 RX ORDER — DULOXETIN HYDROCHLORIDE 60 MG/1
CAPSULE, DELAYED RELEASE ORAL
Qty: 90 CAPSULE | Refills: 0 | Status: SHIPPED | OUTPATIENT
Start: 2022-12-27

## 2022-12-27 RX ORDER — ARIPIPRAZOLE 10 MG/1
TABLET ORAL
Qty: 90 TABLET | Refills: 0 | Status: SHIPPED | OUTPATIENT
Start: 2022-12-27

## 2023-01-19 ENCOUNTER — TELEPHONE (OUTPATIENT)
Dept: PSYCHIATRY | Facility: CLINIC | Age: 68
End: 2023-01-19

## 2023-01-19 NOTE — TELEPHONE ENCOUNTER
Pt is looking to transfer to new psychiatrist due to not meshing well with current psychiatrist  Transferred call to [facility]

## 2023-01-23 ENCOUNTER — TELEPHONE (OUTPATIENT)
Dept: PSYCHIATRY | Facility: CLINIC | Age: 68
End: 2023-01-23

## 2023-01-23 NOTE — TELEPHONE ENCOUNTER
Apt scheduled to discuss clients request to transfer to a different psychiatrist, per Dr Blanchard Citizen request

## 2023-01-25 ENCOUNTER — TELEPHONE (OUTPATIENT)
Dept: OTHER | Facility: OTHER | Age: 68
End: 2023-01-25

## 2023-01-25 NOTE — TELEPHONE ENCOUNTER
Patient is calling regarding cancelling an appointment      Date/Time: 1/25/23 at 11am    Patient was rescheduled: YES [] NO [x]    Patient requesting call back to reschedule: YES [x] NO []    Pt cxld appointment due to weather

## 2023-01-27 NOTE — PATIENT INSTRUCTIONS
Michael Westbrook [id number]   Thanks for presenting to today's Appointment at [facility]   Your medications were not changed

## 2023-01-27 NOTE — PSYCH
[facility]/Hospital: [address]   [address] [facility]    Psychiatric Progress Note  MRN#: [id number]  Renzo Frias 75 y o  female    This note was not shared with the patient due to reasonable likelihood of causing patient harm   This Patient was seen in the office today at [facility] location  Subjective:    Cesario Bashir reported wanting to change mental health provider, and that she also is interested in therapy  Otherwise , theres medications compliance to Duloxetine and Aripiprazole  Without side effects  Completed labs     ROS:  Bipolar - denies recent mood changes  SI/HI- denies   Somatic Symptoms- none , denied tremors or abnormal movements     Mental Status Evaluation:  General Appearance:  Renzo Frias is a 75 y o   female casually dressed, adequate hygiene and grooming, looks stated age   Behavior:  pleasant, cooperative, calm, adequate eye contact   Speech:  normal for rate, rhythm,latency, amount  Loud tone and volume    Mood:  frustration   Affect:  mood-congruent  To irritable , labile    Thought Process:  logical and tangential   Thought Content:  no overt delusions, normal   Perceptual Disturbances: no auditory hallucinations, no visual hallucinations, Does not appear responding or preoccupied   Delusions  No   Risk Potential: Suicidal Ideations No  Homicidal Ideations No  Potential for Aggression No     Sensorium:  Oriented to person, place, time/date and situation   Memory:  recent and remote memory grossly intact   Consciousness:  alert and awake   Attention: attention span and concentration are age appropriate   Insight:  fair   Judgment: fair   Gait/Station: normal   Motor Activity: no abnormal movements     There were no vitals filed for this visit       Medications:   Current Outpatient Medications on File Prior to Visit   Medication Sig Dispense Refill   • ARIPiprazole (ABILIFY) 10 mg tablet Aripiprazole 10m tablet po daily 90 tablet 0   • ascorbic acid (VITAMIN C) 500 mg tablet Take 500 mg by mouth daily     • benzonatate (TESSALON PERLES) 100 mg capsule Take 1 capsule (100 mg total) by mouth 3 (three) times a day as needed for cough 20 capsule 0   • bisacodyl (DULCOLAX) 5 mg EC tablet Take 1 tablet by mouth daily as needed     • Calcium 250 MG CAPS Take 500 mg by mouth       • Cholecalciferol 50 MCG (2000 UT) CAPS Take by mouth     • DULoxetine (CYMBALTA) 60 mg delayed release capsule Duloxetine 60m capsule po daily 90 capsule 0   • fluticasone (FLONASE) 50 mcg/act nasal spray 2 sprays into each nostril daily 9 9 mL 2   • loratadine (CLARITIN) 10 mg tablet Take 10 mg by mouth     • metFORMIN (GLUCOPHAGE-XR) 750 mg 24 hr tablet Take 1 tablet (750 mg total) by mouth in the morning 30 tablet 0   • Turmeric Curcumin 500 MG CAPS Take by mouth       No current facility-administered medications on file prior to visit  Labs: I have personally reviewed all pertinent laboratory/tests results     Most Recent Labs:   Lab Results   Component Value Date    WBC 5 07 2022    RBC 4 06 2022    HGB 13 5 2022    HCT 40 3 2022     2022    RDW 14 1 2022    NEUTROABS 2 86 2022    SODIUM 142 2022    K 4 0 2022     (H) 2022    CO2 27 2022    BUN 10 2022    CREATININE 0 86 2022    GLUC 109 2021    GLUF 109 (H) 2022    CALCIUM 9 2 2022    AST 31 2022    ALT 37 2022    ALKPHOS 68 2022    TP 7 3 2022    ALB 4 0 2022    TBILI 0 83 2022    CHOLESTEROL 158 2022    HDL 41 (L) 2022    TRIG 87 2022    LDLCALC 100 2022    NONHDLC 117 2022    [id number] 2 340 2022    HGBA1C 5 0 2022     10/27/2014   _____________________________________________________________________    A/P  Herbie Pat, 75 yr old  female, h/o hospitalization for bipolar II disorder ( prior trail of Lithium; Depakote), PTSD, presented for MDD on Abilify + Duloxetine  Presented with manic like symptoms, interim events of depression  Today, Kaitlynn Hairston reported stability although desire for change in providers and goal to also start therapy  MSE of frustration , otherwise devoid of SI      DSM 5  Major Depressive Disorder , recurrent, moderate without psychotic feature  Otherwise specified trauma and related stress disorder- 1/2 symptoms reactivity  Other specified Bipolar Disorder,      PLANS:  Discussed diagnotic impression based on history, external records reviewed and clinical findings   WNL CBC, CMP, TSH, lipid panel, and HgA1C  Plans for Kaitlynn Hairston to see another provider; was informed of how too  Transfer note was completed after appointment ( 6:09pm)          Treatement: Risks, benefits, and possible side effects of medications explained to patient and patient verbalizes understanding  Today's Appointment@ [facility]        Face To Face:  12:02 PM -12:13 PM;Documentation Time:  3:17 PM- 3:25 PM           Encounter Duration: Time Spent 11 minutes with Patient  Greater than 50% of total time was spent with the patient     MDM  Number of Diagnoses or Management Options  Major depressive disorder, recurrent, moderate ([facility] [address] ): established, improving  Other bipolar disorder ([facility] [address] ): established, improving     Amount and/or Complexity of Data Reviewed  Clinical lab tests: reviewed  Review and summarize past medical records: yes    Risk of Complications, Morbidity, and/or Mortality  Presenting problems: moderate  Management options: low    Critical Care  Total time providing critical care: < 30 minutes

## 2023-01-30 ENCOUNTER — TELEPHONE (OUTPATIENT)
Dept: PSYCHIATRY | Facility: CLINIC | Age: 68
End: 2023-01-30

## 2023-01-30 ENCOUNTER — DOCUMENTATION (OUTPATIENT)
Dept: PSYCHIATRY | Facility: CLINIC | Age: 68
End: 2023-01-30

## 2023-01-30 ENCOUNTER — OFFICE VISIT (OUTPATIENT)
Dept: PSYCHIATRY | Facility: CLINIC | Age: 68
End: 2023-01-30

## 2023-01-30 DIAGNOSIS — F33.1 MAJOR DEPRESSIVE DISORDER, RECURRENT, MODERATE (HCC): Primary | ICD-10-CM

## 2023-01-30 DIAGNOSIS — F31.89 OTHER BIPOLAR DISORDER (HCC): ICD-10-CM

## 2023-01-30 NOTE — TELEPHONE ENCOUNTER
Patient called in and stated that she was a former patient of a provider here and that she would like to schedule an appointment  Writer informed her that due to the time last seen in office and having been discharged, she would have to go on the wait list as a np and that the wait list is rather extensive  Writer offered to mail out outside [facility] as well  Patient stated she simply wanted a message sent to provider to see if he had suggestion for her since she is falling apart and used to see him for seven years  Writer informed patient that a message would be sent to provider but that there were no guarantees as to if he would reach out or not  Patient understood and was much appreciative

## 2023-01-30 NOTE — PSYCH
No  This note was not shared with the patient due to reasonable likelihood of causing patient harm       Jie Traylor    Patient Name Gene Julian   Patient requested Transfer      Date of Birth: 75 y o  1955      MRN: [id number]    Admission Date: August 2022    Date of Transfer: January 30, 2023    Current Diagnosis:     Major Depressive Disorder   Otherwise specified trauma   Otherwise specified bipolar disorder       Reason for Admission: Tucker Walker presented for treatment due to mood instability  MSE of characterology pathology       Progress in Treatment: Tucker Walker was seen for acute elvira , since course of treatment bipolar symptoms as stablized with Abilify 10mg, also on Duloxetine 60mg     Episodes of Higher Level of Care: No    Does this individual need a clinician with specialized training/expertise?: Yes, Interpersonal Psychotherapy and Trauma informed       Are there any specific individuals who would be a “best fit” or who have already agreed to accept this transfer request?      Psychiatrist: None       Attempts to maintain the current therapeutic relationship: Yes, but patient insists on transfer    Transfer request routed to Clinical Coordinator for input and/or approval      Comments from other involved providers and/or clinical coordinator: None    Darya Hatch, DO

## 2023-02-01 ENCOUNTER — OFFICE VISIT (OUTPATIENT)
Dept: GASTROENTEROLOGY | Facility: CLINIC | Age: 68
End: 2023-02-01

## 2023-02-01 ENCOUNTER — TELEPHONE (OUTPATIENT)
Dept: GASTROENTEROLOGY | Facility: CLINIC | Age: 68
End: 2023-02-01

## 2023-02-01 VITALS
SYSTOLIC BLOOD PRESSURE: 133 MMHG | WEIGHT: 226 LBS | BODY MASS INDEX: 36.32 KG/M2 | DIASTOLIC BLOOD PRESSURE: 67 MMHG | HEIGHT: 66 IN

## 2023-02-01 DIAGNOSIS — E66.9 OBESITY (BMI 30-39.9): ICD-10-CM

## 2023-02-01 DIAGNOSIS — K59.00 CONSTIPATION, UNSPECIFIED CONSTIPATION TYPE: ICD-10-CM

## 2023-02-01 DIAGNOSIS — Z12.11 SCREENING FOR COLORECTAL CANCER: ICD-10-CM

## 2023-02-01 DIAGNOSIS — R13.12 OROPHARYNGEAL DYSPHAGIA: Primary | ICD-10-CM

## 2023-02-01 DIAGNOSIS — Z12.12 SCREENING FOR COLORECTAL CANCER: ICD-10-CM

## 2023-02-01 RX ORDER — POLYETHYLENE GLYCOL 3350, SODIUM SULFATE ANHYDROUS, SODIUM BICARBONATE, SODIUM CHLORIDE, POTASSIUM CHLORIDE 236; 22.74; 6.74; 5.86; 2.97 G/4L; G/4L; G/4L; G/4L; G/4L
4000 POWDER, FOR SOLUTION ORAL ONCE
Qty: 4000 ML | Refills: 0 | Status: SHIPPED | OUTPATIENT
Start: 2023-02-01 | End: 2023-02-01

## 2023-02-01 NOTE — TELEPHONE ENCOUNTER
Scheduled date of colonoscopy (as of today):2/24/23  Physician performing colonoscopy:MILO  Location of colonoscopy:[facility]  Bowel prep reviewed with patient:Milly  Instructions reviewed with patient by:HEIDI  Clearances: N

## 2023-02-01 NOTE — H&P (VIEW-ONLY)
[facility] - Outpatient Consultation  Rosendo Alegre 75 y o  female MRN: [id number]  Encounter: [id number]    ASSESSMENT AND PLAN:      1  Oropharyngeal dysphagia  Noted  May be due to her neck fusion procedure  Possible structural or mechanical limitations to her swallowing  Differential includes erosive disease  Less likely motility disorder  We will plan for EGD to evaluate her dysphagia  Plan to trial PPI after EGD  - EGD; Future    2  Obesity (BMI 30-39  9)  Noted BMI of 37 04    3  Screening for colorectal cancer  Patient is due for colon cancer screening with colonoscopy and is at average risk for colorectal cancer  Patient is not on diabetic medications or blood thinners  We will schedule the patient for colonoscopy and have advised the patient to take the bowel preparation in a split dose bowel preparation  I have discussed with the patient the risks and benefits and alternatives of the procedure which include but are not limited to bleeding, infection, aspiration, perforation  Alternatives include FIT yearly, flex sig +/- FIT, CT colonography q5yr, cologuard q3yr  - Colonoscopy; Future  - polyethylene glycol (Golytely) 4000 mL solution; Take 4,000 mL by mouth once for 1 dose Take 4000 mL by mouth once for 1 dose  Use as directed  Dispense: 4000 mL; Refill: 0    4  Constipation, unspecified constipation type  Patient with a long history of constipation  Currently on 2 bisacodyl tabs nightly with regular bowel movements  No blood  Okay to continue this regimen  Prior to her colonoscopy she will take an extra dose of bisacodyl 2 days before the colonoscopy to help with bowel preparation     - Colonoscopy; Future  - polyethylene glycol (Golytely) 4000 mL solution; Take 4,000 mL by mouth once for 1 dose Take 4000 mL by mouth once for 1 dose   Use as directed  Dispense: 4000 mL; Refill: 0      Follow up Appointment: EGD and colonoscopy    Chief Complaint   Patient presents with   • Dysphagia   • Schedule Colonoscopy       HPI:   Patient is a 50-year-old female past medical history of obesity, bipolar disease, fibromyalgia, chronic pain syndrome, who is presenting for evaluation of dysphagia  Ever since May 2022 when she had surgery with a neck fusion she was found to have episodes of oropharyngeal dysphagia  Food would get stuck in the middle of her throat multiple times a week  No nausea vomiting or regurgitation  Would need to drink a lot of water in order to wash down the food  Denies any abdominal pain  Denies any heartburn  She also has episodes of constipation  She is currently on 2 bisacodyl pills nightly  With this regimen she is having regular bowel movements  Without any blood or GI bleeding  She is intentionally trying to lose weight for possible bariatric surgery  GI History:  Blood thinners: Denies ASA, antiplatelet, or anticoagulation  NSAID use: ibuprofen as needed  Insulin use: None      Abdominal Surgical Hx: tubal ligation  hemorrhoidectomy  Family Hx: Denies first degree relatives with GI malignancies  GI procedure Hx: colonoscopy in 2013   normal    Historical Information   Past Medical History:   Diagnosis Date   • Anxiety    • Arthritis    • Basal cell carcinoma (BCC)    • Bipolar disorder ([facility] [address] )    • Cervical spinal stenosis     last assessed 5/13/14, resolved 10/10/16   • Chronic constipation     last assessed 8/28/12, resovled 9/14/15   • Chronic fatigue syndrome     last assessed 8/28/12, resolved 9/14/15   • Chronic hyperglycemia     resolved 9/14/15   • Chronic pain syndrome     last assessed 11/12/13, resolved 10/10/16   • Depression    • Essential hypertriglyceridemia     resolved 10/10/16   • Fibromyalgia    • GERD (gastroesophageal reflux disease)    • Herpes simplex infection     last assessed 12/12/13, resolved 10/10/16   • Hypokalemia     last assessed 9/14/15, resolved 11/23/15   • Insomnia     last assessed 3/30/15, resolved 11/23/15   • Median neuropathy     last assessed 7/14/15, resolved 10/10/16   • Pneumonia     last assessed 7/16/13, resolved 5/10/13   • Sacroiliitis ([facility] [address] )     last assessed 10/22/13, resolved 10/27/14     Past Surgical History:   Procedure Laterality Date   • CERVICAL FUSION     • COLONOSCOPY  10/23/2013    10yrs    • DILATION AND CURETTAGE OF UTERUS     • HALLUX VALGUS CORRECTION     • HEMORROIDECTOMY     • NEUROPLASTY / [address]     • REDUCTION MAMMAPLASTY Bilateral 1991   • TONSILLECTOMY     • TOOTH EXTRACTION     • TUBAL LIGATION       Social History     Substance and Sexual Activity   Alcohol Use Not Currently     Social History     Substance and Sexual Activity   Drug Use No     Social History     Tobacco Use   Smoking Status Never   Smokeless Tobacco Never     Family History   Problem Relation Age of Onset   • Hypertension Mother    • Osteoporosis Mother    • Arthritis Mother    • Sudden death Mother         brain aneurysm   • Heart attack Father    • Melanoma Father 48   • Coronary artery disease Father    • Hyperlipidemia Father    • Cancer Father 78        brain cancer metasized from skin   • Diabetes Paternal Grandmother    • Coronary artery disease Paternal Grandfather    • Heart attack Family    • Arthritis Family    • Diabetes Family    • Hypertension Family    • Heart disease Family    • Osteoporosis Family    • No Known Problems Daughter    • No Known Problems Maternal Grandmother    • No Known Problems Maternal Grandfather    • No Known Problems Brother        Meds/Allergies     Current Outpatient Medications:   •  ARIPiprazole (ABILIFY) 10 mg tablet  •  ascorbic acid (VITAMIN C) 500 mg tablet  •  Calcium 250 MG CAPS  •  Cholecalciferol 50 MCG (2000 UT) CAPS  •  DULoxetine (CYMBALTA) 60 mg delayed release capsule  •  loratadine (CLARITIN) 10 mg tablet  •  polyethylene glycol (Golytely) 4000 mL solution  •  Turmeric Curcumin 500 MG CAPS  •  benzonatate (TESSALON PERLES) 100 mg capsule  •  fluticasone (FLONASE) 50 mcg/act nasal spray  •  metFORMIN (GLUCOPHAGE-XR) 750 mg 24 hr tablet    Allergies   Allergen Reactions   • Other Shortness Of Breath, Allergic Rhinitis and Cough     Cigarette and bleach  [place]  trees    • Cephalexin Rash   • Isoflavones (Soy)    • Latex Rash     Reaction Date: 21Mar2012;    • Molds & Smuts Allergic Rhinitis   • Risperidone Palpitations     Reaction Date: 21Mar2012;    • Sertraline Itching     Reaction Date: 21Mar2012;        PHYSICAL EXAM:    Blood pressure 133/67, height 5' 5 5" (1 664 m), weight 103 kg (226 lb), not currently breastfeeding  Body mass index is 37 04 kg/m²  General Appearance: NAD, cooperative, alert  Eyes: Anicteric  GI:  Soft, non-tender, non-distended; normal bowel sounds; no masses, no organomegaly   Rectal: Deferred  Musculoskeletal: No edema  Skin:  No jaundice    Lab Results:   Lab Results   Component Value Date    WBC 5 07 01/31/2022    WBC 5 69 08/29/2021    WBC 6 29 08/28/2021    HGB 13 5 01/31/2022    HGB 12 6 08/29/2021    HGB 12 2 08/28/2021    MCV 99 (H) 01/31/2022     01/31/2022     08/29/2021     08/28/2021     Lab Results   Component Value Date     09/14/2015    K 4 0 01/31/2022     (H) 01/31/2022    CO2 27 01/31/2022    ANIONGAP 6 09/14/2015    BUN 10 01/31/2022    CREATININE 0 86 01/31/2022    GLUCOSE 66 09/14/2015    GLUF 109 (H) 01/31/2022    CALCIUM 9 2 01/31/2022    CORRECTEDCA 9 6 08/29/2021    AST 31 01/31/2022    AST 23 08/29/2021    AST 21 08/28/2021    ALT 37 01/31/2022    ALT 34 08/29/2021    ALT 33 08/28/2021    ALKPHOS 68 01/31/2022    ALKPHOS 68 08/29/2021    ALKPHOS 66 08/28/2021    PROT 7 2 07/21/2015    BILITOT 0 57 07/21/2015    BILITOT 0 6 01/09/2015    BILITOT 0 4 10/22/2014    EGFR 70 01/31/2022     No results found for: IRON, TIBC, FERRITIN  No results found for: LIPASE    Radiology Results:   No results found

## 2023-02-01 NOTE — PROGRESS NOTES
[address] [facility] - Outpatient Consultation  Tawanna Avery 75 y o  female MRN: [id number]  Encounter: [id number]    ASSESSMENT AND PLAN:      1  Oropharyngeal dysphagia  Noted  May be due to her neck fusion procedure  Possible structural or mechanical limitations to her swallowing  Differential includes erosive disease  Less likely motility disorder  We will plan for EGD to evaluate her dysphagia  Plan to trial PPI after EGD  - EGD; Future    2  Obesity (BMI 30-39  9)  Noted BMI of 37 04    3  Screening for colorectal cancer  Patient is due for colon cancer screening with colonoscopy and is at average risk for colorectal cancer  Patient is not on diabetic medications or blood thinners  We will schedule the patient for colonoscopy and have advised the patient to take the bowel preparation in a split dose bowel preparation  I have discussed with the patient the risks and benefits and alternatives of the procedure which include but are not limited to bleeding, infection, aspiration, perforation  Alternatives include FIT yearly, flex sig +/- FIT, CT colonography q5yr, cologuard q3yr  - Colonoscopy; Future  - polyethylene glycol (Golytely) 4000 mL solution; Take 4,000 mL by mouth once for 1 dose Take 4000 mL by mouth once for 1 dose  Use as directed  Dispense: 4000 mL; Refill: 0    4  Constipation, unspecified constipation type  Patient with a long history of constipation  Currently on 2 bisacodyl tabs nightly with regular bowel movements  No blood  Okay to continue this regimen  Prior to her colonoscopy she will take an extra dose of bisacodyl 2 days before the colonoscopy to help with bowel preparation     - Colonoscopy; Future  - polyethylene glycol (Golytely) 4000 mL solution; Take 4,000 mL by mouth once for 1 dose Take 4000 mL by mouth once for 1 dose   Use as directed  Dispense: 4000 mL; Refill: 0      Follow up Appointment: EGD and colonoscopy    Chief Complaint   Patient presents with   • Dysphagia   • Schedule Colonoscopy       HPI:   Patient is a 19-year-old female past medical history of obesity, bipolar disease, fibromyalgia, chronic pain syndrome, who is presenting for evaluation of dysphagia  Ever since May 2022 when she had surgery with a neck fusion she was found to have episodes of oropharyngeal dysphagia  Food would get stuck in the middle of her throat multiple times a week  No nausea vomiting or regurgitation  Would need to drink a lot of water in order to wash down the food  Denies any abdominal pain  Denies any heartburn  She also has episodes of constipation  She is currently on 2 bisacodyl pills nightly  With this regimen she is having regular bowel movements  Without any blood or GI bleeding  She is intentionally trying to lose weight for possible bariatric surgery  GI History:  Blood thinners: Denies ASA, antiplatelet, or anticoagulation  NSAID use: ibuprofen as needed  Insulin use: None      Abdominal Surgical Hx: tubal ligation  hemorrhoidectomy  Family Hx: Denies first degree relatives with GI malignancies  GI procedure Hx: colonoscopy in 2013   normal    Historical Information   Past Medical History:   Diagnosis Date   • Anxiety    • Arthritis    • Basal cell carcinoma (BCC)    • Bipolar disorder ([facility] [address] )    • Cervical spinal stenosis     last assessed 5/13/14, resolved 10/10/16   • Chronic constipation     last assessed 8/28/12, resovled 9/14/15   • Chronic fatigue syndrome     last assessed 8/28/12, resolved 9/14/15   • Chronic hyperglycemia     resolved 9/14/15   • Chronic pain syndrome     last assessed 11/12/13, resolved 10/10/16   • Depression    • Essential hypertriglyceridemia     resolved 10/10/16   • Fibromyalgia    • GERD (gastroesophageal reflux disease)    • Herpes simplex infection     last assessed 12/12/13, resolved 10/10/16   • Hypokalemia     last assessed 9/14/15, resolved 11/23/15   • Insomnia     last assessed 3/30/15, resolved 11/23/15   • Median neuropathy     last assessed 7/14/15, resolved 10/10/16   • Pneumonia     last assessed 7/16/13, resolved 5/10/13   • Sacroiliitis ([facility] [address] )     last assessed 10/22/13, resolved 10/27/14     Past Surgical History:   Procedure Laterality Date   • CERVICAL FUSION     • COLONOSCOPY  10/23/2013    10yrs    • DILATION AND CURETTAGE OF UTERUS     • HALLUX VALGUS CORRECTION     • HEMORROIDECTOMY     • NEUROPLASTY / [address]     • REDUCTION MAMMAPLASTY Bilateral 1991   • TONSILLECTOMY     • TOOTH EXTRACTION     • TUBAL LIGATION       Social History     Substance and Sexual Activity   Alcohol Use Not Currently     Social History     Substance and Sexual Activity   Drug Use No     Social History     Tobacco Use   Smoking Status Never   Smokeless Tobacco Never     Family History   Problem Relation Age of Onset   • Hypertension Mother    • Osteoporosis Mother    • Arthritis Mother    • Sudden death Mother         brain aneurysm   • Heart attack Father    • Melanoma Father 48   • Coronary artery disease Father    • Hyperlipidemia Father    • Cancer Father 78        brain cancer metasized from skin   • Diabetes Paternal Grandmother    • Coronary artery disease Paternal Grandfather    • Heart attack Family    • Arthritis Family    • Diabetes Family    • Hypertension Family    • Heart disease Family    • Osteoporosis Family    • No Known Problems Daughter    • No Known Problems Maternal Grandmother    • No Known Problems Maternal Grandfather    • No Known Problems Brother        Meds/Allergies     Current Outpatient Medications:   •  ARIPiprazole (ABILIFY) 10 mg tablet  •  ascorbic acid (VITAMIN C) 500 mg tablet  •  Calcium 250 MG CAPS  •  Cholecalciferol 50 MCG (2000 UT) CAPS  •  DULoxetine (CYMBALTA) 60 mg delayed release capsule  •  loratadine (CLARITIN) 10 mg tablet  •  polyethylene glycol (Golytely) 4000 mL solution  •  Turmeric Curcumin 500 MG CAPS  •  benzonatate (TESSALON PERLES) 100 mg capsule  •  fluticasone (FLONASE) 50 mcg/act nasal spray  •  metFORMIN (GLUCOPHAGE-XR) 750 mg 24 hr tablet    Allergies   Allergen Reactions   • Other Shortness Of Breath, Allergic Rhinitis and Cough     Cigarette and bleach  [place]  trees    • Cephalexin Rash   • Isoflavones (Soy)    • Latex Rash     Reaction Date: 21Mar2012;    • Molds & Smuts Allergic Rhinitis   • Risperidone Palpitations     Reaction Date: 21Mar2012;    • Sertraline Itching     Reaction Date: 21Mar2012;        PHYSICAL EXAM:    Blood pressure 133/67, height 5' 5 5" (1 664 m), weight 103 kg (226 lb), not currently breastfeeding  Body mass index is 37 04 kg/m²  General Appearance: NAD, cooperative, alert  Eyes: Anicteric  GI:  Soft, non-tender, non-distended; normal bowel sounds; no masses, no organomegaly   Rectal: Deferred  Musculoskeletal: No edema  Skin:  No jaundice    Lab Results:   Lab Results   Component Value Date    WBC 5 07 01/31/2022    WBC 5 69 08/29/2021    WBC 6 29 08/28/2021    HGB 13 5 01/31/2022    HGB 12 6 08/29/2021    HGB 12 2 08/28/2021    MCV 99 (H) 01/31/2022     01/31/2022     08/29/2021     08/28/2021     Lab Results   Component Value Date     09/14/2015    K 4 0 01/31/2022     (H) 01/31/2022    CO2 27 01/31/2022    ANIONGAP 6 09/14/2015    BUN 10 01/31/2022    CREATININE 0 86 01/31/2022    GLUCOSE 66 09/14/2015    GLUF 109 (H) 01/31/2022    CALCIUM 9 2 01/31/2022    CORRECTEDCA 9 6 08/29/2021    AST 31 01/31/2022    AST 23 08/29/2021    AST 21 08/28/2021    ALT 37 01/31/2022    ALT 34 08/29/2021    ALT 33 08/28/2021    ALKPHOS 68 01/31/2022    ALKPHOS 68 08/29/2021    ALKPHOS 66 08/28/2021    PROT 7 2 07/21/2015    BILITOT 0 57 07/21/2015    BILITOT 0 6 01/09/2015    BILITOT 0 4 10/22/2014    EGFR 70 01/31/2022     No results found for: IRON, TIBC, FERRITIN  No results found for: LIPASE    Radiology Results:   No results found

## 2023-02-07 ENCOUNTER — TELEPHONE (OUTPATIENT)
Dept: PSYCHIATRY | Facility: CLINIC | Age: 68
End: 2023-02-07

## 2023-02-07 NOTE — TELEPHONE ENCOUNTER
Contacted client due to request for transfer to a new prescriber  Transfer approved by Dr Ryan Agee, client scheduled with Kamaljit Connelly

## 2023-02-13 ENCOUNTER — TELEPHONE (OUTPATIENT)
Dept: PSYCHIATRY | Facility: CLINIC | Age: 68
End: 2023-02-13

## 2023-02-13 NOTE — TELEPHONE ENCOUNTER
Spoke with pt in regards to pending transfer list  Pt stated she would like Fanny and Yokasta  Writer informed pt that I will update chart and she will get a call as soon as an appt becomes available with Yokasta

## 2023-02-24 ENCOUNTER — ANESTHESIA (OUTPATIENT)
Dept: GASTROENTEROLOGY | Facility: AMBULATORY SURGERY CENTER | Age: 68
End: 2023-02-24

## 2023-02-24 ENCOUNTER — ANESTHESIA EVENT (OUTPATIENT)
Dept: GASTROENTEROLOGY | Facility: AMBULATORY SURGERY CENTER | Age: 68
End: 2023-02-24

## 2023-02-24 ENCOUNTER — HOSPITAL ENCOUNTER (OUTPATIENT)
Dept: GASTROENTEROLOGY | Facility: AMBULATORY SURGERY CENTER | Age: 68
Discharge: HOME | End: 2023-02-24
Attending: INTERNAL MEDICINE

## 2023-02-24 VITALS
OXYGEN SATURATION: 98 % | HEIGHT: 66 IN | RESPIRATION RATE: 23 BRPM | WEIGHT: 220 LBS | DIASTOLIC BLOOD PRESSURE: 77 MMHG | HEART RATE: 70 BPM | SYSTOLIC BLOOD PRESSURE: 129 MMHG | BODY MASS INDEX: 35.36 KG/M2 | TEMPERATURE: 97.5 F

## 2023-02-24 DIAGNOSIS — K29.70 GASTRITIS WITHOUT BLEEDING, UNSPECIFIED CHRONICITY, UNSPECIFIED GASTRITIS TYPE: Primary | ICD-10-CM

## 2023-02-24 DIAGNOSIS — R13.12 OROPHARYNGEAL DYSPHAGIA: ICD-10-CM

## 2023-02-24 DIAGNOSIS — Z12.12 SCREENING FOR COLORECTAL CANCER: ICD-10-CM

## 2023-02-24 DIAGNOSIS — Z12.11 SCREENING FOR COLORECTAL CANCER: ICD-10-CM

## 2023-02-24 DIAGNOSIS — K59.00 CONSTIPATION, UNSPECIFIED CONSTIPATION TYPE: ICD-10-CM

## 2023-02-24 RX ORDER — SODIUM CHLORIDE, SODIUM LACTATE, POTASSIUM CHLORIDE, CALCIUM CHLORIDE 600; 310; 30; 20 MG/100ML; MG/100ML; MG/100ML; MG/100ML
50 INJECTION, SOLUTION INTRAVENOUS CONTINUOUS
Status: DISCONTINUED | OUTPATIENT
Start: 2023-02-24 | End: 2023-02-28 | Stop reason: HOSPADM

## 2023-02-24 RX ORDER — OMEPRAZOLE 40 MG/1
40 CAPSULE, DELAYED RELEASE ORAL DAILY
Qty: 90 CAPSULE | Refills: 1 | Status: SHIPPED | OUTPATIENT
Start: 2023-02-24

## 2023-02-24 RX ORDER — PROPOFOL 10 MG/ML
INJECTION, EMULSION INTRAVENOUS AS NEEDED
Status: DISCONTINUED | OUTPATIENT
Start: 2023-02-24 | End: 2023-02-24

## 2023-02-24 RX ADMIN — PROPOFOL 50 MG: 10 INJECTION, EMULSION INTRAVENOUS at 13:22

## 2023-02-24 RX ADMIN — PROPOFOL 120 MG: 10 INJECTION, EMULSION INTRAVENOUS at 13:03

## 2023-02-24 RX ADMIN — PROPOFOL 50 MG: 10 INJECTION, EMULSION INTRAVENOUS at 13:14

## 2023-02-24 RX ADMIN — PROPOFOL 50 MG: 10 INJECTION, EMULSION INTRAVENOUS at 13:11

## 2023-02-24 RX ADMIN — PROPOFOL 50 MG: 10 INJECTION, EMULSION INTRAVENOUS at 13:27

## 2023-02-24 RX ADMIN — PROPOFOL 50 MG: 10 INJECTION, EMULSION INTRAVENOUS at 13:18

## 2023-02-24 RX ADMIN — PROPOFOL 30 MG: 10 INJECTION, EMULSION INTRAVENOUS at 13:07

## 2023-02-24 RX ADMIN — SODIUM CHLORIDE, SODIUM LACTATE, POTASSIUM CHLORIDE, CALCIUM CHLORIDE 50 ML/HR: 600; 310; 30; 20 INJECTION, SOLUTION INTRAVENOUS at 13:02

## 2023-02-24 NOTE — ANESTHESIA POSTPROCEDURE EVALUATION
Post-Op Assessment Note    CV Status:  Stable  Pain Score: 0    Pain management: adequate     Mental Status:  Alert and awake   Hydration Status:  Euvolemic   PONV Controlled:  Controlled   Airway Patency:  Patent      Post Op Vitals Reviewed: Yes      Staff: Anesthesiologist, CRNA         No notable events documented      BP   119/61   Temp      Pulse  78   Resp   20   SpO2   99

## 2023-02-24 NOTE — ANESTHESIA PREPROCEDURE EVALUATION
Procedure:  COLONOSCOPY  EGD    Relevant Problems   CARDIO   (+) Chest pain syndrome   (+) Chronic bilateral thoracic back pain      GI/HEPATIC   (+) Hyperinsulinemia      MUSCULOSKELETAL   (+) Chronic bilateral low back pain without sciatica   (+) Chronic bilateral thoracic back pain   (+) DDD (degenerative disc disease), cervical   (+) Disc degeneration, lumbar   (+) Fibromyalgia   (+) Myofascial pain syndrome   (+) Other spondylosis with radiculopathy, lumbar region      NEURO/PSYCH   (+) Chronic bilateral low back pain without sciatica   (+) Chronic bilateral thoracic back pain   (+) Chronic neck pain   (+) Chronic pain syndrome   (+) Fibromyalgia   (+) Myofascial pain syndrome   (+) PTSD (post-traumatic stress disorder)      Nervous and Auditory   (+) Cervical radiculopathy      Other   (+) 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 (HCC)   (+) Severe obesity (BMI 35 0-35 9 with comorbidity) (HCC)        Physical Exam    Airway    Mallampati score: I  TM Distance: >3 FB  Neck ROM: full     Dental       Cardiovascular  Cardiovascular exam normal    Pulmonary  Pulmonary exam normal     Other Findings        Anesthesia Plan  ASA Score- 2     Anesthesia Type- IV sedation with anesthesia with ASA Monitors  Additional Monitors:   Airway Plan:           Plan Factors-Exercise tolerance (METS): >4 METS  Chart reviewed  EKG reviewed  Imaging results reviewed  Existing labs reviewed  Patient summary reviewed  Induction- intravenous  Postoperative Plan- Plan for postoperative opioid use  Planned trial extubation    Informed Consent- Anesthetic plan and risks discussed with patient  I personally reviewed this patient with the CRNA  Discussed and agreed on the Anesthesia Plan with the MICHAEL May

## 2023-02-24 NOTE — INTERVAL H&P NOTE
H&P reviewed  After examining the patient I find no changes in the patients condition since the H&P had been written      Vitals:    02/24/23 1246   BP: 149/79   Pulse: 73   Resp: 14   Temp:    SpO2: 98%

## 2023-03-10 ENCOUNTER — OFFICE VISIT (OUTPATIENT)
Dept: PSYCHIATRY | Facility: CLINIC | Age: 68
End: 2023-03-10

## 2023-03-10 DIAGNOSIS — F43.10 PTSD (POST-TRAUMATIC STRESS DISORDER): ICD-10-CM

## 2023-03-10 DIAGNOSIS — F31.81 BIPOLAR II DISORDER (HCC): ICD-10-CM

## 2023-03-10 RX ORDER — DULOXETIN HYDROCHLORIDE 60 MG/1
CAPSULE, DELAYED RELEASE ORAL
Qty: 90 CAPSULE | Refills: 0 | Status: SHIPPED | OUTPATIENT
Start: 2023-03-10

## 2023-03-10 RX ORDER — ARIPIPRAZOLE 10 MG/1
TABLET ORAL
Qty: 90 TABLET | Refills: 0 | Status: SHIPPED | OUTPATIENT
Start: 2023-03-10
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
## 2023-03-22 ENCOUNTER — TELEPHONE (OUTPATIENT)
Dept: PSYCHIATRY | Facility: CLINIC | Age: 68
End: 2023-03-22

## 2023-04-18 ENCOUNTER — TRANSCRIBE ORDERS (OUTPATIENT)
Dept: SLEEP MEDICINE | Facility: HOSPITAL | Age: 68
End: 2023-04-18

## 2023-04-18 DIAGNOSIS — G47.33 OBSTRUCTIVE SLEEP APNEA (ADULT) (PEDIATRIC): Primary | ICD-10-CM

## 2023-04-24 ENCOUNTER — HOSPITAL ENCOUNTER (OUTPATIENT)
Dept: SLEEP MEDICINE | Facility: HOSPITAL | Age: 68
Discharge: HOME | End: 2023-04-24
Attending: INTERNAL MEDICINE
Payer: MEDICARE

## 2023-04-24 DIAGNOSIS — G47.33 OBSTRUCTIVE SLEEP APNEA (ADULT) (PEDIATRIC): ICD-10-CM

## 2023-04-24 PROCEDURE — 95811 POLYSOM 6/>YRS CPAP 4/> PARM: CPT

## 2023-04-25 VITALS — WEIGHT: 228 LBS | HEART RATE: 64 BPM | HEIGHT: 65 IN | BODY MASS INDEX: 37.99 KG/M2 | OXYGEN SATURATION: 97 %

## 2023-05-02 ENCOUNTER — HOSPITAL ENCOUNTER (OUTPATIENT)
Dept: MAMMOGRAPHY | Facility: CLINIC | Age: 68
Discharge: HOME | End: 2023-05-02

## 2023-05-02 VITALS — HEIGHT: 66 IN | BODY MASS INDEX: 35.36 KG/M2 | WEIGHT: 220.02 LBS

## 2023-05-02 DIAGNOSIS — Z12.31 ENCOUNTER FOR SCREENING MAMMOGRAM FOR MALIGNANT NEOPLASM OF BREAST: ICD-10-CM

## 2023-05-19 ENCOUNTER — OFFICE VISIT (OUTPATIENT)
Age: 68
End: 2023-05-19

## 2023-05-19 VITALS
DIASTOLIC BLOOD PRESSURE: 80 MMHG | WEIGHT: 225 LBS | SYSTOLIC BLOOD PRESSURE: 140 MMHG | BODY MASS INDEX: 36.16 KG/M2 | HEIGHT: 66 IN

## 2023-05-19 DIAGNOSIS — R13.12 OROPHARYNGEAL DYSPHAGIA: Primary | ICD-10-CM

## 2023-05-19 DIAGNOSIS — E66.9 OBESITY (BMI 30-39.9): ICD-10-CM

## 2023-05-19 DIAGNOSIS — Z86.010 HISTORY OF COLONIC POLYPS: ICD-10-CM

## 2023-05-19 NOTE — PROGRESS NOTES
[facility] - Outpatient Follow-up Note  Fabiola Noe 70 y o  female MRN: [id number]  Encounter: [id number]    ASSESSMENT AND PLAN:      1  Oropharyngeal dysphagia  No structural abnormalities noted on EGD or on barium swallow  Consider ENT evaluation if further diagnosis is needed  Patient is largely avoiding any symptoms of dysphagia as long as she chews well and has small portions  2  Obesity (BMI 30-39  9)  BMI of 36 8  Noted  Counseled on exercise and diet    3  History of colonic polyps  Colonoscopy was 6 subcentimeter polyps mixture of serrated lesions and tubular adenomas  Recall in 3 years  Follow up appointment: As needed  ______________________________________________________________________    Chief Complaint   Patient presents with   • f/u swallowing tests     HPI:   Patient is a 48-year-old female past medical history of obesity, bipolar disease, fibromyalgia, chronic pain syndrome, who is presenting for follow-up  Was previously seen in the office in February 2023 for oropharyngeal dysphagia  Had a EGD and colonoscopy in February 2023  Stomach biopsies and proximal esophagus biopsies unremarkable  Colonoscopy was 6 subcentimeter polyps mixture of serrated lesions and tubular adenomas  Recall in 3 years  She had a modified barium swallow study was unremarkable  Started a trial of omeprazole 40 mg once daily  Omeprazole does not help much  Patient still continues to have occasional symptoms of oropharyngeal dysphagia but attributes that to swallowing large foods  Patient stated no reported lightheadedness, dizziness, fevers, chills, nausea, vomiting, heartburn, SOB, CP, abdominal pain, changes in bowel movement with diarrhea or constipation, GI bleeding, or unintentional weight loss      Historical Information   Past Medical History:   Diagnosis Date   • Anxiety    • Arthritis    • Basal cell carcinoma (BCC)    • Bipolar disorder ([facility] [address] )    • Cervical spinal stenosis     last assessed 5/13/14, resolved 10/10/16   • Chronic constipation     last assessed 8/28/12, resovled 9/14/15   • Chronic fatigue syndrome     last assessed 8/28/12, resolved 9/14/15   • Chronic hyperglycemia     resolved 9/14/15   • Chronic pain syndrome     last assessed 11/12/13, resolved 10/10/16   • Depression    • Essential hypertriglyceridemia     resolved 10/10/16   • Fibromyalgia    • Fibromyalgia, primary    • GERD (gastroesophageal reflux disease)    • Herpes simplex infection     last assessed 12/12/13, resolved 10/10/16   • Hypokalemia     last assessed 9/14/15, resolved 11/23/15   • Insomnia     last assessed 3/30/15, resolved 11/23/15   • Median neuropathy     last assessed 7/14/15, resolved 10/10/16   • Pneumonia     last assessed 7/16/13, resolved 5/10/13   • Sacroiliitis ([address] )     last assessed 10/22/13, resolved 10/27/14   • Sleep apnea      Past Surgical History:   Procedure Laterality Date   • CERVICAL FUSION     • COLONOSCOPY  10/23/2013    10yrs    • DILATION AND CURETTAGE OF UTERUS     • HALLUX VALGUS CORRECTION      bunion   • HEMORROIDECTOMY     • NEUROPLASTY / TRANSPOSITION MEDIAN NERVE AT CARPAL TUNNEL     • REDUCTION MAMMAPLASTY Bilateral 1991   • TONSILLECTOMY     • TOOTH EXTRACTION     • TUBAL LIGATION       Social History     Substance and Sexual Activity   Alcohol Use Not Currently     Social History     Substance and Sexual Activity   Drug Use No     Social History     Tobacco Use   Smoking Status Never   Smokeless Tobacco Never     Family History   Problem Relation Age of Onset   • Hypertension Mother    • Osteoporosis Mother    • Arthritis Mother    • Sudden death Mother         brain aneurysm   • Heart attack Father    • Melanoma Father 48   • Coronary artery disease Father    • Hyperlipidemia Father    • Cancer Father 78        brain cancer metasized from skin   • No Known Problems Daughter    • No Known Problems Maternal Grandmother    • No Known "Problems Maternal Grandfather    • Diabetes Paternal Grandmother    • Coronary artery disease Paternal Grandfather    • No Known Problems Brother          Current Outpatient Medications:   •  ARIPiprazole (ABILIFY) 10 mg tablet  •  ascorbic acid (VITAMIN C) 500 mg tablet  •  bisacodyl (DULCOLAX) 5 mg EC tablet  •  Calcium 250 MG CAPS  •  Cholecalciferol 50 MCG (2000 UT) CAPS  •  DULoxetine (CYMBALTA) 60 mg delayed release capsule  •  loratadine (CLARITIN) 10 mg tablet  •  Turmeric Curcumin 500 MG CAPS  •  VALACYCLOVIR HCL PO  Allergies   Allergen Reactions   • Other Shortness Of Breath, Allergic Rhinitis and Cough     Cigarette and bleach  [place]  trees    • Cephalexin Rash   • Isoflavones (Soy)    • Latex Rash     Reaction Date: 21Mar2012;    • Molds & Smuts Allergic Rhinitis   • Risperidone Palpitations     Reaction Date: 21Mar2012;    • Sertraline Itching     Reaction Date: 21Mar2012;      Reviewed medications and allergies and updated as indicated    PHYSICAL EXAM:    Blood pressure 140/80, height 5' 5 5\" (1 664 m), weight 102 kg (225 lb), not currently breastfeeding  Body mass index is 36 87 kg/m²  General Appearance: NAD, cooperative, alert  Eyes: Anicteric  GI:  Soft, non-tender, non-distended; normal bowel sounds; no masses, no organomegaly   Rectal: Deferred  Musculoskeletal: No edema    Skin:  No jaundice    Lab Results:   Lab Results   Component Value Date    WBC 5 07 01/31/2022    WBC 5 69 08/29/2021    WBC 6 29 08/28/2021    HGB 13 5 01/31/2022    HGB 12 6 08/29/2021    HGB 12 2 08/28/2021    MCV 99 (H) 01/31/2022     01/31/2022     08/29/2021     08/28/2021     Lab Results   Component Value Date     09/14/2015    K 4 0 01/31/2022     (H) 01/31/2022    CO2 27 01/31/2022    ANIONGAP 6 09/14/2015    BUN 10 01/31/2022    CREATININE 0 86 01/31/2022    GLUCOSE 66 09/14/2015    GLUF 109 (H) 01/31/2022    CALCIUM 9 2 01/31/2022    CORRECTEDCA 9 6 08/29/2021    AST 31 01/31/2022 " AST 23 08/29/2021    AST 21 08/28/2021    ALT 37 01/31/2022    ALT 34 08/29/2021    ALT 33 08/28/2021    ALKPHOS 68 01/31/2022    ALKPHOS 68 08/29/2021    ALKPHOS 66 08/28/2021    PROT 7 2 07/21/2015    BILITOT 0 57 07/21/2015    BILITOT 0 6 01/09/2015    BILITOT 0 4 10/22/2014    EGFR 70 01/31/2022     No results found for: IRON, TIBC, FERRITIN  No results found for: LIPASE    Radiology Results:   Mammo screening bilateral w 3d & cad    Result Date: 5/3/2023  Narrative: DIAGNOSIS: Encounter for screening mammogram for malignant neoplasm of breast TECHNIQUE: Digital screening mammography was performed  Computer Aided Detection (CAD) analyzed all applicable images  COMPARISONS: Prior breast imaging dated: 03/09/2022, 02/16/2021, 02/14/2020, 11/30/2018, 11/15/2017, 10/10/2016, 07/14/2015, and 08/12/2013 RELEVANT HISTORY: Family Breast Cancer History: No known family history of breast cancer  Family Medical History: No known relevant family medical history  Personal History: Hormone history includes birth control  Surgical history includes breast reduction  No known relevant medical history  The patient is scheduled in a reminder system for screening mammography  8-10% of cancers will be missed on mammography  Management of a palpable abnormality must be based on clinical grounds  Patients will be notified of their results via letter from our facility  Accredited by [employer] of Radiology and FDA  RISK ASSESSMENT: 5 Year Tyrer-Cuzick: 2 19 % 10 Year Tyrer-Cuzick: 4 56 % Lifetime Tyrer-Cuzick: 7 72 % TISSUE DENSITY: There are scattered areas of fibroglandular density  INDICATION: Ruby Hackett is a 70 y o  female presenting for screening mammography  FINDINGS: Bilateral There are no suspicious masses, grouped microcalcifications or areas of unexplained architectural distortion  The skin and nipple areolar complex are unremarkable  There are scattered calcifications present       Impression: No mammographic evidence of malignancy  ASSESSMENT/BI-RADS CATEGORY: Left: 2 - Benign Right: 2 - Benign Overall: 2 - Benign RECOMMENDATION:      - Routine screening mammogram in 1 year for both breasts   Workstation ID: [id number]

## 2023-05-25 ENCOUNTER — TELEPHONE (OUTPATIENT)
Dept: PSYCHIATRY | Facility: CLINIC | Age: 68
End: 2023-05-25

## 2023-05-30 ENCOUNTER — TELEPHONE (OUTPATIENT)
Dept: PSYCHIATRY | Facility: CLINIC | Age: 68
End: 2023-05-30

## 2023-06-23 ENCOUNTER — TELEPHONE (OUTPATIENT)
Age: 68
End: 2023-06-23

## 2023-06-23 NOTE — TELEPHONE ENCOUNTER
Patients GI provider:  Dr Chavez    Number to return call: [phone]    Reason for call: Pt calling would like your swallowing test and EDG sent to Dr Leilani Gonzalez  Fax # [phone]

## 2023-06-26 ENCOUNTER — NURSE TRIAGE (OUTPATIENT)
Age: 68
End: 2023-06-26

## 2023-06-26 NOTE — TELEPHONE ENCOUNTER
Patient states she called Friday to have records sent to her neurosurgeon Dr Nate Barrera  Previous note states faxed but she states their office does not have them  EGD/path and video swallow faxed via routing in [facility] to [phone]

## 2023-07-07 ENCOUNTER — OFFICE VISIT (OUTPATIENT)
Dept: PSYCHIATRY | Facility: CLINIC | Age: 68
End: 2023-07-07
Payer: MEDICARE

## 2023-07-07 DIAGNOSIS — F31.81 BIPOLAR II DISORDER (HCC): ICD-10-CM

## 2023-07-07 DIAGNOSIS — F43.10 PTSD (POST-TRAUMATIC STRESS DISORDER): ICD-10-CM

## 2023-07-07 PROCEDURE — 99212 OFFICE O/P EST SF 10 MIN: CPT

## 2023-07-07 RX ORDER — DULOXETIN HYDROCHLORIDE 60 MG/1
CAPSULE, DELAYED RELEASE ORAL
Qty: 90 CAPSULE | Refills: 1 | Status: SHIPPED | OUTPATIENT
Start: 2023-07-07

## 2023-07-07 RX ORDER — ARIPIPRAZOLE 10 MG/1
TABLET ORAL
Qty: 90 TABLET | Refills: 1 | Status: SHIPPED | OUTPATIENT
Start: 2023-07-07
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
## 2023-09-07 ENCOUNTER — OFFICE VISIT (OUTPATIENT)
Dept: PSYCHIATRY | Facility: CLINIC | Age: 68
End: 2023-09-07
Payer: MEDICARE

## 2023-09-07 DIAGNOSIS — F43.10 PTSD (POST-TRAUMATIC STRESS DISORDER): ICD-10-CM

## 2023-09-07 DIAGNOSIS — F31.81 BIPOLAR II DISORDER (HCC): ICD-10-CM

## 2023-09-07 PROCEDURE — 99212 OFFICE O/P EST SF 10 MIN: CPT

## 2023-09-07 RX ORDER — DULOXETIN HYDROCHLORIDE 60 MG/1
CAPSULE, DELAYED RELEASE ORAL
Qty: 30 CAPSULE | Refills: 3 | Status: SHIPPED | OUTPATIENT
Start: 2023-09-07

## 2023-09-07 RX ORDER — ARIPIPRAZOLE 10 MG/1
TABLET ORAL
Qty: 30 TABLET | Refills: 3 | Status: SHIPPED | OUTPATIENT
Start: 2023-09-07
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
